# Patient Record
Sex: FEMALE | Race: WHITE | NOT HISPANIC OR LATINO | Employment: OTHER | URBAN - METROPOLITAN AREA
[De-identification: names, ages, dates, MRNs, and addresses within clinical notes are randomized per-mention and may not be internally consistent; named-entity substitution may affect disease eponyms.]

---

## 2023-09-20 PROBLEM — K42.9 UMBILICAL HERNIA WITHOUT OBSTRUCTION AND WITHOUT GANGRENE: Status: ACTIVE | Noted: 2023-09-20

## 2023-09-20 PROBLEM — I89.0 LYMPHEDEMA: Status: ACTIVE | Noted: 2023-09-20

## 2023-09-20 PROBLEM — M47.816 FACET DEGENERATION OF LUMBAR REGION: Status: ACTIVE | Noted: 2023-09-20

## 2023-09-20 PROBLEM — N28.9 RENAL IMPAIRMENT: Status: ACTIVE | Noted: 2023-09-20

## 2023-09-20 PROBLEM — K44.9 HIATAL HERNIA: Status: ACTIVE | Noted: 2023-09-20

## 2023-09-20 PROBLEM — R60.0 LOWER LEG EDEMA: Status: ACTIVE | Noted: 2023-09-20

## 2023-09-20 PROBLEM — H91.90 HEARING LOSS: Status: ACTIVE | Noted: 2023-09-20

## 2023-09-20 PROBLEM — K43.9 EPIGASTRIC HERNIA: Status: ACTIVE | Noted: 2023-09-20

## 2023-09-20 PROBLEM — H92.02 OTALGIA, LEFT: Status: ACTIVE | Noted: 2023-09-20

## 2023-09-20 PROBLEM — M25.511 ARTHRALGIA OF SHOULDER REGION, RIGHT: Status: ACTIVE | Noted: 2023-09-20

## 2023-09-20 PROBLEM — Z86.711 HISTORY OF PULMONARY EMBOLUS (PE): Status: ACTIVE | Noted: 2023-09-20

## 2023-09-20 RX ORDER — FLUTICASONE PROPIONATE 50 MCG
1 SPRAY, SUSPENSION (ML) NASAL DAILY
COMMUNITY

## 2023-09-20 RX ORDER — ALBUTEROL SULFATE 90 UG/1
2 AEROSOL, METERED RESPIRATORY (INHALATION) EVERY 4 HOURS PRN
COMMUNITY

## 2023-09-20 RX ORDER — ACETAMINOPHEN 325 MG/1
3 TABLET ORAL EVERY 8 HOURS
COMMUNITY

## 2023-09-20 RX ORDER — IPRATROPIUM BROMIDE AND ALBUTEROL SULFATE 2.5; .5 MG/3ML; MG/3ML
3 SOLUTION RESPIRATORY (INHALATION) EVERY 4 HOURS PRN
COMMUNITY

## 2023-09-20 RX ORDER — FLUTICASONE PROPIONATE AND SALMETEROL 250; 50 UG/1; UG/1
1 POWDER RESPIRATORY (INHALATION) 2 TIMES DAILY
COMMUNITY
Start: 2023-08-31 | End: 2024-02-08 | Stop reason: WASHOUT

## 2023-09-20 RX ORDER — MONTELUKAST SODIUM 10 MG/1
1 TABLET ORAL DAILY
COMMUNITY

## 2023-09-20 RX ORDER — GABAPENTIN 600 MG/1
600 TABLET ORAL 3 TIMES DAILY
COMMUNITY

## 2023-09-20 RX ORDER — LANOLIN ALCOHOL/MO/W.PET/CERES
1 CREAM (GRAM) TOPICAL DAILY
COMMUNITY
End: 2024-02-08 | Stop reason: WASHOUT

## 2023-09-20 RX ORDER — MONTELUKAST SODIUM 4 MG/1
1 TABLET, CHEWABLE ORAL DAILY
COMMUNITY
End: 2024-02-08 | Stop reason: WASHOUT

## 2023-09-20 RX ORDER — LEVOTHYROXINE SODIUM 150 UG/1
1 TABLET ORAL DAILY
COMMUNITY

## 2023-09-20 RX ORDER — METOPROLOL SUCCINATE 50 MG/1
50 TABLET, EXTENDED RELEASE ORAL DAILY
COMMUNITY
Start: 2023-08-31 | End: 2024-02-08 | Stop reason: WASHOUT

## 2023-09-20 RX ORDER — TRAMADOL HYDROCHLORIDE 50 MG/1
50 TABLET ORAL EVERY 6 HOURS PRN
COMMUNITY
Start: 2023-08-31 | End: 2024-03-03 | Stop reason: HOSPADM

## 2023-09-20 RX ORDER — CHOLECALCIFEROL (VITAMIN D3) 125 MCG
2000 TABLET ORAL DAILY
COMMUNITY

## 2023-09-20 RX ORDER — LORATADINE 10 MG/1
10 TABLET ORAL DAILY
COMMUNITY

## 2023-09-20 RX ORDER — TORSEMIDE 10 MG/1
TABLET ORAL
Status: ON HOLD | COMMUNITY
End: 2024-03-01 | Stop reason: SDUPTHER

## 2023-09-20 RX ORDER — CYANOCOBALAMIN (VITAMIN B-12) 500 MCG
125 TABLET ORAL
COMMUNITY
End: 2024-03-04 | Stop reason: ENTERED-IN-ERROR

## 2023-09-20 RX ORDER — OMEPRAZOLE 40 MG/1
40 CAPSULE, DELAYED RELEASE ORAL EVERY MORNING
COMMUNITY
End: 2024-03-08 | Stop reason: HOSPADM

## 2023-10-26 ENCOUNTER — APPOINTMENT (OUTPATIENT)
Dept: ORTHOPEDIC SURGERY | Facility: CLINIC | Age: 83
End: 2023-10-26
Payer: MEDICARE

## 2023-11-15 ENCOUNTER — APPOINTMENT (OUTPATIENT)
Dept: RADIOLOGY | Facility: HOSPITAL | Age: 83
DRG: 163 | End: 2023-11-15
Payer: MEDICARE

## 2023-11-15 ENCOUNTER — APPOINTMENT (OUTPATIENT)
Dept: CARDIOLOGY | Facility: HOSPITAL | Age: 83
DRG: 163 | End: 2023-11-15
Payer: MEDICARE

## 2023-11-15 ENCOUNTER — APPOINTMENT (OUTPATIENT)
Dept: VASCULAR MEDICINE | Facility: HOSPITAL | Age: 83
DRG: 163 | End: 2023-11-15
Payer: MEDICARE

## 2023-11-15 ENCOUNTER — HOSPITAL ENCOUNTER (INPATIENT)
Facility: HOSPITAL | Age: 83
LOS: 13 days | Discharge: NURSING, REHAB OR LONG-TERM CARE | DRG: 163 | End: 2023-11-28
Attending: STUDENT IN AN ORGANIZED HEALTH CARE EDUCATION/TRAINING PROGRAM | Admitting: INTERNAL MEDICINE
Payer: MEDICARE

## 2023-11-15 DIAGNOSIS — N28.9 RENAL IMPAIRMENT: ICD-10-CM

## 2023-11-15 DIAGNOSIS — I26.09 OTHER PULMONARY EMBOLISM WITH ACUTE COR PULMONALE (MULTI): ICD-10-CM

## 2023-11-15 DIAGNOSIS — R60.1 GENERALIZED EDEMA: ICD-10-CM

## 2023-11-15 DIAGNOSIS — Z86.711 HISTORY OF PULMONARY EMBOLISM: ICD-10-CM

## 2023-11-15 DIAGNOSIS — I26.92 ACUTE SADDLE PULMONARY EMBOLISM, UNSPECIFIED WHETHER ACUTE COR PULMONALE PRESENT (MULTI): Primary | ICD-10-CM

## 2023-11-15 DIAGNOSIS — I26.99 PULMONARY EMBOLISM (MULTI): ICD-10-CM

## 2023-11-15 DIAGNOSIS — I63.9 ACUTE STROKE DUE TO ISCHEMIA (MULTI): ICD-10-CM

## 2023-11-15 DIAGNOSIS — N17.9 ACUTE KIDNEY INJURY (CMS-HCC): ICD-10-CM

## 2023-11-15 DIAGNOSIS — R06.09 OTHER FORMS OF DYSPNEA: ICD-10-CM

## 2023-11-15 LAB
ALBUMIN SERPL BCP-MCNC: 3.6 G/DL (ref 3.4–5)
ALP SERPL-CCNC: 90 U/L (ref 33–136)
ALT SERPL W P-5'-P-CCNC: 8 U/L (ref 7–45)
ANION GAP BLDA CALCULATED.4IONS-SCNC: 8 MMO/L (ref 10–25)
ANION GAP BLDA CALCULATED.4IONS-SCNC: 9 MMO/L (ref 10–25)
ANION GAP BLDV CALCULATED.4IONS-SCNC: 9 MMOL/L (ref 10–25)
ANION GAP SERPL CALC-SCNC: 17 MMOL/L (ref 10–20)
ANION GAP SERPL CALC-SCNC: 19 MMOL/L (ref 10–20)
APPARATUS: ABNORMAL
APTT PPP: 33 SECONDS (ref 27–38)
AST SERPL W P-5'-P-CCNC: 16 U/L (ref 9–39)
BASE EXCESS BLDA CALC-SCNC: -0.7 MMOL/L (ref -2–3)
BASE EXCESS BLDA CALC-SCNC: -1.8 MMOL/L (ref -2–3)
BASE EXCESS BLDMV CALC-SCNC: -1.6 MMOL/L (ref -2–3)
BASE EXCESS BLDMV CALC-SCNC: 0.9 MMOL/L (ref -2–3)
BASE EXCESS BLDMV CALC-SCNC: 2.9 MMOL/L (ref -2–3)
BASE EXCESS BLDV CALC-SCNC: 0.8 MMOL/L (ref -2–3)
BASOPHILS # BLD AUTO: 0.05 X10*3/UL (ref 0–0.1)
BASOPHILS NFR BLD AUTO: 0.5 %
BILIRUB SERPL-MCNC: 0.8 MG/DL (ref 0–1.2)
BNP SERPL-MCNC: 1683 PG/ML (ref 0–99)
BODY TEMPERATURE: 37 DEGREES CELSIUS
BUN SERPL-MCNC: 29 MG/DL (ref 6–23)
BUN SERPL-MCNC: 35 MG/DL (ref 6–23)
CA-I BLDA-SCNC: 1.19 MMOL/L (ref 1.1–1.33)
CA-I BLDA-SCNC: 1.23 MMOL/L (ref 1.1–1.33)
CA-I BLDV-SCNC: 1.19 MMOL/L (ref 1.1–1.33)
CALCIUM SERPL-MCNC: 8.5 MG/DL (ref 8.6–10.3)
CALCIUM SERPL-MCNC: 9.5 MG/DL (ref 8.6–10.3)
CARDIAC TROPONIN I PNL SERPL HS: 1116 NG/L (ref 0–13)
CARDIAC TROPONIN I PNL SERPL HS: 1272 NG/L (ref 0–13)
CHLORIDE BLDA-SCNC: 105 MMOL/L (ref 98–107)
CHLORIDE BLDA-SCNC: 107 MMOL/L (ref 98–107)
CHLORIDE BLDV-SCNC: 103 MMOL/L (ref 98–107)
CHLORIDE SERPL-SCNC: 101 MMOL/L (ref 98–107)
CHLORIDE SERPL-SCNC: 103 MMOL/L (ref 98–107)
CHOLEST SERPL-MCNC: 112 MG/DL (ref 0–199)
CHOLESTEROL/HDL RATIO: 3.6
CO2 SERPL-SCNC: 23 MMOL/L (ref 21–32)
CO2 SERPL-SCNC: 23 MMOL/L (ref 21–32)
CREAT SERPL-MCNC: 2.21 MG/DL (ref 0.5–1.05)
CREAT SERPL-MCNC: 2.58 MG/DL (ref 0.5–1.05)
EOSINOPHIL # BLD AUTO: 0.15 X10*3/UL (ref 0–0.4)
EOSINOPHIL NFR BLD AUTO: 1.5 %
ERYTHROCYTE [DISTWIDTH] IN BLOOD BY AUTOMATED COUNT: 15.8 % (ref 11.5–14.5)
ERYTHROCYTE [DISTWIDTH] IN BLOOD BY AUTOMATED COUNT: 15.9 % (ref 11.5–14.5)
EST. AVERAGE GLUCOSE BLD GHB EST-MCNC: 128 MG/DL
FLOW: 15 LPM
FLOW: 60 LPM
FLOW: 60 LPM
FLUAV RNA RESP QL NAA+PROBE: NOT DETECTED
FLUBV RNA RESP QL NAA+PROBE: NOT DETECTED
GFR SERPL CREATININE-BSD FRML MDRD: 18 ML/MIN/1.73M*2
GFR SERPL CREATININE-BSD FRML MDRD: 22 ML/MIN/1.73M*2
GLUCOSE BLD MANUAL STRIP-MCNC: 125 MG/DL (ref 74–99)
GLUCOSE BLD MANUAL STRIP-MCNC: 134 MG/DL (ref 74–99)
GLUCOSE BLDA-MCNC: 149 MG/DL (ref 74–99)
GLUCOSE BLDA-MCNC: 150 MG/DL (ref 74–99)
GLUCOSE BLDV-MCNC: 125 MG/DL (ref 74–99)
GLUCOSE SERPL-MCNC: 119 MG/DL (ref 74–99)
GLUCOSE SERPL-MCNC: 133 MG/DL (ref 74–99)
HBA1C MFR BLD: 6.1 %
HCO3 BLDA-SCNC: 23.7 MMOL/L (ref 22–26)
HCO3 BLDA-SCNC: 24.2 MMOL/L (ref 22–26)
HCO3 BLDMV-SCNC: 25.6 MMOL/L (ref 22–26)
HCO3 BLDMV-SCNC: 27.5 MMOL/L (ref 22–26)
HCO3 BLDMV-SCNC: 29.8 MMOL/L (ref 22–26)
HCO3 BLDV-SCNC: 29.1 MMOL/L (ref 22–26)
HCT VFR BLD AUTO: 39.2 % (ref 36–46)
HCT VFR BLD AUTO: 48.7 % (ref 36–46)
HCT VFR BLD EST: 40 % (ref 36–46)
HCT VFR BLD EST: 41 % (ref 36–46)
HCT VFR BLD EST: 45 % (ref 36–46)
HDLC SERPL-MCNC: 30.7 MG/DL
HGB BLD-MCNC: 12.3 G/DL (ref 12–16)
HGB BLD-MCNC: 15.1 G/DL (ref 12–16)
HGB BLDA-MCNC: 13.3 G/DL (ref 12–16)
HGB BLDA-MCNC: 15 G/DL (ref 12–16)
HGB BLDV-MCNC: 13.7 G/DL (ref 12–16)
HOLD SPECIMEN: NORMAL
HOLD SPECIMEN: NORMAL
IMM GRANULOCYTES # BLD AUTO: 0.07 X10*3/UL (ref 0–0.5)
IMM GRANULOCYTES NFR BLD AUTO: 0.7 % (ref 0–0.9)
INHALED O2 CONCENTRATION: 100 %
INHALED O2 CONCENTRATION: 80 %
INR PPP: 1.3 (ref 0.9–1.1)
LACTATE BLDA-SCNC: 2.3 MMOL/L (ref 0.4–2)
LACTATE BLDA-SCNC: 2.7 MMOL/L (ref 0.4–2)
LACTATE BLDV-SCNC: 3.4 MMOL/L (ref 0.4–2)
LDLC SERPL CALC-MCNC: 53 MG/DL
LYMPHOCYTES # BLD AUTO: 2.56 X10*3/UL (ref 0.8–3)
LYMPHOCYTES NFR BLD AUTO: 24.9 %
MAGNESIUM SERPL-MCNC: 2.2 MG/DL (ref 1.6–2.4)
MCH RBC QN AUTO: 30.7 PG (ref 26–34)
MCH RBC QN AUTO: 31 PG (ref 26–34)
MCHC RBC AUTO-ENTMCNC: 31 G/DL (ref 32–36)
MCHC RBC AUTO-ENTMCNC: 31.4 G/DL (ref 32–36)
MCV RBC AUTO: 99 FL (ref 80–100)
MCV RBC AUTO: 99 FL (ref 80–100)
MONOCYTES # BLD AUTO: 0.96 X10*3/UL (ref 0.05–0.8)
MONOCYTES NFR BLD AUTO: 9.3 %
NEUTROPHILS # BLD AUTO: 6.5 X10*3/UL (ref 1.6–5.5)
NEUTROPHILS NFR BLD AUTO: 63.1 %
NON HDL CHOLESTEROL: 81 MG/DL (ref 0–149)
NRBC BLD-RTO: 0.4 /100 WBCS (ref 0–0)
NRBC BLD-RTO: 0.5 /100 WBCS (ref 0–0)
OXYHGB MFR BLDA: 79.2 % (ref 94–98)
OXYHGB MFR BLDA: 97.6 % (ref 94–98)
OXYHGB MFR BLDMV: 41.6 % (ref 45–75)
OXYHGB MFR BLDMV: 43.2 % (ref 45–75)
OXYHGB MFR BLDMV: 51.1 % (ref 45–75)
OXYHGB MFR BLDV: 33.5 % (ref 45–75)
PCO2 BLDA: 40 MM HG (ref 38–42)
PCO2 BLDA: 42 MM HG (ref 38–42)
PCO2 BLDMV: 51 MM HG (ref 41–51)
PCO2 BLDMV: 52 MM HG (ref 41–51)
PCO2 BLDMV: 54 MM HG (ref 41–51)
PCO2 BLDV: 62 MM HG (ref 41–51)
PH BLDA: 7.36 PH (ref 7.38–7.42)
PH BLDA: 7.39 PH (ref 7.38–7.42)
PH BLDMV: 7.3 PH (ref 7.33–7.43)
PH BLDMV: 7.34 PH (ref 7.33–7.43)
PH BLDMV: 7.35 PH (ref 7.33–7.43)
PH BLDV: 7.28 PH (ref 7.33–7.43)
PLATELET # BLD AUTO: 157 X10*3/UL (ref 150–450)
PLATELET # BLD AUTO: 175 X10*3/UL (ref 150–450)
PO2 BLDA: 111 MM HG (ref 85–95)
PO2 BLDA: 53 MM HG (ref 85–95)
PO2 BLDMV: 30 MM HG (ref 35–45)
PO2 BLDMV: 32 MM HG (ref 35–45)
PO2 BLDMV: 37 MM HG (ref 35–45)
PO2 BLDV: 30 MM HG (ref 35–45)
POCT GLUCOSE: 134 MG/DL (ref 74–99)
POTASSIUM BLDA-SCNC: 4.6 MMOL/L (ref 3.5–5.3)
POTASSIUM BLDA-SCNC: 5.1 MMOL/L (ref 3.5–5.3)
POTASSIUM BLDV-SCNC: 4.7 MMOL/L (ref 3.5–5.3)
POTASSIUM SERPL-SCNC: 4.5 MMOL/L (ref 3.5–5.3)
POTASSIUM SERPL-SCNC: 5.1 MMOL/L (ref 3.5–5.3)
PROT SERPL-MCNC: 7.5 G/DL (ref 6.4–8.2)
PROTHROMBIN TIME: 14.6 SECONDS (ref 9.8–12.8)
RBC # BLD AUTO: 3.97 X10*6/UL (ref 4–5.2)
RBC # BLD AUTO: 4.92 X10*6/UL (ref 4–5.2)
SAO2 % BLDA: 100 % (ref 94–100)
SAO2 % BLDA: 82 % (ref 94–100)
SAO2 % BLDMV: 42 % (ref 45–75)
SAO2 % BLDMV: 44 % (ref 45–75)
SAO2 % BLDMV: 52 % (ref 45–75)
SAO2 % BLDV: 34 % (ref 45–75)
SARS-COV-2 RNA RESP QL NAA+PROBE: NOT DETECTED
SODIUM BLDA-SCNC: 134 MMOL/L (ref 136–145)
SODIUM BLDA-SCNC: 134 MMOL/L (ref 136–145)
SODIUM BLDV-SCNC: 136 MMOL/L (ref 136–145)
SODIUM SERPL-SCNC: 138 MMOL/L (ref 136–145)
SODIUM SERPL-SCNC: 138 MMOL/L (ref 136–145)
TRIGL SERPL-MCNC: 142 MG/DL (ref 0–149)
UFH PPP CHRO-ACNC: 0.1 IU/ML
UFH PPP CHRO-ACNC: 0.7 IU/ML
UFH PPP CHRO-ACNC: 1.3 IU/ML
VLDL: 28 MG/DL (ref 0–40)
WBC # BLD AUTO: 10.3 X10*3/UL (ref 4.4–11.3)
WBC # BLD AUTO: 12.8 X10*3/UL (ref 4.4–11.3)

## 2023-11-15 PROCEDURE — 71045 X-RAY EXAM CHEST 1 VIEW: CPT | Mod: FR

## 2023-11-15 PROCEDURE — C1769 GUIDE WIRE: HCPCS | Performed by: INTERNAL MEDICINE

## 2023-11-15 PROCEDURE — 2500000002 HC RX 250 W HCPCS SELF ADMINISTERED DRUGS (ALT 637 FOR MEDICARE OP, ALT 636 FOR OP/ED): Performed by: STUDENT IN AN ORGANIZED HEALTH CARE EDUCATION/TRAINING PROGRAM

## 2023-11-15 PROCEDURE — 2550000001 HC RX 255 CONTRASTS: Performed by: INTERNAL MEDICINE

## 2023-11-15 PROCEDURE — 71275 CT ANGIOGRAPHY CHEST: CPT | Mod: FR

## 2023-11-15 PROCEDURE — 82947 ASSAY GLUCOSE BLOOD QUANT: CPT

## 2023-11-15 PROCEDURE — 36415 COLL VENOUS BLD VENIPUNCTURE: CPT | Performed by: NURSE PRACTITIONER

## 2023-11-15 PROCEDURE — 96365 THER/PROPH/DIAG IV INF INIT: CPT | Mod: 59

## 2023-11-15 PROCEDURE — 2720000007 HC OR 272 NO HCPCS: Performed by: INTERNAL MEDICINE

## 2023-11-15 PROCEDURE — 85027 COMPLETE CBC AUTOMATED: CPT | Performed by: STUDENT IN AN ORGANIZED HEALTH CARE EDUCATION/TRAINING PROGRAM

## 2023-11-15 PROCEDURE — 99223 1ST HOSP IP/OBS HIGH 75: CPT | Performed by: PSYCHIATRY & NEUROLOGY

## 2023-11-15 PROCEDURE — 70450 CT HEAD/BRAIN W/O DYE: CPT | Performed by: SURGERY

## 2023-11-15 PROCEDURE — 85520 HEPARIN ASSAY: CPT | Performed by: NURSE PRACTITIONER

## 2023-11-15 PROCEDURE — 99153 MOD SED SAME PHYS/QHP EA: CPT | Performed by: INTERNAL MEDICINE

## 2023-11-15 PROCEDURE — 87636 SARSCOV2 & INF A&B AMP PRB: CPT | Performed by: STUDENT IN AN ORGANIZED HEALTH CARE EDUCATION/TRAINING PROGRAM

## 2023-11-15 PROCEDURE — 37185 PRIM ART M-THRMBC SBSQ VSL: CPT | Performed by: INTERNAL MEDICINE

## 2023-11-15 PROCEDURE — 75743 ARTERY X-RAYS LUNGS: CPT | Performed by: INTERNAL MEDICINE

## 2023-11-15 PROCEDURE — 96360 HYDRATION IV INFUSION INIT: CPT | Mod: 59

## 2023-11-15 PROCEDURE — 82805 BLOOD GASES W/O2 SATURATION: CPT

## 2023-11-15 PROCEDURE — 70496 CT ANGIOGRAPHY HEAD: CPT

## 2023-11-15 PROCEDURE — 84295 ASSAY OF SERUM SODIUM: CPT | Performed by: STUDENT IN AN ORGANIZED HEALTH CARE EDUCATION/TRAINING PROGRAM

## 2023-11-15 PROCEDURE — 2500000005 HC RX 250 GENERAL PHARMACY W/O HCPCS: Performed by: INTERNAL MEDICINE

## 2023-11-15 PROCEDURE — 93970 EXTREMITY STUDY: CPT

## 2023-11-15 PROCEDURE — 82805 BLOOD GASES W/O2 SATURATION: CPT | Performed by: STUDENT IN AN ORGANIZED HEALTH CARE EDUCATION/TRAINING PROGRAM

## 2023-11-15 PROCEDURE — C9113 INJ PANTOPRAZOLE SODIUM, VIA: HCPCS | Performed by: STUDENT IN AN ORGANIZED HEALTH CARE EDUCATION/TRAINING PROGRAM

## 2023-11-15 PROCEDURE — 2500000004 HC RX 250 GENERAL PHARMACY W/ HCPCS (ALT 636 FOR OP/ED): Performed by: NURSE PRACTITIONER

## 2023-11-15 PROCEDURE — 71045 X-RAY EXAM CHEST 1 VIEW: CPT | Mod: FOREIGN READ | Performed by: RADIOLOGY

## 2023-11-15 PROCEDURE — 99152 MOD SED SAME PHYS/QHP 5/>YRS: CPT | Performed by: INTERNAL MEDICINE

## 2023-11-15 PROCEDURE — 71275 CT ANGIOGRAPHY CHEST: CPT

## 2023-11-15 PROCEDURE — 93970 EXTREMITY STUDY: CPT | Performed by: INTERNAL MEDICINE

## 2023-11-15 PROCEDURE — 82435 ASSAY OF BLOOD CHLORIDE: CPT | Performed by: STUDENT IN AN ORGANIZED HEALTH CARE EDUCATION/TRAINING PROGRAM

## 2023-11-15 PROCEDURE — 85610 PROTHROMBIN TIME: CPT | Performed by: STUDENT IN AN ORGANIZED HEALTH CARE EDUCATION/TRAINING PROGRAM

## 2023-11-15 PROCEDURE — 93306 TTE W/DOPPLER COMPLETE: CPT

## 2023-11-15 PROCEDURE — 2020000001 HC ICU ROOM DAILY

## 2023-11-15 PROCEDURE — 99223 1ST HOSP IP/OBS HIGH 75: CPT | Performed by: INTERNAL MEDICINE

## 2023-11-15 PROCEDURE — 82947 ASSAY GLUCOSE BLOOD QUANT: CPT | Performed by: STUDENT IN AN ORGANIZED HEALTH CARE EDUCATION/TRAINING PROGRAM

## 2023-11-15 PROCEDURE — B31T1ZZ FLUOROSCOPY OF LEFT PULMONARY ARTERY USING LOW OSMOLAR CONTRAST: ICD-10-PCS | Performed by: INTERNAL MEDICINE

## 2023-11-15 PROCEDURE — 84484 ASSAY OF TROPONIN QUANT: CPT | Performed by: STUDENT IN AN ORGANIZED HEALTH CARE EDUCATION/TRAINING PROGRAM

## 2023-11-15 PROCEDURE — 2500000004 HC RX 250 GENERAL PHARMACY W/ HCPCS (ALT 636 FOR OP/ED): Performed by: STUDENT IN AN ORGANIZED HEALTH CARE EDUCATION/TRAINING PROGRAM

## 2023-11-15 PROCEDURE — 93306 TTE W/DOPPLER COMPLETE: CPT | Performed by: STUDENT IN AN ORGANIZED HEALTH CARE EDUCATION/TRAINING PROGRAM

## 2023-11-15 PROCEDURE — 36600 WITHDRAWAL OF ARTERIAL BLOOD: CPT

## 2023-11-15 PROCEDURE — 96374 THER/PROPH/DIAG INJ IV PUSH: CPT | Mod: 59

## 2023-11-15 PROCEDURE — 36415 COLL VENOUS BLD VENIPUNCTURE: CPT | Performed by: STUDENT IN AN ORGANIZED HEALTH CARE EDUCATION/TRAINING PROGRAM

## 2023-11-15 PROCEDURE — 70450 CT HEAD/BRAIN W/O DYE: CPT

## 2023-11-15 PROCEDURE — 83036 HEMOGLOBIN GLYCOSYLATED A1C: CPT | Performed by: STUDENT IN AN ORGANIZED HEALTH CARE EDUCATION/TRAINING PROGRAM

## 2023-11-15 PROCEDURE — 02CR3ZZ EXTIRPATION OF MATTER FROM LEFT PULMONARY ARTERY, PERCUTANEOUS APPROACH: ICD-10-PCS | Performed by: INTERNAL MEDICINE

## 2023-11-15 PROCEDURE — 83880 ASSAY OF NATRIURETIC PEPTIDE: CPT | Performed by: STUDENT IN AN ORGANIZED HEALTH CARE EDUCATION/TRAINING PROGRAM

## 2023-11-15 PROCEDURE — 70496 CT ANGIOGRAPHY HEAD: CPT | Performed by: SURGERY

## 2023-11-15 PROCEDURE — 82330 ASSAY OF CALCIUM: CPT | Performed by: STUDENT IN AN ORGANIZED HEALTH CARE EDUCATION/TRAINING PROGRAM

## 2023-11-15 PROCEDURE — 36014 PLACE CATHETER IN ARTERY: CPT | Performed by: INTERNAL MEDICINE

## 2023-11-15 PROCEDURE — 94640 AIRWAY INHALATION TREATMENT: CPT

## 2023-11-15 PROCEDURE — 36015 PLACE CATHETER IN ARTERY: CPT | Performed by: INTERNAL MEDICINE

## 2023-11-15 PROCEDURE — 37184 PRIM ART M-THRMBC 1ST VSL: CPT | Performed by: INTERNAL MEDICINE

## 2023-11-15 PROCEDURE — 2550000001 HC RX 255 CONTRASTS: Performed by: STUDENT IN AN ORGANIZED HEALTH CARE EDUCATION/TRAINING PROGRAM

## 2023-11-15 PROCEDURE — 99291 CRITICAL CARE FIRST HOUR: CPT | Performed by: INTERNAL MEDICINE

## 2023-11-15 PROCEDURE — 80061 LIPID PANEL: CPT | Performed by: STUDENT IN AN ORGANIZED HEALTH CARE EDUCATION/TRAINING PROGRAM

## 2023-11-15 PROCEDURE — 71275 CT ANGIOGRAPHY CHEST: CPT | Mod: FOREIGN READ | Performed by: RADIOLOGY

## 2023-11-15 PROCEDURE — 2500000004 HC RX 250 GENERAL PHARMACY W/ HCPCS (ALT 636 FOR OP/ED): Performed by: INTERNAL MEDICINE

## 2023-11-15 PROCEDURE — 76937 US GUIDE VASCULAR ACCESS: CPT

## 2023-11-15 PROCEDURE — 96375 TX/PRO/DX INJ NEW DRUG ADDON: CPT | Mod: 59

## 2023-11-15 PROCEDURE — 83605 ASSAY OF LACTIC ACID: CPT

## 2023-11-15 PROCEDURE — 94660 CPAP INITIATION&MGMT: CPT

## 2023-11-15 PROCEDURE — 85730 THROMBOPLASTIN TIME PARTIAL: CPT | Performed by: STUDENT IN AN ORGANIZED HEALTH CARE EDUCATION/TRAINING PROGRAM

## 2023-11-15 PROCEDURE — 85025 COMPLETE CBC W/AUTO DIFF WBC: CPT | Performed by: STUDENT IN AN ORGANIZED HEALTH CARE EDUCATION/TRAINING PROGRAM

## 2023-11-15 PROCEDURE — 85520 HEPARIN ASSAY: CPT | Performed by: STUDENT IN AN ORGANIZED HEALTH CARE EDUCATION/TRAINING PROGRAM

## 2023-11-15 PROCEDURE — 99291 CRITICAL CARE FIRST HOUR: CPT | Performed by: STUDENT IN AN ORGANIZED HEALTH CARE EDUCATION/TRAINING PROGRAM

## 2023-11-15 PROCEDURE — 2700000047 HC OR 270 NO HCPCS: Performed by: INTERNAL MEDICINE

## 2023-11-15 PROCEDURE — 70498 CT ANGIOGRAPHY NECK: CPT | Performed by: SURGERY

## 2023-11-15 PROCEDURE — 80048 BASIC METABOLIC PNL TOTAL CA: CPT | Mod: CCI | Performed by: STUDENT IN AN ORGANIZED HEALTH CARE EDUCATION/TRAINING PROGRAM

## 2023-11-15 PROCEDURE — 83735 ASSAY OF MAGNESIUM: CPT | Performed by: STUDENT IN AN ORGANIZED HEALTH CARE EDUCATION/TRAINING PROGRAM

## 2023-11-15 PROCEDURE — 85018 HEMOGLOBIN: CPT | Performed by: STUDENT IN AN ORGANIZED HEALTH CARE EDUCATION/TRAINING PROGRAM

## 2023-11-15 PROCEDURE — 02CQ3ZZ EXTIRPATION OF MATTER FROM RIGHT PULMONARY ARTERY, PERCUTANEOUS APPROACH: ICD-10-PCS | Performed by: INTERNAL MEDICINE

## 2023-11-15 PROCEDURE — 85520 HEPARIN ASSAY: CPT | Performed by: INTERNAL MEDICINE

## 2023-11-15 PROCEDURE — 70450 CT HEAD/BRAIN W/O DYE: CPT | Performed by: STUDENT IN AN ORGANIZED HEALTH CARE EDUCATION/TRAINING PROGRAM

## 2023-11-15 PROCEDURE — B31S1ZZ FLUOROSCOPY OF RIGHT PULMONARY ARTERY USING LOW OSMOLAR CONTRAST: ICD-10-PCS | Performed by: INTERNAL MEDICINE

## 2023-11-15 PROCEDURE — 70498 CT ANGIOGRAPHY NECK: CPT

## 2023-11-15 RX ORDER — LIDOCAINE HYDROCHLORIDE 20 MG/ML
INJECTION, SOLUTION INFILTRATION; PERINEURAL AS NEEDED
Status: DISCONTINUED | OUTPATIENT
Start: 2023-11-15 | End: 2023-11-15 | Stop reason: HOSPADM

## 2023-11-15 RX ORDER — HYDRALAZINE HYDROCHLORIDE 20 MG/ML
10 INJECTION INTRAMUSCULAR; INTRAVENOUS
Status: DISCONTINUED | OUTPATIENT
Start: 2023-11-15 | End: 2023-11-17

## 2023-11-15 RX ORDER — HEPARIN SODIUM 5000 [USP'U]/ML
2000-4000 INJECTION, SOLUTION INTRAVENOUS; SUBCUTANEOUS EVERY 4 HOURS PRN
Status: DISCONTINUED | OUTPATIENT
Start: 2023-11-15 | End: 2023-11-15

## 2023-11-15 RX ORDER — FLUTICASONE PROPIONATE 50 MCG
2 SPRAY, SUSPENSION (ML) NASAL DAILY
Status: DISCONTINUED | OUTPATIENT
Start: 2023-11-15 | End: 2023-11-28 | Stop reason: HOSPADM

## 2023-11-15 RX ORDER — SODIUM CHLORIDE, SODIUM LACTATE, POTASSIUM CHLORIDE, CALCIUM CHLORIDE 600; 310; 30; 20 MG/100ML; MG/100ML; MG/100ML; MG/100ML
75 INJECTION, SOLUTION INTRAVENOUS CONTINUOUS
Status: DISCONTINUED | OUTPATIENT
Start: 2023-11-15 | End: 2023-11-18

## 2023-11-15 RX ORDER — HEPARIN SODIUM 5000 [USP'U]/ML
3000-6000 INJECTION, SOLUTION INTRAVENOUS; SUBCUTANEOUS EVERY 4 HOURS PRN
Status: DISCONTINUED | OUTPATIENT
Start: 2023-11-15 | End: 2023-11-16

## 2023-11-15 RX ORDER — NAPROXEN SODIUM 220 MG/1
324 TABLET, FILM COATED ORAL ONCE
Status: DISCONTINUED | OUTPATIENT
Start: 2023-11-15 | End: 2023-11-16

## 2023-11-15 RX ORDER — POLYETHYLENE GLYCOL 3350 17 G/17G
17 POWDER, FOR SOLUTION ORAL DAILY
Status: DISCONTINUED | OUTPATIENT
Start: 2023-11-15 | End: 2023-11-28 | Stop reason: HOSPADM

## 2023-11-15 RX ORDER — LABETALOL HYDROCHLORIDE 5 MG/ML
10 INJECTION, SOLUTION INTRAVENOUS EVERY 10 MIN PRN
Status: DISCONTINUED | OUTPATIENT
Start: 2023-11-15 | End: 2023-11-17

## 2023-11-15 RX ORDER — HEPARIN SODIUM 10000 [USP'U]/100ML
0-4000 INJECTION, SOLUTION INTRAVENOUS CONTINUOUS
Status: DISCONTINUED | OUTPATIENT
Start: 2023-11-15 | End: 2023-11-15

## 2023-11-15 RX ORDER — FENTANYL CITRATE 50 UG/ML
INJECTION, SOLUTION INTRAMUSCULAR; INTRAVENOUS AS NEEDED
Status: DISCONTINUED | OUTPATIENT
Start: 2023-11-15 | End: 2023-11-15 | Stop reason: HOSPADM

## 2023-11-15 RX ORDER — ASPIRIN 81 MG/1
81 TABLET ORAL DAILY
Status: DISCONTINUED | OUTPATIENT
Start: 2023-11-16 | End: 2023-11-28 | Stop reason: HOSPADM

## 2023-11-15 RX ORDER — ALBUTEROL SULFATE 0.83 MG/ML
2.5 SOLUTION RESPIRATORY (INHALATION) ONCE
Status: COMPLETED | OUTPATIENT
Start: 2023-11-15 | End: 2023-11-15

## 2023-11-15 RX ORDER — HYDRALAZINE HYDROCHLORIDE 25 MG/1
25 TABLET, FILM COATED ORAL EVERY 6 HOURS PRN
Status: DISCONTINUED | OUTPATIENT
Start: 2023-11-17 | End: 2023-11-17

## 2023-11-15 RX ORDER — MAGNESIUM SULFATE HEPTAHYDRATE 40 MG/ML
2 INJECTION, SOLUTION INTRAVENOUS ONCE
Status: COMPLETED | OUTPATIENT
Start: 2023-11-15 | End: 2023-11-15

## 2023-11-15 RX ORDER — HEPARIN SODIUM 1000 [USP'U]/ML
INJECTION, SOLUTION INTRAVENOUS; SUBCUTANEOUS AS NEEDED
Status: DISCONTINUED | OUTPATIENT
Start: 2023-11-15 | End: 2023-11-15 | Stop reason: HOSPADM

## 2023-11-15 RX ORDER — IPRATROPIUM BROMIDE AND ALBUTEROL SULFATE 2.5; .5 MG/3ML; MG/3ML
3 SOLUTION RESPIRATORY (INHALATION) ONCE
Status: COMPLETED | OUTPATIENT
Start: 2023-11-15 | End: 2023-11-15

## 2023-11-15 RX ORDER — SODIUM CHLORIDE 9 MG/ML
INJECTION, SOLUTION INTRAVENOUS CONTINUOUS PRN
Status: COMPLETED | OUTPATIENT
Start: 2023-11-15 | End: 2023-11-15

## 2023-11-15 RX ORDER — HEPARIN SODIUM 5000 [USP'U]/ML
80 INJECTION, SOLUTION INTRAVENOUS; SUBCUTANEOUS ONCE
Status: COMPLETED | OUTPATIENT
Start: 2023-11-15 | End: 2023-11-15

## 2023-11-15 RX ORDER — PANTOPRAZOLE SODIUM 40 MG/10ML
40 INJECTION, POWDER, LYOPHILIZED, FOR SOLUTION INTRAVENOUS DAILY
Status: DISCONTINUED | OUTPATIENT
Start: 2023-11-15 | End: 2023-11-17

## 2023-11-15 RX ORDER — PANTOPRAZOLE SODIUM 40 MG/1
40 TABLET, DELAYED RELEASE ORAL
Status: DISCONTINUED | OUTPATIENT
Start: 2023-11-16 | End: 2023-11-15

## 2023-11-15 RX ORDER — ATORVASTATIN CALCIUM 40 MG/1
40 TABLET, FILM COATED ORAL NIGHTLY
Status: DISCONTINUED | OUTPATIENT
Start: 2023-11-15 | End: 2023-11-28 | Stop reason: HOSPADM

## 2023-11-15 RX ORDER — HEPARIN SODIUM 10000 [USP'U]/100ML
0-4500 INJECTION, SOLUTION INTRAVENOUS CONTINUOUS
Status: DISCONTINUED | OUTPATIENT
Start: 2023-11-15 | End: 2023-11-28 | Stop reason: HOSPADM

## 2023-11-15 RX ORDER — LEVOTHYROXINE SODIUM 150 UG/1
150 TABLET ORAL DAILY
Status: DISCONTINUED | OUTPATIENT
Start: 2023-11-15 | End: 2023-11-16

## 2023-11-15 RX ORDER — IODIXANOL 270 MG/ML
INJECTION, SOLUTION INTRAVASCULAR AS NEEDED
Status: DISCONTINUED | OUTPATIENT
Start: 2023-11-15 | End: 2023-11-15 | Stop reason: HOSPADM

## 2023-11-15 RX ADMIN — IOHEXOL 75 ML: 350 INJECTION, SOLUTION INTRAVENOUS at 06:27

## 2023-11-15 RX ADMIN — ALBUTEROL SULFATE 2.5 MG: 2.5 SOLUTION RESPIRATORY (INHALATION) at 03:30

## 2023-11-15 RX ADMIN — PANTOPRAZOLE SODIUM 40 MG: 40 INJECTION, POWDER, FOR SOLUTION INTRAVENOUS at 15:59

## 2023-11-15 RX ADMIN — IPRATROPIUM BROMIDE AND ALBUTEROL SULFATE 3 ML: .5; 3 SOLUTION RESPIRATORY (INHALATION) at 03:30

## 2023-11-15 RX ADMIN — MAGNESIUM SULFATE HEPTAHYDRATE 2 G: 40 INJECTION, SOLUTION INTRAVENOUS at 05:01

## 2023-11-15 RX ADMIN — FLUTICASONE PROPIONATE 2 SPRAY: 50 SPRAY, METERED NASAL at 20:08

## 2023-11-15 RX ADMIN — SODIUM CHLORIDE 500 ML: 9 INJECTION, SOLUTION INTRAVENOUS at 07:55

## 2023-11-15 RX ADMIN — IOHEXOL 100 ML: 350 INJECTION, SOLUTION INTRAVENOUS at 07:37

## 2023-11-15 RX ADMIN — HEPARIN SODIUM 2000 UNITS/HR: 10000 INJECTION, SOLUTION INTRAVENOUS at 13:07

## 2023-11-15 RX ADMIN — HEPARIN SODIUM 1000 UNITS/HR: 10000 INJECTION, SOLUTION INTRAVENOUS at 05:08

## 2023-11-15 RX ADMIN — IOHEXOL 75 ML: 350 INJECTION, SOLUTION INTRAVENOUS at 04:50

## 2023-11-15 RX ADMIN — HEPARIN SODIUM 1998 UNITS/HR: 10000 INJECTION, SOLUTION INTRAVENOUS at 05:23

## 2023-11-15 RX ADMIN — PERFLUTREN 3 ML OF DILUTION: 6.52 INJECTION, SUSPENSION INTRAVENOUS at 13:49

## 2023-11-15 RX ADMIN — METHYLPREDNISOLONE SODIUM SUCCINATE 125 MG: 125 INJECTION INTRAMUSCULAR; INTRAVENOUS at 05:00

## 2023-11-15 RX ADMIN — SODIUM CHLORIDE, POTASSIUM CHLORIDE, SODIUM LACTATE AND CALCIUM CHLORIDE 75 ML/HR: 600; 310; 30; 20 INJECTION, SOLUTION INTRAVENOUS at 15:55

## 2023-11-15 RX ADMIN — HEPARIN SODIUM 9000 UNITS: 5000 INJECTION INTRAVENOUS; SUBCUTANEOUS at 05:28

## 2023-11-15 SDOH — SOCIAL STABILITY: SOCIAL INSECURITY: DO YOU FEEL UNSAFE GOING BACK TO THE PLACE WHERE YOU ARE LIVING?: NO

## 2023-11-15 SDOH — SOCIAL STABILITY: SOCIAL INSECURITY: HAVE YOU HAD THOUGHTS OF HARMING ANYONE ELSE?: NO

## 2023-11-15 SDOH — SOCIAL STABILITY: SOCIAL INSECURITY: ARE YOU OR HAVE YOU BEEN THREATENED OR ABUSED PHYSICALLY, EMOTIONALLY, OR SEXUALLY BY ANYONE?: NO

## 2023-11-15 SDOH — SOCIAL STABILITY: SOCIAL INSECURITY: WERE YOU ABLE TO COMPLETE ALL THE BEHAVIORAL HEALTH SCREENINGS?: YES

## 2023-11-15 SDOH — SOCIAL STABILITY: SOCIAL INSECURITY: ARE THERE ANY APPARENT SIGNS OF INJURIES/BEHAVIORS THAT COULD BE RELATED TO ABUSE/NEGLECT?: NO

## 2023-11-15 SDOH — SOCIAL STABILITY: SOCIAL INSECURITY: DOES ANYONE TRY TO KEEP YOU FROM HAVING/CONTACTING OTHER FRIENDS OR DOING THINGS OUTSIDE YOUR HOME?: NO

## 2023-11-15 SDOH — SOCIAL STABILITY: SOCIAL INSECURITY: DO YOU FEEL ANYONE HAS EXPLOITED OR TAKEN ADVANTAGE OF YOU FINANCIALLY OR OF YOUR PERSONAL PROPERTY?: NO

## 2023-11-15 SDOH — SOCIAL STABILITY: SOCIAL INSECURITY: ABUSE: ADULT

## 2023-11-15 SDOH — SOCIAL STABILITY: SOCIAL INSECURITY: HAS ANYONE EVER THREATENED TO HURT YOUR FAMILY OR YOUR PETS?: NO

## 2023-11-15 ASSESSMENT — PAIN SCALES - GENERAL
PAINLEVEL_OUTOF10: 0 - NO PAIN

## 2023-11-15 ASSESSMENT — COLUMBIA-SUICIDE SEVERITY RATING SCALE - C-SSRS
6. HAVE YOU EVER DONE ANYTHING, STARTED TO DO ANYTHING, OR PREPARED TO DO ANYTHING TO END YOUR LIFE?: NO
1. IN THE PAST MONTH, HAVE YOU WISHED YOU WERE DEAD OR WISHED YOU COULD GO TO SLEEP AND NOT WAKE UP?: NO
2. HAVE YOU ACTUALLY HAD ANY THOUGHTS OF KILLING YOURSELF?: NO

## 2023-11-15 ASSESSMENT — COGNITIVE AND FUNCTIONAL STATUS - GENERAL
PERSONAL GROOMING: TOTAL
EATING MEALS: TOTAL
DAILY ACTIVITIY SCORE: 6
MOBILITY SCORE: 6
STANDING UP FROM CHAIR USING ARMS: TOTAL
HELP NEEDED FOR BATHING: TOTAL
MOVING TO AND FROM BED TO CHAIR: TOTAL
TURNING FROM BACK TO SIDE WHILE IN FLAT BAD: TOTAL
MOVING FROM LYING ON BACK TO SITTING ON SIDE OF FLAT BED WITH BEDRAILS: TOTAL
WALKING IN HOSPITAL ROOM: TOTAL
DRESSING REGULAR LOWER BODY CLOTHING: TOTAL
PATIENT BASELINE BEDBOUND: NO
CLIMB 3 TO 5 STEPS WITH RAILING: TOTAL
TOILETING: TOTAL
DRESSING REGULAR UPPER BODY CLOTHING: TOTAL

## 2023-11-15 ASSESSMENT — LIFESTYLE VARIABLES
SUBSTANCE_ABUSE_PAST_12_MONTHS: NO
HOW MANY STANDARD DRINKS CONTAINING ALCOHOL DO YOU HAVE ON A TYPICAL DAY: PATIENT DOES NOT DRINK
SKIP TO QUESTIONS 9-10: 1
HOW OFTEN DO YOU HAVE A DRINK CONTAINING ALCOHOL: NEVER
AUDIT-C TOTAL SCORE: 0
REASON UNABLE TO ASSESS: YES
HOW OFTEN DO YOU HAVE 6 OR MORE DRINKS ON ONE OCCASION: NEVER
HAVE YOU EVER FELT YOU SHOULD CUT DOWN ON YOUR DRINKING: NO
HAVE PEOPLE ANNOYED YOU BY CRITICIZING YOUR DRINKING: NO
EVER HAD A DRINK FIRST THING IN THE MORNING TO STEADY YOUR NERVES TO GET RID OF A HANGOVER: NO
EVER FELT BAD OR GUILTY ABOUT YOUR DRINKING: NO
PRESCIPTION_ABUSE_PAST_12_MONTHS: NO
AUDIT-C TOTAL SCORE: 0

## 2023-11-15 ASSESSMENT — PATIENT HEALTH QUESTIONNAIRE - PHQ9
SUM OF ALL RESPONSES TO PHQ9 QUESTIONS 1 & 2: 0
1. LITTLE INTEREST OR PLEASURE IN DOING THINGS: NOT AT ALL
2. FEELING DOWN, DEPRESSED OR HOPELESS: NOT AT ALL

## 2023-11-15 ASSESSMENT — ACTIVITIES OF DAILY LIVING (ADL)
WALKS IN HOME: UNABLE TO ASSESS
ADEQUATE_TO_COMPLETE_ADL: YES
JUDGMENT_ADEQUATE_SAFELY_COMPLETE_DAILY_ACTIVITIES: YES
DRESSING YOURSELF: NEEDS ASSISTANCE
FEEDING YOURSELF: NEEDS ASSISTANCE
HEARING - RIGHT EAR: FUNCTIONAL
PATIENT'S MEMORY ADEQUATE TO SAFELY COMPLETE DAILY ACTIVITIES?: YES
BATHING: DEPENDENT
HEARING - LEFT EAR: FUNCTIONAL
TOILETING: DEPENDENT
GROOMING: NEEDS ASSISTANCE

## 2023-11-15 ASSESSMENT — PAIN - FUNCTIONAL ASSESSMENT
PAIN_FUNCTIONAL_ASSESSMENT: 0-10

## 2023-11-15 NOTE — PROGRESS NOTES
Emergency Medicine Transition of Care Note.    I received Lillian Mendoza in signout from Dr. Can.  Please see the previous ED provider note for all HPI, PE and MDM up to the time of signout at 0700. This is in addition to the primary record.    In brief Lillian Mendoza is an 83 y.o. female presenting for   Chief Complaint   Patient presents with    Stroke     Pt found by granddaughter sitting on toilet slumped over. Pt with facial droop and aphasia. Last known well 0200.     At the time of signout we were awaiting: CT PE and further discussion with PERT team.    Reassessed patient she is now on high flow nasal cannula 70 L.  Maintaining O2 sat 90%.  Patient with facial droop, dysarthria, right arm weakness.  Lungs clear to auscultation my exam.  Distal pulses intact.  Updated patient and family with my review of CT.  PERT team repaged as images are available for CT PE.  ED Course as of 11/15/23 1320   Wed Nov 15, 2023   0304 83-year-old female history of pulmonary embolism not on anticoagulation COPD presents emergency department for strokelike symptoms.  Initially on vital signs patient was stable except for hypoxia she has diffuse wheezing.  Her NIH stroke scale was 9.  She had a left-sided facial droop with dysarthria and aphasia as well as weakness in lower extremities which is around her baseline.  Delta NIH was 5.  Differentials considered include intracranial hemorrhage ischemic stroke.  Differential considered however unlikely as Akshat's paralysis as there is no seizure activity reported.  For patient's wheezing differential includes COVID-19 influenza and COPD exacerbation CTA of the head CT noncontrast chest x-ray CBC CMP EKG troponin have been ordered patient will be routinely reevaluate. [ZS]   0400 Upon reevaluation patient's dysarthria persists she also has some mild numbness in the right upper extremity which was not appreciated on initial examination.  There is no drift in upper extremity.   Initially radiology stated no hemorrhage was appreciated however there is a cortical ischemic infarct in the left temporal parietal cortex that is appreciated.  The case was discussed with Dr. Mcgovern.  Prior to knowing the cortical findings patient was considered a tenecteplase candidate however with the cortical findings and the numbness that started at 7 AM.  Patient currently is not a tenecteplase candidate.  There is also delay in obtaining CTAs due to patient being hypoxic and also having mild improvement in wheezing after breathing treatments.  Patient's blood gas showed that her pH is 7.39 CO2 is 40 however she is having decreased oxygenation at 53%.  She will be placed on air Vo at this time   [ZS]   0404 Patient has no signs of leukocytosis H&H of 15.1/48.7.  Creatinine of 2.58 which is acute kidney injury.  Glucose was 134. [ZS]   0404 EKG interpreted by me poor baseline however atrial fibrillation ventricular 108  QTc 41 no other acute ischemic changes [ZS]   0427 Repeat EKG shows sinus tachycardia ventricular 100 MN interval 206 QRS 97 QTc 467 no ST elevations appreciated there is some premature atrial complexes.  Patient's troponin was 1272.  Case was discussed with cardiology Dr. Almonte who recommended heparin and aspirin.  I also discussed this with Dr. Mcgovern she stated that she would recommend doing heparin infusion family was notified they are okay with plan.   [ZS]   0434 Aspirin heparin were ordered [ZS]   0519 Troponin is downtrending less likely to be ACS however patient does have bilateral pulmonary embolisms on CT imaging. [ZS]   0529 I reevaluated patient's neuro exam.  Facial droop is now resolved.  She continues to have some mild dysarthria but no aphasia. [ZS]   0603 Case was discussed with PERT team they recommended that patient requires CTA of the chest is not enough information can be delineated from the CTA of the head and neck.  Patient does have acute kidney injury that  was discussed with PERT team.  The benefits outweigh the risk at this time. [ZS]   0637 Patient's IV line blew on CTA of the chest.  I placed a ultrasound-guided line in the left upper extremity. [ZS]   0656 Handoff to Dr. Dumont [ZS]   0757 Updated PERT team with my evaluation the CT findings with my concern for extensive bilateral pulmonary emboli and large clot burden.  Updated with vital sign findings including high flow settings. Dr. Frank ([facility]) recommending echocardiogram and will call back with further information. [JA]   0821 Discussed radiology and they indicate there is moderate to severe right heart strain in the presence of multiple pulmonary emboli. [JA]   0822 Received call back from PERT team and they indicate Dr. Alvarado is at our facility today and will evaluate the patient for potential need of intervention at this facility.  [JA]   0853 Discussed with Dr. Alvarado who plans for intervention today.    [JA]   0855 Discussed with Dr. Moya (ICU) and he accepts the patient to his service. [JA]   0912 Updated patient and family with plan. Patient and family agree at this time.  [JA]      ED Course User Index  [JA] Enzo Dumont DO  [ZS] Bessie Can MD         Diagnoses as of 11/15/23 1320   Acute kidney injury (CMS/HCC)   Acute stroke due to ischemia (CMS/HCC)   Acute saddle pulmonary embolism, unspecified whether acute cor pulmonale present (CMS/HCC)       Medical Decision Making    Patient presenting for evaluation of possible acute stroke.  Ischemic changes seen on CT thus patient not a candidate for TNK.  Upon further review patient was found to be out of window for TNK.  CT angio did show incidental finding of pulmonary embolism.  Dedicated pulmonary embolism study ordered.  Patient found to have significant bilateral PEs with moderate to severe right heart strain.  Discussed with PERT team.  Patient planning for intervention at this facility.  Patient maintaining O2 sat of 90% on maximum high flow nasal  cannula.  Patient does not require IV pressors at this time.  Cardiology taking patient to Cath Lab for thrombectomy.  ICU consulted and accepted patient.      Final diagnoses:   [N17.9] Acute kidney injury (CMS/[facility])   [I63.9] Acute stroke due to ischemia (CMS/[facility])   [I26.92] Acute saddle pulmonary embolism, unspecified whether acute cor pulmonale present (CMS/[facility])           Procedure  Critical Care    Performed by: Enzo Dumont DO  Authorized by: Enzo Dumont DO    Critical care provider statement:     Critical care time (minutes):  35    Critical care time was exclusive of:  Separately billable procedures and treating other patients    Critical care was necessary to treat or prevent imminent or life-threatening deterioration of the following conditions:  Respiratory failure    Critical care was time spent personally by me on the following activities:  Ordering and performing treatments and interventions, ordering and review of laboratory studies, ordering and review of radiographic studies, pulse oximetry, re-evaluation of patient's condition, obtaining history from patient or surrogate, examination of patient and development of treatment plan with patient or surrogate    Care discussed with: admitting provider        Enzo Dumont DO

## 2023-11-15 NOTE — SIGNIFICANT EVENT
Stroke team called at 1729 for new left facial droop.  Patient had been admitted for acute hypoxemic respiratory failure secondary to acute bilateral PE for which she underwent thrombectomy this morning.  She was also found to have acute to subacute ischemic CVA involving inferior posterior left temporal lobe.  Heparin drip was stopped.  BP at time of stroke team was 98/65.  She was taken to CT scanner on NRB.    Physical Exam:   Constitutional: awake, alert, no acute distress  ENMT: mucous membranes moist, EOMI, conjunctivae clear  Head/Neck: normocephalic, atraumatic; supple, trachea midline  Respiratory/Thorax: diminished breath sounds but otherwise clear  Cardiovascular: RRR, no murmur appreciated  Gastrointestinal: soft, nondistended, non-tender, bowel sounds appreciated  Extremities: palpable peripheral pulses, BLE with nonpitting edema  Neurological: AO x3, NIHSS 4 (mild left facial droop, right leg motor drift)  Psychological: appropriate mood and behavior  Skin: warm and dry     NIH Stroke Scale: 4    Assessment/Plan:  Acute hypoxemic respiratory failure secondary to acute bilateral PE  Acute/subacute ischemic CVA, likely cardioembolic    - Discussed with radiology regarding CT findings, which were negative for ICH. Will restart Heparin ggt.  - Case discussed with interventional cardiology, neurology. Avoid hypotension, goal SBP 140s given her CVA. Repeat BPs upon her returning from CT improved to 120-130s/60s. Can use Levophed or Mekhi if needed overnight for additional support.  - Vascular surgery consulted for evaluation for IVC filter

## 2023-11-15 NOTE — H&P
History and Physical   Pre Surgical Review (< 30 days)      History & Physical Reviewed    I have reviewed the History and Physical dated:  11/15/2023   History and Physical reviewed and relevant findings noted. Patient examined to review pertinent physical  findings.: No significant changes   Home Medications Reviewed: no changes noted   Allergies Reviewed: no changes noted      Home Medications  Current Outpatient Medications   Medication Instructions    acetaminophen (Tylenol) 325 mg tablet 3 tablets, oral, Every 8 hours    albuterol 90 mcg/actuation inhaler 2 puffs, inhalation, Every 4 hours PRN    cholecalciferol (Vitamin D-3) 125 MCG (5000 UT) capsule Vitamin D CAPS   Refills: 0       Active    ergocalciferol (Vitamin D-2) 50 MCG (2000 UT) capsule capsule 1 cap(s) orally once a day    fluticasone (Flonase) 50 mcg/actuation nasal spray 1 spray, Does not apply, Daily    fluticasone propion-salmeteroL (Advair Diskus) 250-50 mcg/dose diskus inhaler 1 puff, inhalation, 2 times daily    gabapentin (Neurontin) 600 mg tablet 1 tab(s) orally 3 times a day    ipratropium-albuteroL (Duo-Neb) 0.5-2.5 mg/3 mL nebulizer solution 3 mL, inhalation, Every 4 hours PRN    levothyroxine (Synthroid) 150 mcg tablet 1 tablet, oral, Daily    loratadine (Claritin) 10 mg tablet Allergy Relief 10 MG Oral Tablet   Quantity: 90  Refills: 0        Start : 8-Oct-2019   Active    metoprolol succinate XL (TOPROL-XL) 50 mg, oral, Daily    montelukast (Singulair) 10 mg tablet 1 tablet, oral, Nightly    montelukast (Singulair) 4 mg chewable tablet 1 tablet, oral, Daily    omeprazole (PRILOSEC) 40 mg, oral, Daily RT    thiamine 100 mg tablet 1 tablet, oral, Daily    torsemide (Demadex) 10 mg tablet 5 tab(s) orally once a day    traMADol (ULTRAM) 50 mg, oral, Every 6 hours PRN        Allergies  Allergies   Allergen Reactions    Morphine Rash       Physical Exam  Physical Exam  Constitutional:       General: She is not in acute distress.      Appearance: She is obese. She is ill-appearing.   HENT:      Head: Normocephalic and atraumatic.      Nose: Nose normal.      Mouth/Throat:      Mouth: Mucous membranes are moist.      Pharynx: Oropharynx is clear.   Eyes:      Extraocular Movements: Extraocular movements intact.      Conjunctiva/sclera: Conjunctivae normal.      Pupils: Pupils are equal, round, and reactive to light.   Cardiovascular:      Rate and Rhythm: Normal rate and regular rhythm.      Pulses: Normal pulses.      Heart sounds: Normal heart sounds, S1 normal and S2 normal. No murmur heard.     No systolic murmur is present.      No friction rub. No gallop.   Pulmonary:      Effort: Pulmonary effort is normal. Tachypnea present. No bradypnea.      Breath sounds: No stridor. Examination of the right-lower field reveals decreased breath sounds. Examination of the left-lower field reveals decreased breath sounds. Decreased breath sounds present.   Abdominal:      General: Abdomen is flat. There is no distension.      Palpations: Abdomen is soft.   Musculoskeletal:         General: Normal range of motion.      Cervical back: Normal range of motion and neck supple.      Right lower leg: Edema present.      Left lower leg: Edema present.   Skin:     General: Skin is dry.      Capillary Refill: Capillary refill takes less than 2 seconds.      Findings: No lesion.      Nails: There is no clubbing.   Neurological:      Cranial Nerves: Cranial nerves 2-12 are intact.      Sensory: Sensation is intact.      Motor: Weakness (R sided) present.   Psychiatric:         Attention and Perception: Attention and perception normal.         Speech: Speech normal.         Cognition and Memory: Cognition and memory normal.          Airway/Sedation Assessment     Assessment by anesthesia See anesthesia airway/sedation assessment     ·  Mouth Opening OK yes      Neck Flexibility OK yes      Loose Teeth No   ·  Oropharyngeal Classification Grade II   ·  ASA PS  Classification ASA III - Patient with severe systemic disease       Sedation Plan Moderate     Risks, benefits, and alternatives discussed with patient.     ERAS (Enhanced Recovery After Surgery):  ·  ERAS Patient: No      Consent:   COVID-19 Consent:  ·  COVID-19 Risk Consent Surgeon has reviewed key risks related to the risk of soniya COVID-19 and if they contract COVID-19 what the risks are.     Porfirio Basilio, APRN-CNP

## 2023-11-15 NOTE — CONSULTS
"Inpatient consult to Cardiology  Consult performed by: Maldonado Alvarado MD  Consult ordered by: ZOEY Montoya-CNP  Reason for consult: intermediate-high risk PE        History Of Present Illness:    Lillian Mendoza is a 83 y.o. female presenting with intermediate-high risk PE.     Last Recorded Vitals:  Vitals:    11/15/23 1050 11/15/23 1300 11/15/23 1338 11/15/23 1400   BP:  114/85  123/75   BP Location:       Patient Position:       Pulse:  92 86 85   Resp:  20 16 14   SpO2: 92% 90% 93% 94%   Weight:   111 kg (244 lb 11.4 oz)    Height:   1.67 m (5' 5.75\")        Last Labs:  CBC - 11/15/2023:  3:25 AM  10.3 15.1 175    48.7      CMP - 11/15/2023:  3:25 AM  9.5 7.5 16 --- 0.8   3.5 3.6 8 90      PTT - 11/15/2023:  4:18 AM  1.3   14.6 33     Troponin I, High Sensitivity   Date/Time Value Ref Range Status   11/15/2023 04:18 AM 1,116 (HH) 0 - 13 ng/L Final     Comment:     Previous result verified on 11/15/2023 0404 on specimen/case [id number] called with component [facility] for procedure Troponin I, High Sensitivity with value 1,272 ng/L.   11/15/2023 03:25 AM 1,272 (HH) 0 - 13 ng/L Final     BNP   Date/Time Value Ref Range Status   11/15/2023 03:25 AM 1,683 (H) 0 - 99 pg/mL Final     Hemoglobin A1C   Date/Time Value Ref Range Status   11/15/2023 03:25 AM 6.1 (H) see below % Final     LDL Calculated   Date/Time Value Ref Range Status   11/15/2023 04:12 AM 53 <=99 mg/dL Final     Comment:                                 Near   Borderline      AGE      Desirable  Optimal    High     High     Very High     0-19 Y     0 - 109     ---    110-129   >/= 130     ----    20-24 Y     0 - 119     ---    120-159   >/= 160     ----      >24 Y     0 -  99   100-129  130-159   160-189     >/=190       VLDL   Date/Time Value Ref Range Status   11/15/2023 04:12 AM 28 0 - 40 mg/dL Final   12/13/2019 03:04 PM 21 0 - 40 mg/dL Final   11/14/2018 03:15 PM 32 0 - 40 mg/dL Final      Last I/O:  No intake/output data recorded.    Past " Cardiology Tests (Last 3 Years):  CTA Chest 11/15/23:  Bilateral PE R>LPA. RV/LV 2.      Past Medical History:  She has a past medical history of Personal history of diseases of the blood and blood-forming organs and certain disorders involving the immune mechanism (11/23/2020), Personal history of other diseases of the circulatory system (11/23/2020), Personal history of other diseases of the digestive system (11/23/2020), Personal history of other diseases of the musculoskeletal system and connective tissue, Personal history of other diseases of the nervous system and sense organs, Personal history of other diseases of the respiratory system (11/23/2020), Personal history of other diseases of the respiratory system (11/23/2020), Personal history of other diseases of the respiratory system (11/23/2020), Personal history of other diseases of urinary system (11/23/2020), Personal history of other endocrine, nutritional and metabolic disease (11/23/2020), and Personal history of other specified conditions (11/23/2020).    Past Surgical History:  She has a past surgical history that includes Other surgical history (11/23/2020); Other surgical history (11/23/2020); Other surgical history (11/23/2020); Other surgical history (11/23/2020); Other surgical history (11/23/2020); and Other surgical history (11/23/2020).      Social History:  She has no history on file for tobacco use, alcohol use, and drug use.    Family History:  Family History   Problem Relation Name Age of Onset    Cancer Mother      Diabetes Paternal Grandmother      Diabetes Paternal Grandfather          Allergies:  Morphine    Inpatient Medications:  Scheduled medications   Medication Dose Route Frequency    aspirin  324 mg oral Once    [Held by provider] aspirin  81 mg oral Daily    [Held by provider] atorvastatin  40 mg oral Nightly    iohexol  75 mL intravenous Once in imaging    [Held by provider] levothyroxine  150 mcg oral Daily    pantoprazole   40 mg intravenous Daily    perflutren protein A microsphere  0.5 mL intravenous Once in imaging    [Held by provider] polyethylene glycol  17 g oral Daily    sulfur hexafluoride microsphr  2 mL intravenous Once in imaging     PRN medications   Medication    heparin    hydrALAZINE    Followed by    [START ON 11/17/2023] hydrALAZINE    labetaloL    oxygen    oxygen     Continuous Medications   Medication Dose Last Rate    heparin  0-4,500 Units/hr 2,000 Units/hr (11/15/23 1307)    lactated Ringer's  75 mL/hr       Outpatient Medications:  Current Outpatient Medications   Medication Instructions    acetaminophen (Tylenol) 325 mg tablet 3 tablets, oral, Every 8 hours    albuterol 90 mcg/actuation inhaler 2 puffs, inhalation, Every 4 hours PRN    cholecalciferol (Vitamin D-3) 125 MCG (5000 UT) capsule Vitamin D CAPS   Refills: 0       Active    ergocalciferol (Vitamin D-2) 50 MCG (2000 UT) capsule capsule 1 cap(s) orally once a day    fluticasone (Flonase) 50 mcg/actuation nasal spray 1 spray, Does not apply, Daily    fluticasone propion-salmeteroL (Advair Diskus) 250-50 mcg/dose diskus inhaler 1 puff, inhalation, 2 times daily    gabapentin (Neurontin) 600 mg tablet 1 tab(s) orally 3 times a day    ipratropium-albuteroL (Duo-Neb) 0.5-2.5 mg/3 mL nebulizer solution 3 mL, inhalation, Every 4 hours PRN    levothyroxine (Synthroid) 150 mcg tablet 1 tablet, oral, Daily    loratadine (Claritin) 10 mg tablet Allergy Relief 10 MG Oral Tablet   Quantity: 90  Refills: 0        Start : 8-Oct-2019   Active    metoprolol succinate XL (TOPROL-XL) 50 mg, oral, Daily    montelukast (Singulair) 10 mg tablet 1 tablet, oral, Nightly    montelukast (Singulair) 4 mg chewable tablet 1 tablet, oral, Daily    omeprazole (PRILOSEC) 40 mg, oral, Daily RT    thiamine 100 mg tablet 1 tablet, oral, Daily    torsemide (Demadex) 10 mg tablet 5 tab(s) orally once a day    traMADol (ULTRAM) 50 mg, oral, Every 6 hours PRN       Physical Exam:  General:  no acute distress  HEENT: EOMI, no scleral icterus.  Lungs: fair air movement.  Cardiovascular: tachycardic.  Abdomen: Soft, nontender, nondistended. Bowel sounds present.  Extremities: BLE edema.  Neurologic: dysarthria.       Assessment/Plan   83-year-old woman with remote PE (in setting of knee TKA) 15 years ago s/p 1 year of warfarin, COPD, obesity, lymphedema. Presented with stroke sxs with dysarthria and facial droop. Out of window for TPA.     Found to have bilateral PE on work up as well - c/f likely paradoxical embolism in setting of RV pressure/volume overload. +significant hypoxia with high O2 requirement.    Status post intervention in cath lab with extirpation of material (fresh acute clot from LPA, subacute clot from RPA) with some improvement in PA pressures and O2 intralab post procedure.    I discussed with Dr. Flores from neurology - ok for systemic heparin; plan was to avoid significant heparin bolus in the cath lab. [place] not appropriate candidate to transfer to [facility]. We did give 2000 units heparin due to prolonged dwell time of catheter in setting of complex RPA intervention.     Plan:  - fig-of-8 stitch to be removed this afternoon  - cont systemic heparin  - repeat imaging of head as per neurology  - DVT scan BLE, TTE with bubble eval for PFO  - plan d/w pt, family, primary team and neurology team      Peripheral IV 11/15/23 22 G Right;Ventral Hand (Active)   Site Assessment Clean;Dry;Intact 11/15/23 1400   Dressing Type Transparent 11/15/23 1400   Line Status Saline locked 11/15/23 1400   Dressing Status Clean;Dry 11/15/23 1400   Number of days: 0       Peripheral IV 11/15/23 20 G Left Antecubital (Active)   Site Assessment Clean;Dry;Intact 11/15/23 1400   Dressing Type Transparent 11/15/23 1400   Line Status Blood return noted;Infusing 11/15/23 1400   Tubing Change Tubing changed 11/15/23 1400   Dressing Status Clean;Dry 11/15/23 1400   $ Peripheral IV Charge Ultrasound guidance 11/15/23  1400   Number of days: 0       Code Status:  Full Code      Maldonado Alvarado MD

## 2023-11-15 NOTE — CONSULTS
Inpatient consult to Neurology  Consult performed by: Alda Flores DO  Consult ordered by: Porfirio Basilio, ZOEY-CNP          History Of Present Illness  Lillian Mendoza is a 83 y.o. female presenting with acute onset of left facial droop and slurred speech.  History obtained from daughter at bedside. She reports that on the day prior to admission the patient developed right hand numbness, which she has had intermittent in the past from a prior surgery so they didn't think it was concerning at the time, however the patient reports that her hand was also weak and she was dropping the paper.  She then seemed confused that night when her granddaughter came home from work aroudn 1:30. Then at 2 pm she woke up to have her daughter take her to the bathroom and she noticed the facial droop and slurred speech.  Facial droop has now resolved however family states that her speech is still not back to normal.  TNK was not given in the ED due to concern for subacute infarct on CT head and symptoms that started the day prior.  She was then found to have bilateral PE with significant hypoxia with high O2 requirement. She was placed on heparin drip and went for thrombectomy this morning.       Last known well: morning prior to admission   Had stroke symptoms resolved at time of presentation: No  Past Medical History  Past Medical History:   Diagnosis Date    Personal history of diseases of the blood and blood-forming organs and certain disorders involving the immune mechanism 11/23/2020    History of anemia    Personal history of other diseases of the circulatory system 11/23/2020    History of hypertension    Personal history of other diseases of the digestive system 11/23/2020    History of hiatal hernia    Personal history of other diseases of the musculoskeletal system and connective tissue     History of arthritis    Personal history of other diseases of the nervous system and sense organs     History of cataract     Personal history of other diseases of the respiratory system 11/23/2020    History of asthma    Personal history of other diseases of the respiratory system 11/23/2020    History of bronchitis    Personal history of other diseases of the respiratory system 11/23/2020    History of lung disease    Personal history of other diseases of urinary system 11/23/2020    History of kidney problems    Personal history of other endocrine, nutritional and metabolic disease 11/23/2020    History of thyroid disorder    Personal history of other specified conditions 11/23/2020    H/O shortness of breath     Surgical History  Past Surgical History:   Procedure Laterality Date    OTHER SURGICAL HISTORY  11/23/2020    Knee replacement    OTHER SURGICAL HISTORY  11/23/2020    Lumpectomy    OTHER SURGICAL HISTORY  11/23/2020    Cataract surgery    OTHER SURGICAL HISTORY  11/23/2020    Cholecystectomy    OTHER SURGICAL HISTORY  11/23/2020    Hernia repair    OTHER SURGICAL HISTORY  11/23/2020    Hysterectomy     Social History     Allergies  Morphine  Home Medications  Medications Prior to Admission   Medication Sig Dispense Refill Last Dose    acetaminophen (Tylenol) 325 mg tablet Take 3 tablets (975 mg) by mouth every 8 hours.       albuterol 90 mcg/actuation inhaler Inhale 2 puffs every 4 hours if needed.       cholecalciferol (Vitamin D-3) 125 MCG (5000 UT) capsule Vitamin D CAPS   Refills: 0       Active       ergocalciferol (Vitamin D-2) 50 MCG (2000 UT) capsule capsule 1 cap(s) orally once a day       fluticasone (Flonase) 50 mcg/actuation nasal spray 1 spray by Does not apply route once daily.       fluticasone propion-salmeteroL (Advair Diskus) 250-50 mcg/dose diskus inhaler Inhale 1 puff twice a day.       gabapentin (Neurontin) 600 mg tablet 1 tab(s) orally 3 times a day       ipratropium-albuteroL (Duo-Neb) 0.5-2.5 mg/3 mL nebulizer solution Inhale 3 mL every 4 hours if needed for wheezing or shortness of breath.        levothyroxine (Synthroid) 150 mcg tablet Take 1 tablet (150 mcg) by mouth once daily.       loratadine (Claritin) 10 mg tablet Allergy Relief 10 MG Oral Tablet   Quantity: 90  Refills: 0        Start : 8-Oct-2019   Active       metoprolol succinate XL (Toprol-XL) 50 mg 24 hr tablet Take 1 tablet (50 mg) by mouth once daily.       montelukast (Singulair) 10 mg tablet Take 1 tablet (10 mg) by mouth once daily at bedtime.       montelukast (Singulair) 4 mg chewable tablet Chew 1 tablet (4 mg) once daily.       omeprazole (PriLOSEC) 40 mg DR capsule Take 1 capsule (40 mg) by mouth once daily.       thiamine 100 mg tablet Take 1 tablet (100 mg) by mouth once daily.       torsemide (Demadex) 10 mg tablet 5 tab(s) orally once a day       traMADol (Ultram) 50 mg tablet Take 1 tablet (50 mg) by mouth every 6 hours if needed (pain).          Review of Systems  Neurological Exam  Physical Exam    On general examination, the patient is well appearing and well groomed. Heart is regular, rate and rhythm. Lungs are clear to ausculation bilaterally. There is no peripheral edema. Normal pedal pulses bilaterally. No carotid bruits.   On neurologic examination, patient wearing high flow oxgyen masked when evaluated and short of breath which limited exam.  The history is related in quite good detail with no obvious deficit of attention, memory or language. Fund of knowledge is adequate. Orientation is intact to person, place and time. Spontaneous speech is fluent with no paraphasic or aphasic errors. On cranial nerve exam, the pupils are equal, round and reactive to light. Extraocular movements are full, without nystagmus.  Visual fields are full to confrontation. There is no bulbofacial weakness or ptosis.  The tongue is midline with no wasting or fasciculations. The palate elevates symmetrically. Facial sensation is intact to light touch and pinprick bilaterally. Hearing is intact to finger rub bilaterally. Shoulder shrug is 5/5  "bilaterally.  Neck flexion and extension have full strength. Motor examination reveals normal tone and bulk in the upper and lower extremities bilaterally. There are no fasciculations. There is full strength in the proximal and distal muscles of the upper and lower extremities bilaterally.  On coordination testing, finger-nose-fingertesting are done well bilaterally. Sensory examination reveals normal light touch sensation in the distal upper and lower extremities bilaterally. Gait is deferred.    Last Recorded Vitals  Blood pressure 123/75, pulse 85, temperature 36.5 °C (97.7 °F), resp. rate 14, height 1.67 m (5' 5.75\"), weight 111 kg (244 lb 11.4 oz), SpO2 96 %.      Results for orders placed or performed during the hospital encounter of 11/15/23 (from the past 24 hour(s))   POCT glucose   Result Value Ref Range    POCT Glucose 134 (A) 74 - 99 mg/dL   CBC and Auto Differential   Result Value Ref Range    WBC 10.3 4.4 - 11.3 x10*3/uL    nRBC 0.4 (H) 0.0 - 0.0 /100 WBCs    RBC 4.92 4.00 - 5.20 x10*6/uL    Hemoglobin 15.1 12.0 - 16.0 g/dL    Hematocrit 48.7 (H) 36.0 - 46.0 %    MCV 99 80 - 100 fL    MCH 30.7 26.0 - 34.0 pg    MCHC 31.0 (L) 32.0 - 36.0 g/dL    RDW 15.9 (H) 11.5 - 14.5 %    Platelets 175 150 - 450 x10*3/uL    Neutrophils % 63.1 40.0 - 80.0 %    Immature Granulocytes %, Automated 0.7 0.0 - 0.9 %    Lymphocytes % 24.9 13.0 - 44.0 %    Monocytes % 9.3 2.0 - 10.0 %    Eosinophils % 1.5 0.0 - 6.0 %    Basophils % 0.5 0.0 - 2.0 %    Neutrophils Absolute 6.50 (H) 1.60 - 5.50 x10*3/uL    Immature Granulocytes Absolute, Automated 0.07 0.00 - 0.50 x10*3/uL    Lymphocytes Absolute 2.56 0.80 - 3.00 x10*3/uL    Monocytes Absolute 0.96 (H) 0.05 - 0.80 x10*3/uL    Eosinophils Absolute 0.15 0.00 - 0.40 x10*3/uL    Basophils Absolute 0.05 0.00 - 0.10 x10*3/uL   Comprehensive metabolic panel   Result Value Ref Range    Glucose 133 (H) 74 - 99 mg/dL    Sodium 138 136 - 145 mmol/L    Potassium 4.5 3.5 - 5.3 mmol/L    " Chloride 101 98 - 107 mmol/L    Bicarbonate 23 21 - 32 mmol/L    Anion Gap 19 10 - 20 mmol/L    Urea Nitrogen 35 (H) 6 - 23 mg/dL    Creatinine 2.58 (H) 0.50 - 1.05 mg/dL    eGFR 18 (L) >60 mL/min/1.73m*2    Calcium 9.5 8.6 - 10.3 mg/dL    Albumin 3.6 3.4 - 5.0 g/dL    Alkaline Phosphatase 90 33 - 136 U/L    Total Protein 7.5 6.4 - 8.2 g/dL    AST 16 9 - 39 U/L    Bilirubin, Total 0.8 0.0 - 1.2 mg/dL    ALT 8 7 - 45 U/L   Troponin I, High Sensitivity   Result Value Ref Range    Troponin I, High Sensitivity 1,272 (HH) 0 - 13 ng/L   Protime-INR   Result Value Ref Range    Protime 14.6 (H) 9.8 - 12.8 seconds    INR 1.3 (H) 0.9 - 1.1   POCT GLUCOSE   Result Value Ref Range    POCT Glucose 134 (H) 74 - 99 mg/dL   B-type natriuretic peptide   Result Value Ref Range    BNP 1,683 (H) 0 - 99 pg/mL   Hemoglobin A1C   Result Value Ref Range    Hemoglobin A1C 6.1 (H) see below %    Estimated Average Glucose 128 Not Established mg/dL   SARS-CoV-2 RT PCR   Result Value Ref Range    Coronavirus 2019, PCR Not Detected Not Detected   Influenza A, and B PCR   Result Value Ref Range    Flu A Result Not Detected Not Detected    Flu B Result Not Detected Not Detected   Blood Gas Arterial Full Panel   Result Value Ref Range    POCT pH, Arterial 7.39 7.38 - 7.42 pH    POCT pCO2, Arterial 40 38 - 42 mm Hg    POCT pO2, Arterial 53 (L) 85 - 95 mm Hg    POCT SO2, Arterial 82 (L) 94 - 100 %    POCT Oxy Hemoglobin, Arterial 79.2 (L) 94.0 - 98.0 %    POCT Hematocrit Calculated, Arterial 45.0 36.0 - 46.0 %    POCT Sodium, Arterial 134 (L) 136 - 145 mmol/L    POCT Potassium, Arterial 4.6 3.5 - 5.3 mmol/L    POCT Chloride, Arterial 105 98 - 107 mmol/L    POCT Ionized Calcium, Arterial 1.23 1.10 - 1.33 mmol/L    POCT Glucose, Arterial 150 (H) 74 - 99 mg/dL    POCT Lactate, Arterial 2.7 (H) 0.4 - 2.0 mmol/L    POCT Base Excess, Arterial -0.7 -2.0 - 3.0 mmol/L    POCT HCO3 Calculated, Arterial 24.2 22.0 - 26.0 mmol/L    POCT Hemoglobin, Arterial 15.0  12.0 - 16.0 g/dL    POCT Anion Gap, Arterial 9 (L) 10 - 25 mmo/L    Patient Temperature 37.0 degrees Celsius    FiO2 80 %    Apparatus NON REBREATHER     Flow 15.0 LPM   SST TOP   Result Value Ref Range    Extra Tube Hold for add-ons.    Gray Top   Result Value Ref Range    Extra Tube Hold for add-ons.    Lipid Panel   Result Value Ref Range    Cholesterol 112 0 - 199 mg/dL    HDL-Cholesterol 30.7 mg/dL    Cholesterol/HDL Ratio 3.6     LDL Calculated 53 <=99 mg/dL    VLDL 28 0 - 40 mg/dL    Triglycerides 142 0 - 149 mg/dL    Non HDL Cholesterol 81 0 - 149 mg/dL   APTT   Result Value Ref Range    aPTT 33 27 - 38 seconds   Troponin I, High Sensitivity   Result Value Ref Range    Troponin I, High Sensitivity 1,116 (HH) 0 - 13 ng/L   Heparin Assay, UFH   Result Value Ref Range    Heparin Unfractionated 0.7 See Comment Below for Therapeutic Ranges IU/mL   Blood Gas Venous Full Panel Unsolicited   Result Value Ref Range    POCT pH, Venous 7.28 (L) 7.33 - 7.43 pH    POCT pCO2, Venous 62 (H) 41 - 51 mm Hg    POCT pO2, Venous 30 (L) 35 - 45 mm Hg    POCT SO2, Venous 34 (L) 45 - 75 %    POCT Oxy Hemoglobin, Venous 33.5 (L) 45.0 - 75.0 %    POCT Hematocrit Calculated, Venous 41.0 36.0 - 46.0 %    POCT Sodium, Venous 136 136 - 145 mmol/L    POCT Potassium, Venous 4.7 3.5 - 5.3 mmol/L    POCT Chloride, Venous 103 98 - 107 mmol/L    POCT Ionized Calicum, Venous 1.19 1.10 - 1.33 mmol/L    POCT Glucose, Venous 125 (H) 74 - 99 mg/dL    POCT Lactate, Venous 3.4 (H) 0.4 - 2.0 mmol/L    POCT Base Excess, Venous 0.8 -2.0 - 3.0 mmol/L    POCT HCO3 Calculated, Venous 29.1 (H) 22.0 - 26.0 mmol/L    POCT Hemoglobin, Venous 13.7 12.0 - 16.0 g/dL    POCT Anion Gap, Venous 9.0 (L) 10.0 - 25.0 mmol/L    Patient Temperature 37.0 degrees Celsius    FiO2 100 %    Apparatus HIGH FLOW CANNULA     Flow 60.0 LPM   Blood Gas Mixed Venous Unsolicited   Result Value Ref Range    POCT pH, Mixed 7.35 7.33 - 7.43 pH    POCT pCO2, Mixed 54 (H) 41 - 51 mm Hg     POCT pO2, Mixed 37 35 - 45 mm Hg    POCT SO2, Mixed 52 45 - 75 %    POCT Oxy Hemoglobin, Mixed 51.1 45.0 - 75.0 %    POCT Base Excess, Mixed 2.9 -2.0 - 3.0 mmol/L    POCT HCO3 Calculated, Mixed 29.8 (H) 22.0 - 26.0 mmol/L    Patient Temperature 37.0 degrees Celsius    FiO2 100 %   Blood Gas Mixed Venous Unsolicited   Result Value Ref Range    POCT pH, Mixed 7.34 7.33 - 7.43 pH    POCT pCO2, Mixed 51 41 - 51 mm Hg    POCT pO2, Mixed 30 (L) 35 - 45 mm Hg    POCT SO2, Mixed 42 (L) 45 - 75 %    POCT Oxy Hemoglobin, Mixed 41.6 (L) 45.0 - 75.0 %    POCT Base Excess, Mixed 0.9 -2.0 - 3.0 mmol/L    POCT HCO3 Calculated, Mixed 27.5 (H) 22.0 - 26.0 mmol/L    Patient Temperature 37.0 degrees Celsius    FiO2 100 %    Apparatus NON REBREATHER    Blood Gas Mixed Venous Unsolicited   Result Value Ref Range    POCT pH, Mixed 7.30 (L) 7.33 - 7.43 pH    POCT pCO2, Mixed 52 (H) 41 - 51 mm Hg    POCT pO2, Mixed 32 (L) 35 - 45 mm Hg    POCT SO2, Mixed 44 (L) 45 - 75 %    POCT Oxy Hemoglobin, Mixed 43.2 (L) 45.0 - 75.0 %    POCT Base Excess, Mixed -1.6 -2.0 - 3.0 mmol/L    POCT HCO3 Calculated, Mixed 25.6 22.0 - 26.0 mmol/L    Patient Temperature 37.0 degrees Celsius    FiO2 100 %    Apparatus NON REBREATHER    Blood Gas Arterial Full Panel   Result Value Ref Range    POCT pH, Arterial 7.36 (L) 7.38 - 7.42 pH    POCT pCO2, Arterial 42 38 - 42 mm Hg    POCT pO2, Arterial 111 (H) 85 - 95 mm Hg    POCT SO2, Arterial 100 94 - 100 %    POCT Oxy Hemoglobin, Arterial 97.6 94.0 - 98.0 %    POCT Hematocrit Calculated, Arterial 40.0 36.0 - 46.0 %    POCT Sodium, Arterial 134 (L) 136 - 145 mmol/L    POCT Potassium, Arterial 5.1 3.5 - 5.3 mmol/L    POCT Chloride, Arterial 107 98 - 107 mmol/L    POCT Ionized Calcium, Arterial 1.19 1.10 - 1.33 mmol/L    POCT Glucose, Arterial 149 (H) 74 - 99 mg/dL    POCT Lactate, Arterial 2.3 (H) 0.4 - 2.0 mmol/L    POCT Base Excess, Arterial -1.8 -2.0 - 3.0 mmol/L    POCT HCO3 Calculated, Arterial 23.7 22.0 -  26.0 mmol/L    POCT Hemoglobin, Arterial 13.3 12.0 - 16.0 g/dL    POCT Anion Gap, Arterial 8 (L) 10 - 25 mmo/L    Patient Temperature 37.0 degrees Celsius    FiO2 100 %    Apparatus HIGH FLOW CANNULA     Flow 60.0 LPM   Transthoracic Echo (TTE) Complete   Result Value Ref Range    BSA 2.27 m2   Vascular US Lower Extremity Venous Duplex Bilateral   Result Value Ref Range    BSA 2.27 m2          NIH Stroke Scale  1A. Level of Consciousness: Alert, Keenly Responsive  1B. Ask Month and Age: Both Questions Right  1C. Blink Eyes & Squeeze Hands: Performs Both Tasks  2. Best Gaze: Normal  3. Visual: No Visual Loss  4. Facial Palsy: Normal Symmetrical Movements  5A. Motor - Left Arm: No Drift  5B. Motor - Right Arm: No Drift  6A. Motor - Left Leg: Some Effort Against Gravity  6B. Motor - Right Leg: Some Effort Against Gravity  7. Limb Ataxia: Absent  8. Sensory Loss: Normal  9. Best Language: No Aphasia  10. Dysarthria: Normal  11. Extinction and Inattention: No Abnormality  NIH Stroke Scale: 4   Current NIH 0 during my evaluation.           [place] Coma Scale  Best Eye Response: Spontaneous  Best Verbal Response: Incomprehensible sounds  Best Motor Response: Follows commands  [place] Coma Scale Score: 12                    Results from last 7 days   Lab Units 11/15/23  0325   HEMOGLOBIN A1C % 6.1*     BNP   Date/Time Value Ref Range Status   11/15/2023 03:25 AM 1,683 (H) 0 - 99 pg/mL Final        I have personally reviewed the following imaging results Invasive vascular procedure    Result Date: 11/15/2023   [facility], Cath Lab, [address] [place] [address] Cardiovascular Catheterization Report Patient Name:      DARLING WYMAN   Performing Physician:  Jay Alvarado MD Study Date:        11/15/2023           Verifying Physician:   Jay Alvarado MD MRN/PID:           [id number]             Cardiologist/Co-scrub: Accession#:        [id number]         Ordering Physician:    Jay ALVARADO Date of Birth/Age:  "1940 / 83 years Fellow: Gender:            F                    Fellow: Encounter#:        [id number]  Study:            Pulmonary Angiogram Additional Study: Peripheral Intervention  Indications: DARLING WYMAN is a 84 year old female who presents with intermediate-high risk PE, hx of PE 15 years ago (thought 2/2 knee TKA) s/p 1 year of warfarin; p/w stroke c/f paradoxical embolus in setting of PE. +significant O2 requirement.  Procedure Description: After infiltration of local anesthetic, the right femoral vein was identified with two dimensional ultrasound. Under direct ultrasound visualization, the right femoral vein was cannulated with a percutaneous technique. A 24F sheath was placed in the vein.  Pulmonary Embolism:  We predilated with a 22F [employer] dilator, and placed a 24F sheath into the vein. Post-procedure, the venous sheath was pulled and pressure was applied to the site via figure-of-8.  Prior to access, we imaged the bilateral common femoral veins to evaluate for thrombus. No thrombus was noted. We selectively engaged the pulmonary artery using a JR4 diagnostic catheter. Pre-thrombectomy RA and PA pressures were obtained.  Following this, we sequentially engaged the following PA branches with Inari [id number] for selective extirpation of material: Left main, Right main, Truncus anterior and RLL posterior basal. Successful thrombectomy was performed. Selective angiogram was performed during the procedure to help locate thrombus for extraction. Post-thrombectomy RA and PA pressures were obtained.  We did not administer therapeutic systemic heparin up front d/t stroke and concerns for potential hemorrhagic conversion. Heparinized saline was utilized to \"wash\" the FlowSaver filter to maintain patency. Initially thrombectomy of LPA highly successful. Difficulty with RPA thrombectomy, with angiogram showing ball-like embolus. TA aspiration performed with loosening of the RPA thrombus (subacute clot). " We did provide 2000 units of heparin at this time due to extended dwell time of catheter. Further thrombectomy performed of the main RPA and lower posterior branch with successful extirpation of remainder of subacute thrombus.  Hemo Personnel: +----------+---------+ Name      Duty      +----------+---------+ Maldonado AlvaradoROC MD 1 +----------+---------+  Hemodynamic Pressures:  +----+-----------+----------+-------------+--------------+-----+-------+-------+ Site Date Time Phase NameSystolic mmHgDiastolic mmHgMean A-Wave V-Wave                                                      mmHg  mmHg   mmHg   +----+-----------+----------+-------------+--------------+-----+-------+-------+   RA 11/15/2023   O2 REST                              25     28     25     10:53:43 AM                                                         +----+-----------+----------+-------------+--------------+-----+-------+-------+   PA 11/15/2023   O2 REST           73            27   52                   10:56:29 AM                                                         +----+-----------+----------+-------------+--------------+-----+-------+-------+   PA 11/15/2023   O2 REST           75            31   51                   11:38:07 AM                                                         +----+-----------+----------+-------------+--------------+-----+-------+-------+   RA 11/15/2023   O2 REST                              18     21     19     11:38:30 AM                                                         +----+-----------+----------+-------------+--------------+-----+-------+-------+   PA 11/15/2023   O2 REST           67            29   41                   12:06:02 PM                                                         +----+-----------+----------+-------------+--------------+-----+-------+-------+   RA 11/15/2023   O2  REST                              23     27     23     12:06:39 PM                                                         +----+-----------+----------+-------------+--------------+-----+-------+-------+  Oxygen Saturation %: +-----------+----------+------------+ Sample SiteO2 Sat (%)HB (g/100ml) +-----------+----------+------------+          AO        90        15.1 +-----------+----------+------------+          PA        52        15.1 +-----------+----------+------------+          AO        90        15.1 +-----------+----------+------------+          PA        52        15.1 +-----------+----------+------------+          PA        42             +-----------+----------+------------+          PA        42             +-----------+----------+------------+  Cardiac Outputs: +---------------+------------------+-------+ SINTIA CO (l/min)SINTIA CI (l/min/m2)SINTIA SV +---------------+------------------+-------+             3.7               1.7   41.2 +---------------+------------------+-------+  Vascular Resistance Calculated Values (Wood Units): +-----+----+----+ PhaseTPR TPRI +-----+----+----+ 0    14.030.5 +-----+----+----+  Complications: No in-lab complications observed.  Cardiac Cath Post Procedure Notes: Post Procedure Diagnosis: Bilateral pulmonary embolism R>L c/w intermediate-high                           risk PE s/p extirpation of material. Blood Loss:               Estimated blood loss during the procedure was 50 mls. Specimens Removed:        Number of specimen(s) removed: thrombus (acute LPA,                           subacute RPA). ____________________________________________________________________________________ CONCLUSIONS:  1. Indication: intermediate-high risk PE with suspected paradoxical thrombus and CVA, severe hypoxia.  2. Bilateral pulmonary angiogram and exptirpation of material from bilateral PAs as noted.  3. Heparin anticoagulation  and routine work up. ICD 10 Codes: Other pulmonary embolism with acute cor pulmonale-I26.09  CPT Codes: Selective catheter placement, left or right pulmonary artery uni-91324; Selective catheter placement, left or right pulmonary artery bilat-56105.50; Selective catheter placement, segmental or subsegmental pulmonary artery uni-21144; Angiography, pulmonary, bilateral S&I-55154; Extirpation of material, unilateral PA-57554; Extirpation of material, contralateral PA-36800.50; Extirpation of material, unilateral secondary vessel Subsegment 1-23404; Extirpation of material, unilateral secondary vessel Subsegment 2-21012; Moderate Sedation Services initial 15 minutes patient >5 years-45289; Moderate Sedation Services 1st additional 15 minutes patient >5 years-17165; Moderate Sedation Services 2nd additional 15 minutes patient >5 years-69654; Moderate Sedation Services 3rd additional 15 minutes patient >5 years-40350; Moderate Sedation Services 4th additional 15 minutes patient >5 years-32699; Moderate Sedation Services 5th additional 15 minutes patient >5 years-26929  [id number] Maldonado Alvarado MD Performing Physician Electronically signed by Jay Alvarado MD on 11/15/2023 at 3:56:11 PM  ** Final **     Vascular US Lower Extremity Venous Duplex Bilateral    Result Date: 11/15/2023  Preliminary Cardiology Report          [place] [address] Tel [phone] and Fax [phone]          Preliminary Vascular Lab Report  [facility]C US LOWER EXTREMITY VENOUS DUPLEX BILATERAL  Patient Name:      DARLING Colin Physician: [id number] Cookie WYMAN MD Study Date:        11/15/2023        Ordering           [id number] ALVARADO                                      Physician: MRN/PID:           [id number]          Technologist:      Allyn Escobar RVT Accession#:        [id number]      Technologist 2: Date of Birth/Age: 1940         Encounter#:         [id number] Gender:            F Admission Status:  Inpatient         Location           [facility]                                      Performed:  Diagnosis/ICD: Other pulmonary embolism without acute cor pulmonale-I26.99 Procedure/CPT: 82052 Peripheral venous duplex scan for DVT complete  **CRITICAL RESULT** Critical Result: DVT Bilateral FV, Bilateral POP V, and Right PTV Notification called to Dr Maldonado Alvarado on 11/15/2023 at 3:00:00 PM by NEIL Escobar RVT.  PRELIMINARY CONCLUSIONS: Right Lower Venous: There is acute occlusive deep vein thrombosis visualized in the mid femoral, distal femoral, popliteal and posterior tibial veins. There is acute non-occlusive deep vein thrombosis visualized in the proximal femoral vein. Left Lower Venous: There is acute occlusive deep vein thrombosis visualized in the mid femoral, distal femoral, popliteal and proximal femoral veins. Cannot rule out thrombus in non-visualized peroneal vein due to edema.  Imaging & Doppler Findings:  Right                 Compressible      Thrombus          Flow Distal External Iliac     Yes             None         Continuous CFV                       Yes             None         Continuous PFV                       Yes             None FV Proximal             Partial    Acute non-occlusive Continuous FV Mid                     No        Acute occlusive FV Distal                  No        Acute occlusive Popliteal                  No        Acute occlusive      None Peroneal                  Yes             None PTV                        No        Acute occlusive  Left                  Compress    Thrombus        Flow Distal External Iliac   Yes         None       Continuous CFV                     Yes         None       Continuous PFV                     Yes         None FV Proximal              No    Acute occlusive    None FV Mid                   No    Acute occlusive FV Distal                No    Acute occlusive Popliteal                 No    Acute occlusive    None PTV                     Yes         None VASCULAR PRELIMINARY REPORT completed by Allyn Escobar RVT on 11/15/2023 at 3:05:56 PM  ** Final **     CT angio chest for pulmonary embolism    Addendum Date: 11/15/2023    This finding was discussed with and acknowledged by Dr. Lazaro Amezquita on 11/15/2023 at 9:30 AM Signed by Stan Chamberlain MD    Result Date: 11/15/2023  STUDY: CT Angiogram of the Chest; 11/15/2023 7:38 AM. INDICATION: Known pulmonary embolism.  Evaluate for heart strain. COMPARISON: CXR 11/15/2023.  CT CAP 12/2/2022. ACCESSION NUMBER(S): [id number] ORDERING CLINICIAN: LAZARO AMEZQUITA TECHNIQUE:  CTA of the chest was performed with intravenous contrast. Images are reviewed and processed at a workstation according to the CT angiogram protocol with 3-D and/or MIP post processing imaging generated.  Omnipaque 350 100 mL was administered intravenously. Automated mA/kV exposure control was utilized and patient examination was performed in strict accordance with principles of ALARA. FINDINGS: Pulmonary arteries are adequately opacified.  There are extensive filling defects in the bilateral main pulmonary arteries extending into the bilateral lobar and segmental arteries.  These are greater on the right.  The thoracic aorta is normal in course and caliber without dissection or aneurysm.  There is no evidence for right heart strain. The heart is normal in size without pericardial effusion.  Thoracic lymph nodes are not enlarged. There is no pleural effusion, pleural thickening, or pneumothorax. The airways are patent. Lungs are clear without consolidation, interstitial disease, or suspicious nodules.  There is prominent atelectasis at the left base. Upper abdomen demonstrates no acute pathology. There are no acute fractures.  No suspicious bony lesions.    1. Extensive bilateral pulmonary emboli, greater on the right. There is no evidence for right heart strain. 2. Prominent  atelectasis at the left base. Signed by Stan Chamberlain MD    CT angio chest for pulmonary embolism    Result Date: 11/15/2023  STUDY: CT Angiogram of the Chest; 11/15/2023 6:32 AM INDICATION: Abdominal pain. COMPARISON: XR chest 11/15/2023, CT CAP 12/02/2022. ACCESSION NUMBER(S): [id number] ORDERING CLINICIAN: JASMEET MERCADO TECHNIQUE:  CTA of the chest was performed with intravenous contrast. Images are reviewed and processed at a workstation according to the CT angiogram protocol with 3-D and/or MIP post processing imaging generated.  Omnipaque 350 75 mL was administered intravenously. Technologist note states contrast extravasation/IV infiltration. Automated mA/kV exposure control was utilized and patient examination was performed in strict accordance with principles of ALARA. FINDINGS: Pulmonary arteries are adequately opacified and there are extensive bilateral pulmonary emboli involving all lobes with large amount of embolic material in the distal aspect of the right main pulmonary artery. There is moderate to severe embolic burden with moderate to severe right heart dilation.  The thoracic aorta is normal in course and caliber without dissection or aneurysm. Heart is enlarged.  Thoracic lymph nodes are not enlarged. There is no pleural effusion, pleural thickening, or pneumothorax. The airways are patent. Left basilar atelectasis. Lungs are otherwise clear. There is fatty infiltration of the liver. Nonobstructing right renal calculus. Moderate right renal cortical atrophy. There are no acute fractures.  No suspicious bony lesions.    1. Extensive bilateral pulmonary emboli involving all lobes with large amount of embolic material in the distal aspect of the right main pulmonary artery. There is moderate to severe embolic burden with moderate to severe right heart strain. 2. Hepatic steatosis. 3. Nonobstructing right renal calculus. 4. Moderate right renal cortical atrophy. Findings discussed with and  acknowledged by Dr. Enzo Dumont8:15 AM Signed by Akshat Nj MD    CT angio brain attack head w IV contrast and post procedure    Result Date: 11/15/2023  Interpreted By:  Arian Goldberg, STUDY: CT ANGIO BRAIN ATTACK HEAD W IV CONTRAST AND POST PROCEDURE; CT ANGIO BRAIN ATTACK NECK W IV CONTRAST AND POST PROCEDURE;  11/15/2023 4:49 am   INDICATION: Signs/Symptoms:Stroke Evaluation with VAN Positive.   COMPARISON: Correlation made to noncontrast head CT of 11/15/2023.   ACCESSION NUMBER(S): [id number]; [id number]   ORDERING CLINICIAN: JASMEET MERCADO   TECHNIQUE: 75 cc Omnipaque 350 were administered intravenously and axial images of the head were acquired.  Coronal, sagittal, and 3-D reconstructions were provided for review.   FINDINGS: The previously seen region of asymmetric cortical hypoattenuation and loss of gray-white matter differentiation is not definitely seen on the current examination, suggesting its presence on the prior examination may have related to artifact. Gray-white matter differentiation appears maintained. There is no evidence of acute intracranial hemorrhage. No abnormal intracranial enhancement is seen.   CTA HEAD FINDINGS:   Anterior circulation: The bilateral intracranial internal carotid arteries, bilateral carotid terminals, bilateral proximal anterior and middle cerebral arteries are normal.   Posterior circulation: Bilateral intracranial vertebral arteries, vertebrobasilar junction, basilar artery and proximal posterior cerebral arteries are normal.   CTA NECK FINDINGS:   Right carotid vessels: The common carotid artery is normal. The carotid bifurcation is normal. The internal carotid artery in the neck is normal. There is 0% stenosis  .   Left carotid vessels: The common carotid artery is normal. The carotid bifurcation is normal. The internal carotid artery in the neck is normal. There is 0% stenosis  .   Vertebral vessels: The origin of the right vertebral artery is not well seen  due to artifact. The visualized segments of the cervical vertebral arteries are normal in caliber.     CT angiography of the chest demonstrates acute bilateral pulmonary emboli. The visible aortic arch appears within normal limits. No significant stenosis in the innominate or subclavian arteries. Asymmetric posterior opacity in the superior segment of the left lower lobe partially imaged may relate to atelectasis, scarring, pneumonia, or developing pulmonary infarct.       The previously seen region of asymmetric cortical hypoattenuation and loss of gray-white matter differentiation is not definitely seen on the current examination, suggesting its presence on the prior examination may have related to artifact. Gray-white matter differentiation appears maintained. There is no evidence of acute intracranial hemorrhage. No abnormal intracranial enhancement is seen.   No evidence for significant stenosis of the cervical vessels.   No evidence for significant stenosis or large branch vessel cutoffs of the intracranial vessels.   Acute bilateral pulmonary emboli are incidentally seen on CT angiography of the chest. Asymmetric posterior opacity in the superior segment of the left lower lobe partially imaged may relate to atelectasis, scarring, pneumonia, or developing pulmonary infarct.   MACRO: Arian Goldberg discussed the significance and urgency of this critical finding by epic secure chat with message receipt verification with JSAMEET MERCADO on 11/15/2023 at 5:24 am.  (**-RCF-**) Findings:  Acute pulmonary embolism.   Signed by: Arian Goldberg 11/15/2023 5:25 AM Dictation workstation:   [id number]    CT angio brain attack neck w IV contrast and post procedure    Result Date: 11/15/2023  Interpreted By:  Arian Goldberg, STUDY: CT ANGIO BRAIN ATTACK HEAD W IV CONTRAST AND POST PROCEDURE; CT ANGIO BRAIN ATTACK NECK W IV CONTRAST AND POST PROCEDURE;  11/15/2023 4:49 am   INDICATION: Signs/Symptoms:Stroke Evaluation with VAN  Positive.   COMPARISON: Correlation made to noncontrast head CT of 11/15/2023.   ACCESSION NUMBER(S): [id number]; [id number]   ORDERING CLINICIAN: JASMEET MERCADO   TECHNIQUE: 75 cc Omnipaque 350 were administered intravenously and axial images of the head were acquired.  Coronal, sagittal, and 3-D reconstructions were provided for review.   FINDINGS: The previously seen region of asymmetric cortical hypoattenuation and loss of gray-white matter differentiation is not definitely seen on the current examination, suggesting its presence on the prior examination may have related to artifact. Gray-white matter differentiation appears maintained. There is no evidence of acute intracranial hemorrhage. No abnormal intracranial enhancement is seen.   CTA HEAD FINDINGS:   Anterior circulation: The bilateral intracranial internal carotid arteries, bilateral carotid terminals, bilateral proximal anterior and middle cerebral arteries are normal.   Posterior circulation: Bilateral intracranial vertebral arteries, vertebrobasilar junction, basilar artery and proximal posterior cerebral arteries are normal.   CTA NECK FINDINGS:   Right carotid vessels: The common carotid artery is normal. The carotid bifurcation is normal. The internal carotid artery in the neck is normal. There is 0% stenosis  .   Left carotid vessels: The common carotid artery is normal. The carotid bifurcation is normal. The internal carotid artery in the neck is normal. There is 0% stenosis  .   Vertebral vessels: The origin of the right vertebral artery is not well seen due to artifact. The visualized segments of the cervical vertebral arteries are normal in caliber.     CT angiography of the chest demonstrates acute bilateral pulmonary emboli. The visible aortic arch appears within normal limits. No significant stenosis in the innominate or subclavian arteries. Asymmetric posterior opacity in the superior segment of the left lower lobe partially imaged may  relate to atelectasis, scarring, pneumonia, or developing pulmonary infarct.       The previously seen region of asymmetric cortical hypoattenuation and loss of gray-white matter differentiation is not definitely seen on the current examination, suggesting its presence on the prior examination may have related to artifact. Gray-white matter differentiation appears maintained. There is no evidence of acute intracranial hemorrhage. No abnormal intracranial enhancement is seen.   No evidence for significant stenosis of the cervical vessels.   No evidence for significant stenosis or large branch vessel cutoffs of the intracranial vessels.   Acute bilateral pulmonary emboli are incidentally seen on CT angiography of the chest. Asymmetric posterior opacity in the superior segment of the left lower lobe partially imaged may relate to atelectasis, scarring, pneumonia, or developing pulmonary infarct.   MACRO: Arian Goldberg discussed the significance and urgency of this critical finding by epic secure chat with message receipt verification with JASMEET MERCADO on 11/15/2023 at 5:24 am.  (**-RCF-**) Findings:  Acute pulmonary embolism.   Signed by: Arian Goldberg 11/15/2023 5:25 AM Dictation workstation:   [id number]    XR chest 1 view    Result Date: 11/15/2023  STUDY: Chest Radiograph;  11/15/2023 3:28 AM INDICATION: Wheezing. COMPARISON: 12/01/2022 XR Chest ACCESSION NUMBER(S): [id number] ORDERING CLINICIAN: JASMEET MERCADO TECHNIQUE:  Frontal chest was obtained at 03:28 hours. FINDINGS: CARDIOMEDIASTINAL SILHOUETTE: Cardiomediastinal silhouette is enlarged and more pronounced as compared to previous exam. LUNGS: Lungs reveals opacification of left lung base.  There is a lobulated appearance of the left hilum. ABDOMEN: No remarkable upper abdominal findings.  BONES: No acute osseous changes.    Opacification at the left lung base difficult to determine etiology.  Cannot exclude underlying infiltrate.  Fullness of left hilum.   Findings could better assessed with CT chest. Signed by Morgan De León, DO    CT brain attack head wo IV contrast    Result Date: 11/15/2023  Interpreted By:  Arian Goldberg, STUDY: CT BRAIN ATTACK HEAD WO IV CONTRAST;  11/15/2023 3:16 am   INDICATION: Signs/Symptoms:Stroke Evaluation.   COMPARISON: Noncontrast head CT of 12/02/2022.   ACCESSION NUMBER(S): [id number]   ORDERING CLINICIAN: JASMEET MERCADO   TECHNIQUE: Noncontrast axial CT scan of head was performed. Angled reformats in brain and bone windows were generated. The images were reviewed in bone, brain, blood and soft tissue windows.   FINDINGS: CSF Spaces: The ventricles, sulci and basal cisterns are within normal limits. There is no extraaxial fluid collection.   Parenchyma: Moderate to advanced volume loss.  There is periventricular and subcortical white matter hypoattenuation, most in keeping with chronic microvascular ischemic change. Similar appearance of asymmetric focal hypodensity in the left globus pallidus/genu of internal capsule suggesting sequelae of chronic ischemia.  Redemonstrated small focal hypodensity compatible with chronic lacunar infarct in the left cerebellar hemisphere. There is a new focal region of asymmetric cortical hypoattenuation and loss of gray-white matter differentiation involving the inferior posterior left temporal lobe (series 203, images 21-30; series 205, images 66-71; series 206, images 61-67), suspicious for acute to subacute ischemia. The grey-white differentiation is otherwise intact.There is no mass effect or midline shift.  There is no intracranial hemorrhage.   Calvarium: The calvarium is unremarkable.   Paranasal sinuses and mastoids: Visualized paranasal sinuses and mastoids are clear.       There is a new focal region of asymmetric cortical hypoattenuation and loss of gray-white matter differentiation involving the inferior posterior left temporal lobe (series 203, images 21-30; series 205, images 66-71;  series 206, images 61-67), suspicious for small region of acute to subacute ischemia. No acute intracranial hemorrhage.     MACRO: Arian Goldberg discussed the significance and urgency of this critical finding by telephone with  JASMEET MERCADO on 11/15/2023 at 3:30 am. (**-RCF-**) Findings:  See findings.     Signed by: Arian Goldberg 11/15/2023 3:32 AM Dictation workstation:   [id number]          IV Thrombolysis IV Thrombolysis Checklist      IV Thrombolysis Given: No; Thrombolysis contraindication reason: Time from Last Known Well (or stroke onset) is >4.5 hours        Assessment/Plan   Principal Problem:    Acute saddle pulmonary embolism, unspecified whether acute cor pulmonale present (CMS/[facility])      Ms. Mendoza is a 83 year old woman whom presented with right hand numbness/weakness, left facial droop and slurred speech. Found to have acute saddle pulmonary embolism s/p thrombectomy.  Neurologic symptoms have greatly improved and seem resolved on my evaluation although family feels speech is not at baseline. Etiology of stroke concerning for embolic source.  Need echo with bubble study and DVT scan for further evaluation given presentation of PE, concern for paradoxical emboli.  Will need MRI brain to confirm size of stroke and if bilateral involvement given bilateral symptoms. Needs close monitoring of neurologic exam given heparin drip and risk for hemorrhagic conversion was discussed with patient and family at bedside. Would try to keep BP less than 160 to lower risk of hemorrhagic conversion however avoid hypotension.       Neurology will continue to follow.      Alda Flores, DO

## 2023-11-15 NOTE — ED PROVIDER NOTES
HPI   Chief Complaint   Patient presents with    Stroke     Pt found by granddaughter sitting on toilet slumped over. Pt with facial droop and aphasia. Last known well 0200.       Patient is a 83-year-old female past medical history of pulmonary embolism not on anticoagulation, COPD presenting to the emergency department for strokelike symptoms.  Patient is not providing much history however states that she has been having some difficulty with speech.  Initially patient's granddaughter who presented to the ER stated that her last known normal was 2 AM.  EMS states that patient was also hypoxic they placed on nonrebreather and presented to the ER.      After initial assessment a discussion occurred with family and they stated that patient had been having numbness of the right upper extremity starting at 7 AM yesterday.  They stated that she texted her granddaughter that it did appear to improve however it is unclear for completely resolved.  When asked patient she stated that the numbness persisted throughout the day.                          [place] Coma Scale Score: 11         NIH Stroke Scale: 9          Patient History   Past Medical History:   Diagnosis Date    Personal history of diseases of the blood and blood-forming organs and certain disorders involving the immune mechanism 11/23/2020    History of anemia    Personal history of other diseases of the circulatory system 11/23/2020    History of hypertension    Personal history of other diseases of the digestive system 11/23/2020    History of hiatal hernia    Personal history of other diseases of the musculoskeletal system and connective tissue     History of arthritis    Personal history of other diseases of the nervous system and sense organs     History of cataract    Personal history of other diseases of the respiratory system 11/23/2020    History of asthma    Personal history of other diseases of the respiratory system 11/23/2020    History of  bronchitis    Personal history of other diseases of the respiratory system 11/23/2020    History of lung disease    Personal history of other diseases of urinary system 11/23/2020    History of kidney problems    Personal history of other endocrine, nutritional and metabolic disease 11/23/2020    History of thyroid disorder    Personal history of other specified conditions 11/23/2020    H/O shortness of breath     Past Surgical History:   Procedure Laterality Date    OTHER SURGICAL HISTORY  11/23/2020    Knee replacement    OTHER SURGICAL HISTORY  11/23/2020    Lumpectomy    OTHER SURGICAL HISTORY  11/23/2020    Cataract surgery    OTHER SURGICAL HISTORY  11/23/2020    Cholecystectomy    OTHER SURGICAL HISTORY  11/23/2020    Hernia repair    OTHER SURGICAL HISTORY  11/23/2020    Hysterectomy     Family History   Problem Relation Name Age of Onset    Cancer Mother      Diabetes Paternal Grandmother      Diabetes Paternal Grandfather       Social History     Tobacco Use    Smoking status: Not on file    Smokeless tobacco: Not on file   Substance Use Topics    Alcohol use: Not on file    Drug use: Not on file       Physical Exam   ED Triage Vitals   Temp Heart Rate Resp BP   -- 11/15/23 0329 11/15/23 0329 11/15/23 0329    97 18 108/84      SpO2 Temp src Heart Rate Source Patient Position   11/15/23 0329 -- 11/15/23 0358 11/15/23 0358   (!) 81 %  Monitor Sitting      BP Location FiO2 (%)     11/15/23 0358 --     Left arm        Physical Exam  Vitals reviewed.   Constitutional:       Appearance: Normal appearance.   HENT:      Head: Normocephalic and atraumatic.      Nose: Nose normal.      Mouth/Throat:      Mouth: Mucous membranes are moist.   Eyes:      Extraocular Movements: Extraocular movements intact.      Conjunctiva/sclera: Conjunctivae normal.   Cardiovascular:      Rate and Rhythm: Normal rate. Rhythm irregular.      Pulses: Normal pulses.      Heart sounds: Normal heart sounds.   Pulmonary:      Effort:  Respiratory distress present.      Breath sounds: Wheezing present. No rhonchi or rales.   Abdominal:      General: Abdomen is flat. Bowel sounds are normal.      Palpations: Abdomen is soft.   Musculoskeletal:         General: Normal range of motion.   Skin:     General: Skin is warm and dry.      Capillary Refill: Capillary refill takes less than 2 seconds.   Neurological:      Mental Status: She is alert and oriented to person, place, and time. Mental status is at baseline.      GCS: GCS eye subscore is 4. GCS verbal subscore is 5. GCS motor subscore is 6.      Cranial Nerves: Cranial nerve deficit (Left lower facial droop) present.      Sensory: Sensation is intact.      Motor: Weakness (Bilateral lower extremities drift to bed (baseline)) present. No pronator drift.      Comments: NIH Stroke Scale: 9   VAN negative   Psychiatric:         Mood and Affect: Mood normal.         ED Course & MDM   ED Course as of 11/15/23 0656   Wed Nov 15, 2023   0304 83-year-old female history of pulmonary embolism not on anticoagulation COPD presents emergency department for strokelike symptoms.  Initially on vital signs patient was stable except for hypoxia she has diffuse wheezing.  Her NIH stroke scale was 9.  She had a left-sided facial droop with dysarthria and aphasia as well as weakness in lower extremities which is around her baseline.  Delta NIH was 5.  Differentials considered include intracranial hemorrhage ischemic stroke.  Differential considered however unlikely as Akshat's paralysis as there is no seizure activity reported.  For patient's wheezing differential includes COVID-19 influenza and COPD exacerbation CTA of the head CT noncontrast chest x-ray CBC CMP EKG troponin have been ordered patient will be routinely reevaluate. [ZS]   0400 Upon reevaluation patient's dysarthria persists she also has some mild numbness in the right upper extremity which was not appreciated on initial examination.  There is no drift in  upper extremity.  Initially radiology stated no hemorrhage was appreciated however there is a cortical ischemic infarct in the left temporal parietal cortex that is appreciated.  The case was discussed with Dr. Mcgovern.  Prior to knowing the cortical findings patient was considered a tenecteplase candidate however with the cortical findings and the numbness that started at 7 AM.  Patient currently is not a tenecteplase candidate.  There is also delay in obtaining CTAs due to patient being hypoxic and also having mild improvement in wheezing after breathing treatments.  Patient's blood gas showed that her pH is 7.39 CO2 is 40 however she is having decreased oxygenation at 53%.  She will be placed on air Vo at this time   [ZS]   0404 Patient has no signs of leukocytosis H&H of 15.1/48.7.  Creatinine of 2.58 which is acute kidney injury.  Glucose was 134. [ZS]   0404 EKG interpreted by me poor baseline however atrial fibrillation ventricular 108  QTc 41 no other acute ischemic changes [ZS]   0427 Repeat EKG shows sinus tachycardia ventricular 100 AR interval 206 QRS 97 QTc 467 no ST elevations appreciated there is some premature atrial complexes.  Patient's troponin was 1272.  Case was discussed with cardiology Dr. Almonte who recommended heparin and aspirin.  I also discussed this with Dr. Mcgovern she stated that she would recommend doing heparin infusion family was notified they are okay with plan.   [ZS]   0434 Aspirin heparin were ordered [ZS]   0519 Troponin is downtrending less likely to be ACS however patient does have bilateral pulmonary embolisms on CT imaging. [ZS]   0529 I reevaluated patient's neuro exam.  Facial droop is now resolved.  She continues to have some mild dysarthria but no aphasia. [ZS]   0603 Case was discussed with PERT team they recommended that patient requires CTA of the chest is not enough information can be delineated from the CTA of the head and neck.  Patient does have acute  kidney injury that was discussed with PERT team.  The benefits outweigh the risk at this time. [ZS]   0637 Patient's IV line blew on CTA of the chest.  I placed a ultrasound-guided line in the left upper extremity. [ZS]   0656 Handoff to Dr. Dumont [ZS]      ED Course User Index  [ZS] Bessie Can MD         Diagnoses as of 11/15/23 0656   Acute kidney injury (CMS/HCC)       Medical Decision Making      Procedure  Critical Care    Performed by: Bessie Can MD  Authorized by: Bessie Can MD    Critical care provider statement:     Critical care time (minutes):  60    Critical care time was exclusive of:  Separately billable procedures and treating other patients    Critical care was necessary to treat or prevent imminent or life-threatening deterioration of the following conditions:  Respiratory failure (stroke, PE)    Critical care was time spent personally by me on the following activities:  Development of treatment plan with patient or surrogate, discussions with consultants, evaluation of patient's response to treatment, examination of patient, interpretation of cardiac output measurements, obtaining history from patient or surrogate, ordering and performing treatments and interventions, ordering and review of laboratory studies, ordering and review of radiographic studies, blood draw for specimens, re-evaluation of patient's condition, pulse oximetry and review of old charts       Bessie Can MD  11/15/23 0657

## 2023-11-15 NOTE — POST-PROCEDURE NOTE
Physician Transition of Care Summary  Invasive Cardiovascular Lab    Procedure Date: 11/15/2023  Attending:    Krysten Alvarado - Primary  Resident/Fellow/Other Assistant: Surgeon(s) and Role:    Indications:   Pre-op Diagnosis     * Pulmonary embolism (CMS/HCC) [I26.99]    Post-procedure diagnosis:   Post-op Diagnosis     * Pulmonary embolism (CMS/HCC) [I26.99]    Procedure(s):     * Pulmonary Angiography - Bilateral      Procedure Findings:   Acute PE    Description of the Procedure:   Thrombectomy bilateral PAs    Complications:   None    Stents/Implants:       Anticoagulation/Antiplatelet Plan:   Heparin resumed    Estimated Blood Loss:   50 mL    Anesthesia: Moderate Sedation Anesthesia Staff: No anesthesia staff entered.    Any Specimen(s) Removed:   No specimens collected during this procedure.    Disposition:   ICU      Electronically signed by: Maldonado Alvarado MD, 11/15/2023 1:01 PM

## 2023-11-15 NOTE — CARE PLAN
"PULMONARY EMBOLISM RESPONSE TEAM (PERT) NOTE     This note represents a summary of a virtual evaluation by the pulmonary embolism response team requested by Dr. Dumont.  Suggestions and impression are summarized from the initial virtual encounter and discussion with the referring medical team. These suggestions supplement but should not be a substitute for bedside evaluation and management by the attending of record.     PERT Members on the Call:   Dr. Jaime, Dr. Palafox, Dr. Rodriguez      Brief Clinical Summary:  Lillian Mendoza is a 83 y.o. female with history of COPD presenting for stroke like symptoms and found to have PE. Initially in for code stroke that started over 24 hours ago. She was hypoxic requiring increasing amounts of oxygen and is now on high flow 100%.     Initially radiology stated no hemorrhage was appreciated however there is a cortical ischemic infarct in the left temporal parietal cortex that is appreciated     Moderate persistent asthma and chronic allergic rhinitis.   obstructive sleep apnea on nocturnal oxygen   Chronic diastolic CHF  Chronic lymphedema    Vital Signs  BP 99/67 (BP Location: Right arm, Patient Position: Sitting)   Pulse 90   Resp 22   Ht 1.67 m (5' 5.75\")   Wt 111 kg (244 lb 11.4 oz)   SpO2 91%   BMI 39.80 kg/m²      Laboratory  Recent Labs     11/15/23  0418 11/15/23  0325   TROPHS 1,116* 1,272*   BNP  --  1,683*           PESI Score:  201, consistent with JLPESIRISK: Class V, or Very High risk (10-24.5% 30-day mortality risk) PE.    CT Scan reviewed:  Clot burden almost complete occlusion of the right pulmonary artery with minimal distal filling and moderate burden on the left  RV/LV ratio enlarged RV by CT scan    TTE reviewed (if available): pending      Venous duplex (if available): pending    Contraindications to anticoagulation: started on heparin  Contraindications to thrombolytics: Stroke on head imaging    Initial Impressions:  ERS/ESC Pulmonary Embolism " Risk Category: JLPECLASS: High risk.    Initial Suggestions/Plans:  Continue treatment at [facility], will discuss intervention with Dr. Castro who at [place] today  Initial therapy suggested: heparin drip has been started on discussion with neurology.  Additional testing recommended: Echo  Consults suggested: Neurology, interventional cardiology      To re conference the PERT team please call the  Transfer Center at [phone].

## 2023-11-15 NOTE — H&P
Subjective   Admission History:  Lillian Mendoza is a 83 y.o. female who presents for Stroke (Pt found by granddaughter sitting on toilet slumped over. Pt with facial droop and aphasia. Last known well 0200.).    HPI  This is an 83-year-old female who initially presented from home to [facility] on 11/15/23 with confusion, left facial droop, and dysarthria at 2 AM this morning.  Per granddaughter, patient was in the bathroom at 2AM when she had called over her granddaughter for help.  It was at that time that her granddaughter noticed the facial droop and dysarthria.  Before this, patient had been complaining of right hand numbness 1 day prior.  However, family did not think much of this as patient has had intermittent right hand numbness from a prior surgery.    Upon arrival, patient is hemodynamically stable although hypoxic with SpO2 81% on NRB.  NIHSS 9.  ABG showed pH 7.39/PCO2 40/PO2 53 on NRB after which she was placed on Airvo.  Initial work-up notable for EMILI on CKD with creatinine 2.58.  This is compared to her baseline of ~1.3.  She has elevated BNP 1683 and troponin 1272.  EKG without acute ischemic changes.  CT brain attack head negative for ICH but did show acute to subacute ischemic CVA involving the inferior posterior left temporal lobe.  Case had been discussed with on-call cardiology and neurology due to concern for NSTEMI and CVA, respectively, from the ED after which patient was given ASA and started on heparin drip.  She was deemed not a TNK candidate given development of her right hand numbness beginning the day prior.  CTA head/neck negative for LVO but did incidentally show acute bilateral PE.  CTA chest was subsequently obtained which confirmed extensive bilateral PE involving all lobes with large embolus in the distal aspect of right main pulmonary artery.  Moderate to severe embolic burden with moderate to severe right heart strain noted.  PERT team was activated.  Patient was urgently taken  for thrombectomy and then admitted the ICU for further evaluation and management.    Past Medical History:   History of remote PE in setting of right TKR in ~2008, previously on Coumadin  Chronic HFpEF with EF 55 to 60% as of 9/2020  Hypertension  Hyperlipidemia  CKD III  Hypothyroidism  BLAYNE on O2 nightly  Moderate persistent asthma  Chronic allergic rhinitis  GERD  RLS  Chronic lymphedema  Osteoarthritis    Past Surgical History: Cataract extraction, cholecystectomy, hernia repair, hysterectomy, right knee replacement, right breast lumpectomy  Family History: HTN, DM, CAD, COPD, asthma  Allergies: see above  Social history: Denies tobacco, alcohol, or illicit drug use.    Current Medications: see above    Review of Systems:  12-point ROS was reviewed and is negative, unless otherwise noted in HPI    Objective   Vital Signs: Reviewed  Input/Output: Reviewed  Oxygen requirements: Reviewed  Ventilator Information: Reviewed     Physical Exam:   Constitutional: awake, alert  ENMT: mucous membranes moist, EOMI, conjunctivae clear  Head/Neck: normocephalic, atraumatic; supple, trachea midline  Respiratory/Thorax: diminished breath sounds bilaterally but otherwise clear, Airvo with NRB in place  Cardiovascular: tachycardic, regular rhythm, no murmur appreciated  Gastrointestinal: soft, nondistended, non-tender, bowel sounds appreciated  Extremities: palpable peripheral pulses, BLE with nonpitting edema  Neurological: AO x3, CN II-XII grossly intact, finger-to-nose intact, sensation grossly intact throughout; motor strength RUE 4/5, LUE 5/5, RLE 2/5, LLE 3/5; no facial droop, no dysarthria or aphasia  Psychological: appropriate mood and behavior  Skin: warm and dry    Scheduled Medications:   aspirin, 324 mg, oral, Once  [START ON 11/16/2023] aspirin, 81 mg, oral, Daily  atorvastatin, 40 mg, oral, Nightly  iohexol, 75 mL, intravenous, Once in imaging  levothyroxine, 150 mcg, oral, Daily  [START ON 11/16/2023] pantoprazole,  40 mg, oral, Daily before breakfast  perflutren lipid microspheres, 0.5-10 mL of dilution, intravenous, Once in imaging  perflutren protein A microsphere, 0.5 mL, intravenous, Once in imaging  polyethylene glycol, 17 g, oral, Daily  sulfur hexafluoride microsphr, 2 mL, intravenous, Once in imaging    Continuous Medications:   heparin, 0-4,500 Units/hr, Last Rate: Stopped (11/15/23 1031)    PRN Medications:   PRN medications: heparin, hydrALAZINE **FOLLOWED BY** [START ON 11/17/2023] hydrALAZINE, labetaloL, oxygen, oxygen    Assessment/Plan   This is an 83-year-old female, with history of remote PE in setting of right TKR, chronic HFpEF with EF 55 to 60% as of 9/2020, HTN, HLD, CKD 3, hypothyroidism, BLAYNE on O2 nightly, moderate persistent asthma, chronic allergic rhinitis, and chronic lymphedema, who initially presented from home to [facility] on 11/15/23 with confusion, left facial droop, and dysarthria at 2 AM that morning.  Patient was out of TNK window as she had had right hand numbness the day prior.  She was found to have acute to subacute ischemic CVA involving inferior posterior left temporal lobe.  CTA head/neck incidentally found acute bilateral PE.  Subsequent CTA chest confirmed extensive bilateral PE involving all lobes with large embolus in the distal aspect of right main pulmonary artery.  Moderate to severe embolic burden with moderate to severe right heart strain noted.  PERT team was activated.  Patient was urgently taken for thrombectomy and then admitted the ICU for further evaluation and management.    Neurological:  Acute/subacute ischemic CVA, left temporal lobe  Restless legs syndrome  Stroke team called on evening of admission due to redemonstration of left facial droop. Repeat CT head (-) ICH.  - Neurochecks q4h  - Defer MRI brain until 11/16 pending respiratory status  - Avoid excess caths & drains, sedating meds  - Monitor for ICU delirium    Cardiovascular:  Acute submassive bilateral PE s/p  thrombectomy on 11/15/23  Acute occlusive proximal BLE DVT   Chronic HFpEF  Hypertension  Hyperlipidemia  TTE in 9/2020 shows EF 55 to 60%  - Continue Heparin ggt  - Hemodynamically stable, not requiring pressors. Judicious use of IVF given RV overload. Maintain MAP>65.  - TTE with bubble study pending  - Interventional cardiology following.  - Vascular surgery consulted for evaluation for IVC filter in setting of proximal BLE DVTs.    Respiratory:  Acute hypoxemic respiratory failure secondary to acute submassive bilateral PE  History of remote PE in setting of right TKA in ~2008, previously on Coumadin  BLAYNE on O2 QHS  Moderate persistent asthma  Chronic allergic rhinitis  Not on continuous O2 at baseline.  Repeat ABG 7.36/42/111 on max Airvo.  - Wean FiO2 as tolerated with goal SpO2>92%.  - Aspiration precautions    Gastrointestinal:  Hepatic steatosis  GERD  - Diet: NPO  - PPX: Protonix    Endocrine:  Hypothyroidism  No hx DM.  - Accuchecks q6h.  - Hold home Levothyroxine while NPO for now.    Renal:  EMILI on CKD III  Lactic acidosis  Right nephrolithiasis, nonobstructing  Baseline SCr ~1.2-1.3  - IVF: LR @ 75cc/hr  - Trend RFP, lactate. Avoid nephrotoxins. Monitor I&Os.  - Monitor & replete electrolytes PRN  - Nephrology consulted    Hematological:  - Trend CBC. Monitor for s/s bleeding while on Heparin ggt.    Infection Disease:  No acute issues.    Skin/MSK:  Chronic lymphedema  Osteoarthritis  No acute issues.    Vent/O2: Airvo 60/100 + NRB  Lines/Devices: PIVs  Drips: Heparin  ATBx: -  Diet: NPO given high risk of aspiration while on Airvo 60L/min.  IVF: LR @ 75cc/hr  PPX: Heparin ggt, Protonix  Code: FULL - Discussed with patient who was accompanied by granddaughter, Cassandra. Patient requesting more time to decide.  Dispo: ICU    This is a preliminary note written by the resident. Please wait for attending addendum for finalization of note and recommendations.    Anatoliy Kumar, DO  Internal Medicine PGY3

## 2023-11-16 ENCOUNTER — APPOINTMENT (OUTPATIENT)
Dept: RADIOLOGY | Facility: HOSPITAL | Age: 83
DRG: 163 | End: 2023-11-16
Payer: MEDICARE

## 2023-11-16 ENCOUNTER — APPOINTMENT (OUTPATIENT)
Dept: CARDIOLOGY | Facility: HOSPITAL | Age: 83
DRG: 163 | End: 2023-11-16
Payer: MEDICARE

## 2023-11-16 LAB
ALBUMIN SERPL BCP-MCNC: 2.8 G/DL (ref 3.4–5)
ALP SERPL-CCNC: 66 U/L (ref 33–136)
ALT SERPL W P-5'-P-CCNC: 8 U/L (ref 7–45)
ANION GAP SERPL CALC-SCNC: 15 MMOL/L (ref 10–20)
AST SERPL W P-5'-P-CCNC: 19 U/L (ref 9–39)
BILIRUB SERPL-MCNC: 0.8 MG/DL (ref 0–1.2)
BUN SERPL-MCNC: 29 MG/DL (ref 6–23)
CALCIUM SERPL-MCNC: 8.2 MG/DL (ref 8.6–10.3)
CHLORIDE SERPL-SCNC: 105 MMOL/L (ref 98–107)
CO2 SERPL-SCNC: 21 MMOL/L (ref 21–32)
CREAT SERPL-MCNC: 2.12 MG/DL (ref 0.5–1.05)
ERYTHROCYTE [DISTWIDTH] IN BLOOD BY AUTOMATED COUNT: 15.6 % (ref 11.5–14.5)
ERYTHROCYTE [DISTWIDTH] IN BLOOD BY AUTOMATED COUNT: 15.9 % (ref 11.5–14.5)
GFR SERPL CREATININE-BSD FRML MDRD: 23 ML/MIN/1.73M*2
GLUCOSE BLD MANUAL STRIP-MCNC: 107 MG/DL (ref 74–99)
GLUCOSE BLD MANUAL STRIP-MCNC: 88 MG/DL (ref 74–99)
GLUCOSE BLD MANUAL STRIP-MCNC: 99 MG/DL (ref 74–99)
GLUCOSE SERPL-MCNC: 95 MG/DL (ref 74–99)
HCT VFR BLD AUTO: 32.8 % (ref 36–46)
HCT VFR BLD AUTO: 39.1 % (ref 36–46)
HGB BLD-MCNC: 10.2 G/DL (ref 12–16)
HGB BLD-MCNC: 11.3 G/DL (ref 12–16)
LACTATE SERPL-SCNC: 1.1 MMOL/L (ref 0.4–2)
LEFT VENTRICLE INTERNAL DIMENSION DIASTOLE: 3.47 (ref 3.5–6)
LEFT VENTRICULAR OUTFLOW TRACT DIAMETER: 2.7
MAGNESIUM SERPL-MCNC: 2.12 MG/DL (ref 1.6–2.4)
MCH RBC QN AUTO: 31.3 PG (ref 26–34)
MCH RBC QN AUTO: 31.3 PG (ref 26–34)
MCHC RBC AUTO-ENTMCNC: 28.9 G/DL (ref 32–36)
MCHC RBC AUTO-ENTMCNC: 31.1 G/DL (ref 32–36)
MCV RBC AUTO: 101 FL (ref 80–100)
MCV RBC AUTO: 108 FL (ref 80–100)
NRBC BLD-RTO: 0.3 /100 WBCS (ref 0–0)
NRBC BLD-RTO: 0.6 /100 WBCS (ref 0–0)
PHOSPHATE SERPL-MCNC: 3.9 MG/DL (ref 2.5–4.9)
PLATELET # BLD AUTO: 147 X10*3/UL (ref 150–450)
PLATELET # BLD AUTO: 148 X10*3/UL (ref 150–450)
POTASSIUM SERPL-SCNC: 5.3 MMOL/L (ref 3.5–5.3)
PROT SERPL-MCNC: 5.7 G/DL (ref 6.4–8.2)
RBC # BLD AUTO: 3.26 X10*6/UL (ref 4–5.2)
RBC # BLD AUTO: 3.61 X10*6/UL (ref 4–5.2)
RIGHT VENTRICLE PEAK SYSTOLIC PRESSURE: 43.2
SODIUM SERPL-SCNC: 136 MMOL/L (ref 136–145)
UFH PPP CHRO-ACNC: 0.1 IU/ML
UFH PPP CHRO-ACNC: 1.1 IU/ML
UFH PPP CHRO-ACNC: 1.2 IU/ML
UFH PPP CHRO-ACNC: 1.4 IU/ML
UFH PPP CHRO-ACNC: <0.1 IU/ML
WBC # BLD AUTO: 8.6 X10*3/UL (ref 4.4–11.3)
WBC # BLD AUTO: 9.4 X10*3/UL (ref 4.4–11.3)

## 2023-11-16 PROCEDURE — 85027 COMPLETE CBC AUTOMATED: CPT | Performed by: STUDENT IN AN ORGANIZED HEALTH CARE EDUCATION/TRAINING PROGRAM

## 2023-11-16 PROCEDURE — 2500000004 HC RX 250 GENERAL PHARMACY W/ HCPCS (ALT 636 FOR OP/ED): Performed by: STUDENT IN AN ORGANIZED HEALTH CARE EDUCATION/TRAINING PROGRAM

## 2023-11-16 PROCEDURE — 2500000004 HC RX 250 GENERAL PHARMACY W/ HCPCS (ALT 636 FOR OP/ED): Performed by: NURSE PRACTITIONER

## 2023-11-16 PROCEDURE — 82947 ASSAY GLUCOSE BLOOD QUANT: CPT

## 2023-11-16 PROCEDURE — 84100 ASSAY OF PHOSPHORUS: CPT | Performed by: STUDENT IN AN ORGANIZED HEALTH CARE EDUCATION/TRAINING PROGRAM

## 2023-11-16 PROCEDURE — 85520 HEPARIN ASSAY: CPT | Performed by: INTERNAL MEDICINE

## 2023-11-16 PROCEDURE — 2020000001 HC ICU ROOM DAILY

## 2023-11-16 PROCEDURE — 36415 COLL VENOUS BLD VENIPUNCTURE: CPT | Performed by: INTERNAL MEDICINE

## 2023-11-16 PROCEDURE — 99223 1ST HOSP IP/OBS HIGH 75: CPT | Performed by: NURSE PRACTITIONER

## 2023-11-16 PROCEDURE — 85520 HEPARIN ASSAY: CPT | Performed by: STUDENT IN AN ORGANIZED HEALTH CARE EDUCATION/TRAINING PROGRAM

## 2023-11-16 PROCEDURE — 99291 CRITICAL CARE FIRST HOUR: CPT | Performed by: INTERNAL MEDICINE

## 2023-11-16 PROCEDURE — 80053 COMPREHEN METABOLIC PANEL: CPT | Performed by: STUDENT IN AN ORGANIZED HEALTH CARE EDUCATION/TRAINING PROGRAM

## 2023-11-16 PROCEDURE — 76937 US GUIDE VASCULAR ACCESS: CPT

## 2023-11-16 PROCEDURE — 85027 COMPLETE CBC AUTOMATED: CPT | Performed by: NURSE PRACTITIONER

## 2023-11-16 PROCEDURE — 70551 MRI BRAIN STEM W/O DYE: CPT

## 2023-11-16 PROCEDURE — 83605 ASSAY OF LACTIC ACID: CPT | Performed by: STUDENT IN AN ORGANIZED HEALTH CARE EDUCATION/TRAINING PROGRAM

## 2023-11-16 PROCEDURE — 83735 ASSAY OF MAGNESIUM: CPT | Performed by: STUDENT IN AN ORGANIZED HEALTH CARE EDUCATION/TRAINING PROGRAM

## 2023-11-16 PROCEDURE — 99232 SBSQ HOSP IP/OBS MODERATE 35: CPT | Performed by: NURSE PRACTITIONER

## 2023-11-16 PROCEDURE — 93308 TTE F-UP OR LMTD: CPT | Performed by: STUDENT IN AN ORGANIZED HEALTH CARE EDUCATION/TRAINING PROGRAM

## 2023-11-16 PROCEDURE — 70551 MRI BRAIN STEM W/O DYE: CPT | Performed by: RADIOLOGY

## 2023-11-16 PROCEDURE — 92610 EVALUATE SWALLOWING FUNCTION: CPT | Mod: GN

## 2023-11-16 PROCEDURE — C9113 INJ PANTOPRAZOLE SODIUM, VIA: HCPCS | Performed by: STUDENT IN AN ORGANIZED HEALTH CARE EDUCATION/TRAINING PROGRAM

## 2023-11-16 PROCEDURE — 36415 COLL VENOUS BLD VENIPUNCTURE: CPT | Performed by: STUDENT IN AN ORGANIZED HEALTH CARE EDUCATION/TRAINING PROGRAM

## 2023-11-16 PROCEDURE — 99291 CRITICAL CARE FIRST HOUR: CPT | Performed by: NURSE PRACTITIONER

## 2023-11-16 PROCEDURE — 94660 CPAP INITIATION&MGMT: CPT

## 2023-11-16 PROCEDURE — 93308 TTE F-UP OR LMTD: CPT

## 2023-11-16 RX ORDER — HEPARIN SODIUM 1000 [USP'U]/ML
3000-6000 INJECTION, SOLUTION INTRAVENOUS; SUBCUTANEOUS EVERY 4 HOURS PRN
Status: DISCONTINUED | OUTPATIENT
Start: 2023-11-16 | End: 2023-11-28 | Stop reason: HOSPADM

## 2023-11-16 RX ORDER — LEVOTHYROXINE SODIUM 150 UG/1
150 TABLET ORAL
Status: DISCONTINUED | OUTPATIENT
Start: 2023-11-17 | End: 2023-11-28 | Stop reason: HOSPADM

## 2023-11-16 RX ADMIN — HEPARIN SODIUM 2300 UNITS/HR: 10000 INJECTION, SOLUTION INTRAVENOUS at 16:03

## 2023-11-16 RX ADMIN — FLUTICASONE PROPIONATE 2 SPRAY: 50 SPRAY, METERED NASAL at 09:16

## 2023-11-16 RX ADMIN — HEPARIN SODIUM 3000 UNITS: 1000 INJECTION INTRAVENOUS; SUBCUTANEOUS at 09:11

## 2023-11-16 RX ADMIN — PANTOPRAZOLE SODIUM 40 MG: 40 INJECTION, POWDER, FOR SOLUTION INTRAVENOUS at 09:10

## 2023-11-16 RX ADMIN — SODIUM CHLORIDE, POTASSIUM CHLORIDE, SODIUM LACTATE AND CALCIUM CHLORIDE 75 ML/HR: 600; 310; 30; 20 INJECTION, SOLUTION INTRAVENOUS at 16:02

## 2023-11-16 RX ADMIN — HEPARIN SODIUM 2300 UNITS/HR: 10000 INJECTION, SOLUTION INTRAVENOUS at 06:09

## 2023-11-16 ASSESSMENT — COGNITIVE AND FUNCTIONAL STATUS - GENERAL
MOBILITY SCORE: 7
PERSONAL GROOMING: A LOT
MOVING FROM LYING ON BACK TO SITTING ON SIDE OF FLAT BED WITH BEDRAILS: A LOT
DRESSING REGULAR UPPER BODY CLOTHING: TOTAL
HELP NEEDED FOR BATHING: TOTAL
DRESSING REGULAR LOWER BODY CLOTHING: TOTAL
WALKING IN HOSPITAL ROOM: TOTAL
STANDING UP FROM CHAIR USING ARMS: TOTAL
CLIMB 3 TO 5 STEPS WITH RAILING: TOTAL
EATING MEALS: A LOT
TURNING FROM BACK TO SIDE WHILE IN FLAT BAD: TOTAL
MOVING TO AND FROM BED TO CHAIR: TOTAL
DAILY ACTIVITIY SCORE: 8
TOILETING: TOTAL

## 2023-11-16 ASSESSMENT — PAIN - FUNCTIONAL ASSESSMENT
PAIN_FUNCTIONAL_ASSESSMENT: 0-10

## 2023-11-16 ASSESSMENT — PAIN SCALES - GENERAL
PAINLEVEL_OUTOF10: 0 - NO PAIN

## 2023-11-16 ASSESSMENT — ACTIVITIES OF DAILY LIVING (ADL): LACK_OF_TRANSPORTATION: NO

## 2023-11-16 NOTE — CONSULTS
Consults  Vascular Surgery  Reason For Consult  Possible IVC filter    History Of Present Illness  Lillian Mendoza is a 83 y.o. female presenting with left fascial droop and slurred speech.  History obtained from daughter at bedside. She reports that on the day prior to admission the patient developed right hand numbness, which she has had intermittent in the past from a prior surgery so they didn't think it was concerning at the time, however the patient reports that her hand was also weak and she was dropping the paper.  She then seemed confused that night when her granddaughter came home from work aroudn 1:30. Then at 2 pm she woke up to have her daughter take her to the bathroom and she noticed the facial droop and slurred speech.  Facial droop has now resolved however family states that her speech is still not back to normal.  TNK was not given in the ED due to concern for subacute infarct on CT head and symptoms that started the day prior.  She was then found to have bilateral PE with significant hypoxia with high O2 requirement. She was placed on heparin drip and went for thrombectomy this morning.  Information obtained from chart review.  Family was present during evaluation patient's current neurologic symptom is left facial droop and slurred speech no other neurological issues currently on heparin infusion and high flow nasal oxygen.        Last known well: morning prior to admission   Had stroke symptoms resolved at time of presentation: No     Past Medical History  She has a past medical history of Personal history of diseases of the blood and blood-forming organs and certain disorders involving the immune mechanism (11/23/2020), Personal history of other diseases of the circulatory system (11/23/2020), Personal history of other diseases of the digestive system (11/23/2020), Personal history of other diseases of the musculoskeletal system and connective tissue, Personal history of other diseases of  the nervous system and sense organs, Personal history of other diseases of the respiratory system (11/23/2020), Personal history of other diseases of the respiratory system (11/23/2020), Personal history of other diseases of the respiratory system (11/23/2020), Personal history of other diseases of urinary system (11/23/2020), Personal history of other endocrine, nutritional and metabolic disease (11/23/2020), and Personal history of other specified conditions (11/23/2020).  Patient is very sedentary and morbidly obese.    Surgical History  She has a past surgical history that includes Other surgical history (11/23/2020); Other surgical history (11/23/2020); Other surgical history (11/23/2020); Other surgical history (11/23/2020); Other surgical history (11/23/2020); Other surgical history (11/23/2020); and Invasive Vascular Procedure (N/A, 11/15/2023).     Social History  She has no history on file for tobacco use, alcohol use, and drug use.    Family History  Family History   Problem Relation Name Age of Onset    Cancer Mother      Diabetes Paternal Grandmother      Diabetes Paternal Grandfather          Allergies  Morphine    Review of Systems  A 10 point ROS was performed with the patient denying any complaint at this time aside from those listed in the HPI above.  Physical Exam  Constitutional: BMI 47.2, awake/alert/oriented x3, in no distress,  Eyes: Clear sclera  ENMT: mucous membranes are moist, no apparent injury, no lesions seen,   Head/neck: Neck supple, trachea  is midline, no apparent injury, no JVD, no bruits, no mass, no stridor  Respiratory/thorax: Breath sounds clear and equal diminished throughout bilaterally, thorax symmetric, currently on high flow oxygen  Cardiac/Vascular: Regular, rate and rhythm, no murmurs, 2+ radial pulses, palpable DP and PTs  Gastrointestinal: Nondistended soft nontender no rebound tenderness or guarding no masses palpable no organomegaly, positive bowel sounds, no  "bruits.  Musculoskeletal: Moves all extremities, limited range of motion , no joint swelling, normal strength  Extremities: No cyanosis, no contusions or wounds, no clubbing  Neurological: Alert and oriented x3, left facial droop with slurred speech no other neurological issues  Lymphatic: No significant lymphadenopathy  Skin: Warm and dry, no lesions, no rashes  Psychological: Appropriate mood and behavior last Recorded Vitals  Blood pressure 109/70, pulse 94, temperature 36.6 °C (97.9 °F), temperature source Tympanic, resp. rate (!) 34, height 1.67 m (5' 5.75\"), weight 132 kg (290 lb 2 oz), SpO2 96 %.       Results for orders placed or performed during the hospital encounter of 11/15/23 (from the past 24 hour(s))   Transthoracic Echo (TTE) Complete   Result Value Ref Range    LVOT diam 2.70     LVIDd 3.47     RVSP 43.2    CBC   Result Value Ref Range    WBC 12.8 (H) 4.4 - 11.3 x10*3/uL    nRBC 0.5 (H) 0.0 - 0.0 /100 WBCs    RBC 3.97 (L) 4.00 - 5.20 x10*6/uL    Hemoglobin 12.3 12.0 - 16.0 g/dL    Hematocrit 39.2 36.0 - 46.0 %    MCV 99 80 - 100 fL    MCH 31.0 26.0 - 34.0 pg    MCHC 31.4 (L) 32.0 - 36.0 g/dL    RDW 15.8 (H) 11.5 - 14.5 %    Platelets 157 150 - 450 x10*3/uL   Magnesium   Result Value Ref Range    Magnesium 2.20 1.60 - 2.40 mg/dL   Basic Metabolic Panel   Result Value Ref Range    Glucose 119 (H) 74 - 99 mg/dL    Sodium 138 136 - 145 mmol/L    Potassium 5.1 3.5 - 5.3 mmol/L    Chloride 103 98 - 107 mmol/L    Bicarbonate 23 21 - 32 mmol/L    Anion Gap 17 10 - 20 mmol/L    Urea Nitrogen 29 (H) 6 - 23 mg/dL    Creatinine 2.21 (H) 0.50 - 1.05 mg/dL    eGFR 22 (L) >60 mL/min/1.73m*2    Calcium 8.5 (L) 8.6 - 10.3 mg/dL   Heparin Assay, UFH   Result Value Ref Range    Heparin Unfractionated 1.3 (HH) See Comment Below for Therapeutic Ranges IU/mL   POCT GLUCOSE   Result Value Ref Range    POCT Glucose 125 (H) 74 - 99 mg/dL   Heparin Assay   Result Value Ref Range    Heparin Unfractionated 0.1 See Comment " Below for Therapeutic Ranges IU/mL   POCT GLUCOSE   Result Value Ref Range    POCT Glucose 99 74 - 99 mg/dL   Heparin Assay   Result Value Ref Range    Heparin Unfractionated <0.1 See Comment Below for Therapeutic Ranges IU/mL   Comprehensive Metabolic Panel   Result Value Ref Range    Glucose 95 74 - 99 mg/dL    Sodium 136 136 - 145 mmol/L    Potassium 5.3 3.5 - 5.3 mmol/L    Chloride 105 98 - 107 mmol/L    Bicarbonate 21 21 - 32 mmol/L    Anion Gap 15 10 - 20 mmol/L    Urea Nitrogen 29 (H) 6 - 23 mg/dL    Creatinine 2.12 (H) 0.50 - 1.05 mg/dL    eGFR 23 (L) >60 mL/min/1.73m*2    Calcium 8.2 (L) 8.6 - 10.3 mg/dL    Albumin 2.8 (L) 3.4 - 5.0 g/dL    Alkaline Phosphatase 66 33 - 136 U/L    Total Protein 5.7 (L) 6.4 - 8.2 g/dL    AST 19 9 - 39 U/L    Bilirubin, Total 0.8 0.0 - 1.2 mg/dL    ALT 8 7 - 45 U/L   CBC   Result Value Ref Range    WBC 8.6 4.4 - 11.3 x10*3/uL    nRBC 0.6 (H) 0.0 - 0.0 /100 WBCs    RBC 3.61 (L) 4.00 - 5.20 x10*6/uL    Hemoglobin 11.3 (L) 12.0 - 16.0 g/dL    Hematocrit 39.1 36.0 - 46.0 %     (H) 80 - 100 fL    MCH 31.3 26.0 - 34.0 pg    MCHC 28.9 (L) 32.0 - 36.0 g/dL    RDW 15.9 (H) 11.5 - 14.5 %    Platelets 147 (L) 150 - 450 x10*3/uL   Magnesium   Result Value Ref Range    Magnesium 2.12 1.60 - 2.40 mg/dL   Phosphorus   Result Value Ref Range    Phosphorus 3.9 2.5 - 4.9 mg/dL   Lactate   Result Value Ref Range    Lactate 1.1 0.4 - 2.0 mmol/L   POCT GLUCOSE   Result Value Ref Range    POCT Glucose 88 74 - 99 mg/dL   Heparin Assay   Result Value Ref Range    Heparin Unfractionated 0.1 See Comment Below for Therapeutic Ranges IU/mL   POCT GLUCOSE   Result Value Ref Range    POCT Glucose 107 (H) 74 - 99 mg/dL       Vascular US Lower Extremity Venous Duplex Bilateral    Result Date: 11/16/2023           [facility] [address] [place] [address] Tel [phone] and Fax [phone]  Vascular Lab Report VASC US LOWER EXTREMITY VENOUS DUPLEX BILATERAL  Patient Name:      DARLING Colin Physician: [id number] Cookie Pace MD Study Date:       11/15/2023          Ordering           [id number] TRANG ALVARADO                                       Physician: MRN/PID:          [id number]            Technologist:      Allyn Escobar RVT Accession#:       [id number]        Technologist 2: Date of           1940 / 83      Encounter#:        [id number] Birth/Age:        years Gender:           F Admission Status: Inpatient           Location           [facility]                                       Performed:  Diagnosis/ICD: Other pulmonary embolism without acute cor pulmonale-I26.99 CPT Codes:     30816 Peripheral venous duplex scan for DVT complete  **CRITICAL RESULT** Critical Result: DVT Bilateral FV, Bilateral POP V, and Right PTV Notification called to Dr Trang Alvarado on 11/15/2023 at 3:00:00 PM by NEIL Escobar RVT.  CONCLUSIONS: Right Lower Venous: There is acute occlusive deep vein thrombosis visualized in the mid femoral, distal femoral, popliteal and posterior tibial veins. There is acute non-occlusive deep vein thrombosis visualized in the proximal femoral vein. Left Lower Venous: There is acute occlusive deep vein thrombosis visualized in the mid femoral, distal femoral, popliteal and proximal femoral veins. Cannot rule out thrombus in non-visualized peroneal vein due to edema.  Imaging & Doppler Findings:  Right                 Compressible      Thrombus          Flow Distal External Iliac     Yes             None         Continuous CFV                       Yes             None         Continuous PFV                       Yes             None FV Proximal             Partial    Acute non-occlusive Continuous FV Mid                     No        Acute occlusive FV Distal                  No        Acute occlusive Popliteal                  No        Acute occlusive      None Peroneal                  Yes              None PTV                        No        Acute occlusive  Left                  Compress    Thrombus        Flow Distal External Iliac   Yes         None       Continuous CFV                     Yes         None       Continuous PFV                     Yes         None FV Proximal              No    Acute occlusive    None FV Mid                   No    Acute occlusive FV Distal                No    Acute occlusive Popliteal                No    Acute occlusive    None PTV                     Yes         None  [id number] Cookie Pace MD Electronically signed by [id number] Cookie Pace MD on 11/16/2023 at 12:36:58 PM  ** Final **     Transthoracic Echo (TTE) Complete    Result Date: 11/16/2023    [facility], [address] [place] [address]Hic [phone] and                                 Fax [phone] TRANSTHORACIC ECHOCARDIOGRAM REPORT  Patient Name:      DARLING SORIA GARO   Reading Physician:    [id number] Govind Olvera MD Study Date:        11/15/2023           Ordering Provider:    [id number] ANTOINETTE ENCINAS MRN/PID:           [id number]             Fellow: Accession#:        [id number]         Nurse: Date of Birth/Age: 1940 / 83 years Sonographer:          OSMAN Aguirre RDCS Gender:            F                    Additional Staff: Height:            165.10 cm            Admit Date:           11/15/2023 Weight:            110.68 kg            Admission Status:     Inpatient -                                                               Routine BSA:               2.15 m2              Encounter#:           [id number]                                         Department Location:  [address]                                                               floor/ICU Blood Pressure: 105 /73 mmHg Study Type:    TRANSTHORACIC ECHO (TTE) COMPLETE Diagnosis/ICD: Other forms of  dyspnea-R06.09 Indication:    Dyspnea CPT Code:      Echo Complete w Full Doppler-12960 Patient History: Pertinent History: Dyspnea. Study Detail: The following Echo studies were performed: 2D, M-Mode, Doppler and               color flow. Technically challenging study due to poor acoustic               windows, patient lying in supine position, body habitus and Post               procedure and bi-pap. Definity used as a contrast agent for               endocardial border definition. Total contrast used for this               procedure was 3 mL via IV push.  PHYSICIAN INTERPRETATION: Left Ventricle: The left ventricular systolic function is normal, with an estimated ejection fraction of 50-55%. There are no regional wall motion abnormalities. The left ventricular cavity size is normal. Left ventricular diastolic filling was not assessed. Left Atrium: The left atrium is normal in size. Right Ventricle: The right ventricle is severely enlarged. There is moderately reduced right ventricular systolic function. Right Atrium: The right atrium is mildly dilated. Aortic Valve: The aortic valve is probably trileaflet. There is trivial aortic valve regurgitation. Mitral Valve: The mitral valve is normal in structure. There is trace mitral valve regurgitation. Tricuspid Valve: The tricuspid valve is structurally normal. There is mild tricuspid regurgitation. The Doppler estimated RVSP is moderately elevated at 43.2 mmHg. Pulmonic Valve: The pulmonic valve is not well visualized. The pulmonic valve regurgitation was not well visualized. Pericardium: There is no pericardial effusion noted. Aorta: The aortic root is abnormal. There is mild dilatation of the ascending aorta. There is mild dilatation of the aortic root. Systemic Veins: The inferior vena cava appears to be of normal size.  CONCLUSIONS:  1. Left ventricular systolic function is normal with a 50-55% estimated ejection fraction.  2. Severely enlarged right ventricle.   3. There is moderately reduced right ventricular systolic function.  4. Moderately elevated right ventricular systolic pressure. QUANTITATIVE DATA SUMMARY: 2D MEASUREMENTS:                          Normal Ranges: Ao Root d:     3.70 cm   (2.0-3.7cm) LAs:           3.10 cm   (2.7-4.0cm) IVSd:          1.02 cm   (0.6-1.1cm) LVPWd:         0.84 cm   (0.6-1.1cm) LVIDd:         3.47 cm   (3.9-5.9cm) LVIDs:         2.14 cm LV Mass Index: 42.8 g/m2 LV % FS        38.3 % M-MODE MEASUREMENTS:                  Normal Ranges: Ao Root: 4.00 cm (2.0-3.7cm) LAs:     3.50 cm (2.7-4.0cm) AORTA MEASUREMENTS:                    Normal Ranges: Asc Ao, d: 3.80 cm (2.1-3.4cm) AORTIC VALVE:                        Normal Ranges: LVOT Diameter: 2.70 cm (1.8-2.4cm)  RIGHT VENTRICLE: RV Basal 5.13 cm TRICUSPID VALVE/RVSP:                             Normal Ranges: Peak TR Velocity: 3.17 m/s RV Syst Pressure: 43.2 mmHg (< 30mmHg) IVC Diam:         1.50 cm  [id number] Govind Olvera MD Electronically signed on 11/16/2023 at 9:42:05 AM  ** Final **     CT brain attack head wo IV contrast    Result Date: 11/15/2023  Interpreted By:  Reece Teran, STUDY: CT BRAIN ATTACK HEAD WO IV CONTRAST;  11/15/2023 5:54 pm   INDICATION: Signs/Symptoms:left facial droop.   COMPARISON: Noncontrast CT head dated 11/15/2023.   ACCESSION NUMBER(S): [id number]   ORDERING CLINICIAN: TERRY MCKINNEY   TECHNIQUE: Noncontrast axial CT scan of head was performed. Angled reformats in brain and bone windows were generated. The images were reviewed in bone, brain, blood and soft tissue windows.   FINDINGS: Exam is somewhat degraded by presence of the intravenous contrast within the vascular structures, likely from interventional procedure from earlier in the day due to poor renal clearance.   Within limits of the study, no hyperdense intracranial hemorrhage is evident. There is no mass effect or midline shift. Area of diminished attenuation described on previous  same day noncontrast CT of the head dated 11/15/2023 is not well visualized on current study.   Gray-white differentiation is intact, without evidence of CT apparent transcortical infarct. Subtle attenuation changes present in the periventricular and subcortical white matter of bilateral cerebral hemispheres are nonspecific, but favored to represent sequela of microvascular disease.   There is prominence of the extra-axial spaces overlying the cerebral hemispheres bilaterally without evidence of abnormal fluid collection. Basal cisterns are patent. No ventricular dilatation is present.   Scalp soft tissues do not demonstrate any acute abnormalities. Calvarium is unremarkable in appearance without evidence of depressed skull fracture. Mastoid air cells and middle ear cavities are well aerated without evidence of fluid fluid levels.   There is opacification of the intracranial carotid arteries and basilar artery bilaterally.       1.  Exam is somewhat degraded by a retained contrast from previous interventional procedure likely due to poor renal clearance. 2. Within limitations of the study, no hyperdense intracranial hemorrhage or CT apparent transcortical infarct is identified. Area of diminished attenuation in the posteroinferior left temporal lobe described on previous same day noncontrast CT of the head is not definitely identified on current examination. MRI of the brain can be considered for more definite characterization. 3. Subtle attenuation changes present in the periventricular and subcortical white matter bilateral cerebral hemispheres are nonspecific, but favored to represent changes of microvascular disease.   MACRO: Reece Teran discussed the significance and urgency of this critical finding by telephone with  TERRY MCKINNEY on 11/15/2023 at 6:03 pm.  (**-RCF-**) Findings:  See findings.   Signed by: Reece Teran 11/15/2023 6:05 PM Dictation workstation:   [id number]    Invasive vascular  procedure    Result Date: 11/15/2023   [facility], Cath Lab, [address] [facility], [place], [place] [address] Cardiovascular Catheterization Report Patient Name:      DARLING WYMAN   Performing Physician:  Jay Alvarado MD Study Date:        11/15/2023           Verifying Physician:   Jay Alvarado MD MRN/PID:           [id number]             Cardiologist/Co-scrub: Accession#:        [id number]         Ordering Physician:    Jay ALVARADO Date of Birth/Age: 1940 / 83 years Fellow: Gender:            F                    Fellow: Encounter#:        [id number]  Study:            Pulmonary Angiogram Additional Study: Peripheral Intervention  Indications: DARLING WYMAN is a 84 year old female who presents with intermediate-high risk PE, hx of PE 15 years ago (thought 2/2 knee TKA) s/p 1 year of warfarin; p/w stroke c/f paradoxical embolus in setting of PE. +significant O2 requirement.  Procedure Description: After infiltration of local anesthetic, the right femoral vein was identified with two dimensional ultrasound. Under direct ultrasound visualization, the right femoral vein was cannulated with a percutaneous technique. A 24F sheath was placed in the vein.  Pulmonary Embolism:  We predilated with a 22F Mac dilator, and placed a 24F sheath into the vein. Post-procedure, the venous sheath was pulled and pressure was applied to the site via figure-of-8.  Prior to access, we imaged the bilateral common femoral veins to evaluate for thrombus. No thrombus was noted. We selectively engaged the pulmonary artery using a JR4 diagnostic catheter. Pre-thrombectomy RA and PA pressures were obtained.  Following this, we sequentially engaged the following PA branches with Inari [id number] for selective extirpation of material: Left main, Right main, Truncus anterior and RLL posterior basal. Successful thrombectomy was performed. Selective angiogram was performed during the procedure to help locate thrombus for extraction.  "Post-thrombectomy RA and PA pressures were obtained.  We did not administer therapeutic systemic heparin up front d/t stroke and concerns for potential hemorrhagic conversion. Heparinized saline was utilized to \"wash\" the FlowSaver filter to maintain patency. Initially thrombectomy of LPA highly successful. Difficulty with RPA thrombectomy, with angiogram showing ball-like embolus. TA aspiration performed with loosening of the RPA thrombus (subacute clot). We did provide 2000 units of heparin at this time due to extended dwell time of catheter. Further thrombectomy performed of the main RPA and lower posterior branch with successful extirpation of remainder of subacute thrombus.  Hemo Personnel: +----------+---------+ Name      Duty      +----------+---------+ Maldonado Alvarado MDPJAIRON MD 1 +----------+---------+  Hemodynamic Pressures:  +----+-----------+----------+-------------+--------------+-----+-------+-------+ Site Date Time Phase NameSystolic mmHgDiastolic mmHgMean A-Wave V-Wave                                                      mmHg  mmHg   mmHg   +----+-----------+----------+-------------+--------------+-----+-------+-------+   RA 11/15/2023   O2 REST                              25     28     25     10:53:43 AM                                                         +----+-----------+----------+-------------+--------------+-----+-------+-------+   PA 11/15/2023   O2 REST           73            27   52                   10:56:29 AM                                                         +----+-----------+----------+-------------+--------------+-----+-------+-------+   PA 11/15/2023   O2 REST           75            31   51                   11:38:07 AM                                                         +----+-----------+----------+-------------+--------------+-----+-------+-------+   RA 11/15/2023   O2 REST                      "         18     21     19     11:38:30 AM                                                         +----+-----------+----------+-------------+--------------+-----+-------+-------+   PA 11/15/2023   O2 REST           67            29   41                   12:06:02 PM                                                         +----+-----------+----------+-------------+--------------+-----+-------+-------+   RA 11/15/2023   O2 REST                              23     27     23     12:06:39 PM                                                         +----+-----------+----------+-------------+--------------+-----+-------+-------+  Oxygen Saturation %: +-----------+----------+------------+ Sample SiteO2 Sat (%)HB (g/100ml) +-----------+----------+------------+          AO        90        15.1 +-----------+----------+------------+          PA        52        15.1 +-----------+----------+------------+          AO        90        15.1 +-----------+----------+------------+          PA        52        15.1 +-----------+----------+------------+          PA        42             +-----------+----------+------------+          PA        42             +-----------+----------+------------+  Cardiac Outputs: +---------------+------------------+-------+ SINTIA CO (l/min)SINTIA CI (l/min/m2)SINTIA SV +---------------+------------------+-------+             3.7               1.7   41.2 +---------------+------------------+-------+  Vascular Resistance Calculated Values (Wood Units): +-----+----+----+ PhaseTPR TPRI +-----+----+----+ 0    14.030.5 +-----+----+----+  Complications: No in-lab complications observed.  Cardiac Cath Post Procedure Notes: Post Procedure Diagnosis: Bilateral pulmonary embolism R>L c/w intermediate-high                           risk PE s/p extirpation of material. Blood Loss:               Estimated blood loss during the  procedure was 50 mls. Specimens Removed:        Number of specimen(s) removed: thrombus (acute LPA,                           subacute RPA). ____________________________________________________________________________________ CONCLUSIONS:  1. Indication: intermediate-high risk PE with suspected paradoxical thrombus and CVA, severe hypoxia.  2. Bilateral pulmonary angiogram and exptirpation of material from bilateral PAs as noted.  3. Heparin anticoagulation and routine work up. ICD 10 Codes: Other pulmonary embolism with acute cor pulmonale-I26.09  CPT Codes: Selective catheter placement, left or right pulmonary artery uni-66703; Selective catheter placement, left or right pulmonary artery bilat-88632.50; Selective catheter placement, segmental or subsegmental pulmonary artery uni-77470; Angiography, pulmonary, bilateral S&I-01729; Extirpation of material, unilateral PA-55740; Extirpation of material, contralateral PA-13265.50; Extirpation of material, unilateral secondary vessel Subsegment 1-64645; Extirpation of material, unilateral secondary vessel Subsegment 2-63198; Moderate Sedation Services initial 15 minutes patient >5 years-[id number]; Moderate Sedation Services 1st additional 15 minutes patient >5 years-[id number]; Moderate Sedation Services 2nd additional 15 minutes patient >5 years-[id number]; Moderate Sedation Services 3rd additional 15 minutes patient >5 years-45598; Moderate Sedation Services 4th additional 15 minutes patient >5 years-81413; Moderate Sedation Services 5th additional 15 minutes patient >5 years-[id number]  [id number] Maldonado Alvarado MD Performing Physician Electronically signed by [id number] Alvarado MD on 11/15/2023 at 3:56:11 PM  ** Final **     CT angio chest for pulmonary embolism    Addendum Date: 11/15/2023    This finding was discussed with and acknowledged by Dr. Enzo Dumont on 11/15/2023 at 9:30 AM Signed by Stan Chamberlain MD    Result Date: 11/15/2023  STUDY: CT Angiogram of the Chest; 11/15/2023 7:38 AM.  INDICATION: Known pulmonary embolism.  Evaluate for heart strain. COMPARISON: CXR 11/15/2023.  CT CAP 12/2/2022. ACCESSION NUMBER(S): [id number] ORDERING CLINICIAN: LAZARO AMEZQUITA TECHNIQUE:  CTA of the chest was performed with intravenous contrast. Images are reviewed and processed at a workstation according to the CT angiogram protocol with 3-D and/or MIP post processing imaging generated.  Omnipaque 350 100 mL was administered intravenously. Automated mA/kV exposure control was utilized and patient examination was performed in strict accordance with principles of ALARA. FINDINGS: Pulmonary arteries are adequately opacified.  There are extensive filling defects in the bilateral main pulmonary arteries extending into the bilateral lobar and segmental arteries.  These are greater on the right.  The thoracic aorta is normal in course and caliber without dissection or aneurysm.  There is no evidence for right heart strain. The heart is normal in size without pericardial effusion.  Thoracic lymph nodes are not enlarged. There is no pleural effusion, pleural thickening, or pneumothorax. The airways are patent. Lungs are clear without consolidation, interstitial disease, or suspicious nodules.  There is prominent atelectasis at the left base. Upper abdomen demonstrates no acute pathology. There are no acute fractures.  No suspicious bony lesions.    1. Extensive bilateral pulmonary emboli, greater on the right. There is no evidence for right heart strain. 2. Prominent atelectasis at the left base. Signed by Stan Chamberlain MD    CT angio chest for pulmonary embolism    Result Date: 11/15/2023  STUDY: CT Angiogram of the Chest; 11/15/2023 6:32 AM INDICATION: Abdominal pain. COMPARISON: XR chest 11/15/2023, CT CAP 12/02/2022. ACCESSION NUMBER(S): [id number] ORDERING CLINICIAN: JASMEET MERCADO TECHNIQUE:  CTA of the chest was performed with intravenous contrast. Images are reviewed and processed at a workstation according  to the CT angiogram protocol with 3-D and/or MIP post processing imaging generated.  Omnipaque 350 75 mL was administered intravenously. Technologist note states contrast extravasation/IV infiltration. Automated mA/kV exposure control was utilized and patient examination was performed in strict accordance with principles of ALARA. FINDINGS: Pulmonary arteries are adequately opacified and there are extensive bilateral pulmonary emboli involving all lobes with large amount of embolic material in the distal aspect of the right main pulmonary artery. There is moderate to severe embolic burden with moderate to severe right heart dilation.  The thoracic aorta is normal in course and caliber without dissection or aneurysm. Heart is enlarged.  Thoracic lymph nodes are not enlarged. There is no pleural effusion, pleural thickening, or pneumothorax. The airways are patent. Left basilar atelectasis. Lungs are otherwise clear. There is fatty infiltration of the liver. Nonobstructing right renal calculus. Moderate right renal cortical atrophy. There are no acute fractures.  No suspicious bony lesions.    1. Extensive bilateral pulmonary emboli involving all lobes with large amount of embolic material in the distal aspect of the right main pulmonary artery. There is moderate to severe embolic burden with moderate to severe right heart strain. 2. Hepatic steatosis. 3. Nonobstructing right renal calculus. 4. Moderate right renal cortical atrophy. Findings discussed with and acknowledged by Dr. Enzo Dumont8:15 AM Signed by Akshat Nj MD    CT angio brain attack head w IV contrast and post procedure    Result Date: 11/15/2023  Interpreted By:  Arian Goldberg, STUDY: CT ANGIO BRAIN ATTACK HEAD W IV CONTRAST AND POST PROCEDURE; CT ANGIO BRAIN ATTACK NECK W IV CONTRAST AND POST PROCEDURE;  11/15/2023 4:49 am   INDICATION: Signs/Symptoms:Stroke Evaluation with VAN Positive.   COMPARISON: Correlation made to noncontrast head CT of  11/15/2023.   ACCESSION NUMBER(S): [id number]; [id number]   ORDERING CLINICIAN: JASMEET MERCADO   TECHNIQUE: 75 cc Omnipaque 350 were administered intravenously and axial images of the head were acquired.  Coronal, sagittal, and 3-D reconstructions were provided for review.   FINDINGS: The previously seen region of asymmetric cortical hypoattenuation and loss of gray-white matter differentiation is not definitely seen on the current examination, suggesting its presence on the prior examination may have related to artifact. Gray-white matter differentiation appears maintained. There is no evidence of acute intracranial hemorrhage. No abnormal intracranial enhancement is seen.   CTA HEAD FINDINGS:   Anterior circulation: The bilateral intracranial internal carotid arteries, bilateral carotid terminals, bilateral proximal anterior and middle cerebral arteries are normal.   Posterior circulation: Bilateral intracranial vertebral arteries, vertebrobasilar junction, basilar artery and proximal posterior cerebral arteries are normal.   CTA NECK FINDINGS:   Right carotid vessels: The common carotid artery is normal. The carotid bifurcation is normal. The internal carotid artery in the neck is normal. There is 0% stenosis  .   Left carotid vessels: The common carotid artery is normal. The carotid bifurcation is normal. The internal carotid artery in the neck is normal. There is 0% stenosis  .   Vertebral vessels: The origin of the right vertebral artery is not well seen due to artifact. The visualized segments of the cervical vertebral arteries are normal in caliber.     CT angiography of the chest demonstrates acute bilateral pulmonary emboli. The visible aortic arch appears within normal limits. No significant stenosis in the innominate or subclavian arteries. Asymmetric posterior opacity in the superior segment of the left lower lobe partially imaged may relate to atelectasis, scarring, pneumonia, or developing pulmonary  infarct.       The previously seen region of asymmetric cortical hypoattenuation and loss of gray-white matter differentiation is not definitely seen on the current examination, suggesting its presence on the prior examination may have related to artifact. Gray-white matter differentiation appears maintained. There is no evidence of acute intracranial hemorrhage. No abnormal intracranial enhancement is seen.   No evidence for significant stenosis of the cervical vessels.   No evidence for significant stenosis or large branch vessel cutoffs of the intracranial vessels.   Acute bilateral pulmonary emboli are incidentally seen on CT angiography of the chest. Asymmetric posterior opacity in the superior segment of the left lower lobe partially imaged may relate to atelectasis, scarring, pneumonia, or developing pulmonary infarct.   MACRO: Arian Goldberg discussed the significance and urgency of this critical finding by epic secure chat with message receipt verification with JASMEET MERCADO on 11/15/2023 at 5:24 am.  (**-RCF-**) Findings:  Acute pulmonary embolism.   Signed by: Arian Goldberg 11/15/2023 5:25 AM Dictation workstation:   [id number]    CT angio brain attack neck w IV contrast and post procedure    Result Date: 11/15/2023  Interpreted By:  Arian Goldberg, STUDY: CT ANGIO BRAIN ATTACK HEAD W IV CONTRAST AND POST PROCEDURE; CT ANGIO BRAIN ATTACK NECK W IV CONTRAST AND POST PROCEDURE;  11/15/2023 4:49 am   INDICATION: Signs/Symptoms:Stroke Evaluation with VAN Positive.   COMPARISON: Correlation made to noncontrast head CT of 11/15/2023.   ACCESSION NUMBER(S): [id number]; [id number]   ORDERING CLINICIAN: JASMEET MERCADO   TECHNIQUE: 75 cc Omnipaque 350 were administered intravenously and axial images of the head were acquired.  Coronal, sagittal, and 3-D reconstructions were provided for review.   FINDINGS: The previously seen region of asymmetric cortical hypoattenuation and loss of gray-white matter differentiation is  not definitely seen on the current examination, suggesting its presence on the prior examination may have related to artifact. Gray-white matter differentiation appears maintained. There is no evidence of acute intracranial hemorrhage. No abnormal intracranial enhancement is seen.   CTA HEAD FINDINGS:   Anterior circulation: The bilateral intracranial internal carotid arteries, bilateral carotid terminals, bilateral proximal anterior and middle cerebral arteries are normal.   Posterior circulation: Bilateral intracranial vertebral arteries, vertebrobasilar junction, basilar artery and proximal posterior cerebral arteries are normal.   CTA NECK FINDINGS:   Right carotid vessels: The common carotid artery is normal. The carotid bifurcation is normal. The internal carotid artery in the neck is normal. There is 0% stenosis  .   Left carotid vessels: The common carotid artery is normal. The carotid bifurcation is normal. The internal carotid artery in the neck is normal. There is 0% stenosis  .   Vertebral vessels: The origin of the right vertebral artery is not well seen due to artifact. The visualized segments of the cervical vertebral arteries are normal in caliber.     CT angiography of the chest demonstrates acute bilateral pulmonary emboli. The visible aortic arch appears within normal limits. No significant stenosis in the innominate or subclavian arteries. Asymmetric posterior opacity in the superior segment of the left lower lobe partially imaged may relate to atelectasis, scarring, pneumonia, or developing pulmonary infarct.       The previously seen region of asymmetric cortical hypoattenuation and loss of gray-white matter differentiation is not definitely seen on the current examination, suggesting its presence on the prior examination may have related to artifact. Gray-white matter differentiation appears maintained. There is no evidence of acute intracranial hemorrhage. No abnormal intracranial enhancement  is seen.   No evidence for significant stenosis of the cervical vessels.   No evidence for significant stenosis or large branch vessel cutoffs of the intracranial vessels.   Acute bilateral pulmonary emboli are incidentally seen on CT angiography of the chest. Asymmetric posterior opacity in the superior segment of the left lower lobe partially imaged may relate to atelectasis, scarring, pneumonia, or developing pulmonary infarct.   MACRO: Arian Goldberg discussed the significance and urgency of this critical finding by epic secure chat with message receipt verification with JASMEET MERCADO on 11/15/2023 at 5:24 am.  (**-RCF-**) Findings:  Acute pulmonary embolism.   Signed by: Arian Goldberg 11/15/2023 5:25 AM Dictation workstation:   [id number]    XR chest 1 view    Result Date: 11/15/2023  STUDY: Chest Radiograph;  11/15/2023 3:28 AM INDICATION: Wheezing. COMPARISON: 12/01/2022 XR Chest ACCESSION NUMBER(S): [id number] ORDERING CLINICIAN: JASMEET MERCADO TECHNIQUE:  Frontal chest was obtained at 03:28 hours. FINDINGS: CARDIOMEDIASTINAL SILHOUETTE: Cardiomediastinal silhouette is enlarged and more pronounced as compared to previous exam. LUNGS: Lungs reveals opacification of left lung base.  There is a lobulated appearance of the left hilum. ABDOMEN: No remarkable upper abdominal findings.  BONES: No acute osseous changes.    Opacification at the left lung base difficult to determine etiology.  Cannot exclude underlying infiltrate.  Fullness of left hilum.  Findings could better assessed with CT chest. Signed by Morgan De León DO    CT brain attack head wo IV contrast    Result Date: 11/15/2023  Interpreted By:  Arian Goldberg, STUDY: CT BRAIN ATTACK HEAD WO IV CONTRAST;  11/15/2023 3:16 am   INDICATION: Signs/Symptoms:Stroke Evaluation.   COMPARISON: Noncontrast head CT of 12/02/2022.   ACCESSION NUMBER(S): [id number]   ORDERING CLINICIAN: JASMEET MERCADO   TECHNIQUE: Noncontrast axial CT scan of head was performed. Angled  reformats in brain and bone windows were generated. The images were reviewed in bone, brain, blood and soft tissue windows.   FINDINGS: CSF Spaces: The ventricles, sulci and basal cisterns are within normal limits. There is no extraaxial fluid collection.   Parenchyma: Moderate to advanced volume loss.  There is periventricular and subcortical white matter hypoattenuation, most in keeping with chronic microvascular ischemic change. Similar appearance of asymmetric focal hypodensity in the left globus pallidus/genu of internal capsule suggesting sequelae of chronic ischemia.  Redemonstrated small focal hypodensity compatible with chronic lacunar infarct in the left cerebellar hemisphere. There is a new focal region of asymmetric cortical hypoattenuation and loss of gray-white matter differentiation involving the inferior posterior left temporal lobe (series 203, images 21-30; series 205, images 66-71; series 206, images 61-67), suspicious for acute to subacute ischemia. The grey-white differentiation is otherwise intact.There is no mass effect or midline shift.  There is no intracranial hemorrhage.   Calvarium: The calvarium is unremarkable.   Paranasal sinuses and mastoids: Visualized paranasal sinuses and mastoids are clear.       There is a new focal region of asymmetric cortical hypoattenuation and loss of gray-white matter differentiation involving the inferior posterior left temporal lobe (series 203, images 21-30; series 205, images 66-71; series 206, images 61-67), suspicious for small region of acute to subacute ischemia. No acute intracranial hemorrhage.     MACRO: Arian Goldberg discussed the significance and urgency of this critical finding by telephone with  JASMEET MERCADO on 11/15/2023 at 3:30 am. (**-RCF-**) Findings:  See findings.     Signed by: Arian Goldberg 11/15/2023 3:32 AM Dictation workstation:   [id number]     Assessment/Plan  1. Acute saddle pulmonary embolism, unspecified whether acute cor  pulmonale present (CMS/HCC)  Transthoracic Echo (TTE) Complete    Transthoracic Echo (TTE) Complete    Vascular US Lower Extremity Venous Duplex Bilateral    Vascular US Lower Extremity Venous Duplex Bilateral    Transthoracic Echo (TTE) Limited    Transthoracic Echo (TTE) Limited    CANCELED: Lower extremity venous duplex bilateral    CANCELED: Lower extremity venous duplex bilateral      2. Acute kidney injury (CMS/HCC)        3. Pulmonary embolism (CMS/HCC)  Invasive vascular procedure    Invasive vascular procedure      4. Acute stroke due to ischemia (CMS/HCC)  Transthoracic Echo (TTE) Complete    Transthoracic Echo (TTE) Complete    Transthoracic Echo (TTE) Limited    Transthoracic Echo (TTE) Limited      5. Generalized edema  Vascular US Lower Extremity Venous Duplex Bilateral    Vascular US Lower Extremity Venous Duplex Bilateral      6. History of pulmonary embolism  CANCELED: Lower extremity venous duplex bilateral    CANCELED: Lower extremity venous duplex bilateral      7. Other pulmonary embolism with acute cor pulmonale (CMS/HCC)  Invasive vascular procedure      8. Other forms of dyspnea  Transthoracic Echo (TTE) Complete           I examined patient at bedside and discussed case with Dr. Peres.  Patient is currently on heparin infusion.  Patient recent DVT/PE with thrombectomy of PE patient can continues to be in considerable amount of oxygen, has bilateral lower extremity DVTs and recent CVA.  Patient needs to be evaluated for possible paradoxical emboli.  She is very thrombogenic.  There is concern for hemorrhagic conversion of CVA but considering her other comorbidities and current condition patient will need some anticoagulation therapy.  MRI infarcts are small.  Current best course is anticoagulation therapy, continue elevation of lower extremities on pillows would like to hold for IVC filter. We will continue to follow.    Thank you very much for allowing Vascular Surgery to be involved in  the care of your patient sincerely Jayro KEENAN .  (This note was generated with voice recognition software and may contain errors including spelling, grammar, syntax and missed recognition of what was dictated, of which may not have been fully corrected)

## 2023-11-16 NOTE — PROGRESS NOTES
Physical Therapy                 Therapy Communication Note    Patient Name: Lillian Mendoza  MRN: [id number]  Today's Date: 11/16/2023     Discipline: Physical Therapy    Missed Visit Reason: Missed Visit Reason:  (pt with new B PE, new CVA. Pt awaiting possible IVC filter and not on AC for 24 hours at this time.)    Missed Time: Attempt

## 2023-11-16 NOTE — PROGRESS NOTES
Speech-Language Pathology    Inpatient Speech-Language Pathology Clinical Swallow Evaluation    Patient Name: Lillian Mendoza  MRN: [id number]  Today's Date: 11/16/2023   Time Calculation  Start Time: 1045  Stop Time: 1100  Time Calculation (min): 15 min         Current Problem:   1. Acute saddle pulmonary embolism, unspecified whether acute cor pulmonale present (CMS/HCC)  Transthoracic Echo (TTE) Complete    Transthoracic Echo (TTE) Complete    Vascular US Lower Extremity Venous Duplex Bilateral    Vascular US Lower Extremity Venous Duplex Bilateral    CANCELED: Lower extremity venous duplex bilateral    CANCELED: Lower extremity venous duplex bilateral      2. Acute kidney injury (CMS/HCC)        3. Pulmonary embolism (CMS/HCC)  Invasive vascular procedure    Invasive vascular procedure      4. Acute stroke due to ischemia (CMS/HCC)  Transthoracic Echo (TTE) Complete    Transthoracic Echo (TTE) Complete      5. Generalized edema  Vascular US Lower Extremity Venous Duplex Bilateral    Vascular US Lower Extremity Venous Duplex Bilateral      6. History of pulmonary embolism  CANCELED: Lower extremity venous duplex bilateral    CANCELED: Lower extremity venous duplex bilateral      7. Other pulmonary embolism with acute cor pulmonale (CMS/HCC)  Invasive vascular procedure      8. Other forms of dyspnea  Transthoracic Echo (TTE) Complete        Chief Complaint   Patient presents with    Stroke       Pt found by granddaughter sitting on toilet slumped over. Pt with facial droop and aphasia. Last known well 0200.      Below copied from [facility] on admission on 11/15/23:  Patient is a 83-year-old female past medical history of pulmonary embolism not on anticoagulation, COPD presenting to the emergency department for strokelike symptoms.  Patient is not providing much history however states that she has been having some difficulty with speech.  Initially patient's granddaughter who presented to the ER stated that her last  known normal was 2 AM.  EMS states that patient was also hypoxic they placed on nonrebreather and presented to the ER.       After initial assessment a discussion occurred with family and they stated that patient had been having numbness of the right upper extremity starting at 7 AM yesterday.  They stated that she texted her granddaughter that it did appear to improve however it is unclear for completely resolved.  When asked patient she stated that the numbness persisted throughout the day. Pt also found to have acute bilateral PE; confirmed on CTA chest.       On admission: GCSS: 11, NIH stroke scale: 9    HX: remote PE in setting of R TKR, chronicHFpEF with EF 55-60% as of 9/2020, HTN, HLD, CKD 3, hypothyroidism, BLAYNE on 02 nightly, moderate persistent asthma, chronic allergic rhinitis, chronic hymphedema      CT head 11/15: IMPRESSION:  1.  Exam is somewhat degraded by a retained contrast from previous  interventional procedure likely due to poor renal clearance.  2. Within limitations of the study, no hyperdense intracranial  hemorrhage or CT apparent transcortical infarct is identified. Area  of diminished attenuation in the posteroinferior left temporal lobe  described on previous same day noncontrast CT of the head is not  definitely identified on current examination. MRI of the brain can be  considered for more definite characterization.  3. Subtle attenuation changes present in the periventricular and  subcortical white matter bilateral cerebral hemispheres are  nonspecific, but favored to represent changes of microvascular  disease.    CXR 11/15 BONES:  No acute osseous changes.  IMPRESSION:  Opacification at the left lung base difficult to determine  etiology.  Cannot exclude underlying infiltrate.  Fullness of left  hilum.  Findings could better assessed with CT chest.  Signed by Morgan De León, DO    MRI ordered for today.     Recommendations:  Risk for Aspiration: Yes  Additional Recommendations:  NPO      Assessment:  Prognosis: Fair    Spoke with nurse Mariya prior to eval; nsg swallow screen had been deferred. Pt seen bedside with multiple family members in the room. Pt's HOB elevated by SLP. Pt on airvo currently 50 LPM Fi02: 50%. Pt with L facial asymmetry at rest. Reduced labial movement and retraction as well as lingual deviation (+ L CVA per CT scan results/MRI pending).  Pt exhibiting dysarthric speech in conversation with SLP.     Pt assessed after oral care, with mildly thick liquid via tsp x1 and honey thick liquid via tsp x1. Pt exhibiting a severe pharyngeal dysphagia at bedside. Pt given tsp of mildly thick liquid with delayed swallow response and immediate coughing and overt s/s aspiration. Pt given time to take some breaths (pulse ox @ 98%). Pt then given a tsp of honey thick liquid, again with delayed swallow response and immediate coughing and overt s/s aspiration. PO trials stopped at this point due to overt s/s aspiration on both tsp thickened liquid trials. Recommend pt to remain NPO with swallow re-assessment on 11/17/23.       Plan:  Inpatient/Swing Bed or Outpatient: Inpatient  SLP Plan: Skilled SLP  SLP Frequency: 2x per week    Goals:   Pt will tolerate PO trials with SLP only for potential to advance to PO diet.   Pt/family education.     Subjective   Current Problem:  Acute CVA      General Visit Information:  Patient Class: Inpatient  Living Environment: Home  Referred By: Porfirio Basilio CNP  Prior Level of Function: WFL  Date of Onset: 11/15/23  Date of Order: 11/15/23  [employer] Diet: regular/thin  Current Diet : NPO      Vital Signs: pt on airvo 50 LPM Fi02: 50%  WBC-WNL (8.6)         Objective     Overt s/s aspiration on tsp of mild and honey thick liquids. Recommend NPO with swallow re-assessment on 11/17.        Pain:  Pain Assessment: 0-10  Pain Score: 0 - No pain         Clinical Observations:  Patient Positioning: Upright in Bed        Inpatient:  Education  Documentation  Reviewed results and recommendations with patient, family members in room, doctor and nurse Adrián

## 2023-11-16 NOTE — PROGRESS NOTES
Lillian Mendoza is a 83 y.o. female on day 1 of admission presenting with Acute saddle pulmonary embolism, unspecified whether acute cor pulmonale present (CMS/HCC).    --Pt underwent thrombectomy on 11/15 - on heparin infusion  --This morning with 50% FiO2      Subjective   Sitting up in bed. Denies any acute complaints, including SOB or chest pain.    Objective   Physical Exam:   Constitutional: awake, alert, no acute distress  ENMT: mucous membranes moist, EOMI, conjunctivae clear  Head/Neck: normocephalic, atraumatic; supple, trachea midline  Respiratory/Thorax: diminished breath sounds but otherwise clear  Cardiovascular: RRR, no murmur appreciated  Gastrointestinal: soft, nondistended, non-tender, bowel sounds appreciated  Extremities: palpable peripheral pulses, BLE with nonpitting edema  Neurological: AO x3, CN II-XII grossly intact, mild dysarthria  Psychological: appropriate mood and behavior  Skin: warm and dry    Scheduled Medications:   aspirin, 81 mg, oral, Daily  atorvastatin, 40 mg, oral, Nightly  fluticasone, 2 spray, Each Nostril, Daily  iohexol, 75 mL, intravenous, Once in imaging  [START ON 11/17/2023] levothyroxine, 150 mcg, oral, Daily before breakfast  pantoprazole, 40 mg, intravenous, Daily  perflutren protein A microsphere, 0.5 mL, intravenous, Once in imaging  [Held by provider] polyethylene glycol, 17 g, oral, Daily  sulfur hexafluoride microsphr, 2 mL, intravenous, Once in imaging    Continuous Medications:   heparin, 0-4,500 Units/hr, Last Rate: 2,500 Units/hr (11/16/23 0910)  lactated Ringer's, 75 mL/hr, Last Rate: 75 mL/hr (11/15/23 1555)    PRN Medications:   PRN medications: heparin, hydrALAZINE **FOLLOWED BY** [START ON 11/17/2023] hydrALAZINE, labetaloL, oxygen    Daily progress:  11/16: Weaned to Airvo 50/50 as of this AM. MRI brain showed acute shower embolic CVA. TTE with bubble study negative. Discussed case with interventional cardiology, considering CAROLS vs cMRI to evaluate  for PFO. Failed SLP evaluation. Patient to remain strict NPO for now. PT/OT.    Assessment/Plan   This is an 83-year-old female, with history of remote PE in setting of right TKR, chronic HFpEF with EF 55 to 60% as of 9/2020, HTN, HLD, CKD 3, hypothyroidism, BLAYNE on O2 nightly, moderate persistent asthma, chronic allergic rhinitis, and chronic lymphedema, who initially presented from home to [facility] on 11/15/23 with confusion, left facial droop, and dysarthria at 2 AM that morning.  Patient was out of TNK window as she had had right hand numbness the day prior.  She was found to have acute to subacute ischemic CVA involving inferior posterior left temporal lobe.  CTA head/neck incidentally found acute bilateral PE.  Subsequent CTA chest confirmed extensive bilateral PE involving all lobes with large embolus in the distal aspect of right main pulmonary artery.  Moderate to severe embolic burden with moderate to severe right heart strain noted.  PERT team was activated.  Patient was urgently taken for thrombectomy and then admitted the ICU for further evaluation and management.     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  - Neurochecks q4h  - Neurology following. ASA and statin on hold while strict NPO pending repeat SLP eval.  - Avoid excess caths & drains, sedating meds  - Monitor for ICU delirium     Cardiovascular:  Acute submassive bilateral PE s/p thrombectomy on 11/15/23  Acute occlusive proximal BLE DVT   Chronic HFpEF  Hypertension --Antihypertensives on hold  Hyperlipidemia  TTE this admission shows EF 50-55% with severely enlarged RV, moderately reduced RVSF, moderately elevated RVSP. (-) bubble study.  - Continue Heparin ggt. Eventual plan will be to transition to Coumadin given morbid obesity.  - Hemodynamically stable, not requiring pressors. Judicious use of IVF given RV overload.  Maintain MAP>65.  - Interventional cardiology following. Considering CARLOS vs cMRI given high suspicion for PFO in setting of embolic CVA.  - Vascular surgery consulted, no plans for IVC filter at this time.     Respiratory:  Acute hypoxemic respiratory failure secondary to acute submassive bilateral PE  History of remote PE in setting of right TKA in ~2008, previously on Coumadin  BLAYNE on O2 QHS  Moderate persistent asthma  Chronic allergic rhinitis  Not on continuous O2 at baseline.  Weaned to Airvo 50/50.  - Wean FiO2 as tolerated with goal SpO2>92%.  - Aspiration precautions     Gastrointestinal:  Concern for dysphagia in setting of acute CVA  Hepatic steatosis  GERD  - Diet: NPO  - PPX: Protonix  - SLP consulted. Failed evaluation on 11/16. Strict NPO pending reeval.     Endocrine:  Hypothyroidism  No hx DM.  A1c 6.1 as of 11/15/23.  - Accuchecks q6h.  - Hold home Levothyroxine while strict NPO, pending SLP reeval.     Renal:  EMILI on CKD III  Lactic acidosis  Right nephrolithiasis, nonobstructing  Baseline SCr ~1.2-1.3  - IVF: LR @ 75cc/hr  - Trend RFP, lactate. Avoid nephrotoxins. Monitor I&Os.  - Monitor & replete electrolytes PRN  - Nephrology consulted     Hematological:  - Trend CBC. Monitor for s/s bleeding while on Heparin ggt.     Infection Disease:  No acute issues.     Skin/MSK:  Chronic lymphedema  Osteoarthritis  No acute issues.     Vent/O2: Airvo 50/50  Lines/Devices: PIVs, external urinary cath  Drips: Heparin  ATBx: -  Diet: strict NPO pending SLP reeval  IVF: LR @ 75cc/hr  PPX: Heparin ggt, Protonix  Code: FULL - Discussed with patient who was accompanied by granddaughter, Cassandra. Patient requesting more time to decide.  Dispo: ICU     This is a preliminary note written by the resident. Please wait for attending addendum for finalization of note and recommendations.     Anatoliy Kumar DO  Internal Medicine PGY3      CRITICAL CARE MEDICINE ATTENDING NOTE    I saw and evaluated the patient. I  personally obtained the key and critical portions of the history and physical examination. I reviewed the resident's documentation and discussed the patient with the resident. I agree with the resident's medical decision making as documented in the resident's note.    The patient has high probability of sudden, clinically significant deterioration, which requires urgent interventions. I managed/supervised life supporting interventions that required frequent physician assessment. I spent 40 critical care minutes of my full attention on this patient's management and direct patient care. Time I spent with family or surrogate(s) is included if the patient was incapable of providing information or participating in medical decision making. Time devoted to teaching and to any procedures I billed separately is not included.     Nena Sanchez MD  Pulmonary and Critical Care Medicine

## 2023-11-16 NOTE — CARE PLAN
The patient's goals for the shift include able to eat/drink    The clinical goals for the shift include O2 saturations above 92%    Over the shift, the patient did not make progress toward the following goals. Barriers to progression include . Recommendations to address these barriers include .

## 2023-11-16 NOTE — CONSULTS
NEPHROLOGY CONSULTATION NOTE    DATE OF CONSULTATION:  11/16/23     Referring Physician: Chris Moya MD    REASON FOR CONSULT: EMILI on CKD stage III    HISTORY OF PRESENT ILLNESS:   Lillian Mendoza is a 83 y.o. female presenting with left-sided facial droop as well as slurred speech.  She was noted to have a creatinine elevation of 2.58.  She was hypoxic.  She had a CT chest PE protocol which showed extensive bilateral pulmonary emboli right greater than left.  She underwent embolectomy.  She was relatively hypotensive and was admitted to the medical ICU.  She was initiated on Ringer lactate.  A nephrology consultation was requested because of the elevated creatinine.    Creatinine today is better at 2.12.  She has underlying CKD stage III with a baseline creatinine between 1.3-1.8.  She states that she has not been taking torsemide but was taking torsemide 50 mg daily for chronic peripheral edema.  She has been using lymphedema pump which has been helping.  She denies taking any nonsteroidal agent     She has had a previous episode of PE 15 years ago and was on anticoagulation but was stopped.  She sees Dr. Ordonez on a regular basis.    PAST MEDICAL HISTORY:   Pulmonary emboli  Chronic peripheral edema  CKD stage III  Asthma  Hiatal hernia  Cataract  Osteoarthritis      SURGICAL HISTORY:   Past Surgical History:   Procedure Laterality Date    Knee replacement    Lumpectomy    Cataract surgery    Cholecystectomy    Hernia repair    Hysterectomy   Right arm surgery with pins-and-needles       SOCIAL HISTORY:   She denies smoking or any significant alcohol consumption  she lives with her granddaughter.  She has 3 children.  She worked in a factory.  FAMILY HISTORY:  Family History   Problem Relation Name Age of Onset    Cancer Mother      Diabetes Paternal Grandmother      Diabetes Paternal Grandfather     No kidney disease in the family her father had only 1 kidney when he was  born    ALLERGIES:  Morphine    MEDICATIONS:     No current facility-administered medications on file prior to encounter.     Current Outpatient Medications on File Prior to Encounter   Medication Sig Dispense Refill    acetaminophen (Tylenol) 325 mg tablet Take 3 tablets (975 mg) by mouth every 8 hours.      albuterol 90 mcg/actuation inhaler Inhale 2 puffs every 4 hours if needed.      cholecalciferol (Vitamin D-3) 125 MCG (5000 UT) capsule Vitamin D CAPS   Refills: 0       Active      ergocalciferol (Vitamin D-2) 50 MCG (2000 UT) capsule capsule 1 cap(s) orally once a day      fluticasone (Flonase) 50 mcg/actuation nasal spray 1 spray by Does not apply route once daily.      fluticasone propion-salmeteroL (Advair Diskus) 250-50 mcg/dose diskus inhaler Inhale 1 puff twice a day.      gabapentin (Neurontin) 600 mg tablet 1 tab(s) orally 3 times a day      ipratropium-albuteroL (Duo-Neb) 0.5-2.5 mg/3 mL nebulizer solution Inhale 3 mL every 4 hours if needed for wheezing or shortness of breath.      levothyroxine (Synthroid) 150 mcg tablet Take 1 tablet (150 mcg) by mouth once daily.      loratadine (Claritin) 10 mg tablet Allergy Relief 10 MG Oral Tablet   Quantity: 90  Refills: 0        Start : 8-Oct-2019   Active      metoprolol succinate XL (Toprol-XL) 50 mg 24 hr tablet Take 1 tablet (50 mg) by mouth once daily.      montelukast (Singulair) 10 mg tablet Take 1 tablet (10 mg) by mouth once daily at bedtime.      montelukast (Singulair) 4 mg chewable tablet Chew 1 tablet (4 mg) once daily.      omeprazole (PriLOSEC) 40 mg DR capsule Take 1 capsule (40 mg) by mouth once daily.      thiamine 100 mg tablet Take 1 tablet (100 mg) by mouth once daily.      torsemide (Demadex) 10 mg tablet 5 tab(s) orally once a day      traMADol (Ultram) 50 mg tablet Take 1 tablet (50 mg) by mouth every 6 hours if needed (pain).          Scheduled medications  aspirin, 324 mg, oral, Once  [Held by provider] aspirin, 81 mg, oral,  Daily  [Held by provider] atorvastatin, 40 mg, oral, Nightly  fluticasone, 2 spray, Each Nostril, Daily  iohexol, 75 mL, intravenous, Once in imaging  [Held by provider] levothyroxine, 150 mcg, oral, Daily  pantoprazole, 40 mg, intravenous, Daily  perflutren protein A microsphere, 0.5 mL, intravenous, Once in imaging  [Held by provider] polyethylene glycol, 17 g, oral, Daily  sulfur hexafluoride microsphr, 2 mL, intravenous, Once in imaging      Continuous medications  heparin, 0-4,500 Units/hr, Last Rate: 2,300 Units/hr (11/16/23 0609)  lactated Ringer's, 75 mL/hr, Last Rate: 75 mL/hr (11/15/23 1555)      PRN medications  PRN medications: heparin, hydrALAZINE **FOLLOWED BY** [START ON 11/17/2023] hydrALAZINE, labetaloL, oxygen     REVIEW OF SYSTEMS:    No headache, no blurry vision, no fever or chills,  no chest pain, some shortness of breath, no nausea, no vomiting, no diarrhea, no constipation , left-sided focal weakness, no burning urination, chronic leg swelling. Rest of the 10 point ROS is negative     PHYSICAL EXAM:    Visit Vitals  /72   Pulse 92   Temp 36 °C (96.8 °F) (Temporal)   Resp 24        GENERAL: Awake and Alert, obese, in no distress, Cooperative  EYES: EOMI,  no Pallor noted  HEENT: oral mucosa moist, high flow nasal oxygen  NECK: supple  CHEST: no tachypnea, no crackles, no wheeze  CVS: S1, S2 heard, Regular Rate and Rhythm, no murmurs, no rubs  ABDOMEN: soft, non tender, bowel sounds present, no distention  MSK: No joint effusion, no tenderness, left knee surgical scar  NEURO: Left-sided focal deficit, moving all extremities, no tremor  EXT: Chronic bilateral lower extremity  edema  PSYCH: normal affect         I&O 24HR    Intake/Output Summary (Last 24 hours) at 11/16/2023 0742  Last data filed at 11/16/2023 0700  Gross per 24 hour   Intake 1428.33 ml   Output 760 ml   Net 668.33 ml       RESULTS:         Lab Results   Component Value Date    WBC 8.6 11/16/2023    HGB 11.3 (L) 11/16/2023     HCT 39.1 11/16/2023     (H) 11/16/2023     (L) 11/16/2023      Lab Results   Component Value Date    GLUCOSE 95 11/16/2023    CALCIUM 8.2 (L) 11/16/2023     11/16/2023    K 5.3 11/16/2023    CO2 21 11/16/2023     11/16/2023    BUN 29 (H) 11/16/2023    CREATININE 2.12 (H) 11/16/2023         === 11/15/23 ===    XR CHEST 1 VIEW    - Impression -  Opacification at the left lung base difficult to determine  etiology.  Cannot exclude underlying infiltrate.  Fullness of left  hilum.  Findings could better assessed with CT chest.  Signed by Morgan De León, DO   CT chest PE protocol: Extensive pulmonary emboli right greater than left    ASSESSMENT/ PLAN:     This is a 83 y.o. year old female who presents with stroke    1.  Acute kidney injury: Multifactorial etiology in the setting of hypoxia and hypotension due to acute pulmonary emboli in addition to contrast exposure.  Patient presented with a creatinine of 2.58 which is above her baseline.  She is getting Ringer lactate.  Creatinine today at 2.12.  Ideally after contrast would recommend normal saline however since renal function is improving we will continue the current IV fluids today.    2.  CKD stage III: Patient has a creatinine fluctuating between 1.3-1.8.  She had been on diuretics which recently she stopped.  Previous imaging of the kidneys showed bilateral cyst as well as cortical thinning    3.  Chronic peripheral edema: She had been taking torsemide which she recently had stopped.  She uses lymphedema pump.    Thank you very much for allowing me to participate in the care of this patient.     This note was created using dragon dictation and may contain unintended errors    TELMA ALEJANDRO MD    11/16/23

## 2023-11-16 NOTE — PROGRESS NOTES
Lillian Mendoza is a 83 y.o. female on day 1 of admission presenting with Acute saddle pulmonary embolism, unspecified whether acute cor pulmonale present (CMS/HCC).      Subjective   83-year-old saw her today it appears clinically that she had a stroke however the CAT scan yesterday of the head did not indicate that.  She had an MRI done today the results of that are pending.  Her ultrasound of the lower extremities does show bilateral DVTs extensive I think probably from her sedentary condition.  Also an echo was done transthoracic however a bubble study was not done so we do not know if she has a PFO or not going to repeat that with a bubble study to see.  She is also to have an IVC filter for my understanding there is no contraindication to anticoagulation but she will need both.  She is on Airvo rate at this time and I will have to review her imaging studies as well.       Objective     Last Recorded Vitals  /70   Pulse 94   Temp 36.6 °C (97.9 °F) (Tympanic)   Resp (!) 34   Wt 132 kg (290 lb 2 oz)   SpO2 96%   Intake/Output last 3 Shifts:    Intake/Output Summary (Last 24 hours) at 11/16/2023 1435  Last data filed at 11/16/2023 0700  Gross per 24 hour   Intake 1428.33 ml   Output 750 ml   Net 678.33 ml       Admission Weight  Weight: 111 kg (244 lb 11.4 oz) (11/15/23 0327)    Daily Weight  11/16/23 : 132 kg (290 lb 2 oz)    Image Results  Vascular US Lower Extremity Venous Duplex Bilateral             [place] [id number]  Tel [phone] and Fax [phone]       Vascular Lab Report  VASC US LOWER EXTREMITY VENOUS DUPLEX BILATERAL       Patient Name:     LILLIAN MENDOZA  Reading Physician: [id number] Cookie Pace MD  Study Date:       11/15/2023          Ordering           [id number] TRANG                                         Physician:  MRN/PID:          [id number]            Technologist:      Allyn  Shawn WYNNE  Accession#:       [id number]        Technologist 2:  Date of           1940 / 83      Encounter#:        [id number]  Birth/Age:        years  Gender:           F  Admission Status: Inpatient           Location           [facility]                                        Performed:       Diagnosis/ICD: Other pulmonary embolism without acute cor pulmonale-I26.99  CPT Codes:     28983 Peripheral venous duplex scan for DVT complete       **CRITICAL RESULT**  Critical Result: DVT Bilateral FV, Bilateral POP V, and Right PTV  Notification called to Dr Maldonado Alvarado on 11/15/2023 at 3:00:00 PM by NEIL Escobar RVT.     CONCLUSIONS:  Right Lower Venous: There is acute occlusive deep vein thrombosis visualized in the mid femoral, distal femoral, popliteal and posterior tibial veins. There is acute non-occlusive deep vein thrombosis visualized in the proximal femoral vein.  Left Lower Venous: There is acute occlusive deep vein thrombosis visualized in the mid femoral, distal femoral, popliteal and proximal femoral veins. Cannot rule out thrombus in non-visualized peroneal vein due to edema.     Imaging & Doppler Findings:     Right                 Compressible      Thrombus          Flow  Distal External Iliac     Yes             None         Continuous  CFV                       Yes             None         Continuous  PFV                       Yes             None  FV Proximal             Partial    Acute non-occlusive Continuous  FV Mid                     No        Acute occlusive  FV Distal                  No        Acute occlusive  Popliteal                  No        Acute occlusive      None  Peroneal                  Yes             None  PTV                        No        Acute occlusive       Left                  Compress    Thrombus        Flow  Distal External Iliac   Yes         None       Continuous  CFV                     Yes         None       Continuous  PFV                     Yes          None  FV Proximal              No    Acute occlusive    None  FV Mid                   No    Acute occlusive  FV Distal                No    Acute occlusive  Popliteal                No    Acute occlusive    None  PTV                     Yes         None       [id number] Cookie Pace MD  Electronically signed by [id number] Cookie Pace MD on 11/16/2023 at 12:36:58 PM       ** Final **  Invasive vascular procedure     [facility], Cath Lab, [address], [place] [address]    Cardiovascular Catheterization Report    Patient Name:      DARLING WYMAN   Performing Physician:  Jay Alvarado MD  Study Date:        11/15/2023           Verifying Physician:   Jay Alvarado MD  MRN/PID:           [id number]             Cardiologist/Co-scrub:  Accession#:        [id number]         Ordering Physician:    Jay ALVARADO  Date of Birth/Age: 1940 / 83 years Fellow:  Gender:            F                    Fellow:  Encounter#:        [id number]       Study:            Pulmonary Angiogram  Additional Study: Peripheral Intervention       Indications:  DARLING WYMAN is a 84 year old female who presents with intermediate-high risk PE, hx of PE 15 years ago (thought 2/2 knee TKA) s/p 1 year of warfarin; p/w stroke c/f paradoxical embolus in setting of PE. +significant O2 requirement.     Procedure Description:  After infiltration of local anesthetic, the right femoral vein was identified with two dimensional ultrasound. Under direct ultrasound visualization, the right femoral vein was cannulated with a percutaneous technique. A 24F sheath was placed in the vein.     Pulmonary Embolism:     We predilated with a 22F Mac dilator, and placed a 24F sheath into the vein. Post-procedure, the venous sheath was pulled and pressure was applied to the site via figure-of-8.     Prior to access, we imaged the bilateral common femoral veins to evaluate for thrombus. No thrombus was noted. We selectively engaged the  "pulmonary artery using a JR4 diagnostic catheter. Pre-thrombectomy RA and PA pressures were obtained.     Following this, we sequentially engaged the following PA branches with Inari [id number] for selective extirpation of material: Left main, Right main, Truncus anterior and RLL posterior basal. Successful thrombectomy was performed. Selective angiogram was performed during the procedure to help locate thrombus for extraction. Post-thrombectomy RA and PA pressures were obtained.     We did not administer therapeutic systemic heparin up front d/t stroke and concerns for potential hemorrhagic conversion. Heparinized saline was utilized to \"wash\" the FlowSaver filter to maintain patency.    Initially thrombectomy of LPA highly successful. Difficulty with RPA thrombectomy, with angiogram showing ball-like embolus. TA aspiration performed with loosening of the RPA thrombus (subacute clot). We did provide 2000 units of heparin at this time due to extended dwell time of catheter. Further thrombectomy performed of the main RPA and lower posterior branch with successful extirpation of remainder of subacute thrombus.     Hemo Personnel:  +----------+---------+  Name      Duty       +----------+---------+  Maldonado Alvarado MDPROC MD 1  +----------+---------+       Hemodynamic Pressures:     +----+-----------+----------+-------------+--------------+-----+-------+-------+  Site Date Time Phase NameSystolic mmHgDiastolic mmHgMean A-Wave V-Wave                                                       mmHg  mmHg   mmHg    +----+-----------+----------+-------------+--------------+-----+-------+-------+    RA 11/15/2023   O2 REST                              25     28     25      10:53:43 AM                                                          +----+-----------+----------+-------------+--------------+-----+-------+-------+    PA 11/15/2023   O2 REST           73            27   52             "        10:56:29 AM                                                          +----+-----------+----------+-------------+--------------+-----+-------+-------+    PA 11/15/2023   O2 REST           75            31   51                    11:38:07 AM                                                          +----+-----------+----------+-------------+--------------+-----+-------+-------+    RA 11/15/2023   O2 REST                              18     21     19      11:38:30 AM                                                          +----+-----------+----------+-------------+--------------+-----+-------+-------+    PA 11/15/2023   O2 REST           67            29   41                    12:06:02 PM                                                          +----+-----------+----------+-------------+--------------+-----+-------+-------+    RA 11/15/2023   O2 REST                              23     27     23      12:06:39 PM                                                          +----+-----------+----------+-------------+--------------+-----+-------+-------+         Oxygen Saturation %:  +-----------+----------+------------+  Sample SiteO2 Sat (%)HB (g/100ml)  +-----------+----------+------------+           AO        90        15.1  +-----------+----------+------------+           PA        52        15.1  +-----------+----------+------------+           AO        90        15.1  +-----------+----------+------------+           PA        52        15.1  +-----------+----------+------------+           PA        42              +-----------+----------+------------+           PA        42              +-----------+----------+------------+       Cardiac Outputs:  +---------------+------------------+-------+  SINTIA CO (l/min)SINTIA CI (l/min/m2)SINTIA SV  +---------------+------------------+-------+              3.7                1.7   41.2  +---------------+------------------+-------+       Vascular Resistance Calculated Values (Wood Units):  +-----+----+----+  PhaseTPR TPRI  +-----+----+----+  0    14.030.5  +-----+----+----+       Complications:  No in-lab complications observed.     Cardiac Cath Post Procedure Notes:  Post Procedure Diagnosis: Bilateral pulmonary embolism R>L c/w intermediate-high                            risk PE s/p extirpation of material.  Blood Loss:               Estimated blood loss during the procedure was 50 mls.  Specimens Removed:        Number of specimen(s) removed: thrombus (acute LPA,                            subacute RPA).    ____________________________________________________________________________________  CONCLUSIONS:   1. Indication: intermediate-high risk PE with suspected paradoxical thrombus and CVA, severe hypoxia.   2. Bilateral pulmonary angiogram and exptirpation of material from bilateral PAs as noted.   3. Heparin anticoagulation and routine work up.    ICD 10 Codes:  Other pulmonary embolism with acute cor pulmonale-I26.09     CPT Codes:  Selective catheter placement, left or right pulmonary artery uni-56467; Selective catheter placement, left or right pulmonary artery bilat-52227.50; Selective catheter placement, segmental or subsegmental pulmonary artery uni-14396; Angiography, pulmonary, bilateral S&I-33128; Extirpation of material, unilateral PA-04180; Extirpation of material, contralateral PA-65241.50; Extirpation of material, unilateral secondary vessel Subsegment 1-21337; Extirpation of material, unilateral secondary vessel Subsegment 2-75432; Moderate Sedation Services initial 15 minutes patient >5 years-51267; Moderate Sedation Services 1st additional 15 minutes patient >5 years-65664; Moderate Sedation Services 2nd additional 15 minutes patient >5 years-99153; Moderate Sedation Services 3rd additional 15 minutes patient >5 years-99153; Moderate Sedation  Services 4th additional 15 minutes patient >5 years-88538; Moderate Sedation Services 5th additional 15 minutes patient >5 years-68227     [id number] Maldonado Alvarado MD  Performing Physician  Electronically signed by Jay Alvarado MD on 11/15/2023 at 3:56:11 PM         ** Final **  Transthoracic Echo (TTE) Complete      [facility], [address] [place] [id number] [phone] and                                  Fax [phone]    TRANSTHORACIC ECHOCARDIOGRAM REPORT       Patient Name:      DARLING SORIA GARO   Reading Physician:    [id number] Govind Olvera MD  Study Date:        11/15/2023           Ordering Provider:    [id number] ANTOINETTE ENCINAS  MRN/PID:           [id number]             Fellow:  Accession#:        [id number]         Nurse:  Date of Birth/Age: 1940 / 83 years Sonographer:          OSMAN Aguirre RDCS  Gender:            F                    Additional Staff:  Height:            165.10 cm            Admit Date:           11/15/2023  Weight:            110.68 kg            Admission Status:     Inpatient -                                                                Routine  BSA:               2.15 m2              Encounter#:           [id number]                                          Department Location:  [address]                                                                floor/ICU  Blood Pressure: 105 /73 mmHg    Study Type:    TRANSTHORACIC ECHO (TTE) COMPLETE  Diagnosis/ICD: Other forms of dyspnea-R06.09  Indication:    Dyspnea  CPT Code:      Echo Complete w Full Doppler-41018    Patient History:  Pertinent History: Dyspnea.    Study Detail: The following Echo studies were performed: 2D, M-Mode, Doppler and                color flow. Technically challenging study due to poor acoustic                windows, patient lying in supine position, body habitus  and Post                procedure and bi-pap. Definity used as a contrast agent for                endocardial border definition. Total contrast used for this                procedure was 3 mL via IV push.       PHYSICIAN INTERPRETATION:  Left Ventricle: The left ventricular systolic function is normal, with an estimated ejection fraction of 50-55%. There are no regional wall motion abnormalities. The left ventricular cavity size is normal. Left ventricular diastolic filling was not assessed.  Left Atrium: The left atrium is normal in size.  Right Ventricle: The right ventricle is severely enlarged. There is moderately reduced right ventricular systolic function.  Right Atrium: The right atrium is mildly dilated.  Aortic Valve: The aortic valve is probably trileaflet. There is trivial aortic valve regurgitation.  Mitral Valve: The mitral valve is normal in structure. There is trace mitral valve regurgitation.  Tricuspid Valve: The tricuspid valve is structurally normal. There is mild tricuspid regurgitation. The Doppler estimated RVSP is moderately elevated at 43.2 mmHg.  Pulmonic Valve: The pulmonic valve is not well visualized. The pulmonic valve regurgitation was not well visualized.  Pericardium: There is no pericardial effusion noted.  Aorta: The aortic root is abnormal. There is mild dilatation of the ascending aorta. There is mild dilatation of the aortic root.  Systemic Veins: The inferior vena cava appears to be of normal size.       CONCLUSIONS:   1. Left ventricular systolic function is normal with a 50-55% estimated ejection fraction.   2. Severely enlarged right ventricle.   3. There is moderately reduced right ventricular systolic function.   4. Moderately elevated right ventricular systolic pressure.    QUANTITATIVE DATA SUMMARY:  2D MEASUREMENTS:                           Normal Ranges:  Ao Root d:     3.70 cm   (2.0-3.7cm)  LAs:           3.10 cm   (2.7-4.0cm)  IVSd:          1.02 cm    (0.6-1.1cm)  LVPWd:         0.84 cm   (0.6-1.1cm)  LVIDd:         3.47 cm   (3.9-5.9cm)  LVIDs:         2.14 cm  LV Mass Index: 42.8 g/m2  LV % FS        38.3 %    M-MODE MEASUREMENTS:                   Normal Ranges:  Ao Root: 4.00 cm (2.0-3.7cm)  LAs:     3.50 cm (2.7-4.0cm)    AORTA MEASUREMENTS:                     Normal Ranges:  Asc Ao, d: 3.80 cm (2.1-3.4cm)    AORTIC VALVE:                         Normal Ranges:  LVOT Diameter: 2.70 cm (1.8-2.4cm)       RIGHT VENTRICLE:  RV Basal 5.13 cm    TRICUSPID VALVE/RVSP:                              Normal Ranges:  Peak TR Velocity: 3.17 m/s  RV Syst Pressure: 43.2 mmHg (< 30mmHg)  IVC Diam:         1.50 cm       [id number] Govind Olvera MD  Electronically signed on 11/16/2023 at 9:42:05 AM       ** Final **  ROS she is awake but she is dysarthric weakness no fever    Physical Exam  Awake and alert and oriented x3 she does have a facial droop she is dysarthric weakness but her lungs are clear anteriorly and her heart has a regular rate and rhythm and her abdomen is soft is not distended or firm  Relevant Results               Assessment/Plan   This patient currently has cardiac telemetry ordered; if you would like to modify or discontinue the telemetry order, click here to go to the orders activity to modify/discontinue the order.    For now she has multiple problems from the clinical stroke I will have to await and see the MRI come back at this time ultrasound of the lower extremities shows bilateral DVTs she is to have an IVC filter I would like to discuss that with vascular not sure no contraindication to anticoagulation why we should not just anticoagulate why she needs both.  Airvo is on right now I Kalpana look and see what her saturations are and take a look at her chest x-ray maintain her oxygen saturations like to look at her H&H and her counts, echo was completed but no bubble study completed, repeat with bubble study also MRI to be read continue with close  ICU monitoring, Her speech eval she failed, probably to retry that and continue with her present meds, neurologically intact, critical care time 35 minutes          Principal Problem:    Acute saddle pulmonary embolism, unspecified whether acute cor pulmonale present (CMS/[facility])                  Sage Alba, DO

## 2023-11-16 NOTE — PROGRESS NOTES
Subjective   Subjective Data and Overnight Events:  Slurred speech persistent.  Thought to have concerns for hemorrhagic transformation last night, repeat imaging overnight and today showed no evidence of hemorrhagic transformation.  Patient is maintained on heparin.    Objective   Vital Signs:  Patient Vitals for the past 24 hrs:   BP Temp Temp src Pulse Resp SpO2 Weight   11/16/23 1800 120/65 -- -- 89 26 97 % --   11/16/23 1700 120/64 -- -- 90 25 95 % --   11/16/23 1616 -- -- -- -- 14 95 % --   11/16/23 1600 104/63 -- -- 82 17 93 % --   11/16/23 1500 116/59 -- -- 89 25 95 % --   11/16/23 1400 -- -- -- 96 -- 95 % --   11/16/23 1300 137/77 -- -- 90 22 93 % --   11/16/23 1216 -- -- -- -- -- 96 % --   11/16/23 1200 100/64 36.6 °C (97.9 °F) Tympanic 88 18 94 % --   11/16/23 1100 109/70 -- -- 94 (!) 34 96 % --   11/16/23 1045 110/74 -- -- -- -- -- --   11/16/23 1000 90/52 -- -- 91 20 91 % --   11/16/23 0900 137/75 -- -- 94 21 93 % --   11/16/23 0800 111/76 -- -- 92 (!) 31 94 % --   11/16/23 0751 -- -- -- -- -- 96 % --   11/16/23 0700 148/72 -- -- 92 24 92 % --   11/16/23 0600 148/67 -- -- 87 17 92 % 132 kg (290 lb 2 oz)   11/16/23 0500 119/58 -- -- 89 19 94 % --   11/16/23 0400 139/63 36 °C (96.8 °F) Temporal 84 15 93 % --   11/16/23 0300 125/56 -- -- 84 17 92 % --   11/16/23 0210 -- -- -- -- -- 95 % --   11/16/23 0200 150/71 -- -- 86 16 96 % --   11/16/23 0100 142/64 -- -- 83 17 95 % --   11/16/23 0000 138/73 36.2 °C (97.2 °F) Temporal 83 17 95 % --   11/15/23 2301 -- -- -- -- -- 96 % --   11/15/23 2300 120/57 -- -- 84 16 94 % --   11/15/23 2200 119/76 -- -- 85 18 95 % --   11/15/23 2100 143/66 -- -- 82 16 93 % --   11/15/23 2000 127/71 36 °C (96.8 °F) -- 85 20 95 % --   11/15/23 1915 -- -- -- 84 20 97 % --   11/15/23 1900 131/66 -- -- 81 14 -- --   11/15/23 1845 127/68 -- -- 87 26 90 % --   11/15/23 1830 142/88 -- -- 88 25 94 % --       Physical Exam:  General: no acute distress  HEENT: EOMI, no scleral  icterus.  Lungs: Clear to auscultation bilaterally without wheezing, rales, or rhonchi.  Cardiovascular: Regular rhythm and rate. Normal S1 and S2. No murmurs, rubs, or gallops are appreciated. JVP normal.  Extremities: 1+ edema BLE.  Neurologic: Alert and oriented x3.    Current Medications:  Scheduled medications   Medication Dose Route Frequency    [Held by provider] aspirin  81 mg oral Daily    [Held by provider] atorvastatin  40 mg oral Nightly    fluticasone  2 spray Each Nostril Daily    iohexol  75 mL intravenous Once in imaging    [Held by provider] levothyroxine  150 mcg oral Daily before breakfast    pantoprazole  40 mg intravenous Daily    perflutren protein A microsphere  0.5 mL intravenous Once in imaging    [Held by provider] polyethylene glycol  17 g oral Daily    sulfur hexafluoride microsphr  2 mL intravenous Once in imaging     Continuous Medications   Medication Dose Last Rate    heparin  0-4,500 Units/hr 2,300 Units/hr (11/16/23 1603)    lactated Ringer's  75 mL/hr 75 mL/hr (11/16/23 1602)     PRN medications   Medication    heparin    hydrALAZINE    Followed by    [START ON 11/17/2023] hydrALAZINE    labetaloL    oxygen       Pertinent Recent Cardiovascular Studies (personally reviewed):  Vascular studies:  Venous duplex 11/15/2023: Bilateral fem/pop DVT. PTV RLE DVT. EIV and CFV spared.    Cardiac studies:  Echocardiogram 11/16/2023:  RV is mildly enlarged, function is mildly reduced.  There is no evidence of PFO (subcostal view only)      Laboratory values:  CMP:  Recent Labs     11/16/23  0203 11/15/23  1716 11/15/23  0325 12/12/22  0854 12/11/22  0812 12/08/22  0658 12/07/22  0846 12/06/22  0728 12/05/22  0637 12/03/22  1802    138 138 140 142 140 143 141   < > 140   K 5.3 5.1 4.5 4.9 4.7 4.0 4.1 4.2   < > 4.7    103 101 107 108* 107 111* 106   < > 105   CO2 21 23 23 23 26 25 25 27   < > 23   ANIONGAP 15 17 19 15 13 12 11 12   < > 17   BUN 29* 29* 35* 22 24* 22 26* 30*   < > 21  "  CREATININE 2.12* 2.21* 2.58* 1.27* 1.37* 1.27* 1.47* 1.36*   < > 1.50*   EGFR 23* 22* 18*  --   --   --   --   --   --   --    MG 2.12 2.20  --  2.24 1.81 1.97 1.57* 1.71  --  1.59*    < > = values in this interval not displayed.     Recent Labs     11/16/23  0203 11/15/23  0325 12/12/22  0854 12/11/22  0812 12/08/22  0658   ALBUMIN 2.8* 3.6 3.0* 3.0* 2.9*   ALKPHOS 66 90  --   --   --    ALT 8 8  --   --   --    AST 19 16  --   --   --    BILITOT 0.8 0.8  --   --   --      CBC:  Recent Labs     11/16/23  0203 11/15/23  1716 11/15/23  0325 12/12/22  0854 12/11/22  0812 12/08/22  0658 12/07/22  0846 12/03/22  1802   WBC 8.6 12.8* 10.3 4.2* 6.9 6.6 6.8 8.5   HGB 11.3* 12.3 15.1 11.6* 11.0* 11.1* 11.3* 13.2   HCT 39.1 39.2 48.7* 40.8 35.5* 35.5* 35.3* 42.4   * 157 175 130* 315 265 252 168   * 99 99 108* 98 98 97 100     COAG:   Recent Labs     11/16/23  1300 11/16/23  0755 11/16/23  0157 11/15/23  2102 11/15/23  1716 11/15/23  0905 11/15/23  0325 12/01/22  1710   INR  --   --   --   --   --   --  1.3* 1.0   HAUF 1.1* 0.1 <0.1 0.1 1.3* 0.7  --   --      ABO:   Recent Labs     12/02/22  0601   ABO O     HEME/ENDO:  Recent Labs     11/15/23  0325 08/24/21  1459 08/17/20  0000 12/13/19  1504 11/14/18  1515   TSH  --  0.14* 0.09* 0.44 5.34*   HGBA1C 6.1*  --   --   --   --       CARDIAC:   Recent Labs     11/15/23  0418 11/15/23  0325   TROPHS 1,116* 1,272*   BNP  --  1,683*     Recent Labs     11/15/23  0412 12/13/19  1504 11/14/18  1515   CHOL 112 182 141   LDLF  --  114* 71   HDL 30.7 46.9 37.5*   TRIG 142 105 162*     MICRO: No results for input(s): \"ESR\", \"CRP\", \"PROCAL\" in the last 18529 hours.  No results found for the last 90 days.         I have personally reviewed most recent PCP, cardiology, vascular, and/or podiatry documentation.      Assessment/Plan   83 y.o. 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 Presented with stroke sxs with dysarthria and facial droop. Out of window for TPA.  Patient status post bilateral thrombectomy for PE.  We did not perform IVC filter at that time due to no evidence of CFV thrombus seen in the Cath Lab.    Interim history includes DVT scan that showed femoropopliteal DVT bilaterally, MRI that showed evidence of embolic stroke.  No evidence of hemorrhagic transformation on multiple imaging of the head.      Plan:  1.  From a pulmonary embolism standpoint, titrate O2 down as tolerated.  Likely, she will require Coumadin for long-term anticoagulation due to BMI of 47.    2.  From a stroke perspective, uncertain as to source of embolic phenomenon.  Differential diagnosis includes the following:   - Highest suspicion is paradoxical embolus from PFO; we may need to consider CARLOS versus CMR to confirm if PFO is present despite negative TTE.  - APLA; regardless, patient will be on warfarin due to her weight and may not change treatment plan even if positive.  - Undiagnosed AF - on tele - I did not see any AF during visit today; CARLOS or CMR may be helpful here to evaluate the KATIE as well.  - Aortic atheroembolic phenomenon - if all else is negative, we may need to consider Plavix and statin optimization    3.  Given she has been tolerating anticoagulation at least for 36 hours now without hemorrhagic transformation, I agree that overall risk of hemorrhagic transformation now is lower.  May be equivocal considering IVC filter placement.  I have discussed with Dr. Peres, we can hold off at this time.  Okay to ambulate and okay to place BP cuff on lower extremity if needed.    4.  Agree with PT/OT evaluation for poststroke care.    We will continue to follow-up, please do not hesitate to call if questions arise.  Discussed with family and primary team.    Disclaimer: This note was dictated by speech recognition. Minor errors in transcription may be  present.       SIGNATURE: Maldonado Alvarado MD PATIENT NAME: Lillian Mendoza   DATE/TIME: November 16, 2023 6:22 PM MRN: [id number]

## 2023-11-16 NOTE — NURSING NOTE
11/16/23 1200 Patient Navigator  I introduced myself to the patient, the pt's son- Serjio, & her granddaughters Kiki & Cassandra. The patient lives with Kiki and she is able to assist her along with the other family members. The patient was dozing throughout my visit and the family was able to assist with answers. The patient uses a rollator & its difficult for her to do exercise. I informed them of the recommendations of 30 minutes 3 times a week. Kiki helps prepare the food for her and other family members makes dinners. They state they try a variety of different foods. I reviewed the Stroke Folder, the handouts listed below, and answered their questions. I reviewed the following lab values and what they indicate: cholesterol, HDL, LDL, triglycerides, and A1C. I gave them my business card & instructed them to call me as needed. They appreciated my visit and the information I provided. I have updated the patient's RN of my visit.    Handouts:  How do I follow a healthy diet pattern? American Heart Association  The connection between diabetes & stroke- American Stroke Association  Lifestyle changes to prevent a stroke- American Stroke Association  What can I eat? American Diabetes Association    Rylie BANKS, RN  Patient Navigator  Stroke Educator  Diabetes Care &

## 2023-11-16 NOTE — PROGRESS NOTES
Care transitions assessment completed via bedside with patient and granddaughters. TCC introduced self and explained role. Demographics verified. Patient from home with granddaughter, Kiki.  Patient needing assistance from granddaughters for ADLs. Has shower chair, walker, commode and HB at home. Denies SW needs at this time.  Dispo pending hospital course, PT/OT latoya. Patient used [facility] in  the past and would prefer again if HHC needed. Patient's family to transport home at time of discharge. TCC to continue to follow for discharge planning needs.      PCP: Sage Alba  Last seen: recent    11/16/23 1612   Discharge Planning   Living Arrangements Family members   Support Systems Family members   Assistance Needed Needs assistance from grandaughters, has SC, walker, commode and HB at home   Type of Residence Private residence   Number of Stairs to Enter Residence 2  (ranch style)   Do you have animals or pets at home? No   Patient expects to be discharged to: Pending PT/OT   Financial Resource Strain   How hard is it for you to pay for the very basics like food, housing, medical care, and heating? Not hard   Housing Stability   In the last 12 months, was there a time when you were not able to pay the mortgage or rent on time? N   In the last 12 months, how many places have you lived? 1   In the last 12 months, was there a time when you did not have a steady place to sleep or slept in a shelter (including now)? N   Transportation Needs   In the past 12 months, has lack of transportation kept you from medical appointments or from getting medications? no   In the past 12 months, has lack of transportation kept you from meetings, work, or from getting things needed for daily living? No     RACHELLE HERNANDEZ, RN ED TCC

## 2023-11-16 NOTE — PROGRESS NOTES
Occupational Therapy                 Therapy Communication Note    Patient Name: Lillian Mendoza  MRN: [id number]  Today's Date: 11/16/2023     Discipline: Occupational Therapy    Missed Visit Reason: Missed Visit Reason: Patient placed on medical hold (possible IVC filter, discussed with RN)    Missed Time: Attempt    Comment:

## 2023-11-16 NOTE — PROGRESS NOTES
"Lillian Mendoza is a 83 y.o. female on day 1 of admission presenting with Acute saddle pulmonary embolism, unspecified whether acute cor pulmonale present (CMS/HCC).    Subjective   MRI of the brain without contrast currently in process to rule out suspected CVA.  Patient is status post thrombectomy for bilateral saddle PEs.       Objective     Last Recorded Vitals  Blood pressure 109/70, pulse 94, temperature 36.6 °C (97.9 °F), temperature source Tympanic, resp. rate (!) 34, height 1.67 m (5' 5.75\"), weight 132 kg (290 lb 2 oz), SpO2 96 %.    Physical exam/neurological exam  Patient seen and examined at this time; upon entering room she is resting quietly in bed. Appears fully developed and well nourished.  Obese.  Mental status: A&Ox 3. Memory testing, fund of knowledge and concentration within normal limits. Speech is fluent and negative for any paraphrasic errors.     Cranial Nerves:  Optic II/ Oculomotor III: Fundoscopic exam was technically difficult. PERRL +2. Visual fields are full. Convergence and accomodation noted without difficulty. Negative for deficits to visual acuity confrontation via static-finger wiggle test. Eyes appear aligned and free of exophthalmos and ptosis. Sclera are white bilaterally and lens are free from clouding.   Oculomotor III/ Trochlear IV/ Abducens VI: Extraocular movements are full, with no evidence of nystagmus. Negative for diplopia.   Trigeminal V: Facial sensation is intact to light touch. Corneal reflex responsive when threatened bilaterally.  Facial VII: intact; nose is midline, with no evidence of flattening to nasolabial folds noted and mouth is negative for evidence of droop. Patient is successfully able to follow commands to raise eyebrows, squeeze eyes shut, smile and show teeth, frown, and puff out cheeks.   Acoustic VIII: Hearing is intact bilaterally.  Glossopharngyeal IX/ Vagus X: Palate elevates symmetrically to phonation. Findings are negative for uvula " deviation or dysphagia.   Spinal accessory XI: Sternocleidomastoid/ upper trapezius is 5/5 to strength testing. No asymmetry noted to strength, bulk, or tone.   Hypoglossal XII: Tongue is midline and without deviations. Phonation is articulate and is negative for findings of dysarthria or aphasia.     Motor exam: negative for evidence of involuntary movements or fasiculations. BUE flexion of biceps and brachioradialis graded 5/5, in addition to extension of triceps at elbow and wrists. BUE  strength 5/5, along with finger abduction and thumb opposition. BLE hip flexion, extension, adduction, and abduction 4+/5. Knee extension & flexion 4+/5. Ankle dorsiflexion and plantarflexion 5/5. Normal bulk and normal tone.     Sensory exam: Sensation is intact to light touch throughout.    Reflexes: Reflexes are 0 and symmetric. Bilateral plantar responses are flexor.     Coordination: finger-to-nose testing is negative for signs of dysmetria. Pronator drift testing to BUE negative. Rapid alternating hand movements WNL.    Gait exam: Not tested as patient is a high fall risk    Relevant Results  NIH: 0        [place] Coma Scale  Best Eye Response: Spontaneous  Best Verbal Response: Oriented  Best Motor Response: Follows commands  [place] Coma Scale Score: 15        Scheduled medications  [Held by provider] aspirin, 81 mg, oral, Daily  [Held by provider] atorvastatin, 40 mg, oral, Nightly  fluticasone, 2 spray, Each Nostril, Daily  iohexol, 75 mL, intravenous, Once in imaging  [Held by provider] levothyroxine, 150 mcg, oral, Daily before breakfast  pantoprazole, 40 mg, intravenous, Daily  perflutren protein A microsphere, 0.5 mL, intravenous, Once in imaging  [Held by provider] polyethylene glycol, 17 g, oral, Daily  sulfur hexafluoride microsphr, 2 mL, intravenous, Once in imaging      Continuous medications  heparin, 0-4,500 Units/hr, Last Rate: 2,500 Units/hr (11/16/23 2510)  lactated Ringer's, 75 mL/hr, Last Rate: 75  mL/hr (11/15/23 1555)      PRN medications  PRN medications: heparin, hydrALAZINE **FOLLOWED BY** [START ON 11/17/2023] hydrALAZINE, labetaloL, oxygen  Vascular US Lower Extremity Venous Duplex Bilateral    Result Date: 11/16/2023           [facility] [address] [place], [place] [address] Tel [phone] and Fax [phone]  Vascular Lab Report VASC US LOWER EXTREMITY VENOUS DUPLEX BILATERAL  Patient Name:     DARLING YANG WYMAN  Reading Physician: [id number] Cookie Pace MD Study Date:       11/15/2023          Ordering           [id number] TRANG ALVARADO                                       Physician: MRN/PID:          [id number]            Technologist:      Allyn Escobar RVT Accession#:       [id number]        Technologist 2: Date of           1940 / 83      Encounter#:        [id number] Birth/Age:        years Gender:           F Admission Status: Inpatient           Location           [facility]                                       Performed:  Diagnosis/ICD: Other pulmonary embolism without acute cor pulmonale-I26.99 CPT Codes:     17146 Peripheral venous duplex scan for DVT complete  **CRITICAL RESULT** Critical Result: DVT Bilateral FV, Bilateral POP V, and Right PTV Notification called to Dr Trang Alvarado on 11/15/2023 at 3:00:00 PM by NEIL Escobar RVT.  CONCLUSIONS: Right Lower Venous: There is acute occlusive deep vein thrombosis visualized in the mid femoral, distal femoral, popliteal and posterior tibial veins. There is acute non-occlusive deep vein thrombosis visualized in the proximal femoral vein. Left Lower Venous: There is acute occlusive deep vein thrombosis visualized in the mid femoral, distal femoral, popliteal and proximal femoral veins. Cannot rule out thrombus in non-visualized peroneal vein due to edema.  Imaging & Doppler Findings:  Right                 Compressible      Thrombus          Flow Distal External Iliac     Yes              None         Continuous CFV                       Yes             None         Continuous PFV                       Yes             None FV Proximal             Partial    Acute non-occlusive Continuous FV Mid                     No        Acute occlusive FV Distal                  No        Acute occlusive Popliteal                  No        Acute occlusive      None Peroneal                  Yes             None PTV                        No        Acute occlusive  Left                  Compress    Thrombus        Flow Distal External Iliac   Yes         None       Continuous CFV                     Yes         None       Continuous PFV                     Yes         None FV Proximal              No    Acute occlusive    None FV Mid                   No    Acute occlusive FV Distal                No    Acute occlusive Popliteal                No    Acute occlusive    None PTV                     Yes         None  [id number] Cookie Pace MD Electronically signed by [id number] Cookie Pace MD on 11/16/2023 at 12:36:58 PM  ** Final **     Transthoracic Echo (TTE) Complete    Result Date: 11/16/2023    [facility], [address] [place] [id number] [phone] and                                 Fax [phone] TRANSTHORACIC ECHOCARDIOGRAM REPORT  Patient Name:      DARLING SORIA GARO   Reading Physician:    [id number] Govind Olvera MD Study Date:        11/15/2023           Ordering Provider:    [id number] ANTOINETTE ENCINAS MRN/PID:           [id number]             Fellow: Accession#:        [id number]         Nurse: Date of Birth/Age: 1940 / 83 years Sonographer:          OSMAN Aguirre RDCS Gender:            F                    Additional Staff: Height:            165.10 cm            Admit Date:           11/15/2023 Weight:            110.68 kg            Admission  Status:     Inpatient -                                                               Routine BSA:               2.15 m2              Encounter#:           [id number]                                         Department Location:  [address]                                                               floor/ICU Blood Pressure: 105 /73 mmHg Study Type:    TRANSTHORACIC ECHO (TTE) COMPLETE Diagnosis/ICD: Other forms of dyspnea-R06.09 Indication:    Dyspnea CPT Code:      Echo Complete w Full Doppler-31223 Patient History: Pertinent History: Dyspnea. Study Detail: The following Echo studies were performed: 2D, M-Mode, Doppler and               color flow. Technically challenging study due to poor acoustic               windows, patient lying in supine position, body habitus and Post               procedure and bi-pap. Definity used as a contrast agent for               endocardial border definition. Total contrast used for this               procedure was 3 mL via IV push.  PHYSICIAN INTERPRETATION: Left Ventricle: The left ventricular systolic function is normal, with an estimated ejection fraction of 50-55%. There are no regional wall motion abnormalities. The left ventricular cavity size is normal. Left ventricular diastolic filling was not assessed. Left Atrium: The left atrium is normal in size. Right Ventricle: The right ventricle is severely enlarged. There is moderately reduced right ventricular systolic function. Right Atrium: The right atrium is mildly dilated. Aortic Valve: The aortic valve is probably trileaflet. There is trivial aortic valve regurgitation. Mitral Valve: The mitral valve is normal in structure. There is trace mitral valve regurgitation. Tricuspid Valve: The tricuspid valve is structurally normal. There is mild tricuspid regurgitation. The Doppler estimated RVSP is moderately elevated at 43.2 mmHg. Pulmonic Valve: The pulmonic valve is not well visualized. The pulmonic valve regurgitation was  not well visualized. Pericardium: There is no pericardial effusion noted. Aorta: The aortic root is abnormal. There is mild dilatation of the ascending aorta. There is mild dilatation of the aortic root. Systemic Veins: The inferior vena cava appears to be of normal size.  CONCLUSIONS:  1. Left ventricular systolic function is normal with a 50-55% estimated ejection fraction.  2. Severely enlarged right ventricle.  3. There is moderately reduced right ventricular systolic function.  4. Moderately elevated right ventricular systolic pressure. QUANTITATIVE DATA SUMMARY: 2D MEASUREMENTS:                          Normal Ranges: Ao Root d:     3.70 cm   (2.0-3.7cm) LAs:           3.10 cm   (2.7-4.0cm) IVSd:          1.02 cm   (0.6-1.1cm) LVPWd:         0.84 cm   (0.6-1.1cm) LVIDd:         3.47 cm   (3.9-5.9cm) LVIDs:         2.14 cm LV Mass Index: 42.8 g/m2 LV % FS        38.3 % M-MODE MEASUREMENTS:                  Normal Ranges: Ao Root: 4.00 cm (2.0-3.7cm) LAs:     3.50 cm (2.7-4.0cm) AORTA MEASUREMENTS:                    Normal Ranges: Asc Ao, d: 3.80 cm (2.1-3.4cm) AORTIC VALVE:                        Normal Ranges: LVOT Diameter: 2.70 cm (1.8-2.4cm)  RIGHT VENTRICLE: RV Basal 5.13 cm TRICUSPID VALVE/RVSP:                             Normal Ranges: Peak TR Velocity: 3.17 m/s RV Syst Pressure: 43.2 mmHg (< 30mmHg) IVC Diam:         1.50 cm  [id number] Govind Olvera MD Electronically signed on 11/16/2023 at 9:42:05 AM  ** Final **     CT brain attack head wo IV contrast    Result Date: 11/15/2023  Interpreted By:  Reece Teran, STUDY: CT BRAIN ATTACK HEAD WO IV CONTRAST;  11/15/2023 5:54 pm   INDICATION: Signs/Symptoms:left facial droop.   COMPARISON: Noncontrast CT head dated 11/15/2023.   ACCESSION NUMBER(S): [id number]   ORDERING CLINICIAN: TERRY MCKINNEY   TECHNIQUE: Noncontrast axial CT scan of head was performed. Angled reformats in brain and bone windows were generated. The images were reviewed in  bone, brain, blood and soft tissue windows.   FINDINGS: Exam is somewhat degraded by presence of the intravenous contrast within the vascular structures, likely from interventional procedure from earlier in the day due to poor renal clearance.   Within limits of the study, no hyperdense intracranial hemorrhage is evident. There is no mass effect or midline shift. Area of diminished attenuation described on previous same day noncontrast CT of the head dated 11/15/2023 is not well visualized on current study.   Gray-white differentiation is intact, without evidence of CT apparent transcortical infarct. Subtle attenuation changes present in the periventricular and subcortical white matter of bilateral cerebral hemispheres are nonspecific, but favored to represent sequela of microvascular disease.   There is prominence of the extra-axial spaces overlying the cerebral hemispheres bilaterally without evidence of abnormal fluid collection. Basal cisterns are patent. No ventricular dilatation is present.   Scalp soft tissues do not demonstrate any acute abnormalities. Calvarium is unremarkable in appearance without evidence of depressed skull fracture. Mastoid air cells and middle ear cavities are well aerated without evidence of fluid fluid levels.   There is opacification of the intracranial carotid arteries and basilar artery bilaterally.       1.  Exam is somewhat degraded by a retained contrast from previous interventional procedure likely due to poor renal clearance. 2. Within limitations of the study, no hyperdense intracranial hemorrhage or CT apparent transcortical infarct is identified. Area of diminished attenuation in the posteroinferior left temporal lobe described on previous same day noncontrast CT of the head is not definitely identified on current examination. MRI of the brain can be considered for more definite characterization. 3. Subtle attenuation changes present in the periventricular and subcortical  white matter bilateral cerebral hemispheres are nonspecific, but favored to represent changes of microvascular disease.   MACRO: Reece Teran discussed the significance and urgency of this critical finding by telephone with  ADRIANJHONNY FAYE on 11/15/2023 at 6:03 pm.  (**-RCF-**) Findings:  See findings.   Signed by: Reece Teran 11/15/2023 6:05 PM Dictation workstation:   [id number]    Invasive vascular procedure    Result Date: 11/15/2023   [facility], Cath Lab, [facility] [place] [id number] Cardiovascular Catheterization Report Patient Name:      DARLING WYMAN   Performing Physician:  Jay Alvarado MD Study Date:        11/15/2023           Verifying Physician:   Jay Alvarado MD MRN/PID:           [id number]             Cardiologist/Co-scrub: Accession#:        [id number]         Ordering Physician:    Jay ALVARADO Date of Birth/Age: 1940 / 83 years Fellow: Gender:            F                    Fellow: Encounter#:        [id number]  Study:            Pulmonary Angiogram Additional Study: Peripheral Intervention  Indications: DARLING WYMAN is a 84 year old female who presents with intermediate-high risk PE, hx of PE 15 years ago (thought 2/2 knee TKA) s/p 1 year of warfarin; p/w stroke c/f paradoxical embolus in setting of PE. +significant O2 requirement.  Procedure Description: After infiltration of local anesthetic, the right femoral vein was identified with two dimensional ultrasound. Under direct ultrasound visualization, the right femoral vein was cannulated with a percutaneous technique. A 24F sheath was placed in the vein.  Pulmonary Embolism:  We predilated with a 22F Mac dilator, and placed a 24F sheath into the vein. Post-procedure, the venous sheath was pulled and pressure was applied to the site via figure-of-8.  Prior to access, we imaged the bilateral common femoral veins to evaluate for thrombus. No thrombus was noted. We selectively engaged the  "pulmonary artery using a JR4 diagnostic catheter. Pre-thrombectomy RA and PA pressures were obtained.  Following this, we sequentially engaged the following PA branches with Inari [id number] for selective extirpation of material: Left main, Right main, Truncus anterior and RLL posterior basal. Successful thrombectomy was performed. Selective angiogram was performed during the procedure to help locate thrombus for extraction. Post-thrombectomy RA and PA pressures were obtained.  We did not administer therapeutic systemic heparin up front d/t stroke and concerns for potential hemorrhagic conversion. Heparinized saline was utilized to \"wash\" the FlowSaver filter to maintain patency. Initially thrombectomy of LPA highly successful. Difficulty with RPA thrombectomy, with angiogram showing ball-like embolus. TA aspiration performed with loosening of the RPA thrombus (subacute clot). We did provide 2000 units of heparin at this time due to extended dwell time of catheter. Further thrombectomy performed of the main RPA and lower posterior branch with successful extirpation of remainder of subacute thrombus.  Hemo Personnel: +----------+---------+ Name      Duty      +----------+---------+ Maldonado Alvarado MDPROC MD 1 +----------+---------+  Hemodynamic Pressures:  +----+-----------+----------+-------------+--------------+-----+-------+-------+ Site Date Time Phase NameSystolic mmHgDiastolic mmHgMean A-Wave V-Wave                                                      mmHg  mmHg   mmHg   +----+-----------+----------+-------------+--------------+-----+-------+-------+   RA 11/15/2023   O2 REST                              25     28     25     10:53:43 AM                                                         +----+-----------+----------+-------------+--------------+-----+-------+-------+   PA 11/15/2023   O2 REST           73            27   52                   10:56:29 AM          "                                                +----+-----------+----------+-------------+--------------+-----+-------+-------+   PA 11/15/2023   O2 REST           75            31   51                   11:38:07 AM                                                         +----+-----------+----------+-------------+--------------+-----+-------+-------+   RA 11/15/2023   O2 REST                              18     21     19     11:38:30 AM                                                         +----+-----------+----------+-------------+--------------+-----+-------+-------+   PA 11/15/2023   O2 REST           67            29   41                   12:06:02 PM                                                         +----+-----------+----------+-------------+--------------+-----+-------+-------+   RA 11/15/2023   O2 REST                              23     27     23     12:06:39 PM                                                         +----+-----------+----------+-------------+--------------+-----+-------+-------+  Oxygen Saturation %: +-----------+----------+------------+ Sample SiteO2 Sat (%)HB (g/100ml) +-----------+----------+------------+          AO        90        15.1 +-----------+----------+------------+          PA        52        15.1 +-----------+----------+------------+          AO        90        15.1 +-----------+----------+------------+          PA        52        15.1 +-----------+----------+------------+          PA        42             +-----------+----------+------------+          PA        42             +-----------+----------+------------+  Cardiac Outputs: +---------------+------------------+-------+ SINTIA CO (l/min)SINTIA CI (l/min/m2)SINTIA SV +---------------+------------------+-------+             3.7               1.7   41.2 +---------------+------------------+-------+   Vascular Resistance Calculated Values (Wood Units): +-----+----+----+ PhaseTPR TPRI +-----+----+----+ 0    14.030.5 +-----+----+----+  Complications: No in-lab complications observed.  Cardiac Cath Post Procedure Notes: Post Procedure Diagnosis: Bilateral pulmonary embolism R>L c/w intermediate-high                           risk PE s/p extirpation of material. Blood Loss:               Estimated blood loss during the procedure was 50 mls. Specimens Removed:        Number of specimen(s) removed: thrombus (acute LPA,                           subacute RPA). ____________________________________________________________________________________ CONCLUSIONS:  1. Indication: intermediate-high risk PE with suspected paradoxical thrombus and CVA, severe hypoxia.  2. Bilateral pulmonary angiogram and exptirpation of material from bilateral PAs as noted.  3. Heparin anticoagulation and routine work up. ICD 10 Codes: Other pulmonary embolism with acute cor pulmonale-I26.09  CPT Codes: Selective catheter placement, left or right pulmonary artery uni-23175; Selective catheter placement, left or right pulmonary artery bilat-45839.50; Selective catheter placement, segmental or subsegmental pulmonary artery uni-11480; Angiography, pulmonary, bilateral S&I-97872; Extirpation of material, unilateral PA-47082; Extirpation of material, contralateral PA-61720.50; Extirpation of material, unilateral secondary vessel Subsegment 1-33933; Extirpation of material, unilateral secondary vessel Subsegment 2-91234; Moderate Sedation Services initial 15 minutes patient >5 years-68645; Moderate Sedation Services 1st additional 15 minutes patient >5 years-77851; Moderate Sedation Services 2nd additional 15 minutes patient >5 years-19018; Moderate Sedation Services 3rd additional 15 minutes patient >5 years-91425; Moderate Sedation Services 4th additional 15 minutes patient >5 years-61641; Moderate Sedation Services 5th additional 15 minutes  patient >5 years-04486  [id number] Maldonado Alvarado MD Performing Physician Electronically signed by [id number] Alvarado MD on 11/15/2023 at 3:56:11 PM  ** Final **     CT angio chest for pulmonary embolism    Addendum Date: 11/15/2023    This finding was discussed with and acknowledged by Dr. Enzo Amezquita on 11/15/2023 at 9:30 AM Signed by Stan Chamberlain MD    Result Date: 11/15/2023  STUDY: CT Angiogram of the Chest; 11/15/2023 7:38 AM. INDICATION: Known pulmonary embolism.  Evaluate for heart strain. COMPARISON: CXR 11/15/2023.  CT CAP 12/2/2022. ACCESSION NUMBER(S): [id number] ORDERING CLINICIAN: ENZO AMEZQUITA TECHNIQUE:  CTA of the chest was performed with intravenous contrast. Images are reviewed and processed at a workstation according to the CT angiogram protocol with 3-D and/or MIP post processing imaging generated.  Omnipaque 350 100 mL was administered intravenously. Automated mA/kV exposure control was utilized and patient examination was performed in strict accordance with principles of ALARA. FINDINGS: Pulmonary arteries are adequately opacified.  There are extensive filling defects in the bilateral main pulmonary arteries extending into the bilateral lobar and segmental arteries.  These are greater on the right.  The thoracic aorta is normal in course and caliber without dissection or aneurysm.  There is no evidence for right heart strain. The heart is normal in size without pericardial effusion.  Thoracic lymph nodes are not enlarged. There is no pleural effusion, pleural thickening, or pneumothorax. The airways are patent. Lungs are clear without consolidation, interstitial disease, or suspicious nodules.  There is prominent atelectasis at the left base. Upper abdomen demonstrates no acute pathology. There are no acute fractures.  No suspicious bony lesions.    1. Extensive bilateral pulmonary emboli, greater on the right. There is no evidence for right heart strain. 2. Prominent atelectasis at the left base. Signed  by Stan Chamberlain MD    CT angio chest for pulmonary embolism    Result Date: 11/15/2023  STUDY: CT Angiogram of the Chest; 11/15/2023 6:32 AM INDICATION: Abdominal pain. COMPARISON: XR chest 11/15/2023, CT CAP 12/02/2022. ACCESSION NUMBER(S): [id number] ORDERING CLINICIAN: JASMEET MERCADO TECHNIQUE:  CTA of the chest was performed with intravenous contrast. Images are reviewed and processed at a workstation according to the CT angiogram protocol with 3-D and/or MIP post processing imaging generated.  Omnipaque 350 75 mL was administered intravenously. Technologist note states contrast extravasation/IV infiltration. Automated mA/kV exposure control was utilized and patient examination was performed in strict accordance with principles of ALARA. FINDINGS: Pulmonary arteries are adequately opacified and there are extensive bilateral pulmonary emboli involving all lobes with large amount of embolic material in the distal aspect of the right main pulmonary artery. There is moderate to severe embolic burden with moderate to severe right heart dilation.  The thoracic aorta is normal in course and caliber without dissection or aneurysm. Heart is enlarged.  Thoracic lymph nodes are not enlarged. There is no pleural effusion, pleural thickening, or pneumothorax. The airways are patent. Left basilar atelectasis. Lungs are otherwise clear. There is fatty infiltration of the liver. Nonobstructing right renal calculus. Moderate right renal cortical atrophy. There are no acute fractures.  No suspicious bony lesions.    1. Extensive bilateral pulmonary emboli involving all lobes with large amount of embolic material in the distal aspect of the right main pulmonary artery. There is moderate to severe embolic burden with moderate to severe right heart strain. 2. Hepatic steatosis. 3. Nonobstructing right renal calculus. 4. Moderate right renal cortical atrophy. Findings discussed with and acknowledged by Dr. Enzo Dumont8:15 AM  Signed by Akshat Nj MD    CT angio brain attack head w IV contrast and post procedure    Result Date: 11/15/2023  Interpreted By:  Arian Goldberg, STUDY: CT ANGIO BRAIN ATTACK HEAD W IV CONTRAST AND POST PROCEDURE; CT ANGIO BRAIN ATTACK NECK W IV CONTRAST AND POST PROCEDURE;  11/15/2023 4:49 am   INDICATION: Signs/Symptoms:Stroke Evaluation with VAN Positive.   COMPARISON: Correlation made to noncontrast head CT of 11/15/2023.   ACCESSION NUMBER(S): [id number]; [id number]   ORDERING CLINICIAN: JASMEET MERCADO   TECHNIQUE: 75 cc Omnipaque 350 were administered intravenously and axial images of the head were acquired.  Coronal, sagittal, and 3-D reconstructions were provided for review.   FINDINGS: The previously seen region of asymmetric cortical hypoattenuation and loss of gray-white matter differentiation is not definitely seen on the current examination, suggesting its presence on the prior examination may have related to artifact. Gray-white matter differentiation appears maintained. There is no evidence of acute intracranial hemorrhage. No abnormal intracranial enhancement is seen.   CTA HEAD FINDINGS:   Anterior circulation: The bilateral intracranial internal carotid arteries, bilateral carotid terminals, bilateral proximal anterior and middle cerebral arteries are normal.   Posterior circulation: Bilateral intracranial vertebral arteries, vertebrobasilar junction, basilar artery and proximal posterior cerebral arteries are normal.   CTA NECK FINDINGS:   Right carotid vessels: The common carotid artery is normal. The carotid bifurcation is normal. The internal carotid artery in the neck is normal. There is 0% stenosis  .   Left carotid vessels: The common carotid artery is normal. The carotid bifurcation is normal. The internal carotid artery in the neck is normal. There is 0% stenosis  .   Vertebral vessels: The origin of the right vertebral artery is not well seen due to artifact. The visualized segments  of the cervical vertebral arteries are normal in caliber.     CT angiography of the chest demonstrates acute bilateral pulmonary emboli. The visible aortic arch appears within normal limits. No significant stenosis in the innominate or subclavian arteries. Asymmetric posterior opacity in the superior segment of the left lower lobe partially imaged may relate to atelectasis, scarring, pneumonia, or developing pulmonary infarct.       The previously seen region of asymmetric cortical hypoattenuation and loss of gray-white matter differentiation is not definitely seen on the current examination, suggesting its presence on the prior examination may have related to artifact. Gray-white matter differentiation appears maintained. There is no evidence of acute intracranial hemorrhage. No abnormal intracranial enhancement is seen.   No evidence for significant stenosis of the cervical vessels.   No evidence for significant stenosis or large branch vessel cutoffs of the intracranial vessels.   Acute bilateral pulmonary emboli are incidentally seen on CT angiography of the chest. Asymmetric posterior opacity in the superior segment of the left lower lobe partially imaged may relate to atelectasis, scarring, pneumonia, or developing pulmonary infarct.   MACRO: Arian Goldberg discussed the significance and urgency of this critical finding by epic secure chat with message receipt verification with JASMEET MERCADO on 11/15/2023 at 5:24 am.  (**-RCF-**) Findings:  Acute pulmonary embolism.   Signed by: Arian Goldberg 11/15/2023 5:25 AM Dictation workstation:   [id number]    CT angio brain attack neck w IV contrast and post procedure    Result Date: 11/15/2023  Interpreted By:  Arian Goldberg, STUDY: CT ANGIO BRAIN ATTACK HEAD W IV CONTRAST AND POST PROCEDURE; CT ANGIO BRAIN ATTACK NECK W IV CONTRAST AND POST PROCEDURE;  11/15/2023 4:49 am   INDICATION: Signs/Symptoms:Stroke Evaluation with VAN Positive.   COMPARISON: Correlation made to  noncontrast head CT of 11/15/2023.   ACCESSION NUMBER(S): [id number]; [id number]   ORDERING CLINICIAN: JASMEET MERCADO   TECHNIQUE: 75 cc Omnipaque 350 were administered intravenously and axial images of the head were acquired.  Coronal, sagittal, and 3-D reconstructions were provided for review.   FINDINGS: The previously seen region of asymmetric cortical hypoattenuation and loss of gray-white matter differentiation is not definitely seen on the current examination, suggesting its presence on the prior examination may have related to artifact. Gray-white matter differentiation appears maintained. There is no evidence of acute intracranial hemorrhage. No abnormal intracranial enhancement is seen.   CTA HEAD FINDINGS:   Anterior circulation: The bilateral intracranial internal carotid arteries, bilateral carotid terminals, bilateral proximal anterior and middle cerebral arteries are normal.   Posterior circulation: Bilateral intracranial vertebral arteries, vertebrobasilar junction, basilar artery and proximal posterior cerebral arteries are normal.   CTA NECK FINDINGS:   Right carotid vessels: The common carotid artery is normal. The carotid bifurcation is normal. The internal carotid artery in the neck is normal. There is 0% stenosis  .   Left carotid vessels: The common carotid artery is normal. The carotid bifurcation is normal. The internal carotid artery in the neck is normal. There is 0% stenosis  .   Vertebral vessels: The origin of the right vertebral artery is not well seen due to artifact. The visualized segments of the cervical vertebral arteries are normal in caliber.     CT angiography of the chest demonstrates acute bilateral pulmonary emboli. The visible aortic arch appears within normal limits. No significant stenosis in the innominate or subclavian arteries. Asymmetric posterior opacity in the superior segment of the left lower lobe partially imaged may relate to atelectasis, scarring, pneumonia, or  developing pulmonary infarct.       The previously seen region of asymmetric cortical hypoattenuation and loss of gray-white matter differentiation is not definitely seen on the current examination, suggesting its presence on the prior examination may have related to artifact. Gray-white matter differentiation appears maintained. There is no evidence of acute intracranial hemorrhage. No abnormal intracranial enhancement is seen.   No evidence for significant stenosis of the cervical vessels.   No evidence for significant stenosis or large branch vessel cutoffs of the intracranial vessels.   Acute bilateral pulmonary emboli are incidentally seen on CT angiography of the chest. Asymmetric posterior opacity in the superior segment of the left lower lobe partially imaged may relate to atelectasis, scarring, pneumonia, or developing pulmonary infarct.   MACRO: Arian Goldberg discussed the significance and urgency of this critical finding by epic secure chat with message receipt verification with JASMEET MERCADO on 11/15/2023 at 5:24 am.  (**-RCF-**) Findings:  Acute pulmonary embolism.   Signed by: Arian Goldberg 11/15/2023 5:25 AM Dictation workstation:   [id number]    XR chest 1 view    Result Date: 11/15/2023  STUDY: Chest Radiograph;  11/15/2023 3:28 AM INDICATION: Wheezing. COMPARISON: 12/01/2022 XR Chest ACCESSION NUMBER(S): [id number] ORDERING CLINICIAN: JASMEET MERCADO TECHNIQUE:  Frontal chest was obtained at 03:28 hours. FINDINGS: CARDIOMEDIASTINAL SILHOUETTE: Cardiomediastinal silhouette is enlarged and more pronounced as compared to previous exam. LUNGS: Lungs reveals opacification of left lung base.  There is a lobulated appearance of the left hilum. ABDOMEN: No remarkable upper abdominal findings.  BONES: No acute osseous changes.    Opacification at the left lung base difficult to determine etiology.  Cannot exclude underlying infiltrate.  Fullness of left hilum.  Findings could better assessed with CT chest. Signed  by Morgan De León DO    CT brain attack head wo IV contrast    Result Date: 11/15/2023  Interpreted By:  Arian Goldberg, STUDY: CT BRAIN ATTACK HEAD WO IV CONTRAST;  11/15/2023 3:16 am   INDICATION: Signs/Symptoms:Stroke Evaluation.   COMPARISON: Noncontrast head CT of 12/02/2022.   ACCESSION NUMBER(S): [id number]   ORDERING CLINICIAN: JASMEET MERCADO   TECHNIQUE: Noncontrast axial CT scan of head was performed. Angled reformats in brain and bone windows were generated. The images were reviewed in bone, brain, blood and soft tissue windows.   FINDINGS: CSF Spaces: The ventricles, sulci and basal cisterns are within normal limits. There is no extraaxial fluid collection.   Parenchyma: Moderate to advanced volume loss.  There is periventricular and subcortical white matter hypoattenuation, most in keeping with chronic microvascular ischemic change. Similar appearance of asymmetric focal hypodensity in the left globus pallidus/genu of internal capsule suggesting sequelae of chronic ischemia.  Redemonstrated small focal hypodensity compatible with chronic lacunar infarct in the left cerebellar hemisphere. There is a new focal region of asymmetric cortical hypoattenuation and loss of gray-white matter differentiation involving the inferior posterior left temporal lobe (series 203, images 21-30; series 205, images 66-71; series 206, images 61-67), suspicious for acute to subacute ischemia. The grey-white differentiation is otherwise intact.There is no mass effect or midline shift.  There is no intracranial hemorrhage.   Calvarium: The calvarium is unremarkable.   Paranasal sinuses and mastoids: Visualized paranasal sinuses and mastoids are clear.       There is a new focal region of asymmetric cortical hypoattenuation and loss of gray-white matter differentiation involving the inferior posterior left temporal lobe (series 203, images 21-30; series 205, images 66-71; series 206, images 61-67), suspicious for small region  of acute to subacute ischemia. No acute intracranial hemorrhage.     MACRO: Arian Goldberg discussed the significance and urgency of this critical finding by telephone with  JASMEET MERCADO on 11/15/2023 at 3:30 am. (**-RCF-**) Findings:  See findings.     Signed by: Arian Goldberg 11/15/2023 3:32 AM Dictation workstation:   [id number]   Results for orders placed or performed during the hospital encounter of 11/15/23 (from the past 24 hour(s))   Blood Gas Arterial Full Panel   Result Value Ref Range    POCT pH, Arterial 7.36 (L) 7.38 - 7.42 pH    POCT pCO2, Arterial 42 38 - 42 mm Hg    POCT pO2, Arterial 111 (H) 85 - 95 mm Hg    POCT SO2, Arterial 100 94 - 100 %    POCT Oxy Hemoglobin, Arterial 97.6 94.0 - 98.0 %    POCT Hematocrit Calculated, Arterial 40.0 36.0 - 46.0 %    POCT Sodium, Arterial 134 (L) 136 - 145 mmol/L    POCT Potassium, Arterial 5.1 3.5 - 5.3 mmol/L    POCT Chloride, Arterial 107 98 - 107 mmol/L    POCT Ionized Calcium, Arterial 1.19 1.10 - 1.33 mmol/L    POCT Glucose, Arterial 149 (H) 74 - 99 mg/dL    POCT Lactate, Arterial 2.3 (H) 0.4 - 2.0 mmol/L    POCT Base Excess, Arterial -1.8 -2.0 - 3.0 mmol/L    POCT HCO3 Calculated, Arterial 23.7 22.0 - 26.0 mmol/L    POCT Hemoglobin, Arterial 13.3 12.0 - 16.0 g/dL    POCT Anion Gap, Arterial 8 (L) 10 - 25 mmo/L    Patient Temperature 37.0 degrees Celsius    FiO2 100 %    Apparatus HIGH FLOW CANNULA     Flow 60.0 LPM   Transthoracic Echo (TTE) Complete   Result Value Ref Range    LVOT diam 2.70     LVIDd 3.47     RVSP 43.2    CBC   Result Value Ref Range    WBC 12.8 (H) 4.4 - 11.3 x10*3/uL    nRBC 0.5 (H) 0.0 - 0.0 /100 WBCs    RBC 3.97 (L) 4.00 - 5.20 x10*6/uL    Hemoglobin 12.3 12.0 - 16.0 g/dL    Hematocrit 39.2 36.0 - 46.0 %    MCV 99 80 - 100 fL    MCH 31.0 26.0 - 34.0 pg    MCHC 31.4 (L) 32.0 - 36.0 g/dL    RDW 15.8 (H) 11.5 - 14.5 %    Platelets 157 150 - 450 x10*3/uL   Magnesium   Result Value Ref Range    Magnesium 2.20 1.60 - 2.40 mg/dL   Basic  Metabolic Panel   Result Value Ref Range    Glucose 119 (H) 74 - 99 mg/dL    Sodium 138 136 - 145 mmol/L    Potassium 5.1 3.5 - 5.3 mmol/L    Chloride 103 98 - 107 mmol/L    Bicarbonate 23 21 - 32 mmol/L    Anion Gap 17 10 - 20 mmol/L    Urea Nitrogen 29 (H) 6 - 23 mg/dL    Creatinine 2.21 (H) 0.50 - 1.05 mg/dL    eGFR 22 (L) >60 mL/min/1.73m*2    Calcium 8.5 (L) 8.6 - 10.3 mg/dL   Heparin Assay, UFH   Result Value Ref Range    Heparin Unfractionated 1.3 (HH) See Comment Below for Therapeutic Ranges IU/mL   POCT GLUCOSE   Result Value Ref Range    POCT Glucose 125 (H) 74 - 99 mg/dL   Heparin Assay   Result Value Ref Range    Heparin Unfractionated 0.1 See Comment Below for Therapeutic Ranges IU/mL   POCT GLUCOSE   Result Value Ref Range    POCT Glucose 99 74 - 99 mg/dL   Heparin Assay   Result Value Ref Range    Heparin Unfractionated <0.1 See Comment Below for Therapeutic Ranges IU/mL   Comprehensive Metabolic Panel   Result Value Ref Range    Glucose 95 74 - 99 mg/dL    Sodium 136 136 - 145 mmol/L    Potassium 5.3 3.5 - 5.3 mmol/L    Chloride 105 98 - 107 mmol/L    Bicarbonate 21 21 - 32 mmol/L    Anion Gap 15 10 - 20 mmol/L    Urea Nitrogen 29 (H) 6 - 23 mg/dL    Creatinine 2.12 (H) 0.50 - 1.05 mg/dL    eGFR 23 (L) >60 mL/min/1.73m*2    Calcium 8.2 (L) 8.6 - 10.3 mg/dL    Albumin 2.8 (L) 3.4 - 5.0 g/dL    Alkaline Phosphatase 66 33 - 136 U/L    Total Protein 5.7 (L) 6.4 - 8.2 g/dL    AST 19 9 - 39 U/L    Bilirubin, Total 0.8 0.0 - 1.2 mg/dL    ALT 8 7 - 45 U/L   CBC   Result Value Ref Range    WBC 8.6 4.4 - 11.3 x10*3/uL    nRBC 0.6 (H) 0.0 - 0.0 /100 WBCs    RBC 3.61 (L) 4.00 - 5.20 x10*6/uL    Hemoglobin 11.3 (L) 12.0 - 16.0 g/dL    Hematocrit 39.1 36.0 - 46.0 %     (H) 80 - 100 fL    MCH 31.3 26.0 - 34.0 pg    MCHC 28.9 (L) 32.0 - 36.0 g/dL    RDW 15.9 (H) 11.5 - 14.5 %    Platelets 147 (L) 150 - 450 x10*3/uL   Magnesium   Result Value Ref Range    Magnesium 2.12 1.60 - 2.40 mg/dL   Phosphorus   Result  Value Ref Range    Phosphorus 3.9 2.5 - 4.9 mg/dL   Lactate   Result Value Ref Range    Lactate 1.1 0.4 - 2.0 mmol/L   POCT GLUCOSE   Result Value Ref Range    POCT Glucose 88 74 - 99 mg/dL   Heparin Assay   Result Value Ref Range    Heparin Unfractionated 0.1 See Comment Below for Therapeutic Ranges IU/mL   POCT GLUCOSE   Result Value Ref Range    POCT Glucose 107 (H) 74 - 99 mg/dL                               Assessment/Plan   This patient currently has cardiac telemetry ordered; if you would like to modify or discontinue the telemetry order, click here to go to the orders activity to modify/discontinue the order.  Principal Problem:    Acute saddle pulmonary embolism, unspecified whether acute cor pulmonale present (CMS/HCC)  -When deemed appropriate to stop heparin GTT, patient to resume ASA 81 mg p.o. daily for CVA prophylaxis if she is not transitioned to oral anticoagulation.  Defer management of AP/AC to ICU team.  Patient also to continue atorvastatin 40 mg p.o. daily as prescribed.  -MRI of the brain without contrast is currently in process; continue stroke protocol  -Echocardiogram pending completion  -Recommendations for needs during hospitalization and at discharge via PT, OT, and SLP  -Continue promotion of lifestyle modifications, such as: Strict BP and glycemic control, dietary habit changes, incorporation of daily exercise regimen, adherence to all prescription/OTC medication schedules, attendance to all follow-up appointments, cessation from smoking if applicable, abstinence from alcohol and illicit drug use if applicable  -Patient to follow-up with PCP in 1 to 2 weeks postdischarge  -Patient to follow-up with Dr. Alda Flores in 2 to 3 months postdischarge    Total critical care face-to-face time spent with patient was 30 minutes; more than 50% of my time was spent counseling the patient on: preparation to see patient via chart review of resulted laboratory values, radiographic imaging,  prescribed medications, and impairment of involved organ systems. Time also spent on medical examination, placing appropriate orders for testing/medications, communicating with other health care providers, counseling/educating the patient/family, and care coordination.    *This note was created using voice recognition transcription software. Despite proofreading, unintentional typographical errors may be present. Please contact the department of neurology with any questions or concerns.         Mirna Angelo, APRN-CNP

## 2023-11-17 DIAGNOSIS — Z86.711 HISTORY OF PULMONARY EMBOLUS (PE): ICD-10-CM

## 2023-11-17 DIAGNOSIS — R06.02 SHORTNESS OF BREATH: ICD-10-CM

## 2023-11-17 LAB
ALBUMIN SERPL BCP-MCNC: 2.5 G/DL (ref 3.4–5)
ALP SERPL-CCNC: 59 U/L (ref 33–136)
ALT SERPL W P-5'-P-CCNC: 8 U/L (ref 7–45)
ANION GAP SERPL CALC-SCNC: 12 MMOL/L (ref 10–20)
AST SERPL W P-5'-P-CCNC: 16 U/L (ref 9–39)
BILIRUB SERPL-MCNC: 0.8 MG/DL (ref 0–1.2)
BUN SERPL-MCNC: 25 MG/DL (ref 6–23)
CALCIUM SERPL-MCNC: 8.2 MG/DL (ref 8.6–10.3)
CHLORIDE SERPL-SCNC: 104 MMOL/L (ref 98–107)
CO2 SERPL-SCNC: 27 MMOL/L (ref 21–32)
CREAT SERPL-MCNC: 1.89 MG/DL (ref 0.5–1.05)
ERYTHROCYTE [DISTWIDTH] IN BLOOD BY AUTOMATED COUNT: 15.6 % (ref 11.5–14.5)
GFR SERPL CREATININE-BSD FRML MDRD: 26 ML/MIN/1.73M*2
GLUCOSE BLD MANUAL STRIP-MCNC: 107 MG/DL (ref 74–99)
GLUCOSE BLD MANUAL STRIP-MCNC: 75 MG/DL (ref 74–99)
GLUCOSE BLD MANUAL STRIP-MCNC: 88 MG/DL (ref 74–99)
GLUCOSE BLD MANUAL STRIP-MCNC: 98 MG/DL (ref 74–99)
GLUCOSE SERPL-MCNC: 87 MG/DL (ref 74–99)
HCT VFR BLD AUTO: 32 % (ref 36–46)
HGB BLD-MCNC: 10 G/DL (ref 12–16)
HOLD SPECIMEN: NORMAL
HOLD SPECIMEN: NORMAL
MAGNESIUM SERPL-MCNC: 1.97 MG/DL (ref 1.6–2.4)
MCH RBC QN AUTO: 31.3 PG (ref 26–34)
MCHC RBC AUTO-ENTMCNC: 31.3 G/DL (ref 32–36)
MCV RBC AUTO: 100 FL (ref 80–100)
NRBC BLD-RTO: 0.2 /100 WBCS (ref 0–0)
PHOSPHATE SERPL-MCNC: 3.5 MG/DL (ref 2.5–4.9)
PLATELET # BLD AUTO: 152 X10*3/UL (ref 150–450)
POTASSIUM SERPL-SCNC: 4.7 MMOL/L (ref 3.5–5.3)
PROT SERPL-MCNC: 5.3 G/DL (ref 6.4–8.2)
RBC # BLD AUTO: 3.2 X10*6/UL (ref 4–5.2)
SODIUM SERPL-SCNC: 138 MMOL/L (ref 136–145)
UFH PPP CHRO-ACNC: 0.4 IU/ML
UFH PPP CHRO-ACNC: 0.7 IU/ML
UFH PPP CHRO-ACNC: 0.8 IU/ML
UFH PPP CHRO-ACNC: 0.8 IU/ML
UFH PPP CHRO-ACNC: 0.9 IU/ML
WBC # BLD AUTO: 8.6 X10*3/UL (ref 4.4–11.3)

## 2023-11-17 PROCEDURE — 97165 OT EVAL LOW COMPLEX 30 MIN: CPT | Mod: GO

## 2023-11-17 PROCEDURE — 85027 COMPLETE CBC AUTOMATED: CPT | Performed by: STUDENT IN AN ORGANIZED HEALTH CARE EDUCATION/TRAINING PROGRAM

## 2023-11-17 PROCEDURE — 83735 ASSAY OF MAGNESIUM: CPT | Performed by: STUDENT IN AN ORGANIZED HEALTH CARE EDUCATION/TRAINING PROGRAM

## 2023-11-17 PROCEDURE — 1200000002 HC GENERAL ROOM WITH TELEMETRY DAILY

## 2023-11-17 PROCEDURE — 36415 COLL VENOUS BLD VENIPUNCTURE: CPT | Performed by: INTERNAL MEDICINE

## 2023-11-17 PROCEDURE — 99291 CRITICAL CARE FIRST HOUR: CPT | Performed by: NURSE PRACTITIONER

## 2023-11-17 PROCEDURE — 2500000004 HC RX 250 GENERAL PHARMACY W/ HCPCS (ALT 636 FOR OP/ED): Performed by: NURSE PRACTITIONER

## 2023-11-17 PROCEDURE — 85520 HEPARIN ASSAY: CPT | Performed by: INTERNAL MEDICINE

## 2023-11-17 PROCEDURE — 92526 ORAL FUNCTION THERAPY: CPT | Mod: GN

## 2023-11-17 PROCEDURE — 82947 ASSAY GLUCOSE BLOOD QUANT: CPT

## 2023-11-17 PROCEDURE — 2500000002 HC RX 250 W HCPCS SELF ADMINISTERED DRUGS (ALT 637 FOR MEDICARE OP, ALT 636 FOR OP/ED): Performed by: STUDENT IN AN ORGANIZED HEALTH CARE EDUCATION/TRAINING PROGRAM

## 2023-11-17 PROCEDURE — 99232 SBSQ HOSP IP/OBS MODERATE 35: CPT | Performed by: NURSE PRACTITIONER

## 2023-11-17 PROCEDURE — 85520 HEPARIN ASSAY: CPT | Performed by: STUDENT IN AN ORGANIZED HEALTH CARE EDUCATION/TRAINING PROGRAM

## 2023-11-17 PROCEDURE — 84100 ASSAY OF PHOSPHORUS: CPT | Performed by: STUDENT IN AN ORGANIZED HEALTH CARE EDUCATION/TRAINING PROGRAM

## 2023-11-17 PROCEDURE — C9113 INJ PANTOPRAZOLE SODIUM, VIA: HCPCS | Performed by: STUDENT IN AN ORGANIZED HEALTH CARE EDUCATION/TRAINING PROGRAM

## 2023-11-17 PROCEDURE — 80053 COMPREHEN METABOLIC PANEL: CPT | Performed by: STUDENT IN AN ORGANIZED HEALTH CARE EDUCATION/TRAINING PROGRAM

## 2023-11-17 PROCEDURE — 2500000004 HC RX 250 GENERAL PHARMACY W/ HCPCS (ALT 636 FOR OP/ED): Performed by: STUDENT IN AN ORGANIZED HEALTH CARE EDUCATION/TRAINING PROGRAM

## 2023-11-17 PROCEDURE — 2500000001 HC RX 250 WO HCPCS SELF ADMINISTERED DRUGS (ALT 637 FOR MEDICARE OP): Performed by: STUDENT IN AN ORGANIZED HEALTH CARE EDUCATION/TRAINING PROGRAM

## 2023-11-17 PROCEDURE — 94660 CPAP INITIATION&MGMT: CPT

## 2023-11-17 PROCEDURE — 2500000001 HC RX 250 WO HCPCS SELF ADMINISTERED DRUGS (ALT 637 FOR MEDICARE OP): Performed by: NURSE PRACTITIONER

## 2023-11-17 PROCEDURE — 94640 AIRWAY INHALATION TREATMENT: CPT

## 2023-11-17 PROCEDURE — 97163 PT EVAL HIGH COMPLEX 45 MIN: CPT | Mod: GP

## 2023-11-17 RX ORDER — WARFARIN SODIUM 5 MG/1
5 TABLET ORAL DAILY
Status: DISCONTINUED | OUTPATIENT
Start: 2023-11-17 | End: 2023-11-17

## 2023-11-17 RX ORDER — IPRATROPIUM BROMIDE AND ALBUTEROL SULFATE 2.5; .5 MG/3ML; MG/3ML
3 SOLUTION RESPIRATORY (INHALATION) EVERY 2 HOUR PRN
Status: DISCONTINUED | OUTPATIENT
Start: 2023-11-17 | End: 2023-11-18

## 2023-11-17 RX ORDER — GABAPENTIN 300 MG/1
300 CAPSULE ORAL 3 TIMES DAILY
Status: DISCONTINUED | OUTPATIENT
Start: 2023-11-17 | End: 2023-11-22

## 2023-11-17 RX ORDER — NYSTATIN 100000 [USP'U]/G
1 POWDER TOPICAL 2 TIMES DAILY
Status: DISCONTINUED | OUTPATIENT
Start: 2023-11-17 | End: 2023-11-28 | Stop reason: HOSPADM

## 2023-11-17 RX ORDER — IPRATROPIUM BROMIDE AND ALBUTEROL SULFATE 2.5; .5 MG/3ML; MG/3ML
3 SOLUTION RESPIRATORY (INHALATION)
Status: DISCONTINUED | OUTPATIENT
Start: 2023-11-17 | End: 2023-11-17

## 2023-11-17 RX ORDER — PANTOPRAZOLE SODIUM 40 MG/1
40 TABLET, DELAYED RELEASE ORAL
Status: DISCONTINUED | OUTPATIENT
Start: 2023-11-18 | End: 2023-11-28 | Stop reason: HOSPADM

## 2023-11-17 RX ORDER — IPRATROPIUM BROMIDE AND ALBUTEROL SULFATE 2.5; .5 MG/3ML; MG/3ML
3 SOLUTION RESPIRATORY (INHALATION)
Status: DISCONTINUED | OUTPATIENT
Start: 2023-11-18 | End: 2023-11-28 | Stop reason: HOSPADM

## 2023-11-17 RX ADMIN — HEPARIN SODIUM 1800 UNITS/HR: 10000 INJECTION, SOLUTION INTRAVENOUS at 06:57

## 2023-11-17 RX ADMIN — NYSTATIN 1 APPLICATION: 100000 POWDER TOPICAL at 14:44

## 2023-11-17 RX ADMIN — HEPARIN SODIUM 2000 UNITS/HR: 10000 INJECTION, SOLUTION INTRAVENOUS at 02:05

## 2023-11-17 RX ADMIN — PANTOPRAZOLE SODIUM 40 MG: 40 INJECTION, POWDER, FOR SOLUTION INTRAVENOUS at 09:09

## 2023-11-17 RX ADMIN — ATORVASTATIN CALCIUM 40 MG: 40 TABLET, FILM COATED ORAL at 16:32

## 2023-11-17 RX ADMIN — GABAPENTIN 300 MG: 300 CAPSULE ORAL at 20:41

## 2023-11-17 RX ADMIN — ASPIRIN 81 MG: 81 TABLET, COATED ORAL at 16:32

## 2023-11-17 RX ADMIN — GABAPENTIN 300 MG: 300 CAPSULE ORAL at 16:31

## 2023-11-17 RX ADMIN — NYSTATIN 1 APPLICATION: 100000 POWDER TOPICAL at 20:41

## 2023-11-17 RX ADMIN — FLUTICASONE PROPIONATE 2 SPRAY: 50 SPRAY, METERED NASAL at 20:41

## 2023-11-17 RX ADMIN — ATORVASTATIN CALCIUM 40 MG: 40 TABLET, FILM COATED ORAL at 20:41

## 2023-11-17 RX ADMIN — HEPARIN SODIUM 1800 UNITS/HR: 10000 INJECTION, SOLUTION INTRAVENOUS at 09:37

## 2023-11-17 RX ADMIN — IPRATROPIUM BROMIDE AND ALBUTEROL SULFATE 3 ML: .5; 3 SOLUTION RESPIRATORY (INHALATION) at 19:36

## 2023-11-17 RX ADMIN — LEVOTHYROXINE SODIUM 150 MCG: 150 TABLET ORAL at 16:31

## 2023-11-17 ASSESSMENT — COGNITIVE AND FUNCTIONAL STATUS - GENERAL
STANDING UP FROM CHAIR USING ARMS: A LOT
PERSONAL GROOMING: A LOT
CLIMB 3 TO 5 STEPS WITH RAILING: TOTAL
WALKING IN HOSPITAL ROOM: TOTAL
MOVING TO AND FROM BED TO CHAIR: A LOT
DRESSING REGULAR LOWER BODY CLOTHING: A LOT
DRESSING REGULAR LOWER BODY CLOTHING: TOTAL
MOVING FROM LYING ON BACK TO SITTING ON SIDE OF FLAT BED WITH BEDRAILS: A LOT
TURNING FROM BACK TO SIDE WHILE IN FLAT BAD: TOTAL
MOVING FROM LYING ON BACK TO SITTING ON SIDE OF FLAT BED WITH BEDRAILS: A LOT
DRESSING REGULAR LOWER BODY CLOTHING: TOTAL
PERSONAL GROOMING: A LITTLE
WALKING IN HOSPITAL ROOM: TOTAL
TURNING FROM BACK TO SIDE WHILE IN FLAT BAD: A LOT
CLIMB 3 TO 5 STEPS WITH RAILING: TOTAL
DAILY ACTIVITIY SCORE: 6
DAILY ACTIVITIY SCORE: 14
CLIMB 3 TO 5 STEPS WITH RAILING: TOTAL
WALKING IN HOSPITAL ROOM: A LOT
MOVING FROM LYING ON BACK TO SITTING ON SIDE OF FLAT BED WITH BEDRAILS: TOTAL
TOILETING: TOTAL
TOILETING: A LOT
DRESSING REGULAR UPPER BODY CLOTHING: A LOT
HELP NEEDED FOR BATHING: TOTAL
DAILY ACTIVITIY SCORE: 13
TOILETING: TOTAL
HELP NEEDED FOR BATHING: A LOT
MOVING TO AND FROM BED TO CHAIR: TOTAL
MOBILITY SCORE: 6
EATING MEALS: TOTAL
MOVING TO AND FROM BED TO CHAIR: A LOT
MOBILITY SCORE: 11
PERSONAL GROOMING: TOTAL
DRESSING REGULAR UPPER BODY CLOTHING: A LOT
STANDING UP FROM CHAIR USING ARMS: TOTAL
HELP NEEDED FOR BATHING: A LOT
STANDING UP FROM CHAIR USING ARMS: A LOT
TURNING FROM BACK TO SIDE WHILE IN FLAT BAD: A LOT
MOBILITY SCORE: 10
DRESSING REGULAR UPPER BODY CLOTHING: TOTAL

## 2023-11-17 ASSESSMENT — ACTIVITIES OF DAILY LIVING (ADL)
LACK_OF_TRANSPORTATION: NO
WALKS IN HOME: NEEDS ASSISTANCE
BATHING: NEEDS ASSISTANCE
PATIENT'S MEMORY ADEQUATE TO SAFELY COMPLETE DAILY ACTIVITIES?: YES
FEEDING YOURSELF: NEEDS ASSISTANCE
TOILETING: NEEDS ASSISTANCE
GROOMING: NEEDS ASSISTANCE
ADEQUATE_TO_COMPLETE_ADL: NO
HEARING - LEFT EAR: FUNCTIONAL
JUDGMENT_ADEQUATE_SAFELY_COMPLETE_DAILY_ACTIVITIES: YES
DRESSING YOURSELF: NEEDS ASSISTANCE
HEARING - RIGHT EAR: FUNCTIONAL
ASSISTIVE_DEVICE: WALKER

## 2023-11-17 ASSESSMENT — PAIN SCALES - GENERAL
PAINLEVEL_OUTOF10: 0 - NO PAIN

## 2023-11-17 ASSESSMENT — PAIN - FUNCTIONAL ASSESSMENT
PAIN_FUNCTIONAL_ASSESSMENT: 0-10
PAIN_FUNCTIONAL_ASSESSMENT: CPOT (CRITICAL CARE PAIN OBSERVATION TOOL)

## 2023-11-17 NOTE — PROGRESS NOTES
Lillian Mendoza is a 83 y.o. female on day 2 of admission presenting with Acute saddle pulmonary embolism, unspecified whether acute cor pulmonale present (CMS/HCC).    --Pt underwent thrombectomy on 11/15 - on heparin infusion  --Hypoxemia has improvedmuch, currently on 3L NC  --Echo did not demonstrate shunt, CARLOS vs cardiac MRI to evaluate source of brain embolism  --MRI result reviewed, multiple strokes    Subjective   Sitting up in bed. Weaned to 3L NC. Anxiously awaiting SLP reevaluation. Asking to restart her home Levothyroxine. Denies any acute complaints, including SOB or chest pain.    Objective   Physical Exam:   Constitutional: awake, alert, no acute distress  ENMT: mucous membranes moist, EOMI, conjunctivae clear  Head/Neck: normocephalic, atraumatic; supple, trachea midline  Respiratory/Thorax: diminished breath sounds but otherwise clear  Cardiovascular: RRR, no murmur appreciated  Gastrointestinal: soft, nondistended, non-tender, bowel sounds appreciated  Extremities: palpable peripheral pulses, BLE with nonpitting edema  Neurological: AO x3, CN II-XII grossly intact, mild dysarthria  Psychological: appropriate mood and behavior  Skin: warm and dry    Scheduled Medications:   [Held by provider] aspirin, 81 mg, oral, Daily  [Held by provider] atorvastatin, 40 mg, oral, Nightly  fluticasone, 2 spray, Each Nostril, Daily  iohexol, 75 mL, intravenous, Once in imaging  [Held by provider] levothyroxine, 150 mcg, oral, Daily before breakfast  nystatin, 1 Application, Topical, BID  pantoprazole, 40 mg, intravenous, Daily  perflutren protein A microsphere, 0.5 mL, intravenous, Once in imaging  [Held by provider] polyethylene glycol, 17 g, oral, Daily  sulfur hexafluoride microsphr, 2 mL, intravenous, Once in imaging    Continuous Medications:   heparin, 0-4,500 Units/hr, Last Rate: 1,600 Units/hr (11/17/23 1200)  lactated Ringer's, 75 mL/hr, Last Rate: 75 mL/hr (11/16/23 2052)    PRN Medications:   PRN  medications: heparin, [] hydrALAZINE **FOLLOWED BY** hydrALAZINE, oxygen    Daily progress:  : Weaned to 3L NC this AM. Discussed case with interventional cardiology, considering CARLOS vs cMRI to evaluate for PFO. Anticipate need for IRENE upon DC to evaluate for possible underlying afib. NPO pending SLP reeval. PT/OT. Otherwise stable for transfer to telemetry under care of general medicine who has been updated.    Assessment/Plan   This is an 83-year-old female, with history of remote PE in setting of right TKR, chronic HFpEF with EF 55 to 60% as of 2020, HTN, HLD, CKD 3, hypothyroidism, BLAYNE on O2 nightly, moderate persistent asthma, chronic allergic rhinitis, and chronic lymphedema, who initially presented from home to [facility] on 11/15/23 with confusion, left facial droop, and dysarthria at 2 AM that morning.  Patient was out of TNK window as she had had right hand numbness the day prior.  She was found to have acute to subacute ischemic CVA involving inferior posterior left temporal lobe.  CTA head/neck incidentally found acute bilateral PE.  Subsequent CTA chest confirmed extensive bilateral PE involving all lobes with large embolus in the distal aspect of right main pulmonary artery.  Moderate to severe embolic burden with moderate to severe right heart strain noted.  PERT team was activated.  Patient was urgently taken for thrombectomy and then admitted the ICU for further evaluation and management.     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  - Neurochecks q4h  - Neurology following. ASA and statin on hold while strict NPO pending repeat SLP eval.  - Avoid excess caths & drains, sedating meds  - Monitor for ICU delirium  - Appreciate cardiology recs regarding best imaging study to evaluate possible source of cerebral emboli <CARLOS vs cardiac MRI>     Cardiovascular:  Acute submassive  bilateral PE s/p thrombectomy on 11/15/23  Acute occlusive proximal BLE DVT   Chronic HFpEF  Hypertension --Antihypertensives on hold  Hyperlipidemia  TTE this admission shows EF 50-55% with severely enlarged RV, moderately reduced RVSF, moderately elevated RVSP. (-) bubble study.  - Continue Heparin ggt. Eventual plan will be to transition to Coumadin given morbid obesity.  - Hemodynamically stable, not requiring pressors. Judicious use of IVF given RV overload. Maintain MAP>65.  - Interventional cardiology following. Considering CARLOS vs cMRI given high suspicion for PFO in setting of embolic CVA with PE/DVT.  - Vascular surgery initially consulted for IVC evaluation; however, patient has remained stable on Heparin ggt for 48+ hrs. No plans for IVC filter at this time.  - Hold home Metoprolol succinate     Respiratory:  Acute hypoxemic respiratory failure secondary to acute submassive bilateral PE  History of remote PE in setting of right TKA in ~2008, previously on Coumadin  BLAYNE on O2 QHS  Moderate persistent asthma  Chronic allergic rhinitis  Not on continuous O2 at baseline.  Weaned to 3LNC  - Wean O2 as tolerated with goal SpO2>92%.  - Aspiration precautions     Gastrointestinal:  Concern for dysphagia in setting of acute CVA  Hepatic steatosis  GERD  - Diet: NPO pending SLP reeval  - PPX: Protonix  - SLP consulted. Failed evaluation on 11/16. Strict NPO pending reeval.     Endocrine:  Hypothyroidism  No hx DM.  A1c 6.1 as of 11/15/23.  - Accuchecks q6h.  - Hold home Levothyroxine while strict NPO, pending SLP reeval. Consider IV LT4 if she fails again.     Renal:  EMILI on CKD III  Lactic acidosis  Right nephrolithiasis, nonobstructing  Baseline SCr ~1.2-1.3  - IVF: LR @ 75cc/hr  - Trend RFP, lactate. Avoid nephrotoxins. Monitor I&Os.  - Monitor & replete electrolytes PRN  - Nephrology consulted     Hematological:  - Trend CBC. Monitor for s/s bleeding while on Heparin ggt.     Infection Disease:  No acute  issues.     Skin/MSK:  Chronic lymphedema  Osteoarthritis  No acute issues.     Vent/O2: 3L NC  Lines/Devices: PIVs, external urinary cath  Drips: Heparin  ATBx: -  Diet: strict NPO pending SLP reeval  IVF: LR @ 75cc/hr  PPX: Heparin ggt, Protonix  Code: FULL - Discussed with patient who was accompanied by granddaughter, Cassandra. Patient requesting more time to decide.  Dispo: Telemetry     This is a preliminary note written by the resident. Please wait for attending addendum for finalization of note and recommendations.     Anatoliy Kumar DO  Internal Medicine PGY3      CRITICAL CARE MEDICINE ATTENDING NOTE    I saw and evaluated the patient. I personally obtained the key and critical portions of the history and physical examination. I reviewed the resident's documentation and discussed the patient with the resident. I agree with the resident's medical decision making as documented in the resident's note.    The patient has high probability of sudden, clinically significant deterioration, which requires urgent interventions. I managed/supervised life supporting interventions that required frequent physician assessment. I spent 40 critical care minutes of my full attention on this patient's management and direct patient care. Time I spent with family or surrogate(s) is included if the patient was incapable of providing information or participating in medical decision making. Time devoted to teaching and to any procedures I billed separately is not included.     Nena Sanchez MD  Pulmonary and Critical Care Medicine

## 2023-11-17 NOTE — CARE PLAN
The patient's goals for the shift include able to eat/drink    The clinical goals for the shift include Maintain O2 saturation above 92% throughout shift

## 2023-11-17 NOTE — NURSING NOTE
11/17/23 7779 Patient Navigator  I saw the patient, her son Serjio & granddaughter Kiki, again this am. The patient is alert and awake and I answered her questions. I reviewed the BE FAST and they denied any other needs. They understand to contact me as needed and appreciated my follow up visit.  Rylie BANKS, RN  Diabetes Care &   Stroke Educator

## 2023-11-17 NOTE — PROGRESS NOTES
Physical Therapy    Physical Therapy Evaluation    Patient Name: Lillian Mendoza  MRN: [id number]  Today's Date: 11/17/2023   Time Calculation  Start Time: 1038  Stop Time: 1109  Time Calculation (min): 31 min    Assessment/Plan   PT Assessment  PT Assessment Results: Decreased strength, Decreased range of motion, Impaired balance, Decreased mobility  Rehab Prognosis: Good  End of Session Communication: Bedside nurse, Care Coordinator  End of Session Patient Position: Up in chair, Alarm off, caregiver present  IP OR SWING BED PT PLAN  Inpatient or Swing Bed: Inpatient  PT Plan  Treatment/Interventions: Bed mobility, Strengthening  PT Plan: Skilled PT  PT Frequency: 5 times per week  PT Discharge Recommendations: High intensity level of continued care  PT Recommended Transfer Status: Assist x2  PT - OK to Discharge: Yes      Subjective   General Visit Information:  General  Reason for Referral: stroke sx's/dysarthia,facial droop  Referred By: José  Past Medical History Relevant to Rehab: COPD,TKR,PFO,remote PE,lymphedema,obesity  Prior to Session Communication: Bedside nurse  Patient Position Received: Alarm off, caregiver present, Bed, 2 rail up  Home Living:  Home Living  Type of Home: House  Lives With: Grandchildren  Home Adaptive Equipment: Walker rolling or standard, Wheelchair-manual, Hospital bed (transport W/C long distances)  Home Layout: One level  Home Access: Stairs to enter without rails (2 steps)  Bathroom Shower/Tub: Tub/shower unit  Bathroom Equipment: Bedside commode  Prior Level of Function:  Prior Function Per Pt/Caregiver Report  Level of Reeves: Needs assistance with ADLs, Needs assistance with homemaking  Hand Dominance: Right  Precautions:  Precautions  Medical Precautions: Fall precautions  Vital Signs:  Vital Signs  Heart Rate: 87  SpO2: 94 %  BP: 121/57    Objective   Pain:  Pain Assessment  Pain Score: 0 - No pain  Cognition:  Cognition  Overall Cognitive Status: Within Functional  Limits    General Assessments:    Functional Assessments:  Bed Mobility  Bed Mobility: Yes (max x 2)    Transfers  Transfer: Yes (mod x 2)    Ambulation/Gait Training  Ambulation/Gait Training Performed: Yes (bed to chair/wh walker/mod assist x 2)  Extremity/Trunk Assessments:    Outcome Measures:  [facility] Basic Mobility  Turning from your back to your side while in a flat bed without using bedrails: A lot  Moving from lying on your back to sitting on the side of a flat bed without using bedrails: A lot  Moving to and from bed to chair (including a wheelchair): A lot  Standing up from a chair using your arms (e.g. wheelchair or bedside chair): A lot  To walk in hospital room: Total  Climbing 3-5 steps with railing: Total  Basic Mobility - Total Score: 10    Encounter Problems       Encounter Problems (Active)       PT Problem       bed mob       Start:  11/17/23    Expected End:  12/08/23       Min assist 1-2 bed mob         srerano       Start:  11/17/23    Expected End:  12/08/23       Min assist 1-2 serrano         gait       Start:  11/17/23    Expected End:  12/08/23       Amb 10 ft> w/ approp device/min assist 1-2         strengthening       Start:  11/17/23    Expected End:  12/08/23       10-30 reps LE'S            Pain - Adult              Education Documentation  Mobility Training, taught by Stan Fall, PT at 11/17/2023  1:24 PM.  Learner: Family, Patient  Readiness: Acceptance  Method: Explanation, Demonstration  Response: Verbalizes Understanding, Demonstrated Understanding    Body Mechanics, taught by Stan Fall, PT at 11/17/2023  1:24 PM.  Learner: Family, Patient  Readiness: Acceptance  Method: Explanation, Demonstration  Response: Verbalizes Understanding, Demonstrated Understanding    Precautions, taught by Stan Fall, PT at 11/17/2023  1:24 PM.  Learner: Family, Patient  Readiness: Acceptance  Method: Explanation, Demonstration  Response: Verbalizes Understanding, Demonstrated  Understanding    ADL Training, taught by Stan Fall, PT at 11/17/2023  1:24 PM.  Learner: Family, Patient  Readiness: Acceptance  Method: Explanation, Demonstration  Response: Verbalizes Understanding, Demonstrated Understanding    Education Comments  No comments found.

## 2023-11-17 NOTE — PROGRESS NOTES
Attempted bedside visit for  discharge planning. PT/OT [facility] 10/13, high intensity/moderate. Katie GRIFFIN reviewing. Will need FOC from granddaughter. Another service at bedside. Will re-attempt assessment as able.      1600: Spoke to Patient, granddaughters and RN at bedside. Discussed AR vs SNF. FOC  Katie GRIFFIN. Referral sent in [facility], awaiting if can accept.   RACHELLE HERNANDEZ, RN ED TCC

## 2023-11-17 NOTE — PROGRESS NOTES
NEPHROLOGY PROGRESS NOTE    REASON FOR CONSULT: EMILI on CKD stage III     SUBJECTIVE:  Patient has been started on a puréed diet.  She is worried that she has not done gotten her Synthroid.  She is in A-fib.  She denies chest pain.  Breathing is okay.  On IV fluids.    OBJECTIVE:    Visit Vitals  /57   Pulse 87   Temp 36.8 °C (98.2 °F) (Temporal)   Resp 21          Intake/Output Summary (Last 24 hours) at 2023 1516  Last data filed at 2023 0600  Gross per 24 hour   Intake 2036.02 ml   Output 800 ml   Net 1236.02 ml        General: Awake and Alert, In no distress, Cooperative  HEENT: Oral mucosa moist, EOMI  NECK: Supple  CHEST: No crackles, no wheeze, no tachypnea  CVS: S1,S2 heard, no rubs, irregularly irregular  ABD: Soft, Non Tender, BS present  EXT: Chronic bilateral lower extremity edema    MEDICATION:    Scheduled medications  [Held by provider] aspirin, 81 mg, oral, Daily  [Held by provider] atorvastatin, 40 mg, oral, Nightly  fluticasone, 2 spray, Each Nostril, Daily  iohexol, 75 mL, intravenous, Once in imaging  [Held by provider] levothyroxine, 150 mcg, oral, Daily before breakfast  nystatin, 1 Application, Topical, BID  pantoprazole, 40 mg, intravenous, Daily  perflutren protein A microsphere, 0.5 mL, intravenous, Once in imaging  [Held by provider] polyethylene glycol, 17 g, oral, Daily  sulfur hexafluoride microsphr, 2 mL, intravenous, Once in imaging      Continuous medications  heparin, 0-4,500 Units/hr, Last Rate: 1,600 Units/hr (23 1200)  lactated Ringer's, 75 mL/hr, Last Rate: 75 mL/hr (23)      PRN medications  PRN medications: heparin, [] hydrALAZINE **FOLLOWED BY** hydrALAZINE, oxygen     RESULTS:    Lab Results   Component Value Date    WBC 8.6 2023    HGB 10.0 (L) 2023    HCT 32.0 (L) 2023     2023     2023        Lab Results   Component Value Date    CREATININE 1.89 (H) 2023    BUN 25 (H) 2023      11/17/2023    K 4.7 11/17/2023     11/17/2023    CO2 27 11/17/2023        Radiology Imaging reviewed      ASSESSMENT/PLAN:     1.EMILI: Multifactorial etiology creatinine is trending down today the creatinine is at 1.89.  She is getting Ringer lactate.  She came in with a stroke and had PE and required contrast.  Blood pressure was on the lower side.  It has stabilized.  Once she is eating better we can discontinue the IV fluids.  Avoid diuretics for now.    2.  CKD stage III: Patient has a baseline creatinine between 1.3-1.8.  She has cortical thinning on her previous imaging.  She has been on chronic diuretics.    3.  Chronic peripheral edema: She had been on torsemide which has been discontinued.        Thank You very much for allowing me to participate in the care of this Patient    This document was created using dragon dictation and may contain unintended error    Peewee Benson MD   11/17/23

## 2023-11-17 NOTE — PROGRESS NOTES
Lillian Mendoza is a 83 y.o. female on day 2 of admission presenting with Acute saddle pulmonary embolism, unspecified whether acute cor pulmonale present (CMS/HCC).      Subjective   Seen today the MRI did in fact show shower emboli bilaterally she had multiple mini infarcts throughout although the the echo with bubble study was negative for PFO most likely this was the cause or is the the cause of the shower emboli it has to be there nevertheless she needs to be anticoagulated and because of her BMI she will need to be on Coumadin and then to follow her INRs she is down on 3 L nasal cannula at this time       Objective     Last Recorded Vitals  /57   Pulse 87   Temp 36.8 °C (98.2 °F) (Temporal)   Resp 21   Wt 134 kg (295 lb 13.7 oz)   SpO2 94% Comment: 3L O2 via NC; dropping to 84% after activity however recovering to 90% with pursed lip breathing; notified nursing staff  Intake/Output last 3 Shifts:    Intake/Output Summary (Last 24 hours) at 11/17/2023 1252  Last data filed at 11/17/2023 0600  Gross per 24 hour   Intake 2036.02 ml   Output 800 ml   Net 1236.02 ml       Admission Weight  Weight: 111 kg (244 lb 11.4 oz) (11/15/23 0327)    Daily Weight  11/17/23 : 134 kg (295 lb 13.7 oz)    Image Results  Transthoracic Echo (TTE) [address] [address] [phone] and                                  Fax [phone]    TRANSTHORACIC ECHOCARDIOGRAM REPORT       Patient Name:      LILLIAN MENDOZA   Reading Physician:    [id number] Govind Olvera MD  Study Date:        11/16/2023           Ordering Provider:    [id number] ANTOINETTE ENCINAS  MRN/PID:           [id number]             Fellow:  Accession#:        [id number]         Nurse:  Date of Birth/Age: 1940 / 83 years Sonographer:          OSMAN Aguirre RDCS  Gender:             F                    Additional Staff:  Height:            165.10 cm            Admit Date:           11/15/2023  Weight:            131.54 kg            Admission Status:     Inpatient -                                                                Routine  BSA:               2.32 m2              Encounter#:           [id number]                                          Department Location:  [address]                                                                floor/ICU  Blood Pressure: 109 /70 mmHg    Study Type:    TRANSTHORACIC ECHO (TTE) LIMITED  Diagnosis/ICD: Personal history of pulmonary embolism-Z86.711  Indication:    PE.  CPT Code:      Echo Limited-57752    Patient History:  Pertinent History: PE.    Study Detail: The following Echo studies were performed: 2D. Technically                challenging study due to body habitus. Agitated saline used as a                contrast agent for intraseptal flow evaluation.       PHYSICIAN INTERPRETATION:  Left Ventricle: The left ventricular systolic function is normal. The left ventricular cavity size was not assessed. Left ventricular diastolic filling was not assessed.  Left Atrium: The left atrium was not assessed.  Right Ventricle: The right ventricle was not assessed. Right ventricular systolic function not assessed.  Right Atrium: The right atrium was not assessed.  Aortic Valve: The aortic valve was not assessed. Aortic valve regurgitation was not assessed.  Mitral Valve: The mitral valve was not assessed. Mitral valve regurgitation was not assessed.  Tricuspid Valve: The tricuspid valve was not assessed. Tricuspid regurgitation was not assessed.  Pulmonic Valve: The pulmonic valve was not assessed. Pulmonic valve regurgitation was not assessed.  Pericardium: There is a small pericardial effusion. There is no evidence of cardiac tamponade.  Aorta: The aortic root was not assessed.       CONCLUSIONS:   1. Left ventricular systolic function is  normal.   2. There is no evidence of cardiac tamponade.   3. Negative agitated saline study. No PFO.    QUANTITATIVE DATA SUMMARY:     [id number] Govind Olvera MD  Electronically signed on 11/16/2023 at 2:52:09 PM       ** Final **  MR brain wo IV contrast  Narrative: Interpreted By:  Concepcion Hopper,   STUDY:  MR BRAIN WO IV CONTRAST;  11/16/2023 2:18 pm      INDICATION:  Signs/Symptoms:stroke symptoms.      COMPARISON:  CT head 11/15/2023.      ACCESSION NUMBER(S):  [id number]      ORDERING CLINICIAN:  TERRY MCKINNEY      TECHNIQUE:  Axial T2, FLAIR, DWI, gradient echo T2 and sagittal and coronal T1  weighted images of brain were acquired.      FINDINGS:  There are multiple small foci of cortical and subcortical diffusion  restriction within the bilateral cerebral hemispheres and left  cerebellum compatible with acute infarcts. There is associated T2 and  FLAIR signal abnormality without significant mass effect. No  susceptibility artifact to suggest hemorrhage.      There is prominence of the ventricles, cortical sulci and basal  cisterns compatible with age related involutional changes and  moderate volume loss. Prominent extra-axial CSF space near the vertex  likely represents asymmetric volume loss.      There is a background of mild nonspecific subcortical and  periventricular T2 and FLAIR hyperintense signal which is compatible  with microangiopathy. Major intracranial flow voids at the skull base  are unremarkable. Evidence of bilateral lens surgery. Paranasal  sinuses and mastoid air cells are predominantly clear.      Cerebellar tonsils are above the foramen magnum. Pituitary and sella  are not enlarged.      Impression: Small foci of cortical and subcortical diffusion restriction within  the bilateral cerebral hemispheres and left cerebellum compatible  with acute infarcts. Given multiple vascular territories consider a  central embolic source. There is no mass effect, midline shift or  evidence of  hemorrhage.      Volume loss and nonspecific white matter changes compatible with  microangiopathy.      MACRO:  None      Signed by: Concepcion Hopper 11/16/2023 2:37 PM  Dictation workstation:   [id number]  Vascular US Lower Extremity Venous Duplex Bilateral             [place] [address]  Tel [phone] and Fax [phone]       Vascular Lab Report  VASC US LOWER EXTREMITY VENOUS DUPLEX BILATERAL       Patient Name:     DARLING YANG WYMAN  Reading Physician: [id number] Cookie Pace MD  Study Date:       11/15/2023          Ordering           [id number] TRANG ALVARADO                                        Physician:  MRN/PID:          [id number]            Technologist:      Allyn Escobar RVT  Accession#:       [id number]        Technologist 2:  Date of           1940 / 83      Encounter#:        [id number]  Birth/Age:        years  Gender:           F  Admission Status: Inpatient           Location           [facility]                                        Performed:       Diagnosis/ICD: Other pulmonary embolism without acute cor pulmonale-I26.99  CPT Codes:     79374 Peripheral venous duplex scan for DVT complete       **CRITICAL RESULT**  Critical Result: DVT Bilateral FV, Bilateral POP V, and Right PTV  Notification called to Dr Trang Alvarado on 11/15/2023 at 3:00:00 PM by NEIL Escobar RVT.     CONCLUSIONS:  Right Lower Venous: There is acute occlusive deep vein thrombosis visualized in the mid femoral, distal femoral, popliteal and posterior tibial veins. There is acute non-occlusive deep vein thrombosis visualized in the proximal femoral vein.  Left Lower Venous: There is acute occlusive deep vein thrombosis visualized in the mid femoral, distal femoral, popliteal and proximal femoral veins. Cannot rule out thrombus in non-visualized peroneal vein due to edema.     Imaging & Doppler Findings:     Right                  Compressible      Thrombus          Flow  Distal External Iliac     Yes             None         Continuous  CFV                       Yes             None         Continuous  PFV                       Yes             None  FV Proximal             Partial    Acute non-occlusive Continuous  FV Mid                     No        Acute occlusive  FV Distal                  No        Acute occlusive  Popliteal                  No        Acute occlusive      None  Peroneal                  Yes             None  PTV                        No        Acute occlusive       Left                  Compress    Thrombus        Flow  Distal External Iliac   Yes         None       Continuous  CFV                     Yes         None       Continuous  PFV                     Yes         None  FV Proximal              No    Acute occlusive    None  FV Mid                   No    Acute occlusive  FV Distal                No    Acute occlusive  Popliteal                No    Acute occlusive    None  PTV                     Yes         None       [id number] Cookie Pace MD  Electronically signed by [id number] Cookie Pace MD on 11/16/2023 at 12:36:58 PM       ** Final **  Invasive vascular procedure     [facility], Cath Lab, [address] [place] [address]    Cardiovascular Catheterization Report    Patient Name:      DARLING WYMAN   Performing Physician:  Jay Alvarado MD  Study Date:        11/15/2023           Verifying Physician:   Jay Alvarado MD  MRN/PID:           [id number]             Cardiologist/Co-scrub:  Accession#:        [id number]         Ordering Physician:    Jay ALVARADO  Date of Birth/Age: 1940 / 83 years Fellow:  Gender:            F                    Fellow:  Encounter#:        [id number]       Study:            Pulmonary Angiogram  Additional Study: Peripheral Intervention       Indications:  DARLING WYMAN is a 84 year old female who presents with intermediate-high risk PE, hx of PE 15  "years ago (thought 2/2 knee TKA) s/p 1 year of warfarin; p/w stroke c/f paradoxical embolus in setting of PE. +significant O2 requirement.     Procedure Description:  After infiltration of local anesthetic, the right femoral vein was identified with two dimensional ultrasound. Under direct ultrasound visualization, the right femoral vein was cannulated with a percutaneous technique. A 24F sheath was placed in the vein.     Pulmonary Embolism:     We predilated with a 22F Mac dilator, and placed a 24F sheath into the vein. Post-procedure, the venous sheath was pulled and pressure was applied to the site via figure-of-8.     Prior to access, we imaged the bilateral common femoral veins to evaluate for thrombus. No thrombus was noted. We selectively engaged the pulmonary artery using a JR4 diagnostic catheter. Pre-thrombectomy RA and PA pressures were obtained.     Following this, we sequentially engaged the following PA branches with Inari [id number] for selective extirpation of material: Left main, Right main, Truncus anterior and RLL posterior basal. Successful thrombectomy was performed. Selective angiogram was performed during the procedure to help locate thrombus for extraction. Post-thrombectomy RA and PA pressures were obtained.     We did not administer therapeutic systemic heparin up front d/t stroke and concerns for potential hemorrhagic conversion. Heparinized saline was utilized to \"wash\" the FlowSaver filter to maintain patency.    Initially thrombectomy of LPA highly successful. Difficulty with RPA thrombectomy, with angiogram showing ball-like embolus. TA aspiration performed with loosening of the RPA thrombus (subacute clot). We did provide 2000 units of heparin at this time due to extended dwell time of catheter. Further thrombectomy performed of the main RPA and lower posterior branch with successful extirpation of remainder of subacute thrombus.     Hemo Personnel:  +----------+---------+  Name    "   Duty       +----------+---------+  Jenny Maldonado CUEVAS MD 1  +----------+---------+       Hemodynamic Pressures:     +----+-----------+----------+-------------+--------------+-----+-------+-------+  Site Date Time Phase NameSystolic mmHgDiastolic mmHgMean A-Wave V-Wave                                                       mmHg  mmHg   mmHg    +----+-----------+----------+-------------+--------------+-----+-------+-------+    RA 11/15/2023   O2 REST                              25     28     25      10:53:43 AM                                                          +----+-----------+----------+-------------+--------------+-----+-------+-------+    PA 11/15/2023   O2 REST           73            27   52                    10:56:29 AM                                                          +----+-----------+----------+-------------+--------------+-----+-------+-------+    PA 11/15/2023   O2 REST           75            31   51                    11:38:07 AM                                                          +----+-----------+----------+-------------+--------------+-----+-------+-------+    RA 11/15/2023   O2 REST                              18     21     19      11:38:30 AM                                                          +----+-----------+----------+-------------+--------------+-----+-------+-------+    PA 11/15/2023   O2 REST           67            29   41                    12:06:02 PM                                                          +----+-----------+----------+-------------+--------------+-----+-------+-------+    RA 11/15/2023   O2 REST                              23     27     23      12:06:39 PM                                                          +----+-----------+----------+-------------+--------------+-----+-------+-------+         Oxygen Saturation  %:  +-----------+----------+------------+  Sample SiteO2 Sat (%)HB (g/100ml)  +-----------+----------+------------+           AO        90        15.1  +-----------+----------+------------+           PA        52        15.1  +-----------+----------+------------+           AO        90        15.1  +-----------+----------+------------+           PA        52        15.1  +-----------+----------+------------+           PA        42              +-----------+----------+------------+           PA        42              +-----------+----------+------------+       Cardiac Outputs:  +---------------+------------------+-------+  SINTIA CO (l/min)SINTIA CI (l/min/m2)SINTIA SV  +---------------+------------------+-------+              3.7               1.7   41.2  +---------------+------------------+-------+       Vascular Resistance Calculated Values (Wood Units):  +-----+----+----+  PhaseTPR TPRI  +-----+----+----+  0    14.030.5  +-----+----+----+       Complications:  No in-lab complications observed.     Cardiac Cath Post Procedure Notes:  Post Procedure Diagnosis: Bilateral pulmonary embolism R>L c/w intermediate-high                            risk PE s/p extirpation of material.  Blood Loss:               Estimated blood loss during the procedure was 50 mls.  Specimens Removed:        Number of specimen(s) removed: thrombus (acute LPA,                            subacute RPA).    ____________________________________________________________________________________  CONCLUSIONS:   1. Indication: intermediate-high risk PE with suspected paradoxical thrombus and CVA, severe hypoxia.   2. Bilateral pulmonary angiogram and exptirpation of material from bilateral PAs as noted.   3. Heparin anticoagulation and routine work up.    ICD 10 Codes:  Other pulmonary embolism with acute cor pulmonale-I26.09     CPT Codes:  Selective catheter placement, left or right pulmonary artery uni-89119;  Selective catheter placement, left or right pulmonary artery bilat-15052.50; Selective catheter placement, segmental or subsegmental pulmonary artery uni-13407; Angiography, pulmonary, bilateral S&I-95292; Extirpation of material, unilateral PA-83032; Extirpation of material, contralateral PA-64940.50; Extirpation of material, unilateral secondary vessel Subsegment 1-77847; Extirpation of material, unilateral secondary vessel Subsegment 2-49094; Moderate Sedation Services initial 15 minutes patient >5 years-93689; Moderate Sedation Services 1st additional 15 minutes patient >5 years-90974; Moderate Sedation Services 2nd additional 15 minutes patient >5 years-14459; Moderate Sedation Services 3rd additional 15 minutes patient >5 years-49106; Moderate Sedation Services 4th additional 15 minutes patient >5 years-70306; Moderate Sedation Services 5th additional 15 minutes patient >5 years-79723     [id number] Maldonado Alvarado MD  Performing Physician  Electronically signed by [id number] Maldonado Alvarado MD on 11/15/2023 at 3:56:11 PM         ** Final **  Transthoracic Echo (TTE) [facility], [address] [place] [address] [phone] and                                  Fax [phone]    TRANSTHORACIC ECHOCARDIOGRAM REPORT       Patient Name:      DARLING Colin Physician:    [id number] Govind Olvera MD  Study Date:        11/15/2023           Ordering Provider:    [id number] ANTOINETTE ENCINAS  MRN/PID:           [id number]             Fellow:  Accession#:        [id number]         Nurse:  Date of Birth/Age: 1940 / 83 years Sonographer:          Yfn Castellanos RDCS, OSMAN  Gender:            F                    Additional Staff:  Height:            165.10 cm            Admit Date:           11/15/2023  Weight:            110.68 kg            Admission Status:     Inpatient -                                                                 Routine  BSA:               2.15 m2              Encounter#:           [id number]                                          Department Location:  [address]                                                                floor/ICU  Blood Pressure: 105 /73 mmHg    Study Type:    TRANSTHORACIC ECHO (TTE) COMPLETE  Diagnosis/ICD: Other forms of dyspnea-R06.09  Indication:    Dyspnea  CPT Code:      Echo Complete w Full Doppler-58812    Patient History:  Pertinent History: Dyspnea.    Study Detail: The following Echo studies were performed: 2D, M-Mode, Doppler and                color flow. Technically challenging study due to poor acoustic                windows, patient lying in supine position, body habitus and Post                procedure and bi-pap. Definity used as a contrast agent for                endocardial border definition. Total contrast used for this                procedure was 3 mL via IV push.       PHYSICIAN INTERPRETATION:  Left Ventricle: The left ventricular systolic function is normal, with an estimated ejection fraction of 50-55%. There are no regional wall motion abnormalities. The left ventricular cavity size is normal. Left ventricular diastolic filling was not assessed.  Left Atrium: The left atrium is normal in size.  Right Ventricle: The right ventricle is severely enlarged. There is moderately reduced right ventricular systolic function.  Right Atrium: The right atrium is mildly dilated.  Aortic Valve: The aortic valve is probably trileaflet. There is trivial aortic valve regurgitation.  Mitral Valve: The mitral valve is normal in structure. There is trace mitral valve regurgitation.  Tricuspid Valve: The tricuspid valve is structurally normal. There is mild tricuspid regurgitation. The Doppler estimated RVSP is moderately elevated at 43.2 mmHg.  Pulmonic Valve: The pulmonic valve is not well visualized. The pulmonic valve regurgitation  was not well visualized.  Pericardium: There is no pericardial effusion noted.  Aorta: The aortic root is abnormal. There is mild dilatation of the ascending aorta. There is mild dilatation of the aortic root.  Systemic Veins: The inferior vena cava appears to be of normal size.       CONCLUSIONS:   1. Left ventricular systolic function is normal with a 50-55% estimated ejection fraction.   2. Severely enlarged right ventricle.   3. There is moderately reduced right ventricular systolic function.   4. Moderately elevated right ventricular systolic pressure.    QUANTITATIVE DATA SUMMARY:  2D MEASUREMENTS:                           Normal Ranges:  Ao Root d:     3.70 cm   (2.0-3.7cm)  LAs:           3.10 cm   (2.7-4.0cm)  IVSd:          1.02 cm   (0.6-1.1cm)  LVPWd:         0.84 cm   (0.6-1.1cm)  LVIDd:         3.47 cm   (3.9-5.9cm)  LVIDs:         2.14 cm  LV Mass Index: 42.8 g/m2  LV % FS        38.3 %    M-MODE MEASUREMENTS:                   Normal Ranges:  Ao Root: 4.00 cm (2.0-3.7cm)  LAs:     3.50 cm (2.7-4.0cm)    AORTA MEASUREMENTS:                     Normal Ranges:  Asc Ao, d: 3.80 cm (2.1-3.4cm)    AORTIC VALVE:                         Normal Ranges:  LVOT Diameter: 2.70 cm (1.8-2.4cm)       RIGHT VENTRICLE:  RV Basal 5.13 cm    TRICUSPID VALVE/RVSP:                              Normal Ranges:  Peak TR Velocity: 3.17 m/s  RV Syst Pressure: 43.2 mmHg (< 30mmHg)  IVC Diam:         1.50 cm       [id number] Govind Olvera MD  Electronically signed on 11/16/2023 at 9:42:05 AM       ** Final **  ROS weakness hypoxia she still very bedridden    Physical Exam  Lungs are clear anteriorly heart has a regular rate and rhythm she is awake and alert and oriented she is very lucid her abdomen is soft and nontender her skin is warm and dry  Relevant Results               Assessment/Plan   This patient currently has cardiac telemetry ordered; if you would like to modify or discontinue the telemetry order, click here to go  to the orders activity to modify/discontinue the order.              Principal Problem:    Acute saddle pulmonary embolism, unspecified whether acute cor pulmonale present (CMS/HCC)    For now it appears that we have to use Coumadin to treat her PE also shower emboli are apparent from the MRI the echo did not show a PFO but most likely she does have 1 she is also morbidly obese and because of her morbid obesity she will not be able to use oral anticoagulants other than Coumadin so she will need to use Coumadin so for now I am going to review her renal panel she is cleared to move out of the intensive care unit up to the regular floor and she will need rehab afterwards planning on discharging her from the hospital probably on Monday or Tuesday once she is stable more stable and she is now she has so many medical issues going on and like to really connect keep an eye on her monitor her up on the stepdown for a while before discharging her to an ECF thank you              Sage Alba, DO

## 2023-11-17 NOTE — PROGRESS NOTES
Speech-Language Pathology    Inpatient  Speech-Language Pathology Treatment     Patient Name: Lillian Mendoza  MRN: [id number]  Today's Date: 11/17/2023  Time Calculation  Start Time: 1300  Stop Time: 1330  Time Calculation (min): 30 min         Current Problem:   1. Acute saddle pulmonary embolism, unspecified whether acute cor pulmonale present (CMS/HCC)  Transthoracic Echo (TTE) Complete    Transthoracic Echo (TTE) Complete    Vascular US Lower Extremity Venous Duplex Bilateral    Vascular US Lower Extremity Venous Duplex Bilateral    Transthoracic Echo (TTE) Limited    Transthoracic Echo (TTE) Limited    CANCELED: Lower extremity venous duplex bilateral    CANCELED: Lower extremity venous duplex bilateral      2. Acute kidney injury (CMS/HCC)        3. Pulmonary embolism (CMS/HCC)  Invasive vascular procedure    Invasive vascular procedure      4. Acute stroke due to ischemia (CMS/HCC)  Transthoracic Echo (TTE) Complete    Transthoracic Echo (TTE) Complete    Transthoracic Echo (TTE) Limited    Transthoracic Echo (TTE) Limited      5. Generalized edema  Vascular US Lower Extremity Venous Duplex Bilateral    Vascular US Lower Extremity Venous Duplex Bilateral      6. History of pulmonary embolism  CANCELED: Lower extremity venous duplex bilateral    CANCELED: Lower extremity venous duplex bilateral      7. Other pulmonary embolism with acute cor pulmonale (CMS/HCC)  Invasive vascular procedure      8. Other forms of dyspnea  Transthoracic Echo (TTE) Complete           Plan:  Inpatient/Swing Bed or Outpatient: Inpatient  Discussed POC: Patient, Caregiver/family  SLP frequency 2x week     Dysphagia Goals:   Pt will tolerate PO trials with SLP only for potential to advance to PO diet.   GOAL MET 11/17/23  Pt/family education. GOAL MET 11/17     New goals added:  Pt will demonstrate safe /adequate management of the prescribed diet without s/s of aspiration.   Pt will complete oropharyngeal exercises x10-20 reps to  given min to moderate cues to maximize pharyngeal /laryngeal /oral strength ./ROM/coordination to improve swallowing function.   Pt will trial advancing PO by SLP only to determine possible diet advancement verus need for a MBSS given more time for recovery.   Pt/family will verbalize demonstrate comprehension of dysphagia education and recommendations.         Subjective , Alert, Ox4 with Min to Moderate dysarthria   Current Problem:  Moderate Oral Dysphagia with a suspected Mild pharyngeal component 2/2 reduced swallowing timing, coordination, and min slow lingual oral motor movements with left mario labial weakness-reduced tone s/p new strokes.     Most Recent Visit:  SLP Received On: 11/18/23    General Visit Information:   Referred By: José  Past Medical History Relevant to Rehab: COPD,TKR,PFO,remote PE,lymphedema,obesity  Prior to Session Communication: Bedside nurse      Pain Assessment:   Pain Assessment: 0-10  Pain Score: 0 - No pain      Therapeutic Swallow:     Swallowing re-evaluation completed 2/2 pt improved with her respiratory skills and now on 3 Liters of O2 via NC with stable RR and SP02 above 90 during the exam.  Pt seated at the side of the bed in a chair. Speech Mildly to Moderately dysarthric.   Pt given thins and Mildly thick liquids with a suspected min reduced swallow onset delay resulting in delayed min throat clears x4 eliminated given cup sips of a slower moving bolus of Moderately thick liquids. Oral control and coordination Moderately reduced with min left pocketing noted during textured PO eliminated given self fed purees and Minced Moist nickie crackers in applesauce.     Stable respiratory skills with a noted dysphagia at the bedside given thins and regular solids.     Suggest an oral diet of MINCED MOIST solids and Moderately thick liquids via small sips/bites as the pt's continues to improve in strength and overall endurance and swallowing timing-coordination.   No delayed overt s/s  of aspiration noted given the modified self administered oral diet.          Inpatient:  Education Documentation    Repeat  CSE results and recs with swallowing strategies d/w the pt/family MD and RN Krysta with ongoing education needed but comprehension verbalized.

## 2023-11-17 NOTE — CARE PLAN
The patient's goals for the shift include able to eat/drink    The clinical goals for the shift include Maintain O2 saturation above 92% throughout shift    Over the shift, the patient did not make progress toward the following goals. Barriers to progression include SWALLOW EVAL. Recommendations to address these barriers include ABLE TO EAT WITHMODIFICATIONS.

## 2023-11-17 NOTE — PROGRESS NOTES
"Lillian Mendoza is a 83 y.o. female on day 2 of admission presenting with Acute saddle pulmonary embolism, unspecified whether acute cor pulmonale present (CMS/HCC).    Subjective   MRI of the brain is positive for acute bilateral CVAs.  Patient is still receiving heparin gtt. for treatment of PE, with plans noted to continue anticoagulation permanently in the outpatient setting.  SLP approved her for modified diet and physical therapy was able to sit her in a bedside chair today.  Left-sided deficits continue to wax and wane.       Objective     Last Recorded Vitals  Blood pressure 121/57, pulse 87, temperature 36.8 °C (98.2 °F), temperature source Temporal, resp. rate 21, height 1.67 m (5' 5.75\"), weight 134 kg (295 lb 13.7 oz), SpO2 94 %.    Physical exam/neurological exam  Patient seen and examined at this time; upon entering room she is resting quietly in bed with family x2 and RN at bedside. Appears fully developed and well nourished.   Mental status: A&Ox 3. Memory testing, fund of knowledge and concentration within normal limits. Speech is slurred but overall fluent and negative for any paraphrasic errors.     Cranial Nerves:  Optic II/ Oculomotor III: Fundoscopic exam was technically difficult. PERRL +2. Visual fields are full. Convergence and accomodation noted without difficulty. Negative for deficits to visual acuity confrontation via static-finger wiggle test. Eyes appear aligned and free of exophthalmos and ptosis. Sclera are white bilaterally and lens are free from clouding.   Oculomotor III/ Trochlear IV/ Abducens VI: Extraocular movements are full, with no evidence of nystagmus. Negative for diplopia.   Trigeminal V: Facial sensation is intact to light touch. Corneal reflex responsive when threatened bilaterally.  Facial VII: impaired; nose is midline, but there is evidence of flattening to left nasolabial fold noted and mouth is positive for evidence of left droop while at rest that does grossly " disappear when smiling. Patient is successfully able to follow commands to raise eyebrows, squeeze eyes shut, smile and show teeth, frown, and puff out cheeks.   Acoustic VIII: Hearing is intact bilaterally.  Glossopharngyeal IX/ Vagus X: Palate elevates symmetrically to phonation. Findings are negative for uvula deviation or dysphagia.   Spinal accessory XI: Sternocleidomastoid/ upper trapezius is 4-/5 to strength testing, with no asymmetry noted to strength, bulk, or tone.  BUE discomfort noted secondary to previous surgical intervention secondary to fracture.  Hypoglossal XII: Tongue is positive for left mouth deviation. Phonation is slurred.  Speech is negative for any evidence of aphasia.    Motor exam: negative for evidence of involuntary movements or fasiculations. BUE flexion of biceps and brachioradialis graded 4-/5, in addition to extension of triceps at elbow and wrists. BUE  strength 4-/5, along with finger abduction and thumb opposition. BLE hip flexion, extension, adduction, and abduction asymmetric, with RLE 1-/5 versus LLE 3-/5. Knee extension & flexion 3-/5. Ankle dorsiflexion and plantarflexion 3-/5. Normal bulk and normal tone.     Sensory exam: Sensation is intact to light touch throughout.    Reflexes: Reflexes are 0 and symmetric. Bilateral plantar responses are flexor.     Coordination: finger-to-nose testing is negative for signs of dysmetria. Pronator drift testing to BUE negative. Rapid alternating hand movements WNL.    Gait exam: Not tested as patient is a high fall risk  Relevant Results    NIH Stroke Scale: 6            [place] Coma Scale  Best Eye Response: Spontaneous  Best Verbal Response: Oriented  Best Motor Response: Follows commands  [place] Coma Scale Score: 15                                  Assessment/Plan   This patient currently has cardiac telemetry ordered; if you would like to modify or discontinue the telemetry order, click here to go to the orders activity to  modify/discontinue the order.  Principal Problem:    Acute saddle pulmonary embolism, unspecified whether acute cor pulmonale present (CMS/[facility])  Acute CVA   -Patient to continue heparin GTT for management of PE plus status post thrombectomy.  When deemed appropriate by ICU team, patient to transition to oral anticoagulation and resume this medication indefinitely for CVA/clot prophylaxis.  It is also recommended for patient to continue atorvastatin 40 mg p.o. daily for additional prophylaxis.  -Interventional cardiology consulting; despite TTE being negative for PFO there is still a question of patient having paradoxical embolus from PFO that would need to be further evaluated by [facility].  Final recommendations are pending.  -Recommendations for needs during hospitalization and at discharge via PT, OT, and SLP; patient has been approved for modified diet and p.o. medications have been resumed.  It is highly recommended for patient to be discharged to acute rehab upon discharge from this facility.  -Continue promotion of lifestyle modifications, such as: Strict BP and glycemic control, dietary habit changes, incorporation of daily exercise regimen, adherence to all prescription/OTC medication schedules, attendance to all follow-up appointments, cessation from smoking if applicable, abstinence from alcohol and illicit drug use if applicable  -Patient to follow-up with PCP in 1 to 2 weeks postdischarge  -Patient to follow-up with Dr. Alda Flores in 2 to 3 months postdischarge.    Total critical care face-to-face time spent with patient was 30 minutes; more than 50% of my time was spent counseling the patient on: preparation to see patient via chart review of resulted laboratory values, radiographic imaging, prescribed medications, and impairment of involved organ systems. Time also spent on medical examination, placing appropriate orders for testing/medications, communicating with other health care providers,  counseling/educating the patient/family, and care coordination.    *This note was created using voice recognition transcription software. Despite proofreading, unintentional typographical errors may be present. Please contact the department of neurology with any questions or concerns.  ZOEY Jose-CNP

## 2023-11-17 NOTE — PROGRESS NOTES
Occupational Therapy    Evaluation    Patient Name: Lillian Mendoza  MRN: [id number]  Today's Date: 11/17/2023  Time Calculation  Start Time: 1037  Stop Time: 1105  Time Calculation (min): 28 min        Assessment:  Prognosis: Fair  End of Session Communication: Bedside nurse  End of Session Patient Position: Up in chair, Alarm on    Plan:  Treatment Interventions: ADL retraining, Functional transfer training, UE strengthening/ROM, Patient/family training, Equipment evaluation/education, Endurance training  OT Frequency: 3 times per week  OT Discharge Recommendations: Moderate intensity level of continued care  OT - OK to Discharge: Yes (from acute OT services to next level of care when medically cleared)  Treatment Interventions: ADL retraining, Functional transfer training, UE strengthening/ROM, Patient/family training, Equipment evaluation/education, Endurance training    Subjective   Current Problem:  1. Acute saddle pulmonary embolism, unspecified whether acute cor pulmonale present (CMS/HCC)  Transthoracic Echo (TTE) Complete    Transthoracic Echo (TTE) Complete    Vascular US Lower Extremity Venous Duplex Bilateral    Vascular US Lower Extremity Venous Duplex Bilateral    Transthoracic Echo (TTE) Limited    Transthoracic Echo (TTE) Limited    CANCELED: Lower extremity venous duplex bilateral    CANCELED: Lower extremity venous duplex bilateral      2. Acute kidney injury (CMS/HCC)        3. Pulmonary embolism (CMS/HCC)  Invasive vascular procedure    Invasive vascular procedure      4. Acute stroke due to ischemia (CMS/HCC)  Transthoracic Echo (TTE) Complete    Transthoracic Echo (TTE) Complete    Transthoracic Echo (TTE) Limited    Transthoracic Echo (TTE) Limited      5. Generalized edema  Vascular US Lower Extremity Venous Duplex Bilateral    Vascular US Lower Extremity Venous Duplex Bilateral      6. History of pulmonary embolism  CANCELED: Lower extremity venous duplex bilateral    CANCELED: Lower  extremity venous duplex bilateral      7. Other pulmonary embolism with acute cor pulmonale (CMS/HCC)  Invasive vascular procedure      8. Other forms of dyspnea  Transthoracic Echo (TTE) Complete        General:  General  Reason for Referral: impaired ADLs; patient to ED with stroke like symptoms, dysarthria and facial droop  Referred By: REE Montoya; 11/16/2023  Past Medical History Relevant to Rehab: COPD, remote PE, obesity, lymphedema, PFO  Co-Treatment: PT  Co-Treatment Reason: to facilitate safety and activity tolerance  Prior to Session Communication: Bedside nurse  Patient Position Received: Bed, 2 rail up  General Comment: patient s/p (B) thrombectomy for PE; MRI with acute infarcts of (B) cerebral hemispheres and (L) cerebellum; acute (B) DVTs to LEs; patient with theraputic heparin assay draw noted this date  Precautions:  Precautions Comment: Fall Precautions  Vital Signs:  Heart Rate: 87  SpO2: 94 % (3L O2 via NC; dropping to 84% after activity however recovering to 90% with pursed lip breathing; notified nursing staff)  BP: 121/57  Pain:  Pain Assessment  Pain Assessment: 0-10  Pain Score: 0 - No pain    Objective   Cognition:  Overall Cognitive Status: Within Functional Limits           Home Living:  Home Living Comments: patient reporting that she lives with her granddaughter in a one story house with 2 JANEEN with HR in place; patient utilizes FWW, saul walker and transport chair as needed for mobility; requires assist with ADLs, and IADLs at [facility]; patient frequently has someone home with her except for overnight but remains in bed; patient with BSC and hospital bed; tub shower with shower chair and grab bars in place; patient declines recent falls, (-) driving    ADL:  ADL Comments: Toileting: total assist 2/2 purwick; UB Dressing: anticipate MOD A;  LB Dressing: total assist to don (B) socks; Grooming: anticiapte MIN A; Feeding: MOD I  Activity Tolerance:  Endurance: Decreased tolerance  for upright activites, Tolerates less than 10 min exercise with changes in vital signs  Bed Mobility/Transfers: Bed Mobility  Bed Mobility: Yes  Bed Mobility 1  Bed Mobility Comments 1: Supine > Sit: completing with MAX A X2    Transfers  Transfer: Yes (completing functional transfers with MOD A x2 with FWW assist from EOB, taking 2-3 steps from EOB to recliner with good hand placement and sequencing noted)      Sensation:  Sensation Comment: declines numbness/tingling  Strength:  Strength Comments: (B) UE grossly 4/5 observed through functinal activity    Extremities: RUE   RUE : Within Functional Limits and LUE   LUE: Within Functional Limits      Outcome Measures:[facility] Daily Activity  Putting on and taking off regular lower body clothing: Total  Bathing (including washing, rinsing, drying): A lot  Putting on and taking off regular upper body clothing: A lot  Toileting, which includes using toilet, bedpan or urinal: Total  Taking care of personal grooming such as brushing teeth: A little  Eating Meals: None  Daily Activity - Total Score: 13        Education Documentation  Body Mechanics, taught by Megan Avila OT at 11/17/2023 12:47 PM.  Learner: Patient  Readiness: Acceptance  Method: Explanation  Response: Verbalizes Understanding    Precautions, taught by Megan Avila OT at 11/17/2023 12:47 PM.  Learner: Patient  Readiness: Acceptance  Method: Explanation  Response: Verbalizes Understanding    ADL Training, taught by Megan Avila OT at 11/17/2023 12:47 PM.  Learner: Patient  Readiness: Acceptance  Method: Explanation  Response: Verbalizes Understanding    Education Comments  No comments found.        OP EDUCATION:  Education  Individual(s) Educated: Patient  Education Provided: Fall precautons, Ergonomics and postural realignment  Patient Response to Education: Patient/Caregiver Verbalized Understanding of Information  Education Comment: will require continued education    Goals:  Encounter  Problems       Encounter Problems (Active)       OT Goals       Patient will complete functional transfers at MIN A level with LRD to facilitate increased independence and safety with home going  (Progressing)       Start:  11/17/23    Expected End:  12/01/23            Patient will complete functional mobility for household distances at MIN A level with LRD to facilitate increased independence and safety with home going  (Progressing)       Start:  11/17/23    Expected End:  12/01/23            Patient will complete LB dressing at MOD A level to facilitate safety and independence for home going   (Progressing)       Start:  11/17/23    Expected End:  12/01/23            Patient will complete UB dressing at MOD I level to facilitate safety and independence for home going   (Progressing)       Start:  11/17/23    Expected End:  12/01/23            Patient will complete toileting at MOD A level to facilitate safety and independence for home going   (Progressing)       Start:  11/17/23    Expected End:  12/01/23

## 2023-11-17 NOTE — PROGRESS NOTES
"Lillian Mendoza is a 83 y.o. female on day 2 of admission presenting with Acute saddle pulmonary embolism, unspecified whether acute cor pulmonale present (CMS/HCC).    Subjective   Patient evaluated in her hospital room family present during evaluation.  Patient was in recliner encourage elevation of bilateral lower extremities to reduce edema and reduce venous pressure on DVTs, patient has 2-3+ pitting edema.         Objective     Vitals:    11/17/23 1200   BP:    Pulse:    Resp:    Temp: 36.8 °C (98.2 °F)   SpO2:       Physical Exam  Constitutional: BMI 47.2, awake/alert/oriented x3, in no distress,  Eyes: Clear sclera  ENMT: mucous membranes are moist, no apparent injury, no lesions seen,   Head/neck: Neck supple, trachea  is midline, no apparent injury, no JVD, no bruits, no mass, no stridor  Respiratory/thorax: Breath sounds clear and equal diminished throughout bilaterally, thorax symmetric, currently on high flow oxygen  Cardiac/Vascular: Regular, rate and rhythm, no murmurs, 2+ radial pulses, palpable DP and PTs  Gastrointestinal: Nondistended soft nontender no rebound tenderness or guarding no masses palpable no organomegaly, positive bowel sounds, no bruits.  Musculoskeletal: Moves all extremities, limited range of motion , no joint swelling, normal strength  Extremities: No cyanosis, no contusions or wounds, no clubbing  Neurological: Alert and oriented x3, left facial droop with slurred speech no other neurological issues  Lymphatic: No significant lymphadenopathy  Skin: Warm and dry, no lesions, no rashes  Blood pressure 121/57, pulse 87, temperature 36.8 °C (98.2 °F), temperature source Temporal, resp. rate 21, height 1.67 m (5' 5.75\"), weight 134 kg (295 lb 13.7 oz), SpO2 94 %.        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    Hearing loss 09/20/2023    Hiatal hernia 09/20/2023    History of pulmonary embolus (PE) 09/20/2023    Lower leg edema 09/20/2023    Lymphedema 09/20/2023    Otalgia, left 09/20/2023    Renal impairment 09/20/2023          Current Facility-Administered Medications:     aspirin EC tablet 81 mg, 81 mg, oral, Daily, ZOEY Montoya-CNP    atorvastatin (Lipitor) tablet 40 mg, 40 mg, oral, Nightly, ZOEY Montoya-CNP    fluticasone (Flonase) nasal spray 2 spray, 2 spray, Each Nostril, Daily, Anatoliy Kumar DO, 2 spray at 11/16/23 0916    gabapentin (Neurontin) capsule 300 mg, 300 mg, oral, TID, Anatoliy Kumar DO    heparin 1,000 unit/mL injection 3,000-6,000 Units, 3,000-6,000 Units, intravenous, q4h PRN, Porfirio Basilio APRN-CNP, 3,000 Units at 11/16/23 0911    heparin 25,000 Units in dextrose 5% 250 mL (100 Units/mL) infusion (premix), 0-4,500 Units/hr, intravenous, Continuous, Porfirio Basilio APRN-CNP, Last Rate: 16 mL/hr at 11/17/23 1200, 1,600 Units/hr at 11/17/23 1200    iohexol (OMNIPaque) 350 mg iodine/mL solution 75 mL, 75 mL, intravenous, Once in imaging, Porfirio Basilio APRN-CNP    lactated Ringer's infusion, 75 mL/hr, intravenous, Continuous, Anatoliy Kumar DO, Last Rate: 75 mL/hr at 11/16/23 2052, 75 mL/hr at 11/16/23 2052    levothyroxine (Synthroid, Levoxyl) tablet 150 mcg, 150 mcg, oral, Daily before breakfast, Anatoliy Kumar DO    nystatin (Mycostatin) 100,000 unit/gram powder 1 Application, 1 Application, Topical, BID, Anatoliy Kumar DO, 1 Application at 11/17/23 1444    oxygen (O2) therapy, , inhalation, Continuous PRN - O2/gases, Nena Smith MD, Rate Verify at  11/16/23 1616    [START ON 11/18/2023] pantoprazole (ProtoNix) EC tablet 40 mg, 40 mg, oral, Daily before breakfast, Anatoliy Kumar DO    polyethylene glycol (Glycolax, Miralax) packet 17 g, 17 g, oral, Daily, Porfirio R Praful, APRN-CNP     Lab Results   Component Value Date    WBC 8.6 11/17/2023    HGB 10.0 (L) 11/17/2023    HCT 32.0 (L) 11/17/2023     11/17/2023    CHOL 112 11/15/2023    TRIG 142 11/15/2023    HDL 30.7 11/15/2023    ALT 8 11/17/2023    AST 16 11/17/2023     11/17/2023    K 4.7 11/17/2023     11/17/2023    CREATININE 1.89 (H) 11/17/2023    BUN 25 (H) 11/17/2023    CO2 27 11/17/2023    TSH 0.14 (L) 08/24/2021    INR 1.3 (H) 11/15/2023    HGBA1C 6.1 (H) 11/15/2023       Transthoracic Echo (TTE) Limited    Result Date: 11/16/2023    [facility], [address] [place] [id number] [phone] and                                 Fax [phone] TRANSTHORACIC ECHOCARDIOGRAM REPORT  Patient Name:      DARLINGMAGUE Colin Physician:    [id number] Govind Olvera MD Study Date:        11/16/2023           Ordering Provider:    [id number] ANTOINETTE ENCINAS MRN/PID:           [id number]             Fellow: Accession#:        [id number]         Nurse: Date of Birth/Age: 1940 / 83 years Sonographer:          Yfn Castellanos RDCS, OSMAN Gender:            F                    Additional Staff: Height:            165.10 cm            Admit Date:           11/15/2023 Weight:            131.54 kg            Admission Status:     Inpatient -                                                               Routine BSA:               2.32 m2              Encounter#:           [id number]                                         Department Location:  [address]                                                               floor/ICU Blood Pressure: 109 /70 mmHg Study Type:     TRANSTHORACIC ECHO (TTE) LIMITED Diagnosis/ICD: Personal history of pulmonary embolism-Z86.711 Indication:    PE. CPT Code:      Echo Limited-63651 Patient History: Pertinent History: PE. Study Detail: The following Echo studies were performed: 2D. Technically               challenging study due to body habitus. Agitated saline used as a               contrast agent for intraseptal flow evaluation.  PHYSICIAN INTERPRETATION: Left Ventricle: The left ventricular systolic function is normal. The left ventricular cavity size was not assessed. Left ventricular diastolic filling was not assessed. Left Atrium: The left atrium was not assessed. Right Ventricle: The right ventricle was not assessed. Right ventricular systolic function not assessed. Right Atrium: The right atrium was not assessed. Aortic Valve: The aortic valve was not assessed. Aortic valve regurgitation was not assessed. Mitral Valve: The mitral valve was not assessed. Mitral valve regurgitation was not assessed. Tricuspid Valve: The tricuspid valve was not assessed. Tricuspid regurgitation was not assessed. Pulmonic Valve: The pulmonic valve was not assessed. Pulmonic valve regurgitation was not assessed. Pericardium: There is a small pericardial effusion. There is no evidence of cardiac tamponade. Aorta: The aortic root was not assessed.  CONCLUSIONS:  1. Left ventricular systolic function is normal.  2. There is no evidence of cardiac tamponade.  3. Negative agitated saline study. No PFO. QUANTITATIVE DATA SUMMARY:  [id number] Govind Olvera MD Electronically signed on 11/16/2023 at 2:52:09 PM  ** Final **     MR brain wo IV contrast    Result Date: 11/16/2023  Interpreted By:  Concepcion Hopper, STUDY: MR BRAIN WO IV CONTRAST;  11/16/2023 2:18 pm   INDICATION: Signs/Symptoms:stroke symptoms.   COMPARISON: CT head 11/15/2023.   ACCESSION NUMBER(S): [id number]   ORDERING CLINICIAN: TERRY MCKINNEY   TECHNIQUE: Axial T2, FLAIR, DWI, gradient echo T2 and  sagittal and coronal T1 weighted images of brain were acquired.   FINDINGS: There are multiple small foci of cortical and subcortical diffusion restriction within the bilateral cerebral hemispheres and left cerebellum compatible with acute infarcts. There is associated T2 and FLAIR signal abnormality without significant mass effect. No susceptibility artifact to suggest hemorrhage.   There is prominence of the ventricles, cortical sulci and basal cisterns compatible with age related involutional changes and moderate volume loss. Prominent extra-axial CSF space near the vertex likely represents asymmetric volume loss.   There is a background of mild nonspecific subcortical and periventricular T2 and FLAIR hyperintense signal which is compatible with microangiopathy. Major intracranial flow voids at the skull base are unremarkable. Evidence of bilateral lens surgery. Paranasal sinuses and mastoid air cells are predominantly clear.   Cerebellar tonsils are above the foramen magnum. Pituitary and sella are not enlarged.       Small foci of cortical and subcortical diffusion restriction within the bilateral cerebral hemispheres and left cerebellum compatible with acute infarcts. Given multiple vascular territories consider a central embolic source. There is no mass effect, midline shift or evidence of hemorrhage.   Volume loss and nonspecific white matter changes compatible with microangiopathy.   MACRO: None   Signed by: Concepcion Hopper 11/16/2023 2:37 PM Dictation workstation:   [id number]    Vascular US Lower Extremity Venous Duplex Bilateral    Result Date: 11/16/2023           [place] [address] Tel [phone] and Fax [phone]  Vascular Lab Report [facility] US LOWER EXTREMITY VENOUS DUPLEX BILATERAL  Patient Name:     DARLING YANG Colin Physician: [id number] Cookie Pace MD Study Date:       11/15/2023           Ordering           [id number] TRANG ALVARADO                                       Physician: MRN/PID:          [id number]            Technologist:      Allyn Escobar RVT Accession#:       [id number]        Technologist 2: Date of           1940 / 83      Encounter#:        [id number] Birth/Age:        years Gender:           F Admission Status: Inpatient           Location           [facility]                                       Performed:  Diagnosis/ICD: Other pulmonary embolism without acute cor pulmonale-I26.99 CPT Codes:     18459 Peripheral venous duplex scan for DVT complete  **CRITICAL RESULT** Critical Result: DVT Bilateral FV, Bilateral POP V, and Right PTV Notification called to Dr Trang Alvarado on 11/15/2023 at 3:00:00 PM by NEIL Escobar RVT.  CONCLUSIONS: Right Lower Venous: There is acute occlusive deep vein thrombosis visualized in the mid femoral, distal femoral, popliteal and posterior tibial veins. There is acute non-occlusive deep vein thrombosis visualized in the proximal femoral vein. Left Lower Venous: There is acute occlusive deep vein thrombosis visualized in the mid femoral, distal femoral, popliteal and proximal femoral veins. Cannot rule out thrombus in non-visualized peroneal vein due to edema.  Imaging & Doppler Findings:  Right                 Compressible      Thrombus          Flow Distal External Iliac     Yes             None         Continuous CFV                       Yes             None         Continuous PFV                       Yes             None FV Proximal             Partial    Acute non-occlusive Continuous FV Mid                     No        Acute occlusive FV Distal                  No        Acute occlusive Popliteal                  No        Acute occlusive      None Peroneal                  Yes             None PTV                        No        Acute occlusive  Left                  Compress    Thrombus        Flow Distal External Iliac   Yes         None        Continuous CFV                     Yes         None       Continuous PFV                     Yes         None FV Proximal              No    Acute occlusive    None FV Mid                   No    Acute occlusive FV Distal                No    Acute occlusive Popliteal                No    Acute occlusive    None PTV                     Yes         None  [id number] Cookie Pace MD Electronically signed by [id number] Cookie Pace MD on 11/16/2023 at 12:36:58 PM  ** Final **     Transthoracic Echo (TTE) Complete    Result Date: 11/16/2023    [facility], [address] [place] [address], [facility] [id number] [phone] and                                 Fax [phone] TRANSTHORACIC ECHOCARDIOGRAM REPORT  Patient Name:      DARLING SORIA GARO   Reading Physician:    [id number] Govind Olvera MD Study Date:        11/15/2023           Ordering Provider:    [id number] ANTOINETTE ENCINAS MRN/PID:           [id number]             Fellow: Accession#:        [id number]         Nurse: Date of Birth/Age: 1940 / 83 years Sonographer:          OSMAN Aguirre RDCS Gender:            F                    Additional Staff: Height:            165.10 cm            Admit Date:           11/15/2023 Weight:            110.68 kg            Admission Status:     Inpatient -                                                               Routine BSA:               2.15 m2              Encounter#:           [id number]                                         Department Location:  [address]                                                               floor/ICU Blood Pressure: 105 /73 mmHg Study Type:    TRANSTHORACIC ECHO (TTE) COMPLETE Diagnosis/ICD: Other forms of dyspnea-R06.09 Indication:    Dyspnea CPT Code:      Echo Complete w Full Doppler-39928 Patient History: Pertinent History: Dyspnea. Study Detail: The following Echo  studies were performed: 2D, M-Mode, Doppler and               color flow. Technically challenging study due to poor acoustic               windows, patient lying in supine position, body habitus and Post               procedure and bi-pap. Definity used as a contrast agent for               endocardial border definition. Total contrast used for this               procedure was 3 mL via IV push.  PHYSICIAN INTERPRETATION: Left Ventricle: The left ventricular systolic function is normal, with an estimated ejection fraction of 50-55%. There are no regional wall motion abnormalities. The left ventricular cavity size is normal. Left ventricular diastolic filling was not assessed. Left Atrium: The left atrium is normal in size. Right Ventricle: The right ventricle is severely enlarged. There is moderately reduced right ventricular systolic function. Right Atrium: The right atrium is mildly dilated. Aortic Valve: The aortic valve is probably trileaflet. There is trivial aortic valve regurgitation. Mitral Valve: The mitral valve is normal in structure. There is trace mitral valve regurgitation. Tricuspid Valve: The tricuspid valve is structurally normal. There is mild tricuspid regurgitation. The Doppler estimated RVSP is moderately elevated at 43.2 mmHg. Pulmonic Valve: The pulmonic valve is not well visualized. The pulmonic valve regurgitation was not well visualized. Pericardium: There is no pericardial effusion noted. Aorta: The aortic root is abnormal. There is mild dilatation of the ascending aorta. There is mild dilatation of the aortic root. Systemic Veins: The inferior vena cava appears to be of normal size.  CONCLUSIONS:  1. Left ventricular systolic function is normal with a 50-55% estimated ejection fraction.  2. Severely enlarged right ventricle.  3. There is moderately reduced right ventricular systolic function.  4. Moderately elevated right ventricular systolic pressure. QUANTITATIVE DATA SUMMARY: 2D  MEASUREMENTS:                          Normal Ranges: Ao Root d:     3.70 cm   (2.0-3.7cm) LAs:           3.10 cm   (2.7-4.0cm) IVSd:          1.02 cm   (0.6-1.1cm) LVPWd:         0.84 cm   (0.6-1.1cm) LVIDd:         3.47 cm   (3.9-5.9cm) LVIDs:         2.14 cm LV Mass Index: 42.8 g/m2 LV % FS        38.3 % M-MODE MEASUREMENTS:                  Normal Ranges: Ao Root: 4.00 cm (2.0-3.7cm) LAs:     3.50 cm (2.7-4.0cm) AORTA MEASUREMENTS:                    Normal Ranges: Asc Ao, d: 3.80 cm (2.1-3.4cm) AORTIC VALVE:                        Normal Ranges: LVOT Diameter: 2.70 cm (1.8-2.4cm)  RIGHT VENTRICLE: RV Basal 5.13 cm TRICUSPID VALVE/RVSP:                             Normal Ranges: Peak TR Velocity: 3.17 m/s RV Syst Pressure: 43.2 mmHg (< 30mmHg) IVC Diam:         1.50 cm  [id number] Govind Olvera MD Electronically signed on 11/16/2023 at 9:42:05 AM  ** Final **     CT brain attack head wo IV contrast    Result Date: 11/15/2023  Interpreted By:  Reece Teran, STUDY: CT BRAIN ATTACK HEAD WO IV CONTRAST;  11/15/2023 5:54 pm   INDICATION: Signs/Symptoms:left facial droop.   COMPARISON: Noncontrast CT head dated 11/15/2023.   ACCESSION NUMBER(S): [id number]   ORDERING CLINICIAN: TERRY MCKINNEY   TECHNIQUE: Noncontrast axial CT scan of head was performed. Angled reformats in brain and bone windows were generated. The images were reviewed in bone, brain, blood and soft tissue windows.   FINDINGS: Exam is somewhat degraded by presence of the intravenous contrast within the vascular structures, likely from interventional procedure from earlier in the day due to poor renal clearance.   Within limits of the study, no hyperdense intracranial hemorrhage is evident. There is no mass effect or midline shift. Area of diminished attenuation described on previous same day noncontrast CT of the head dated 11/15/2023 is not well visualized on current study.   Gray-white differentiation is intact, without evidence of CT  apparent transcortical infarct. Subtle attenuation changes present in the periventricular and subcortical white matter of bilateral cerebral hemispheres are nonspecific, but favored to represent sequela of microvascular disease.   There is prominence of the extra-axial spaces overlying the cerebral hemispheres bilaterally without evidence of abnormal fluid collection. Basal cisterns are patent. No ventricular dilatation is present.   Scalp soft tissues do not demonstrate any acute abnormalities. Calvarium is unremarkable in appearance without evidence of depressed skull fracture. Mastoid air cells and middle ear cavities are well aerated without evidence of fluid fluid levels.   There is opacification of the intracranial carotid arteries and basilar artery bilaterally.       1.  Exam is somewhat degraded by a retained contrast from previous interventional procedure likely due to poor renal clearance. 2. Within limitations of the study, no hyperdense intracranial hemorrhage or CT apparent transcortical infarct is identified. Area of diminished attenuation in the posteroinferior left temporal lobe described on previous same day noncontrast CT of the head is not definitely identified on current examination. MRI of the brain can be considered for more definite characterization. 3. Subtle attenuation changes present in the periventricular and subcortical white matter bilateral cerebral hemispheres are nonspecific, but favored to represent changes of microvascular disease.   MACRO: Reece Teran discussed the significance and urgency of this critical finding by telephone with  TERRY MCKINNEY on 11/15/2023 at 6:03 pm.  (**-RCF-**) Findings:  See findings.   Signed by: Reece Teran 11/15/2023 6:05 PM Dictation workstation:   [id number]    Invasive vascular procedure    Result Date: 11/15/2023   [facility], Cath Lab, [address] [place] [id number] Cardiovascular Catheterization Report Patient  Name:      DARLING WYMAN   Performing Physician:  Jay Alvarado MD Study Date:        11/15/2023           Verifying Physician:   Jay Alvarado MD MRN/PID:           [id number]             Cardiologist/Co-scrub: Accession#:        [id number]         Ordering Physician:    Jay ALVARADO Date of Birth/Age: 1940 / 83 years Fellow: Gender:            F                    Fellow: Encounter#:        [id number]  Study:            Pulmonary Angiogram Additional Study: Peripheral Intervention  Indications: DARLING WYMAN is a 84 year old female who presents with intermediate-high risk PE, hx of PE 15 years ago (thought 2/2 knee TKA) s/p 1 year of warfarin; p/w stroke c/f paradoxical embolus in setting of PE. +significant O2 requirement.  Procedure Description: After infiltration of local anesthetic, the right femoral vein was identified with two dimensional ultrasound. Under direct ultrasound visualization, the right femoral vein was cannulated with a percutaneous technique. A 24F sheath was placed in the vein.  Pulmonary Embolism:  We predilated with a 22F Mac dilator, and placed a 24F sheath into the vein. Post-procedure, the venous sheath was pulled and pressure was applied to the site via figure-of-8.  Prior to access, we imaged the bilateral common femoral veins to evaluate for thrombus. No thrombus was noted. We selectively engaged the pulmonary artery using a JR4 diagnostic catheter. Pre-thrombectomy RA and PA pressures were obtained.  Following this, we sequentially engaged the following PA branches with Inari [id number] for selective extirpation of material: Left main, Right main, Truncus anterior and RLL posterior basal. Successful thrombectomy was performed. Selective angiogram was performed during the procedure to help locate thrombus for extraction. Post-thrombectomy RA and PA pressures were obtained.  We did not administer therapeutic systemic heparin up front d/t stroke and concerns for potential  "hemorrhagic conversion. Heparinized saline was utilized to \"wash\" the FlowSaver filter to maintain patency. Initially thrombectomy of LPA highly successful. Difficulty with RPA thrombectomy, with angiogram showing ball-like embolus. TA aspiration performed with loosening of the RPA thrombus (subacute clot). We did provide 2000 units of heparin at this time due to extended dwell time of catheter. Further thrombectomy performed of the main RPA and lower posterior branch with successful extirpation of remainder of subacute thrombus.  Hemo Personnel: +----------+---------+ Name      Duty      +----------+---------+ Maldonado Alvarado MD 1 +----------+---------+  Hemodynamic Pressures:  +----+-----------+----------+-------------+--------------+-----+-------+-------+ Site Date Time Phase NameSystolic mmHgDiastolic mmHgMean A-Wave V-Wave                                                      mmHg  mmHg   mmHg   +----+-----------+----------+-------------+--------------+-----+-------+-------+   RA 11/15/2023   O2 REST                              25     28     25     10:53:43 AM                                                         +----+-----------+----------+-------------+--------------+-----+-------+-------+   PA 11/15/2023   O2 REST           73            27   52                   10:56:29 AM                                                         +----+-----------+----------+-------------+--------------+-----+-------+-------+   PA 11/15/2023   O2 REST           75            31   51                   11:38:07 AM                                                         +----+-----------+----------+-------------+--------------+-----+-------+-------+   RA 11/15/2023   O2 REST                              18     21     19     11:38:30 AM                                                         " +----+-----------+----------+-------------+--------------+-----+-------+-------+   PA 11/15/2023   O2 REST           67            29   41                   12:06:02 PM                                                         +----+-----------+----------+-------------+--------------+-----+-------+-------+   RA 11/15/2023   O2 REST                              23     27     23     12:06:39 PM                                                         +----+-----------+----------+-------------+--------------+-----+-------+-------+  Oxygen Saturation %: +-----------+----------+------------+ Sample SiteO2 Sat (%)HB (g/100ml) +-----------+----------+------------+          AO        90        15.1 +-----------+----------+------------+          PA        52        15.1 +-----------+----------+------------+          AO        90        15.1 +-----------+----------+------------+          PA        52        15.1 +-----------+----------+------------+          PA        42             +-----------+----------+------------+          PA        44             +-----------+----------+------------+  Cardiac Outputs: +---------------+------------------+-------+ SINTIA CO (l/min)SINTIA CI (l/min/m2)SINTIA SV +---------------+------------------+-------+             3.7               1.7   41.2 +---------------+------------------+-------+  Vascular Resistance Calculated Values (Wood Units): +-----+----+----+ PhaseTPR TPRI +-----+----+----+ 0    14.030.5 +-----+----+----+  Complications: No in-lab complications observed.  Cardiac Cath Post Procedure Notes: Post Procedure Diagnosis: Bilateral pulmonary embolism R>L c/w intermediate-high                           risk PE s/p extirpation of material. Blood Loss:               Estimated blood loss during the procedure was 50 mls. Specimens Removed:        Number of specimen(s) removed: thrombus (acute LPA,                            subacute RPA). ____________________________________________________________________________________ CONCLUSIONS:  1. Indication: intermediate-high risk PE with suspected paradoxical thrombus and CVA, severe hypoxia.  2. Bilateral pulmonary angiogram and exptirpation of material from bilateral PAs as noted.  3. Heparin anticoagulation and routine work up. ICD 10 Codes: Other pulmonary embolism with acute cor pulmonale-I26.09  CPT Codes: Selective catheter placement, left or right pulmonary artery uni-39051; Selective catheter placement, left or right pulmonary artery bilat-24154.50; Selective catheter placement, segmental or subsegmental pulmonary artery uni-89537; Angiography, pulmonary, bilateral S&I-73424; Extirpation of material, unilateral PA-99511; Extirpation of material, contralateral PA-57186.50; Extirpation of material, unilateral secondary vessel Subsegment 1-96724; Extirpation of material, unilateral secondary vessel Subsegment 2-25057; Moderate Sedation Services initial 15 minutes patient >5 years-67352; Moderate Sedation Services 1st additional 15 minutes patient >5 years-98010; Moderate Sedation Services 2nd additional 15 minutes patient >5 years-76789; Moderate Sedation Services 3rd additional 15 minutes patient >5 years-54933; Moderate Sedation Services 4th additional 15 minutes patient >5 years-77379; Moderate Sedation Services 5th additional 15 minutes patient >5 years-25583  [id number] Maldonado Alvarado MD Performing Physician Electronically signed by [id number] Maldonado Alvarado MD on 11/15/2023 at 3:56:11 PM  ** Final **     CT angio chest for pulmonary embolism    Addendum Date: 11/15/2023    This finding was discussed with and acknowledged by Dr. Enzo Dumont on 11/15/2023 at 9:30 AM Signed by Stan Chamberlain MD    Result Date: 11/15/2023  STUDY: CT Angiogram of the Chest; 11/15/2023 7:38 AM. INDICATION: Known pulmonary embolism.  Evaluate for heart strain. COMPARISON: CXR 11/15/2023.  CT CAP 12/2/2022. ACCESSION  NUMBER(S): [id number] ORDERING CLINICIAN: LAZARO AMEZQUITA TECHNIQUE:  CTA of the chest was performed with intravenous contrast. Images are reviewed and processed at a workstation according to the CT angiogram protocol with 3-D and/or MIP post processing imaging generated.  Omnipaque 350 100 mL was administered intravenously. Automated mA/kV exposure control was utilized and patient examination was performed in strict accordance with principles of ALARA. FINDINGS: Pulmonary arteries are adequately opacified.  There are extensive filling defects in the bilateral main pulmonary arteries extending into the bilateral lobar and segmental arteries.  These are greater on the right.  The thoracic aorta is normal in course and caliber without dissection or aneurysm.  There is no evidence for right heart strain. The heart is normal in size without pericardial effusion.  Thoracic lymph nodes are not enlarged. There is no pleural effusion, pleural thickening, or pneumothorax. The airways are patent. Lungs are clear without consolidation, interstitial disease, or suspicious nodules.  There is prominent atelectasis at the left base. Upper abdomen demonstrates no acute pathology. There are no acute fractures.  No suspicious bony lesions.    1. Extensive bilateral pulmonary emboli, greater on the right. There is no evidence for right heart strain. 2. Prominent atelectasis at the left base. Signed by Stan Chamberlain MD    CT angio chest for pulmonary embolism    Result Date: 11/15/2023  STUDY: CT Angiogram of the Chest; 11/15/2023 6:32 AM INDICATION: Abdominal pain. COMPARISON: XR chest 11/15/2023, CT CAP 12/02/2022. ACCESSION NUMBER(S): [id number] ORDERING CLINICIAN: JASMEET MERCADO TECHNIQUE:  CTA of the chest was performed with intravenous contrast. Images are reviewed and processed at a workstation according to the CT angiogram protocol with 3-D and/or MIP post processing imaging generated.  Omnipaque 350 75 mL was administered  intravenously. Technologist note states contrast extravasation/IV infiltration. Automated mA/kV exposure control was utilized and patient examination was performed in strict accordance with principles of ALARA. FINDINGS: Pulmonary arteries are adequately opacified and there are extensive bilateral pulmonary emboli involving all lobes with large amount of embolic material in the distal aspect of the right main pulmonary artery. There is moderate to severe embolic burden with moderate to severe right heart dilation.  The thoracic aorta is normal in course and caliber without dissection or aneurysm. Heart is enlarged.  Thoracic lymph nodes are not enlarged. There is no pleural effusion, pleural thickening, or pneumothorax. The airways are patent. Left basilar atelectasis. Lungs are otherwise clear. There is fatty infiltration of the liver. Nonobstructing right renal calculus. Moderate right renal cortical atrophy. There are no acute fractures.  No suspicious bony lesions.    1. Extensive bilateral pulmonary emboli involving all lobes with large amount of embolic material in the distal aspect of the right main pulmonary artery. There is moderate to severe embolic burden with moderate to severe right heart strain. 2. Hepatic steatosis. 3. Nonobstructing right renal calculus. 4. Moderate right renal cortical atrophy. Findings discussed with and acknowledged by Dr. Enzo Dumont8:15 AM Signed by Akshat Nj MD    CT angio brain attack head w IV contrast and post procedure    Result Date: 11/15/2023  Interpreted By:  Arian Goldberg, STUDY: CT ANGIO BRAIN ATTACK HEAD W IV CONTRAST AND POST PROCEDURE; CT ANGIO BRAIN ATTACK NECK W IV CONTRAST AND POST PROCEDURE;  11/15/2023 4:49 am   INDICATION: Signs/Symptoms:Stroke Evaluation with VAN Positive.   COMPARISON: Correlation made to noncontrast head CT of 11/15/2023.   ACCESSION NUMBER(S): [id number]; [id number]   ORDERING CLINICIAN: JASMEET MERCADO   TECHNIQUE: 75 cc Omnipaque  350 were administered intravenously and axial images of the head were acquired.  Coronal, sagittal, and 3-D reconstructions were provided for review.   FINDINGS: The previously seen region of asymmetric cortical hypoattenuation and loss of gray-white matter differentiation is not definitely seen on the current examination, suggesting its presence on the prior examination may have related to artifact. Gray-white matter differentiation appears maintained. There is no evidence of acute intracranial hemorrhage. No abnormal intracranial enhancement is seen.   CTA HEAD FINDINGS:   Anterior circulation: The bilateral intracranial internal carotid arteries, bilateral carotid terminals, bilateral proximal anterior and middle cerebral arteries are normal.   Posterior circulation: Bilateral intracranial vertebral arteries, vertebrobasilar junction, basilar artery and proximal posterior cerebral arteries are normal.   CTA NECK FINDINGS:   Right carotid vessels: The common carotid artery is normal. The carotid bifurcation is normal. The internal carotid artery in the neck is normal. There is 0% stenosis  .   Left carotid vessels: The common carotid artery is normal. The carotid bifurcation is normal. The internal carotid artery in the neck is normal. There is 0% stenosis  .   Vertebral vessels: The origin of the right vertebral artery is not well seen due to artifact. The visualized segments of the cervical vertebral arteries are normal in caliber.     CT angiography of the chest demonstrates acute bilateral pulmonary emboli. The visible aortic arch appears within normal limits. No significant stenosis in the innominate or subclavian arteries. Asymmetric posterior opacity in the superior segment of the left lower lobe partially imaged may relate to atelectasis, scarring, pneumonia, or developing pulmonary infarct.       The previously seen region of asymmetric cortical hypoattenuation and loss of gray-white matter  differentiation is not definitely seen on the current examination, suggesting its presence on the prior examination may have related to artifact. Gray-white matter differentiation appears maintained. There is no evidence of acute intracranial hemorrhage. No abnormal intracranial enhancement is seen.   No evidence for significant stenosis of the cervical vessels.   No evidence for significant stenosis or large branch vessel cutoffs of the intracranial vessels.   Acute bilateral pulmonary emboli are incidentally seen on CT angiography of the chest. Asymmetric posterior opacity in the superior segment of the left lower lobe partially imaged may relate to atelectasis, scarring, pneumonia, or developing pulmonary infarct.   MACRO: Arian Goldberg discussed the significance and urgency of this critical finding by epic secure chat with message receipt verification with JASMEET MERCADO on 11/15/2023 at 5:24 am.  (**-RCF-**) Findings:  Acute pulmonary embolism.   Signed by: Arian Goldberg 11/15/2023 5:25 AM Dictation workstation:   [id number]    CT angio brain attack neck w IV contrast and post procedure    Result Date: 11/15/2023  Interpreted By:  Arian Goldberg, STUDY: CT ANGIO BRAIN ATTACK HEAD W IV CONTRAST AND POST PROCEDURE; CT ANGIO BRAIN ATTACK NECK W IV CONTRAST AND POST PROCEDURE;  11/15/2023 4:49 am   INDICATION: Signs/Symptoms:Stroke Evaluation with VAN Positive.   COMPARISON: Correlation made to noncontrast head CT of 11/15/2023.   ACCESSION NUMBER(S): [id number]; [id number]   ORDERING CLINICIAN: JASMEET MERCADO   TECHNIQUE: 75 cc Omnipaque 350 were administered intravenously and axial images of the head were acquired.  Coronal, sagittal, and 3-D reconstructions were provided for review.   FINDINGS: The previously seen region of asymmetric cortical hypoattenuation and loss of gray-white matter differentiation is not definitely seen on the current examination, suggesting its presence on the prior examination may have  related to artifact. Gray-white matter differentiation appears maintained. There is no evidence of acute intracranial hemorrhage. No abnormal intracranial enhancement is seen.   CTA HEAD FINDINGS:   Anterior circulation: The bilateral intracranial internal carotid arteries, bilateral carotid terminals, bilateral proximal anterior and middle cerebral arteries are normal.   Posterior circulation: Bilateral intracranial vertebral arteries, vertebrobasilar junction, basilar artery and proximal posterior cerebral arteries are normal.   CTA NECK FINDINGS:   Right carotid vessels: The common carotid artery is normal. The carotid bifurcation is normal. The internal carotid artery in the neck is normal. There is 0% stenosis  .   Left carotid vessels: The common carotid artery is normal. The carotid bifurcation is normal. The internal carotid artery in the neck is normal. There is 0% stenosis  .   Vertebral vessels: The origin of the right vertebral artery is not well seen due to artifact. The visualized segments of the cervical vertebral arteries are normal in caliber.     CT angiography of the chest demonstrates acute bilateral pulmonary emboli. The visible aortic arch appears within normal limits. No significant stenosis in the innominate or subclavian arteries. Asymmetric posterior opacity in the superior segment of the left lower lobe partially imaged may relate to atelectasis, scarring, pneumonia, or developing pulmonary infarct.       The previously seen region of asymmetric cortical hypoattenuation and loss of gray-white matter differentiation is not definitely seen on the current examination, suggesting its presence on the prior examination may have related to artifact. Gray-white matter differentiation appears maintained. There is no evidence of acute intracranial hemorrhage. No abnormal intracranial enhancement is seen.   No evidence for significant stenosis of the cervical vessels.   No evidence for significant  stenosis or large branch vessel cutoffs of the intracranial vessels.   Acute bilateral pulmonary emboli are incidentally seen on CT angiography of the chest. Asymmetric posterior opacity in the superior segment of the left lower lobe partially imaged may relate to atelectasis, scarring, pneumonia, or developing pulmonary infarct.   MACRO: Arian Goldberg discussed the significance and urgency of this critical finding by epic secure chat with message receipt verification with JASMEET MERCADO on 11/15/2023 at 5:24 am.  (**-RCF-**) Findings:  Acute pulmonary embolism.   Signed by: Arian Goldberg 11/15/2023 5:25 AM Dictation workstation:   [id number]    XR chest 1 view    Result Date: 11/15/2023  STUDY: Chest Radiograph;  11/15/2023 3:28 AM INDICATION: Wheezing. COMPARISON: 12/01/2022 XR Chest ACCESSION NUMBER(S): [id number] ORDERING CLINICIAN: JASMEET MERCADO TECHNIQUE:  Frontal chest was obtained at 03:28 hours. FINDINGS: CARDIOMEDIASTINAL SILHOUETTE: Cardiomediastinal silhouette is enlarged and more pronounced as compared to previous exam. LUNGS: Lungs reveals opacification of left lung base.  There is a lobulated appearance of the left hilum. ABDOMEN: No remarkable upper abdominal findings.  BONES: No acute osseous changes.    Opacification at the left lung base difficult to determine etiology.  Cannot exclude underlying infiltrate.  Fullness of left hilum.  Findings could better assessed with CT chest. Signed by Morgan De León DO    CT brain attack head wo IV contrast    Result Date: 11/15/2023  Interpreted By:  Arian Goldberg, STUDY: CT BRAIN ATTACK HEAD WO IV CONTRAST;  11/15/2023 3:16 am   INDICATION: Signs/Symptoms:Stroke Evaluation.   COMPARISON: Noncontrast head CT of 12/02/2022.   ACCESSION NUMBER(S): [id number]   ORDERING CLINICIAN: JASMEET MERCADO   TECHNIQUE: Noncontrast axial CT scan of head was performed. Angled reformats in brain and bone windows were generated. The images were reviewed in bone, brain, blood and  soft tissue windows.   FINDINGS: CSF Spaces: The ventricles, sulci and basal cisterns are within normal limits. There is no extraaxial fluid collection.   Parenchyma: Moderate to advanced volume loss.  There is periventricular and subcortical white matter hypoattenuation, most in keeping with chronic microvascular ischemic change. Similar appearance of asymmetric focal hypodensity in the left globus pallidus/genu of internal capsule suggesting sequelae of chronic ischemia.  Redemonstrated small focal hypodensity compatible with chronic lacunar infarct in the left cerebellar hemisphere. There is a new focal region of asymmetric cortical hypoattenuation and loss of gray-white matter differentiation involving the inferior posterior left temporal lobe (series 203, images 21-30; series 205, images 66-71; series 206, images 61-67), suspicious for acute to subacute ischemia. The grey-white differentiation is otherwise intact.There is no mass effect or midline shift.  There is no intracranial hemorrhage.   Calvarium: The calvarium is unremarkable.   Paranasal sinuses and mastoids: Visualized paranasal sinuses and mastoids are clear.       There is a new focal region of asymmetric cortical hypoattenuation and loss of gray-white matter differentiation involving the inferior posterior left temporal lobe (series 203, images 21-30; series 205, images 66-71; series 206, images 61-67), suspicious for small region of acute to subacute ischemia. No acute intracranial hemorrhage.     MACRO: Arian Goldberg discussed the significance and urgency of this critical finding by telephone with  JASMEET MERCADO on 11/15/2023 at 3:30 am. (**-RCF-**) Findings:  See findings.     Signed by: Arian Goldberg 11/15/2023 3:32 AM Dictation workstation:   [id number]                Assessment/Plan   Principal Problem:    Acute saddle pulmonary embolism, unspecified whether acute cor pulmonale present (CMS/HCC)  Bilateral DVTs    Evaluated patient's in her  hospital room family present.  Patient has been weaned down to nasal oxygen.  Continue weaning of oxygen.  Continue anticoagulation therapy we will hold off IVC filter at this time.  Agree with cardiology due to patient's BMI of 48 (advised weight reduction) will need to be transitioned to warfarin.  Continue elevation of lower extremities to reduce vasocongestion due to DVTs.  Patient will need compression stockings in 3 to 4 weeks and to wear them daily.  We will follow-up patient's hospitalized.    Thank you very much for allowing Vascular Surgery to be involved in the care of your patient sincerely Jayro GREER-CNP .  (This note was generated with voice recognition software and may contain errors including spelling, grammar, syntax and missed recognition of what was dictated, of which may not have been fully corrected)      Jayro Desai, ZOEY-CNP

## 2023-11-18 LAB
ALBUMIN SERPL BCP-MCNC: 2.5 G/DL (ref 3.4–5)
ALP SERPL-CCNC: 55 U/L (ref 33–136)
ALT SERPL W P-5'-P-CCNC: 7 U/L (ref 7–45)
ANION GAP SERPL CALC-SCNC: 12 MMOL/L (ref 10–20)
AST SERPL W P-5'-P-CCNC: 12 U/L (ref 9–39)
BILIRUB SERPL-MCNC: 0.7 MG/DL (ref 0–1.2)
BUN SERPL-MCNC: 19 MG/DL (ref 6–23)
CALCIUM SERPL-MCNC: 8.6 MG/DL (ref 8.6–10.3)
CHLORIDE SERPL-SCNC: 108 MMOL/L (ref 98–107)
CO2 SERPL-SCNC: 25 MMOL/L (ref 21–32)
CREAT SERPL-MCNC: 1.61 MG/DL (ref 0.5–1.05)
ERYTHROCYTE [DISTWIDTH] IN BLOOD BY AUTOMATED COUNT: 15.3 % (ref 11.5–14.5)
ERYTHROCYTE [DISTWIDTH] IN BLOOD BY AUTOMATED COUNT: 15.4 % (ref 11.5–14.5)
GFR SERPL CREATININE-BSD FRML MDRD: 32 ML/MIN/1.73M*2
GLUCOSE BLD MANUAL STRIP-MCNC: 89 MG/DL (ref 74–99)
GLUCOSE BLD MANUAL STRIP-MCNC: 92 MG/DL (ref 74–99)
GLUCOSE SERPL-MCNC: 101 MG/DL (ref 74–99)
HCT VFR BLD AUTO: 29.1 % (ref 36–46)
HCT VFR BLD AUTO: 33.8 % (ref 36–46)
HGB BLD-MCNC: 10.1 G/DL (ref 12–16)
HGB BLD-MCNC: 9 G/DL (ref 12–16)
INR PPP: 1.2 (ref 0.9–1.1)
MAGNESIUM SERPL-MCNC: 2.02 MG/DL (ref 1.6–2.4)
MCH RBC QN AUTO: 30.6 PG (ref 26–34)
MCH RBC QN AUTO: 30.9 PG (ref 26–34)
MCHC RBC AUTO-ENTMCNC: 29.9 G/DL (ref 32–36)
MCHC RBC AUTO-ENTMCNC: 30.9 G/DL (ref 32–36)
MCV RBC AUTO: 100 FL (ref 80–100)
MCV RBC AUTO: 102 FL (ref 80–100)
NRBC BLD-RTO: 0 /100 WBCS (ref 0–0)
NRBC BLD-RTO: 0.3 /100 WBCS (ref 0–0)
PHOSPHATE SERPL-MCNC: 3.3 MG/DL (ref 2.5–4.9)
PLATELET # BLD AUTO: 179 X10*3/UL (ref 150–450)
PLATELET # BLD AUTO: 196 X10*3/UL (ref 150–450)
POTASSIUM SERPL-SCNC: 4.7 MMOL/L (ref 3.5–5.3)
PROT SERPL-MCNC: 4.9 G/DL (ref 6.4–8.2)
PROTHROMBIN TIME: 13.7 SECONDS (ref 9.8–12.8)
RBC # BLD AUTO: 2.91 X10*6/UL (ref 4–5.2)
RBC # BLD AUTO: 3.3 X10*6/UL (ref 4–5.2)
SODIUM SERPL-SCNC: 140 MMOL/L (ref 136–145)
UFH PPP CHRO-ACNC: 0.6 IU/ML
UFH PPP CHRO-ACNC: 0.7 IU/ML
WBC # BLD AUTO: 6.2 X10*3/UL (ref 4.4–11.3)
WBC # BLD AUTO: 6.8 X10*3/UL (ref 4.4–11.3)

## 2023-11-18 PROCEDURE — 2500000004 HC RX 250 GENERAL PHARMACY W/ HCPCS (ALT 636 FOR OP/ED): Performed by: NURSE PRACTITIONER

## 2023-11-18 PROCEDURE — 85027 COMPLETE CBC AUTOMATED: CPT | Performed by: STUDENT IN AN ORGANIZED HEALTH CARE EDUCATION/TRAINING PROGRAM

## 2023-11-18 PROCEDURE — 2500000004 HC RX 250 GENERAL PHARMACY W/ HCPCS (ALT 636 FOR OP/ED): Performed by: INTERNAL MEDICINE

## 2023-11-18 PROCEDURE — 36415 COLL VENOUS BLD VENIPUNCTURE: CPT | Performed by: STUDENT IN AN ORGANIZED HEALTH CARE EDUCATION/TRAINING PROGRAM

## 2023-11-18 PROCEDURE — 82947 ASSAY GLUCOSE BLOOD QUANT: CPT

## 2023-11-18 PROCEDURE — 85520 HEPARIN ASSAY: CPT | Performed by: INTERNAL MEDICINE

## 2023-11-18 PROCEDURE — 2500000001 HC RX 250 WO HCPCS SELF ADMINISTERED DRUGS (ALT 637 FOR MEDICARE OP): Performed by: NURSE PRACTITIONER

## 2023-11-18 PROCEDURE — 36415 COLL VENOUS BLD VENIPUNCTURE: CPT | Performed by: INTERNAL MEDICINE

## 2023-11-18 PROCEDURE — 2500000002 HC RX 250 W HCPCS SELF ADMINISTERED DRUGS (ALT 637 FOR MEDICARE OP, ALT 636 FOR OP/ED): Performed by: NURSE PRACTITIONER

## 2023-11-18 PROCEDURE — 94640 AIRWAY INHALATION TREATMENT: CPT

## 2023-11-18 PROCEDURE — 85610 PROTHROMBIN TIME: CPT | Performed by: INTERNAL MEDICINE

## 2023-11-18 PROCEDURE — 1200000002 HC GENERAL ROOM WITH TELEMETRY DAILY

## 2023-11-18 PROCEDURE — 99232 SBSQ HOSP IP/OBS MODERATE 35: CPT | Performed by: NURSE PRACTITIONER

## 2023-11-18 PROCEDURE — 99233 SBSQ HOSP IP/OBS HIGH 50: CPT | Performed by: STUDENT IN AN ORGANIZED HEALTH CARE EDUCATION/TRAINING PROGRAM

## 2023-11-18 PROCEDURE — 84100 ASSAY OF PHOSPHORUS: CPT | Performed by: STUDENT IN AN ORGANIZED HEALTH CARE EDUCATION/TRAINING PROGRAM

## 2023-11-18 PROCEDURE — 85027 COMPLETE CBC AUTOMATED: CPT | Performed by: NURSE PRACTITIONER

## 2023-11-18 PROCEDURE — 80053 COMPREHEN METABOLIC PANEL: CPT | Performed by: STUDENT IN AN ORGANIZED HEALTH CARE EDUCATION/TRAINING PROGRAM

## 2023-11-18 PROCEDURE — 83735 ASSAY OF MAGNESIUM: CPT | Performed by: STUDENT IN AN ORGANIZED HEALTH CARE EDUCATION/TRAINING PROGRAM

## 2023-11-18 RX ORDER — WARFARIN 2.5 MG/1
2.5 TABLET ORAL DAILY
Status: DISCONTINUED | OUTPATIENT
Start: 2023-11-18 | End: 2023-11-18

## 2023-11-18 RX ORDER — WARFARIN 2.5 MG/1
2.5 TABLET ORAL DAILY
Status: COMPLETED | OUTPATIENT
Start: 2023-11-19 | End: 2023-11-20

## 2023-11-18 RX ORDER — WARFARIN SODIUM 5 MG/1
5 TABLET ORAL ONCE
Status: COMPLETED | OUTPATIENT
Start: 2023-11-18 | End: 2023-11-18

## 2023-11-18 RX ADMIN — PANTOPRAZOLE SODIUM 40 MG: 40 TABLET, DELAYED RELEASE ORAL at 06:01

## 2023-11-18 RX ADMIN — LEVOTHYROXINE SODIUM 150 MCG: 150 TABLET ORAL at 06:01

## 2023-11-18 RX ADMIN — IPRATROPIUM BROMIDE AND ALBUTEROL SULFATE 3 ML: 2.5; .5 SOLUTION RESPIRATORY (INHALATION) at 07:17

## 2023-11-18 RX ADMIN — ATORVASTATIN CALCIUM 40 MG: 40 TABLET, FILM COATED ORAL at 21:00

## 2023-11-18 RX ADMIN — GABAPENTIN 300 MG: 300 CAPSULE ORAL at 08:39

## 2023-11-18 RX ADMIN — FLUTICASONE PROPIONATE 2 SPRAY: 50 SPRAY, METERED NASAL at 21:00

## 2023-11-18 RX ADMIN — NYSTATIN 1 APPLICATION: 100000 POWDER TOPICAL at 21:00

## 2023-11-18 RX ADMIN — HEPARIN SODIUM 1400 UNITS/HR: 10000 INJECTION, SOLUTION INTRAVENOUS at 01:47

## 2023-11-18 RX ADMIN — IPRATROPIUM BROMIDE AND ALBUTEROL SULFATE 3 ML: 2.5; .5 SOLUTION RESPIRATORY (INHALATION) at 14:30

## 2023-11-18 RX ADMIN — WARFARIN SODIUM 5 MG: 5 TABLET ORAL at 17:09

## 2023-11-18 RX ADMIN — HEPARIN SODIUM 1400 UNITS/HR: 10000 INJECTION, SOLUTION INTRAVENOUS at 17:09

## 2023-11-18 RX ADMIN — NYSTATIN 1 APPLICATION: 100000 POWDER TOPICAL at 09:00

## 2023-11-18 RX ADMIN — GABAPENTIN 300 MG: 300 CAPSULE ORAL at 21:00

## 2023-11-18 RX ADMIN — GABAPENTIN 300 MG: 300 CAPSULE ORAL at 15:20

## 2023-11-18 RX ADMIN — ASPIRIN 81 MG: 81 TABLET, COATED ORAL at 08:39

## 2023-11-18 ASSESSMENT — COGNITIVE AND FUNCTIONAL STATUS - GENERAL
DRESSING REGULAR UPPER BODY CLOTHING: A LOT
PERSONAL GROOMING: A LOT
DAILY ACTIVITIY SCORE: 14
TOILETING: A LOT
STANDING UP FROM CHAIR USING ARMS: A LOT
MOVING TO AND FROM BED TO CHAIR: A LOT
DAILY ACTIVITIY SCORE: 14
HELP NEEDED FOR BATHING: A LOT
TOILETING: A LOT
DRESSING REGULAR LOWER BODY CLOTHING: A LOT
WALKING IN HOSPITAL ROOM: A LOT
MOVING TO AND FROM BED TO CHAIR: A LOT
TURNING FROM BACK TO SIDE WHILE IN FLAT BAD: A LOT
STANDING UP FROM CHAIR USING ARMS: A LOT
DRESSING REGULAR UPPER BODY CLOTHING: A LOT
CLIMB 3 TO 5 STEPS WITH RAILING: TOTAL
WALKING IN HOSPITAL ROOM: A LOT
DRESSING REGULAR LOWER BODY CLOTHING: A LOT
MOBILITY SCORE: 11
MOVING FROM LYING ON BACK TO SITTING ON SIDE OF FLAT BED WITH BEDRAILS: A LOT
TURNING FROM BACK TO SIDE WHILE IN FLAT BAD: A LOT
PERSONAL GROOMING: A LOT
HELP NEEDED FOR BATHING: A LOT
MOVING FROM LYING ON BACK TO SITTING ON SIDE OF FLAT BED WITH BEDRAILS: A LOT
MOBILITY SCORE: 11
CLIMB 3 TO 5 STEPS WITH RAILING: TOTAL

## 2023-11-18 ASSESSMENT — PAIN SCALES - GENERAL
PAINLEVEL_OUTOF10: 0 - NO PAIN
PAINLEVEL_OUTOF10: 0 - NO PAIN

## 2023-11-18 ASSESSMENT — PAIN - FUNCTIONAL ASSESSMENT
PAIN_FUNCTIONAL_ASSESSMENT: 0-10
PAIN_FUNCTIONAL_ASSESSMENT: 0-10

## 2023-11-18 NOTE — CARE PLAN
Problem: General Stroke  Goal: Demonstrate improvement in neurological exam throughout the shift  Outcome: Progressing  Goal: Maintain BP within ordered limits throughout shift  Outcome: Progressing  Goal: Participate in treatment (ie., meds, therapy) throughout shift  Outcome: Progressing  Goal: No symptoms of aspiration throughout shift  Outcome: Progressing  Goal: No symptoms of hemorrhage throughout shift  Outcome: Progressing  Goal: Tolerate enteral feeding throughout shift  Outcome: Progressing  Goal: Decreased nausea/vomiting throughout shift  Outcome: Progressing  Goal: Controlled blood glucose throughout shift  Outcome: Progressing  Goal: Out of bed three times today  Outcome: Progressing     Problem: ICU Stroke  Goal: Maintain ICP within ordered limits throughout shift  Outcome: Progressing  Goal: Tolerate EVD clamping trial throughout shift  Outcome: Progressing  Goal: Tolerate ventilator weaning trial during shift  Outcome: Progressing  Goal: Maintain patent airway throughout shift  Outcome: Progressing  Goal: Achieve/maintain targeted sodium level throughout shift  Outcome: Progressing     Problem: Nutrition  Goal: Less than 5 days NPO/clear liquids  Outcome: Progressing  Goal: Oral intake greater than 50%  Outcome: Progressing  Goal: Oral intake greater 75%  Outcome: Progressing  Goal: Consume prescribed supplement  Outcome: Progressing  Goal: Adequate PO fluid intake  Outcome: Progressing  Goal: Nutrition support goals are met within 48 hrs  Outcome: Progressing  Goal: Nutrition support is meeting 75% of nutrient needs  Outcome: Progressing  Goal: Tube feed tolerance  Outcome: Progressing  Goal: BG  mg/dL  Outcome: Progressing  Goal: Lab values WNL  Outcome: Progressing  Goal: Electrolytes WNL  Outcome: Progressing  Goal: Promote healing  Outcome: Progressing  Goal: Maintain stable weight  Outcome: Progressing  Goal: Reduce weight from edema/fluid  Outcome: Progressing  Goal: Gradual weight  gain  Outcome: Progressing  Goal: Improve ostomy output  Outcome: Progressing     Problem: Fall/Injury  Goal: Not fall by end of shift  Outcome: Progressing  Goal: Be free from injury by end of the shift  Outcome: Progressing  Goal: Verbalize understanding of personal risk factors for fall in the hospital  Outcome: Progressing  Goal: Verbalize understanding of risk factor reduction measures to prevent injury from fall in the home  Outcome: Progressing  Goal: Use assistive devices by end of the shift  Outcome: Progressing  Goal: Pace activities to prevent fatigue by end of the shift  Outcome: Progressing     Problem: Pain - Adult  Goal: Verbalizes/displays adequate comfort level or baseline comfort level  Outcome: Progressing     Problem: Safety - Adult  Goal: Free from fall injury  Outcome: Progressing     Problem: Discharge Planning  Goal: Discharge to home or other facility with appropriate resources  Outcome: Progressing     Problem: Chronic Conditions and Co-morbidities  Goal: Patient's chronic conditions and co-morbidity symptoms are monitored and maintained or improved  Outcome: Progressing     Problem: Skin  Goal: Decreased wound size/increased tissue granulation at next dressing change  Outcome: Progressing  Goal: Participates in plan/prevention/treatment measures  Outcome: Progressing  Goal: Prevent/manage excess moisture  Outcome: Progressing  Goal: Prevent/minimize sheer/friction injuries  Outcome: Progressing  Goal: Promote/optimize nutrition  Outcome: Progressing  Goal: Promote skin healing  Outcome: Progressing   The patient's goals for the shift include able to eat/drink    The clinical goals for the shift include maintain O2 sats >92

## 2023-11-18 NOTE — PROGRESS NOTES
"Lillian Mendoza is a 83 y.o. female on day 3 of admission presenting with Acute saddle pulmonary embolism, unspecified whether acute cor pulmonale present (CMS/HCC).    Subjective   I examined patient in her hospital room family present.  Patient denies any chest pain or shortness of breath.  Patient on considerable amount of nasal cannula oxygen.         Objective     Vitals:    11/18/23 1218   BP: 119/70   Pulse: 86   Resp:    Temp: 37.4 °C (99.3 °F)   SpO2: 94%      Physical Exam  Constitutional: BMI 47.2, awake/alert/oriented x3, in no distress, patient has left-sided facial droop and dysarthria, slurred speech  Eyes: Clear sclera  ENMT: mucous membranes are moist, no apparent injury, no lesions seen,   Head/neck: Neck supple, trachea  is midline, no apparent injury, no JVD, no bruits, no mass, no stridor  Respiratory/thorax: Breath sounds clear and equal diminished throughout bilaterally, thorax symmetric, currently on high flow oxygen  Cardiac/Vascular: Regular, rate and rhythm, no murmurs, 2+ radial pulses, palpable DP and PTs  Gastrointestinal: Nondistended soft nontender no rebound tenderness or guarding no masses palpable no organomegaly, positive bowel sounds, no bruits.  Musculoskeletal: Moves all extremities, limited range of motion , no joint swelling, normal strength  Extremities: No cyanosis, no contusions or wounds, no clubbing  Neurological: Alert and oriented x3, left facial droop with slurred speech no other neurological issues  Lymphatic: No significant lymphadenopathy  Skin: Warm and dry, no lesions, no rashes   Blood pressure 119/70, pulse 86, temperature 37.4 °C (99.3 °F), resp. rate 16, height 1.67 m (5' 5.75\"), weight 134 kg (295 lb 13.7 oz), SpO2 94 %.        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    Hearing loss 09/20/2023    Hiatal hernia 09/20/2023    History of pulmonary embolus (PE) 09/20/2023    Lower leg edema 09/20/2023    Lymphedema 09/20/2023    Otalgia, left 09/20/2023    Renal impairment 09/20/2023          Current Facility-Administered Medications:     aspirin EC tablet 81 mg, 81 mg, oral, Daily, ZOEY Garza-CNP, 81 mg at 11/18/23 0839    atorvastatin (Lipitor) tablet 40 mg, 40 mg, oral, Nightly, ZOEY Garza-CNP, 40 mg at 11/17/23 2041    fluticasone (Flonase) nasal spray 2 spray, 2 spray, Each Nostril, Daily, ZOEY Garza-CNP, 2 spray at 11/17/23 2041    gabapentin (Neurontin) capsule 300 mg, 300 mg, oral, TID, ZOEY Garza-CNP, 300 mg at 11/18/23 0839    heparin 1,000 unit/mL injection 3,000-6,000 Units, 3,000-6,000 Units, intravenous, q4h PRN, ZOEY Garza-CNP, 3,000 Units at 11/16/23 0911    heparin 25,000 Units in dextrose 5% 250 mL (100 Units/mL) infusion (premix), 0-4,500 Units/hr, intravenous, Continuous, REE Montoya, Last Rate: 14 mL/hr at 11/18/23 0400, 1,400 Units/hr at 11/18/23 0400    ipratropium-albuteroL (Duo-Neb) 0.5-2.5 mg/3 mL nebulizer solution 3 mL, 3 mL, nebulization, TID, ZOEY Garza-CNP, 3 mL at 11/18/23 0717    lactated Ringer's infusion, 75 mL/hr, intravenous, Continuous, Anatoliy Kumar DO, Last Rate: 75 mL/hr at 11/16/23 2052, 75 mL/hr at 11/16/23 2052    levothyroxine (Synthroid, Levoxyl) tablet 150 mcg, 150 mcg, oral, Daily before breakfast, ZOEY Garza-CNP, 150 mcg at 11/18/23 0601    nystatin (Mycostatin) 100,000 unit/gram powder 1 Application,  1 Application, Topical, BID, Kanwal SORIA REE Wu, 1 Application at 11/18/23 0900    oxygen (O2) therapy, , inhalation, Continuous PRN - O2/gases, Kanwal SORIA REE Wu, Rate Verify at 11/16/23 1616    pantoprazole (ProtoNix) EC tablet 40 mg, 40 mg, oral, Daily before breakfast, Kanwal REE Schmitz, 40 mg at 11/18/23 0601    polyethylene glycol (Glycolax, Miralax) packet 17 g, 17 g, oral, Daily, Kanwal SORIA REE Wu     Lab Results   Component Value Date    WBC 6.2 11/18/2023    HGB 9.0 (L) 11/18/2023    HCT 29.1 (L) 11/18/2023     11/18/2023    CHOL 112 11/15/2023    TRIG 142 11/15/2023    HDL 30.7 11/15/2023    ALT 7 11/18/2023    AST 12 11/18/2023     11/18/2023    K 4.7 11/18/2023     (H) 11/18/2023    CREATININE 1.61 (H) 11/18/2023    BUN 19 11/18/2023    CO2 25 11/18/2023    TSH 0.14 (L) 08/24/2021    INR 1.3 (H) 11/15/2023    HGBA1C 6.1 (H) 11/15/2023       Transthoracic Echo (TTE) Limited    Result Date: 11/16/2023    [facility], [address] [place] [id number] [phone] and                                 Fax [phone] TRANSTHORACIC ECHOCARDIOGRAM REPORT  Patient Name:      DARLING SORIA GARO Colin Physician:    [id number] Govind Olvera MD Study Date:        11/16/2023           Ordering Provider:    [id number] ANTOINETTE ENCINAS MRN/PID:           [id number]             Fellow: Accession#:        [id number]         Nurse: Date of Birth/Age: 1940 / 83 years Sonographer:          Yfn Castellanos RDCS, OSMAN Gender:            F                    Additional Staff: Height:            165.10 cm            Admit Date:           11/15/2023 Weight:            131.54 kg            Admission Status:     Inpatient -                                                               Routine BSA:               2.32 m2               Encounter#:           [id number]                                         Department Location:  [address]                                                               floor/ICU Blood Pressure: 109 /70 mmHg Study Type:    TRANSTHORACIC ECHO (TTE) LIMITED Diagnosis/ICD: Personal history of pulmonary embolism-Z86.711 Indication:    PE. CPT Code:      Echo Limited-63008 Patient History: Pertinent History: PE. Study Detail: The following Echo studies were performed: 2D. Technically               challenging study due to body habitus. Agitated saline used as a               contrast agent for intraseptal flow evaluation.  PHYSICIAN INTERPRETATION: Left Ventricle: The left ventricular systolic function is normal. The left ventricular cavity size was not assessed. Left ventricular diastolic filling was not assessed. Left Atrium: The left atrium was not assessed. Right Ventricle: The right ventricle was not assessed. Right ventricular systolic function not assessed. Right Atrium: The right atrium was not assessed. Aortic Valve: The aortic valve was not assessed. Aortic valve regurgitation was not assessed. Mitral Valve: The mitral valve was not assessed. Mitral valve regurgitation was not assessed. Tricuspid Valve: The tricuspid valve was not assessed. Tricuspid regurgitation was not assessed. Pulmonic Valve: The pulmonic valve was not assessed. Pulmonic valve regurgitation was not assessed. Pericardium: There is a small pericardial effusion. There is no evidence of cardiac tamponade. Aorta: The aortic root was not assessed.  CONCLUSIONS:  1. Left ventricular systolic function is normal.  2. There is no evidence of cardiac tamponade.  3. Negative agitated saline study. No PFO. QUANTITATIVE DATA SUMMARY:  [id number] Govind Olvera MD Electronically signed on 11/16/2023 at 2:52:09 PM  ** Final **     MR brain wo IV contrast    Result Date: 11/16/2023  Interpreted By:  Concepcion Hopper, STUDY: MR BRAIN WO IV CONTRAST;  11/16/2023  2:18 pm   INDICATION: Signs/Symptoms:stroke symptoms.   COMPARISON: CT head 11/15/2023.   ACCESSION NUMBER(S): [id number]   ORDERING CLINICIAN: TERRY MCKINNEY   TECHNIQUE: Axial T2, FLAIR, DWI, gradient echo T2 and sagittal and coronal T1 weighted images of brain were acquired.   FINDINGS: There are multiple small foci of cortical and subcortical diffusion restriction within the bilateral cerebral hemispheres and left cerebellum compatible with acute infarcts. There is associated T2 and FLAIR signal abnormality without significant mass effect. No susceptibility artifact to suggest hemorrhage.   There is prominence of the ventricles, cortical sulci and basal cisterns compatible with age related involutional changes and moderate volume loss. Prominent extra-axial CSF space near the vertex likely represents asymmetric volume loss.   There is a background of mild nonspecific subcortical and periventricular T2 and FLAIR hyperintense signal which is compatible with microangiopathy. Major intracranial flow voids at the skull base are unremarkable. Evidence of bilateral lens surgery. Paranasal sinuses and mastoid air cells are predominantly clear.   Cerebellar tonsils are above the foramen magnum. Pituitary and sella are not enlarged.       Small foci of cortical and subcortical diffusion restriction within the bilateral cerebral hemispheres and left cerebellum compatible with acute infarcts. Given multiple vascular territories consider a central embolic source. There is no mass effect, midline shift or evidence of hemorrhage.   Volume loss and nonspecific white matter changes compatible with microangiopathy.   MACRO: None   Signed by: Concepcion Hopper 11/16/2023 2:37 PM Dictation workstation:   [id number]    Vascular US Lower Extremity Venous Duplex Bilateral    Result Date: 11/16/2023           [address] [address] Tel [phone] and Fax [phone]  Vascular Lab Report [facility] LOWER  EXTREMITY VENOUS DUPLEX BILATERAL  Patient Name:     DARLING WYMAN  Reading Physician: [id number] Cookie Pace MD Study Date:       11/15/2023          Ordering           [id number] TRANG ALVARADO                                       Physician: MRN/PID:          [id number]            Technologist:      Allyn Escobar RVT Accession#:       [id number]        Technologist 2: Date of           1940 / 83      Encounter#:        [id number] Birth/Age:        years Gender:           F Admission Status: Inpatient           Location           [facility]                                       Performed:  Diagnosis/ICD: Other pulmonary embolism without acute cor pulmonale-I26.99 CPT Codes:     13481 Peripheral venous duplex scan for DVT complete  **CRITICAL RESULT** Critical Result: DVT Bilateral FV, Bilateral POP V, and Right PTV Notification called to Dr Trang Alvarado on 11/15/2023 at 3:00:00 PM by NEIL Escobar RVT.  CONCLUSIONS: Right Lower Venous: There is acute occlusive deep vein thrombosis visualized in the mid femoral, distal femoral, popliteal and posterior tibial veins. There is acute non-occlusive deep vein thrombosis visualized in the proximal femoral vein. Left Lower Venous: There is acute occlusive deep vein thrombosis visualized in the mid femoral, distal femoral, popliteal and proximal femoral veins. Cannot rule out thrombus in non-visualized peroneal vein due to edema.  Imaging & Doppler Findings:  Right                 Compressible      Thrombus          Flow Distal External Iliac     Yes             None         Continuous CFV                       Yes             None         Continuous PFV                       Yes             None FV Proximal             Partial    Acute non-occlusive Continuous FV Mid                     No        Acute occlusive FV Distal                  No        Acute occlusive Popliteal                  No        Acute occlusive       None Peroneal                  Yes             None PTV                        No        Acute occlusive  Left                  Compress    Thrombus        Flow Distal External Iliac   Yes         None       Continuous CFV                     Yes         None       Continuous PFV                     Yes         None FV Proximal              No    Acute occlusive    None FV Mid                   No    Acute occlusive FV Distal                No    Acute occlusive Popliteal                No    Acute occlusive    None PTV                     Yes         None  [id number] Cookie Pace MD Electronically signed by [id number] Cookie Pace MD on 11/16/2023 at 12:36:58 PM  ** Final **     Transthoracic Echo (TTE) Complete    Result Date: 11/16/2023    [facility], [address] [place] [id number] [phone] and                                 Fax [phone] TRANSTHORACIC ECHOCARDIOGRAM REPORT  Patient Name:      DARLING WYMAN   Reading Physician:    [id number] Govind Olvera MD Study Date:        11/15/2023           Ordering Provider:    [id number] ANTOINETTE ENCINAS MRN/PID:           [id number]             Fellow: Accession#:        [id number]         Nurse: Date of Birth/Age: 1940 / 83 years Sonographer:          OSMAN Aguirre RDCS Gender:            F                    Additional Staff: Height:            165.10 cm            Admit Date:           11/15/2023 Weight:            110.68 kg            Admission Status:     Inpatient -                                                               Routine BSA:               2.15 m2              Encounter#:           [id number]                                         Department Location:  [address]                                                               floor/ICU Blood Pressure: 105 /73 mmHg Study Type:    TRANSTHORACIC ECHO  (TTE) COMPLETE Diagnosis/ICD: Other forms of dyspnea-R06.09 Indication:    Dyspnea CPT Code:      Echo Complete w Full Doppler-44826 Patient History: Pertinent History: Dyspnea. Study Detail: The following Echo studies were performed: 2D, M-Mode, Doppler and               color flow. Technically challenging study due to poor acoustic               windows, patient lying in supine position, body habitus and Post               procedure and bi-pap. Definity used as a contrast agent for               endocardial border definition. Total contrast used for this               procedure was 3 mL via IV push.  PHYSICIAN INTERPRETATION: Left Ventricle: The left ventricular systolic function is normal, with an estimated ejection fraction of 50-55%. There are no regional wall motion abnormalities. The left ventricular cavity size is normal. Left ventricular diastolic filling was not assessed. Left Atrium: The left atrium is normal in size. Right Ventricle: The right ventricle is severely enlarged. There is moderately reduced right ventricular systolic function. Right Atrium: The right atrium is mildly dilated. Aortic Valve: The aortic valve is probably trileaflet. There is trivial aortic valve regurgitation. Mitral Valve: The mitral valve is normal in structure. There is trace mitral valve regurgitation. Tricuspid Valve: The tricuspid valve is structurally normal. There is mild tricuspid regurgitation. The Doppler estimated RVSP is moderately elevated at 43.2 mmHg. Pulmonic Valve: The pulmonic valve is not well visualized. The pulmonic valve regurgitation was not well visualized. Pericardium: There is no pericardial effusion noted. Aorta: The aortic root is abnormal. There is mild dilatation of the ascending aorta. There is mild dilatation of the aortic root. Systemic Veins: The inferior vena cava appears to be of normal size.  CONCLUSIONS:  1. Left ventricular systolic function is normal with a 50-55% estimated ejection  fraction.  2. Severely enlarged right ventricle.  3. There is moderately reduced right ventricular systolic function.  4. Moderately elevated right ventricular systolic pressure. QUANTITATIVE DATA SUMMARY: 2D MEASUREMENTS:                          Normal Ranges: Ao Root d:     3.70 cm   (2.0-3.7cm) LAs:           3.10 cm   (2.7-4.0cm) IVSd:          1.02 cm   (0.6-1.1cm) LVPWd:         0.84 cm   (0.6-1.1cm) LVIDd:         3.47 cm   (3.9-5.9cm) LVIDs:         2.14 cm LV Mass Index: 42.8 g/m2 LV % FS        38.3 % M-MODE MEASUREMENTS:                  Normal Ranges: Ao Root: 4.00 cm (2.0-3.7cm) LAs:     3.50 cm (2.7-4.0cm) AORTA MEASUREMENTS:                    Normal Ranges: Asc Ao, d: 3.80 cm (2.1-3.4cm) AORTIC VALVE:                        Normal Ranges: LVOT Diameter: 2.70 cm (1.8-2.4cm)  RIGHT VENTRICLE: RV Basal 5.13 cm TRICUSPID VALVE/RVSP:                             Normal Ranges: Peak TR Velocity: 3.17 m/s RV Syst Pressure: 43.2 mmHg (< 30mmHg) IVC Diam:         1.50 cm  [id number] Govind Olvera MD Electronically signed on 11/16/2023 at 9:42:05 AM  ** Final **     CT brain attack head wo IV contrast    Result Date: 11/15/2023  Interpreted By:  Reece Teran, STUDY: CT BRAIN ATTACK HEAD WO IV CONTRAST;  11/15/2023 5:54 pm   INDICATION: Signs/Symptoms:left facial droop.   COMPARISON: Noncontrast CT head dated 11/15/2023.   ACCESSION NUMBER(S): [id number]   ORDERING CLINICIAN: TERRY MCKINNEY   TECHNIQUE: Noncontrast axial CT scan of head was performed. Angled reformats in brain and bone windows were generated. The images were reviewed in bone, brain, blood and soft tissue windows.   FINDINGS: Exam is somewhat degraded by presence of the intravenous contrast within the vascular structures, likely from interventional procedure from earlier in the day due to poor renal clearance.   Within limits of the study, no hyperdense intracranial hemorrhage is evident. There is no mass effect or midline shift. Area  of diminished attenuation described on previous same day noncontrast CT of the head dated 11/15/2023 is not well visualized on current study.   Gray-white differentiation is intact, without evidence of CT apparent transcortical infarct. Subtle attenuation changes present in the periventricular and subcortical white matter of bilateral cerebral hemispheres are nonspecific, but favored to represent sequela of microvascular disease.   There is prominence of the extra-axial spaces overlying the cerebral hemispheres bilaterally without evidence of abnormal fluid collection. Basal cisterns are patent. No ventricular dilatation is present.   Scalp soft tissues do not demonstrate any acute abnormalities. Calvarium is unremarkable in appearance without evidence of depressed skull fracture. Mastoid air cells and middle ear cavities are well aerated without evidence of fluid fluid levels.   There is opacification of the intracranial carotid arteries and basilar artery bilaterally.       1.  Exam is somewhat degraded by a retained contrast from previous interventional procedure likely due to poor renal clearance. 2. Within limitations of the study, no hyperdense intracranial hemorrhage or CT apparent transcortical infarct is identified. Area of diminished attenuation in the posteroinferior left temporal lobe described on previous same day noncontrast CT of the head is not definitely identified on current examination. MRI of the brain can be considered for more definite characterization. 3. Subtle attenuation changes present in the periventricular and subcortical white matter bilateral cerebral hemispheres are nonspecific, but favored to represent changes of microvascular disease.   MACRO: Reece Teran discussed the significance and urgency of this critical finding by telephone with  TERRY MCKINNEY on 11/15/2023 at 6:03 pm.  (**-RCF-**) Findings:  See findings.   Signed by: Reece Teran 11/15/2023 6:05 PM Dictation  workstation:   [id number]    Invasive vascular procedure    Result Date: 11/15/2023   [facility], Cath Lab, [address] [place] [id number] Cardiovascular Catheterization Report Patient Name:      DARLING WYMAN   Performing Physician:  Jay Alvarado MD Study Date:        11/15/2023           Verifying Physician:   Jay Alvarado MD MRN/PID:           [id number]             Cardiologist/Co-scrub: Accession#:        [id number]         Ordering Physician:    Jay ALVARADO Date of Birth/Age: 1940 / 83 years Fellow: Gender:            F                    Fellow: Encounter#:        [id number]  Study:            Pulmonary Angiogram Additional Study: Peripheral Intervention  Indications: DARLING WYMAN is a 84 year old female who presents with intermediate-high risk PE, hx of PE 15 years ago (thought 2/2 knee TKA) s/p 1 year of warfarin; p/w stroke c/f paradoxical embolus in setting of PE. +significant O2 requirement.  Procedure Description: After infiltration of local anesthetic, the right femoral vein was identified with two dimensional ultrasound. Under direct ultrasound visualization, the right femoral vein was cannulated with a percutaneous technique. A 24F sheath was placed in the vein.  Pulmonary Embolism:  We predilated with a 22F Mac dilator, and placed a 24F sheath into the vein. Post-procedure, the venous sheath was pulled and pressure was applied to the site via figure-of-8.  Prior to access, we imaged the bilateral common femoral veins to evaluate for thrombus. No thrombus was noted. We selectively engaged the pulmonary artery using a JR4 diagnostic catheter. Pre-thrombectomy RA and PA pressures were obtained.  Following this, we sequentially engaged the following PA branches with Inari [id number] for selective extirpation of material: Left main, Right main, Truncus anterior and RLL posterior basal. Successful thrombectomy was performed. Selective angiogram was performed during the  "procedure to help locate thrombus for extraction. Post-thrombectomy RA and PA pressures were obtained.  We did not administer therapeutic systemic heparin up front d/t stroke and concerns for potential hemorrhagic conversion. Heparinized saline was utilized to \"wash\" the FlowSaver filter to maintain patency. Initially thrombectomy of LPA highly successful. Difficulty with RPA thrombectomy, with angiogram showing ball-like embolus. TA aspiration performed with loosening of the RPA thrombus (subacute clot). We did provide 2000 units of heparin at this time due to extended dwell time of catheter. Further thrombectomy performed of the main RPA and lower posterior branch with successful extirpation of remainder of subacute thrombus.  Hemo Personnel: +----------+---------+ Name      Duty      +----------+---------+ Maldonado Alvarado MDPJAIRON MD 1 +----------+---------+  Hemodynamic Pressures:  +----+-----------+----------+-------------+--------------+-----+-------+-------+ Site Date Time Phase NameSystolic mmHgDiastolic mmHgMean A-Wave V-Wave                                                      mmHg  mmHg   mmHg   +----+-----------+----------+-------------+--------------+-----+-------+-------+   RA 11/15/2023   O2 REST                              25     28     25     10:53:43 AM                                                         +----+-----------+----------+-------------+--------------+-----+-------+-------+   PA 11/15/2023   O2 REST           73            27   52                   10:56:29 AM                                                         +----+-----------+----------+-------------+--------------+-----+-------+-------+   PA 11/15/2023   O2 REST           75            31   51                   11:38:07 AM                                                         +----+-----------+----------+-------------+--------------+-----+-------+-------+  "  RA 11/15/2023   O2 REST                              18     21     19     11:38:30 AM                                                         +----+-----------+----------+-------------+--------------+-----+-------+-------+   PA 11/15/2023   O2 REST           67            29   41                   12:06:02 PM                                                         +----+-----------+----------+-------------+--------------+-----+-------+-------+   RA 11/15/2023   O2 REST                              23     27     23     12:06:39 PM                                                         +----+-----------+----------+-------------+--------------+-----+-------+-------+  Oxygen Saturation %: +-----------+----------+------------+ Sample SiteO2 Sat (%)HB (g/100ml) +-----------+----------+------------+          AO        90        15.1 +-----------+----------+------------+          PA        52        15.1 +-----------+----------+------------+          AO        90        15.1 +-----------+----------+------------+          PA        52        15.1 +-----------+----------+------------+          PA        44             +-----------+----------+------------+          PA        44             +-----------+----------+------------+  Cardiac Outputs: +---------------+------------------+-------+ SINTIA CO (l/min)SINTIA CI (l/min/m2)SINTIA SV +---------------+------------------+-------+             3.7               1.7   41.2 +---------------+------------------+-------+  Vascular Resistance Calculated Values (Wood Units): +-----+----+----+ PhaseTPR TPRI +-----+----+----+ 0    14.030.5 +-----+----+----+  Complications: No in-lab complications observed.  Cardiac Cath Post Procedure Notes: Post Procedure Diagnosis: Bilateral pulmonary embolism R>L c/w intermediate-high                           risk PE s/p extirpation of material. Blood Loss:                Estimated blood loss during the procedure was 50 mls. Specimens Removed:        Number of specimen(s) removed: thrombus (acute LPA,                           subacute RPA). ____________________________________________________________________________________ CONCLUSIONS:  1. Indication: intermediate-high risk PE with suspected paradoxical thrombus and CVA, severe hypoxia.  2. Bilateral pulmonary angiogram and exptirpation of material from bilateral PAs as noted.  3. Heparin anticoagulation and routine work up. ICD 10 Codes: Other pulmonary embolism with acute cor pulmonale-I26.09  CPT Codes: Selective catheter placement, left or right pulmonary artery uni-95650; Selective catheter placement, left or right pulmonary artery bilat-22217.50; Selective catheter placement, segmental or subsegmental pulmonary artery uni-84341; Angiography, pulmonary, bilateral S&I-59481; Extirpation of material, unilateral PA-31621; Extirpation of material, contralateral PA-26560.50; Extirpation of material, unilateral secondary vessel Subsegment 1-75330; Extirpation of material, unilateral secondary vessel Subsegment 2-07583; Moderate Sedation Services initial 15 minutes patient >5 years-43550; Moderate Sedation Services 1st additional 15 minutes patient >5 years-21024; Moderate Sedation Services 2nd additional 15 minutes patient >5 years-53813; Moderate Sedation Services 3rd additional 15 minutes patient >5 years-65011; Moderate Sedation Services 4th additional 15 minutes patient >5 years-09833; Moderate Sedation Services 5th additional 15 minutes patient >5 years-68954  [id number] Maldonado Alvarado MD Performing Physician Electronically signed by [id number] Alvarado MD on 11/15/2023 at 3:56:11 PM  ** Final **     CT angio chest for pulmonary embolism    Addendum Date: 11/15/2023    This finding was discussed with and acknowledged by Dr. Enzo Dumont on 11/15/2023 at 9:30 AM Signed by Stan Chamberlain MD    Result Date: 11/15/2023  STUDY: CT Angiogram  of the Chest; 11/15/2023 7:38 AM. INDICATION: Known pulmonary embolism.  Evaluate for heart strain. COMPARISON: CXR 11/15/2023.  CT CAP 12/2/2022. ACCESSION NUMBER(S): [id number] ORDERING CLINICIAN: LAZARO AMEZQUITA TECHNIQUE:  CTA of the chest was performed with intravenous contrast. Images are reviewed and processed at a workstation according to the CT angiogram protocol with 3-D and/or MIP post processing imaging generated.  Omnipaque 350 100 mL was administered intravenously. Automated mA/kV exposure control was utilized and patient examination was performed in strict accordance with principles of ALARA. FINDINGS: Pulmonary arteries are adequately opacified.  There are extensive filling defects in the bilateral main pulmonary arteries extending into the bilateral lobar and segmental arteries.  These are greater on the right.  The thoracic aorta is normal in course and caliber without dissection or aneurysm.  There is no evidence for right heart strain. The heart is normal in size without pericardial effusion.  Thoracic lymph nodes are not enlarged. There is no pleural effusion, pleural thickening, or pneumothorax. The airways are patent. Lungs are clear without consolidation, interstitial disease, or suspicious nodules.  There is prominent atelectasis at the left base. Upper abdomen demonstrates no acute pathology. There are no acute fractures.  No suspicious bony lesions.    1. Extensive bilateral pulmonary emboli, greater on the right. There is no evidence for right heart strain. 2. Prominent atelectasis at the left base. Signed by Stan Chamberlain MD    CT angio chest for pulmonary embolism    Result Date: 11/15/2023  STUDY: CT Angiogram of the Chest; 11/15/2023 6:32 AM INDICATION: Abdominal pain. COMPARISON: XR chest 11/15/2023, CT CAP 12/02/2022. ACCESSION NUMBER(S): [id number] ORDERING CLINICIAN: JASMEET MERCADO TECHNIQUE:  CTA of the chest was performed with intravenous contrast. Images are reviewed and  processed at a workstation according to the CT angiogram protocol with 3-D and/or MIP post processing imaging generated.  Omnipaque 350 75 mL was administered intravenously. Technologist note states contrast extravasation/IV infiltration. Automated mA/kV exposure control was utilized and patient examination was performed in strict accordance with principles of ALARA. FINDINGS: Pulmonary arteries are adequately opacified and there are extensive bilateral pulmonary emboli involving all lobes with large amount of embolic material in the distal aspect of the right main pulmonary artery. There is moderate to severe embolic burden with moderate to severe right heart dilation.  The thoracic aorta is normal in course and caliber without dissection or aneurysm. Heart is enlarged.  Thoracic lymph nodes are not enlarged. There is no pleural effusion, pleural thickening, or pneumothorax. The airways are patent. Left basilar atelectasis. Lungs are otherwise clear. There is fatty infiltration of the liver. Nonobstructing right renal calculus. Moderate right renal cortical atrophy. There are no acute fractures.  No suspicious bony lesions.    1. Extensive bilateral pulmonary emboli involving all lobes with large amount of embolic material in the distal aspect of the right main pulmonary artery. There is moderate to severe embolic burden with moderate to severe right heart strain. 2. Hepatic steatosis. 3. Nonobstructing right renal calculus. 4. Moderate right renal cortical atrophy. Findings discussed with and acknowledged by Dr. Enzo Dumont8:15 AM Signed by Akshat Nj MD    CT angio brain attack head w IV contrast and post procedure    Result Date: 11/15/2023  Interpreted By:  Arian Goldberg, STUDY: CT ANGIO BRAIN ATTACK HEAD W IV CONTRAST AND POST PROCEDURE; CT ANGIO BRAIN ATTACK NECK W IV CONTRAST AND POST PROCEDURE;  11/15/2023 4:49 am   INDICATION: Signs/Symptoms:Stroke Evaluation with VAN Positive.   COMPARISON:  Correlation made to noncontrast head CT of 11/15/2023.   ACCESSION NUMBER(S): [id number]; [id number]   ORDERING CLINICIAN: JASMEET MERCADO   TECHNIQUE: 75 cc Omnipaque 350 were administered intravenously and axial images of the head were acquired.  Coronal, sagittal, and 3-D reconstructions were provided for review.   FINDINGS: The previously seen region of asymmetric cortical hypoattenuation and loss of gray-white matter differentiation is not definitely seen on the current examination, suggesting its presence on the prior examination may have related to artifact. Gray-white matter differentiation appears maintained. There is no evidence of acute intracranial hemorrhage. No abnormal intracranial enhancement is seen.   CTA HEAD FINDINGS:   Anterior circulation: The bilateral intracranial internal carotid arteries, bilateral carotid terminals, bilateral proximal anterior and middle cerebral arteries are normal.   Posterior circulation: Bilateral intracranial vertebral arteries, vertebrobasilar junction, basilar artery and proximal posterior cerebral arteries are normal.   CTA NECK FINDINGS:   Right carotid vessels: The common carotid artery is normal. The carotid bifurcation is normal. The internal carotid artery in the neck is normal. There is 0% stenosis  .   Left carotid vessels: The common carotid artery is normal. The carotid bifurcation is normal. The internal carotid artery in the neck is normal. There is 0% stenosis  .   Vertebral vessels: The origin of the right vertebral artery is not well seen due to artifact. The visualized segments of the cervical vertebral arteries are normal in caliber.     CT angiography of the chest demonstrates acute bilateral pulmonary emboli. The visible aortic arch appears within normal limits. No significant stenosis in the innominate or subclavian arteries. Asymmetric posterior opacity in the superior segment of the left lower lobe partially imaged may relate to atelectasis,  scarring, pneumonia, or developing pulmonary infarct.       The previously seen region of asymmetric cortical hypoattenuation and loss of gray-white matter differentiation is not definitely seen on the current examination, suggesting its presence on the prior examination may have related to artifact. Gray-white matter differentiation appears maintained. There is no evidence of acute intracranial hemorrhage. No abnormal intracranial enhancement is seen.   No evidence for significant stenosis of the cervical vessels.   No evidence for significant stenosis or large branch vessel cutoffs of the intracranial vessels.   Acute bilateral pulmonary emboli are incidentally seen on CT angiography of the chest. Asymmetric posterior opacity in the superior segment of the left lower lobe partially imaged may relate to atelectasis, scarring, pneumonia, or developing pulmonary infarct.   MACRO: Arian Goldberg discussed the significance and urgency of this critical finding by epic secure chat with message receipt verification with JASMEET MERCADO on 11/15/2023 at 5:24 am.  (**-RCF-**) Findings:  Acute pulmonary embolism.   Signed by: Arian Goldberg 11/15/2023 5:25 AM Dictation workstation:   [id number]    CT angio brain attack neck w IV contrast and post procedure    Result Date: 11/15/2023  Interpreted By:  Arian Goldberg, STUDY: CT ANGIO BRAIN ATTACK HEAD W IV CONTRAST AND POST PROCEDURE; CT ANGIO BRAIN ATTACK NECK W IV CONTRAST AND POST PROCEDURE;  11/15/2023 4:49 am   INDICATION: Signs/Symptoms:Stroke Evaluation with VAN Positive.   COMPARISON: Correlation made to noncontrast head CT of 11/15/2023.   ACCESSION NUMBER(S): [id number]; [id number]   ORDERING CLINICIAN: JASMEET MERCADO   TECHNIQUE: 75 cc Omnipaque 350 were administered intravenously and axial images of the head were acquired.  Coronal, sagittal, and 3-D reconstructions were provided for review.   FINDINGS: The previously seen region of asymmetric cortical hypoattenuation and  loss of gray-white matter differentiation is not definitely seen on the current examination, suggesting its presence on the prior examination may have related to artifact. Gray-white matter differentiation appears maintained. There is no evidence of acute intracranial hemorrhage. No abnormal intracranial enhancement is seen.   CTA HEAD FINDINGS:   Anterior circulation: The bilateral intracranial internal carotid arteries, bilateral carotid terminals, bilateral proximal anterior and middle cerebral arteries are normal.   Posterior circulation: Bilateral intracranial vertebral arteries, vertebrobasilar junction, basilar artery and proximal posterior cerebral arteries are normal.   CTA NECK FINDINGS:   Right carotid vessels: The common carotid artery is normal. The carotid bifurcation is normal. The internal carotid artery in the neck is normal. There is 0% stenosis  .   Left carotid vessels: The common carotid artery is normal. The carotid bifurcation is normal. The internal carotid artery in the neck is normal. There is 0% stenosis  .   Vertebral vessels: The origin of the right vertebral artery is not well seen due to artifact. The visualized segments of the cervical vertebral arteries are normal in caliber.     CT angiography of the chest demonstrates acute bilateral pulmonary emboli. The visible aortic arch appears within normal limits. No significant stenosis in the innominate or subclavian arteries. Asymmetric posterior opacity in the superior segment of the left lower lobe partially imaged may relate to atelectasis, scarring, pneumonia, or developing pulmonary infarct.       The previously seen region of asymmetric cortical hypoattenuation and loss of gray-white matter differentiation is not definitely seen on the current examination, suggesting its presence on the prior examination may have related to artifact. Gray-white matter differentiation appears maintained. There is no evidence of acute intracranial  hemorrhage. No abnormal intracranial enhancement is seen.   No evidence for significant stenosis of the cervical vessels.   No evidence for significant stenosis or large branch vessel cutoffs of the intracranial vessels.   Acute bilateral pulmonary emboli are incidentally seen on CT angiography of the chest. Asymmetric posterior opacity in the superior segment of the left lower lobe partially imaged may relate to atelectasis, scarring, pneumonia, or developing pulmonary infarct.   MACRO: Arian Goldberg discussed the significance and urgency of this critical finding by epic secure chat with message receipt verification with JASMEET MERCADO on 11/15/2023 at 5:24 am.  (**-RCF-**) Findings:  Acute pulmonary embolism.   Signed by: Arian Goldberg 11/15/2023 5:25 AM Dictation workstation:   [id number]    XR chest 1 view    Result Date: 11/15/2023  STUDY: Chest Radiograph;  11/15/2023 3:28 AM INDICATION: Wheezing. COMPARISON: 12/01/2022 XR Chest ACCESSION NUMBER(S): [id number] ORDERING CLINICIAN: JASMEET MERCADO TECHNIQUE:  Frontal chest was obtained at 03:28 hours. FINDINGS: CARDIOMEDIASTINAL SILHOUETTE: Cardiomediastinal silhouette is enlarged and more pronounced as compared to previous exam. LUNGS: Lungs reveals opacification of left lung base.  There is a lobulated appearance of the left hilum. ABDOMEN: No remarkable upper abdominal findings.  BONES: No acute osseous changes.    Opacification at the left lung base difficult to determine etiology.  Cannot exclude underlying infiltrate.  Fullness of left hilum.  Findings could better assessed with CT chest. Signed by Morgan De León DO    CT brain attack head wo IV contrast    Result Date: 11/15/2023  Interpreted By:  Arian Goldberg, STUDY: CT BRAIN ATTACK HEAD WO IV CONTRAST;  11/15/2023 3:16 am   INDICATION: Signs/Symptoms:Stroke Evaluation.   COMPARISON: Noncontrast head CT of 12/02/2022.   ACCESSION NUMBER(S): [id number]   ORDERING CLINICIAN: JASMEET MERCADO   TECHNIQUE:  Noncontrast axial CT scan of head was performed. Angled reformats in brain and bone windows were generated. The images were reviewed in bone, brain, blood and soft tissue windows.   FINDINGS: CSF Spaces: The ventricles, sulci and basal cisterns are within normal limits. There is no extraaxial fluid collection.   Parenchyma: Moderate to advanced volume loss.  There is periventricular and subcortical white matter hypoattenuation, most in keeping with chronic microvascular ischemic change. Similar appearance of asymmetric focal hypodensity in the left globus pallidus/genu of internal capsule suggesting sequelae of chronic ischemia.  Redemonstrated small focal hypodensity compatible with chronic lacunar infarct in the left cerebellar hemisphere. There is a new focal region of asymmetric cortical hypoattenuation and loss of gray-white matter differentiation involving the inferior posterior left temporal lobe (series 203, images 21-30; series 205, images 66-71; series 206, images 61-67), suspicious for acute to subacute ischemia. The grey-white differentiation is otherwise intact.There is no mass effect or midline shift.  There is no intracranial hemorrhage.   Calvarium: The calvarium is unremarkable.   Paranasal sinuses and mastoids: Visualized paranasal sinuses and mastoids are clear.       There is a new focal region of asymmetric cortical hypoattenuation and loss of gray-white matter differentiation involving the inferior posterior left temporal lobe (series 203, images 21-30; series 205, images 66-71; series 206, images 61-67), suspicious for small region of acute to subacute ischemia. No acute intracranial hemorrhage.     MACRO: Arian Goldberg discussed the significance and urgency of this critical finding by telephone with  JASMEET MERCADO on 11/15/2023 at 3:30 am. (**-RCF-**) Findings:  See findings.     Signed by: Arian Goldberg 11/15/2023 3:32 AM Dictation workstation:   [id number]                Assessment/Plan    Principal Problem:    Acute saddle pulmonary embolism, unspecified whether acute cor pulmonale present (CMS/HCC)  Bilateral DVTs    Continue anticoagulation therapy as ordered, monitor for increased shortness of breath or chest pain, elevate lower extremities avoid keeping them in a dependent position to reduce vasocongestion and pressure.  Avoid compression of the lower extremities by SCDs.  Patient's facial droop has improved as well as her speech.  Continue OT and PT and speech therapy.  Monitor lab work for signs of bleeding.  Transition to oral anticoagulation when possible.  We will continue to follow while patient is hospitalized.    Thank you very much for allowing Vascular Surgery to be involved in the care of your patient sincerely Jayro Desai APRN-CNP .  (This note was generated with voice recognition software and may contain errors including spelling, grammar, syntax and missed recognition of what was dictated, of which may not have been fully corrected) Jayro Desai, APRN-CNP

## 2023-11-18 NOTE — PROGRESS NOTES
Subjective   Subjective Data and Overnight Events:  Still on oxygen supplementation.  Has been transferred to telemetry unit.  Son and granddaughter at bedside.  Denies having chest pain.    Objective   Vital Signs:  Patient Vitals for the past 24 hrs:   BP Temp Temp src Pulse Resp SpO2   11/18/23 0805 130/73 36.1 °C (97 °F) Temporal 96 16 92 %   11/18/23 0717 -- -- -- -- -- 91 %   11/18/23 0403 118/66 36.1 °C (97 °F) -- 88 14 92 %   11/17/23 2331 -- -- -- -- -- 90 %   11/17/23 2247 119/65 36.7 °C (98.1 °F) Temporal 93 14 90 %   11/17/23 1936 -- -- -- -- -- 93 %   11/17/23 1600 128/80 36.3 °C (97.3 °F) Temporal -- -- --   11/17/23 1200 -- 36.8 °C (98.2 °F) Temporal -- -- --         Physical Exam: On oxygen supplementation  General: no acute distress  HEENT: EOMI, no scleral icterus.  Lungs: Clear to auscultation bilaterally without wheezing, rales, or rhonchi.  Cardiovascular: Regular rhythm and rate. Normal S1 and S2. No murmurs, rubs, or gallops are appreciated. JVP normal.  Extremities: 1+ edema BLE.  Neurologic: Alert and oriented x3.    Current Medications:  Scheduled medications   Medication Dose Route Frequency    aspirin  81 mg oral Daily    atorvastatin  40 mg oral Nightly    fluticasone  2 spray Each Nostril Daily    gabapentin  300 mg oral TID    ipratropium-albuteroL  3 mL nebulization TID    levothyroxine  150 mcg oral Daily before breakfast    nystatin  1 Application Topical BID    pantoprazole  40 mg oral Daily before breakfast    polyethylene glycol  17 g oral Daily     Continuous Medications   Medication Dose Last Rate    heparin  0-4,500 Units/hr 1,400 Units/hr (11/18/23 0400)    lactated Ringer's  75 mL/hr 75 mL/hr (11/16/23 2052)     PRN medications   Medication    heparin    oxygen       Pertinent Recent Cardiovascular Studies (personally reviewed):  Vascular studies:  Venous duplex 11/15/2023: Bilateral fem/pop DVT. PTV RLE DVT. EIV and CFV spared.    Cardiac studies:  Echocardiogram  11/16/2023:  RV is mildly enlarged, function is mildly reduced.  There is no evidence of PFO (subcostal view only)      Laboratory values:  CMP:  Recent Labs     11/18/23 0316 11/17/23 0135 11/16/23  0203 11/15/23  1716 11/15/23  0325 12/12/22  0854 12/11/22  0812 12/08/22  0658 12/07/22  0846    138 136 138 138 140 142 140 143   K 4.7 4.7 5.3 5.1 4.5 4.9 4.7 4.0 4.1   * 104 105 103 101 107 108* 107 111*   CO2 25 27 21 23 23 23 26 25 25   ANIONGAP 12 12 15 17 19 15 13 12 11   BUN 19 25* 29* 29* 35* 22 24* 22 26*   CREATININE 1.61* 1.89* 2.12* 2.21* 2.58* 1.27* 1.37* 1.27* 1.47*   EGFR 32* 26* 23* 22* 18*  --   --   --   --    MG 2.02 1.97 2.12 2.20  --  2.24 1.81 1.97 1.57*       Recent Labs     11/18/23 0316 11/17/23 0135 11/16/23  0203 11/15/23  0325 12/12/22  0854   ALBUMIN 2.5* 2.5* 2.8* 3.6 3.0*   ALKPHOS 55 59 66 90  --    ALT 7 8 8 8  --    AST 12 16 19 16  --    BILITOT 0.7 0.8 0.8 0.8  --        CBC:  Recent Labs     11/18/23 0316 11/17/23 0135 11/16/23  2223 11/16/23  0203 11/15/23  1716 11/15/23  0325 12/12/22  0854 12/11/22  0812   WBC 6.2 8.6 9.4 8.6 12.8* 10.3 4.2* 6.9   HGB 9.0* 10.0* 10.2* 11.3* 12.3 15.1 11.6* 11.0*   HCT 29.1* 32.0* 32.8* 39.1 39.2 48.7* 40.8 35.5*    152 148* 147* 157 175 130* 315    100 101* 108* 99 99 108* 98       COAG:   Recent Labs     11/18/23  0751 11/18/23  0316 11/17/23  2157 11/17/23  1558 11/17/23  1047 11/17/23  0541 11/17/23  0131 11/16/23  2223 11/15/23  0905 11/15/23  0325 12/01/22  1710   INR  --   --   --   --   --   --   --   --   --  1.3* 1.0   HAUF 0.7 0.6 0.8 0.7 0.9 0.8 0.4 1.2*   < >  --   --     < > = values in this interval not displayed.       ABO:   Recent Labs     12/02/22  0601   ABO O       HEME/ENDO:  Recent Labs     11/15/23  0325 08/24/21  1459 08/17/20  0000 12/13/19  1504 11/14/18  1515   TSH  --  0.14* 0.09* 0.44 5.34*   HGBA1C 6.1*  --   --   --   --         CARDIAC:   Recent Labs     11/15/23  0418  "11/15/23  0325   TROPHS 1,116* 1,272*   BNP  --  1,683*       Recent Labs     11/15/23  0412 12/13/19  1504 11/14/18  1515   CHOL 112 182 141   LDLF  --  114* 71   HDL 30.7 46.9 37.5*   TRIG 142 105 162*       MICRO: No results for input(s): \"ESR\", \"CRP\", \"PROCAL\" in the last 01289 hours.  No results found for the last 90 days.         I have personally reviewed most recent PCP, cardiology, vascular, and/or podiatry documentation.      Assessment/Plan   83 y.o. 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 Presented with stroke sxs with dysarthria and facial droop. Out of window for TPA.  Patient status post bilateral thrombectomy for PE.  We did not perform IVC filter at that time due to no evidence of CFV thrombus seen in the Cath Lab.    Interim history includes DVT scan that showed femoropopliteal DVT bilaterally, MRI that showed evidence of embolic stroke.  No evidence of hemorrhagic transformation on multiple imaging of the head.      Plan:  1.  From a pulmonary embolism standpoint, titrate O2 down as tolerated.  Likely, she will require Coumadin for long-term anticoagulation due to BMI of 47.    2.  From a stroke perspective, uncertain as to source of embolic phenomenon.  Differential diagnosis includes the following:   - Highest suspicion is paradoxical embolus from PFO; we may need to consider CARLOS versus CMR to confirm if PFO is present despite negative TTE.  - APLA; regardless, patient will be on warfarin due to her weight and may not change treatment plan even if positive.  - Undiagnosed AF - on tele - I did not see any AF during visit today; CARLOS or CMR may be helpful here to evaluate the KATIE as well.  - Aortic atheroembolic phenomenon - if all else is negative, we may need to consider Plavix and statin optimization    3.  Given she has been tolerating anticoagulation at " least for 36 hours now without hemorrhagic transformation, I agree that overall risk of hemorrhagic transformation now is lower.  May be equivocal considering IVC filter placement.  I have discussed with Dr. Peres, we can hold off at this time.  Okay to ambulate and okay to place BP cuff on lower extremity if needed.    4.  Agree with PT/OT evaluation for poststroke care.    We will continue to follow-up, please do not hesitate to call if questions arise.  Discussed with family and primary team.    Disclaimer: This note was dictated by speech recognition. Minor errors in transcription may be present.       11/18/2023  I discussed this case with Dr. Alvarado and I agree that CARLOS will be helpful to rule out PFO.  I discussed that with the patient and family in extent and they are in agreement to proceed with that as long as the patient is breathing status permits.  Tentatively will plan to get it done on Monday.  For now we will continue with current medications including heparin drip.  I will follow-up with the patient tomorrow.      SIGNATURE: Govind Olvera MD PhD PATIENT NAME: Lillian Mendoza   DATE/TIME: November 18, 2023 11:55 AM MRN: [id number]

## 2023-11-18 NOTE — PROGRESS NOTES
"Patient seen and examined.  She is out of the ICU.  She is currently on nasal cannula oxygen.  No fevers or chills.  Family is at the bedside.  I do not believe that she follows with a nephrologist as an outpatient.  She identifies Dr. Ordonez as her outpatient pulmonologist.    Scheduled medications  aspirin, 81 mg, oral, Daily  atorvastatin, 40 mg, oral, Nightly  fluticasone, 2 spray, Each Nostril, Daily  gabapentin, 300 mg, oral, TID  ipratropium-albuteroL, 3 mL, nebulization, TID  levothyroxine, 150 mcg, oral, Daily before breakfast  nystatin, 1 Application, Topical, BID  pantoprazole, 40 mg, oral, Daily before breakfast  polyethylene glycol, 17 g, oral, Daily  [START ON 11/19/2023] warfarin, 2.5 mg, oral, Daily  warfarin, 5 mg, oral, Once      Continuous medications  heparin, 0-4,500 Units/hr, Last Rate: 1,400 Units/hr (11/18/23 1539)      PRN medications  PRN medications: heparin, oxygen, sodium chloride    Blood pressure 122/74, pulse 86, temperature 37.2 °C (99 °F), resp. rate 18, height 1.67 m (5' 5.75\"), weight 134 kg (295 lb 13.7 oz), SpO2 95 %.    Intake/Output Summary (Last 24 hours) at 11/18/2023 1638  Last data filed at 11/18/2023 1539  Gross per 24 hour   Intake 2166.43 ml   Output 500 ml   Net 1666.43 ml     General: No apparent distress  Respiratory: Crackles at the bases  CVS: No rub  Abdomen: Soft  Extremities: Positive peripheral edema    Cr 1.6, K 4.7, HCO 25  Hb 9    Impression:  1.  Acute kidney injury on top of chronic kidney disease stage III.  In the setting of a significant pulmonary embolus without evidence of right heart strain.  Contrast was given on November 15.  No evidence of overt contrast nephropathy.  Creatinine improved from 2.2 down to 1.6.  2.  Chronic peripheral edema.  She tells me she was not using her torsemide at home for the past year  3.  Multifactorial anemia  4.  Pulmonary embolus  5.  Underlying chronic kidney disease stage III with a baseline creatinine in the mid 1 " range with variable creatinine levels.  She has renal atrophy on imaging    Plan:  1.  Stop IV fluids  2.  Probably will look to restart torsemide in the next 1 to 2 days  3.  Check a PTH and vitamin D  4.  Check a urinalysis and spot albumin to creatinine ratio

## 2023-11-18 NOTE — PROGRESS NOTES
Dr Alba asked pharmacy to start and follow warfarin therapy.  Treating PE.  Warfarin 5 mg x 1 day 1 ordered.  Warfain 2.5 mg daily x 2 days started day 2.  INR's ordered.   Also, patient infusing heparin. Suggesting stop heparin infusion when inr ~ 2.   Label comments on heparin and warfarin orders.

## 2023-11-18 NOTE — CARE PLAN
Problem: General Stroke  Goal: Demonstrate improvement in neurological exam throughout the shift  Outcome: Progressing  Goal: Maintain BP within ordered limits throughout shift  Outcome: Progressing  Goal: Participate in treatment (ie., meds, therapy) throughout shift  Outcome: Progressing  Goal: No symptoms of aspiration throughout shift  Outcome: Progressing  Goal: No symptoms of hemorrhage throughout shift  Outcome: Progressing  Goal: Tolerate enteral feeding throughout shift  Outcome: Progressing  Goal: Decreased nausea/vomiting throughout shift  Outcome: Progressing  Goal: Controlled blood glucose throughout shift  Outcome: Progressing  Goal: Out of bed three times today  Outcome: Progressing     Problem: ICU Stroke  Goal: Maintain ICP within ordered limits throughout shift  Outcome: Progressing  Goal: Tolerate EVD clamping trial throughout shift  Outcome: Progressing  Goal: Tolerate ventilator weaning trial during shift  Outcome: Progressing  Goal: Maintain patent airway throughout shift  Outcome: Progressing  Goal: Achieve/maintain targeted sodium level throughout shift  Outcome: Progressing     Problem: Nutrition  Goal: Less than 5 days NPO/clear liquids  Outcome: Progressing  Goal: Oral intake greater than 50%  Outcome: Progressing  Goal: Oral intake greater 75%  Outcome: Progressing  Goal: Consume prescribed supplement  Outcome: Progressing  Goal: Adequate PO fluid intake  Outcome: Progressing  Goal: Nutrition support goals are met within 48 hrs  Outcome: Progressing  Goal: Nutrition support is meeting 75% of nutrient needs  Outcome: Progressing  Goal: Tube feed tolerance  Outcome: Progressing  Goal: BG  mg/dL  Outcome: Progressing  Goal: Lab values WNL  Outcome: Progressing  Goal: Electrolytes WNL  Outcome: Progressing  Goal: Promote healing  Outcome: Progressing  Goal: Maintain stable weight  Outcome: Progressing  Goal: Reduce weight from edema/fluid  Outcome: Progressing  Goal: Gradual weight  gain  Outcome: Progressing  Goal: Improve ostomy output  Outcome: Progressing     Problem: Fall/Injury  Goal: Not fall by end of shift  Outcome: Progressing  Goal: Be free from injury by end of the shift  Outcome: Progressing  Goal: Verbalize understanding of personal risk factors for fall in the hospital  Outcome: Progressing  Goal: Verbalize understanding of risk factor reduction measures to prevent injury from fall in the home  Outcome: Progressing  Goal: Use assistive devices by end of the shift  Outcome: Progressing  Goal: Pace activities to prevent fatigue by end of the shift  Outcome: Progressing     Problem: Pain - Adult  Goal: Verbalizes/displays adequate comfort level or baseline comfort level  Outcome: Progressing     Problem: Safety - Adult  Goal: Free from fall injury  Outcome: Progressing     Problem: Discharge Planning  Goal: Discharge to home or other facility with appropriate resources  Outcome: Progressing     Problem: Chronic Conditions and Co-morbidities  Goal: Patient's chronic conditions and co-morbidity symptoms are monitored and maintained or improved  Outcome: Progressing     Problem: Skin  Goal: Decreased wound size/increased tissue granulation at next dressing change  Outcome: Progressing  Flowsheets (Taken 11/18/2023 1535)  Decreased wound size/increased tissue granulation at next dressing change: Promote sleep for wound healing  Goal: Participates in plan/prevention/treatment measures  Outcome: Progressing  Flowsheets (Taken 11/18/2023 1535)  Participates in plan/prevention/treatment measures:   Discuss with provider PT/OT consult   Elevate heels  Goal: Prevent/manage excess moisture  Outcome: Progressing  Flowsheets (Taken 11/18/2023 1538)  Prevent/manage excess moisture:   Cleanse incontinence/protect with barrier cream   Moisturize dry skin  Goal: Prevent/minimize sheer/friction injuries  Outcome: Progressing  Flowsheets (Taken 11/18/2023 1538)  Prevent/minimize sheer/friction  injuries:   Use pull sheet   HOB 30 degrees or less  Goal: Promote/optimize nutrition  Outcome: Progressing  Flowsheets (Taken 11/18/2023 1538)  Promote/optimize nutrition:   Consume > 50% meals/supplements   Monitor/record intake including meals  Goal: Promote skin healing  Outcome: Progressing  Flowsheets (Taken 11/18/2023 1538)  Promote skin healing:   Turn/reposition every 2 hours/use positioning/transfer devices   Assess skin/pad under line(s)/device(s)   The patient's goals for the shift include able to eat/drink    The clinical goals for the shift include decrease oxygen use    Over the shift, the patient did not make progress toward the following goals. Barriers to progression include motivation. Recommendations to address these barriers include reinforcement.

## 2023-11-18 NOTE — PROGRESS NOTES
Lillian Mendoza is a 83 y.o. female on day 3 of admission presenting with Acute saddle pulmonary embolism, unspecified whether acute cor pulmonale present (CMS/HCC).      Subjective   Seen today doing better hemodynamically stable on the stepdown floor no problems or issues noted renal function looks good I Kalpana start the INR see if she is receiving Coumadin yet and then will overlap that it will take 4 to 5 days probably Wednesday Thursday to acute rehab thank you       Objective     Last Recorded Vitals  /73   Pulse 96   Temp 36.1 °C (97 °F) (Temporal)   Resp 16   Wt 134 kg (295 lb 13.7 oz)   SpO2 92%   Intake/Output last 3 Shifts:    Intake/Output Summary (Last 24 hours) at 11/18/2023 1127  Last data filed at 11/18/2023 0600  Gross per 24 hour   Intake 972.9 ml   Output 800 ml   Net 172.9 ml       Admission Weight  Weight: 111 kg (244 lb 11.4 oz) (11/15/23 0327)    Daily Weight  11/17/23 : 134 kg (295 lb 13.7 oz)    Image Results  Transthoracic Echo (TTE) [facility], [address] [place] [id number] [phone] and                                  Fax [phone]    TRANSTHORACIC ECHOCARDIOGRAM REPORT       Patient Name:      LILLIAN MENDOZA   Reading Physician:    [id number] Govind Olvera MD  Study Date:        11/16/2023           Ordering Provider:    [id number] ANTOINETTE ENCINAS  MRN/PID:           [id number]             Fellow:  Accession#:        [id number]         Nurse:  Date of Birth/Age: 1940 / 83 years Sonographer:          Yfn Castellanos RDCS, OSMAN  Gender:            F                    Additional Staff:  Height:            165.10 cm            Admit Date:           11/15/2023  Weight:            131.54 kg            Admission Status:     Inpatient -                                                                Routine  BSA:                2.32 m2              Encounter#:           [id number]                                          Department Location:  [address]                                                                floor/ICU  Blood Pressure: 109 /70 mmHg    Study Type:    TRANSTHORACIC ECHO (TTE) LIMITED  Diagnosis/ICD: Personal history of pulmonary embolism-Z86.711  Indication:    PE.  CPT Code:      Echo Limited-14404    Patient History:  Pertinent History: PE.    Study Detail: The following Echo studies were performed: 2D. Technically                challenging study due to body habitus. Agitated saline used as a                contrast agent for intraseptal flow evaluation.       PHYSICIAN INTERPRETATION:  Left Ventricle: The left ventricular systolic function is normal. The left ventricular cavity size was not assessed. Left ventricular diastolic filling was not assessed.  Left Atrium: The left atrium was not assessed.  Right Ventricle: The right ventricle was not assessed. Right ventricular systolic function not assessed.  Right Atrium: The right atrium was not assessed.  Aortic Valve: The aortic valve was not assessed. Aortic valve regurgitation was not assessed.  Mitral Valve: The mitral valve was not assessed. Mitral valve regurgitation was not assessed.  Tricuspid Valve: The tricuspid valve was not assessed. Tricuspid regurgitation was not assessed.  Pulmonic Valve: The pulmonic valve was not assessed. Pulmonic valve regurgitation was not assessed.  Pericardium: There is a small pericardial effusion. There is no evidence of cardiac tamponade.  Aorta: The aortic root was not assessed.       CONCLUSIONS:   1. Left ventricular systolic function is normal.   2. There is no evidence of cardiac tamponade.   3. Negative agitated saline study. No PFO.    QUANTITATIVE DATA SUMMARY:     [id number] Govind Olvera MD  Electronically signed on 11/16/2023 at 2:52:09 PM       ** Final **  MR brain wo IV contrast  Narrative: Interpreted  By:  Concepcion Hopper,   STUDY:  MR BRAIN WO IV CONTRAST;  11/16/2023 2:18 pm      INDICATION:  Signs/Symptoms:stroke symptoms.      COMPARISON:  CT head 11/15/2023.      ACCESSION NUMBER(S):  [id number]      ORDERING CLINICIAN:  TERRY MCKINNEY      TECHNIQUE:  Axial T2, FLAIR, DWI, gradient echo T2 and sagittal and coronal T1  weighted images of brain were acquired.      FINDINGS:  There are multiple small foci of cortical and subcortical diffusion  restriction within the bilateral cerebral hemispheres and left  cerebellum compatible with acute infarcts. There is associated T2 and  FLAIR signal abnormality without significant mass effect. No  susceptibility artifact to suggest hemorrhage.      There is prominence of the ventricles, cortical sulci and basal  cisterns compatible with age related involutional changes and  moderate volume loss. Prominent extra-axial CSF space near the vertex  likely represents asymmetric volume loss.      There is a background of mild nonspecific subcortical and  periventricular T2 and FLAIR hyperintense signal which is compatible  with microangiopathy. Major intracranial flow voids at the skull base  are unremarkable. Evidence of bilateral lens surgery. Paranasal  sinuses and mastoid air cells are predominantly clear.      Cerebellar tonsils are above the foramen magnum. Pituitary and sella  are not enlarged.      Impression: Small foci of cortical and subcortical diffusion restriction within  the bilateral cerebral hemispheres and left cerebellum compatible  with acute infarcts. Given multiple vascular territories consider a  central embolic source. There is no mass effect, midline shift or  evidence of hemorrhage.      Volume loss and nonspecific white matter changes compatible with  microangiopathy.      MACRO:  None      Signed by: Concepcion Hopper 11/16/2023 2:37 PM  Dictation workstation:   [id number]  Vascular  Lower Extremity Venous Duplex Bilateral             [facility]  [place] [address]  Tel [phone] and Fax [phone]       Vascular Lab Report  VASC US LOWER EXTREMITY VENOUS DUPLEX BILATERAL       Patient Name:     DARLING WYMAN  Reading Physician: [id number] Cookie Pace MD  Study Date:       11/15/2023          Ordering           [id number] TRANG ALVARADO                                        Physician:  MRN/PID:          [id number]            Technologist:      Allyn Escobar RVT  Accession#:       [id number]        Technologist 2:  Date of           1940 / 83      Encounter#:        [id number]  Birth/Age:        years  Gender:           F  Admission Status: Inpatient           Location           [facility]                                        Performed:       Diagnosis/ICD: Other pulmonary embolism without acute cor pulmonale-I26.99  CPT Codes:     67111 Peripheral venous duplex scan for DVT complete       **CRITICAL RESULT**  Critical Result: DVT Bilateral FV, Bilateral POP V, and Right PTV  Notification called to Dr Trang Alvarado on 11/15/2023 at 3:00:00 PM by NEIL Escobar RVT.     CONCLUSIONS:  Right Lower Venous: There is acute occlusive deep vein thrombosis visualized in the mid femoral, distal femoral, popliteal and posterior tibial veins. There is acute non-occlusive deep vein thrombosis visualized in the proximal femoral vein.  Left Lower Venous: There is acute occlusive deep vein thrombosis visualized in the mid femoral, distal femoral, popliteal and proximal femoral veins. Cannot rule out thrombus in non-visualized peroneal vein due to edema.     Imaging & Doppler Findings:     Right                 Compressible      Thrombus          Flow  Distal External Iliac     Yes             None         Continuous  CFV                       Yes             None         Continuous  PFV                       Yes             None  FV Proximal             Partial    Acute non-occlusive  Continuous  FV Mid                     No        Acute occlusive  FV Distal                  No        Acute occlusive  Popliteal                  No        Acute occlusive      None  Peroneal                  Yes             None  PTV                        No        Acute occlusive       Left                  Compress    Thrombus        Flow  Distal External Iliac   Yes         None       Continuous  CFV                     Yes         None       Continuous  PFV                     Yes         None  FV Proximal              No    Acute occlusive    None  FV Mid                   No    Acute occlusive  FV Distal                No    Acute occlusive  Popliteal                No    Acute occlusive    None  PTV                     Yes         None       [id number] Cookie Pace MD  Electronically signed by [id number] Cookie Pace MD on 11/16/2023 at 12:36:58 PM       ** Final **  Invasive vascular procedure     [facility], Cath Lab, [facility] [place], [place] [address]    Cardiovascular Catheterization Report    Patient Name:      DARLING WYMAN   Performing Physician:  Jay Alvarado MD  Study Date:        11/15/2023           Verifying Physician:   Jay Alvarado MD  MRN/PID:           [id number]             Cardiologist/Co-scrub:  Accession#:        [id number]         Ordering Physician:    Jay ALVARADO  Date of Birth/Age: 1940 / 83 years Fellow:  Gender:            F                    Fellow:  Encounter#:        [id number]       Study:            Pulmonary Angiogram  Additional Study: Peripheral Intervention       Indications:  DARLING WYMAN is a 84 year old female who presents with intermediate-high risk PE, hx of PE 15 years ago (thought 2/2 knee TKA) s/p 1 year of warfarin; p/w stroke c/f paradoxical embolus in setting of PE. +significant O2 requirement.     Procedure Description:  After infiltration of local anesthetic, the right femoral vein was identified with two dimensional ultrasound.  "Under direct ultrasound visualization, the right femoral vein was cannulated with a percutaneous technique. A 24F sheath was placed in the vein.     Pulmonary Embolism:     We predilated with a 22F Mac dilator, and placed a 24F sheath into the vein. Post-procedure, the venous sheath was pulled and pressure was applied to the site via figure-of-8.     Prior to access, we imaged the bilateral common femoral veins to evaluate for thrombus. No thrombus was noted. We selectively engaged the pulmonary artery using a JR4 diagnostic catheter. Pre-thrombectomy RA and PA pressures were obtained.     Following this, we sequentially engaged the following PA branches with Inari [id number] for selective extirpation of material: Left main, Right main, Truncus anterior and RLL posterior basal. Successful thrombectomy was performed. Selective angiogram was performed during the procedure to help locate thrombus for extraction. Post-thrombectomy RA and PA pressures were obtained.     We did not administer therapeutic systemic heparin up front d/t stroke and concerns for potential hemorrhagic conversion. Heparinized saline was utilized to \"wash\" the FlowSaver filter to maintain patency.    Initially thrombectomy of LPA highly successful. Difficulty with RPA thrombectomy, with angiogram showing ball-like embolus. TA aspiration performed with loosening of the RPA thrombus (subacute clot). We did provide 2000 units of heparin at this time due to extended dwell time of catheter. Further thrombectomy performed of the main RPA and lower posterior branch with successful extirpation of remainder of subacute thrombus.     Hemo Personnel:  +----------+---------+  Name      Duty       +----------+---------+  Maldonado Alvarado MDPJAIRON MD 1  +----------+---------+       Hemodynamic Pressures:     +----+-----------+----------+-------------+--------------+-----+-------+-------+  Site Date Time Phase NameSystolic mmHgDiastolic mmHgMean A-Wave " V-Wave                                                       mmHg  mmHg   mmHg    +----+-----------+----------+-------------+--------------+-----+-------+-------+    RA 11/15/2023   O2 REST                              25     28     25      10:53:43 AM                                                          +----+-----------+----------+-------------+--------------+-----+-------+-------+    PA 11/15/2023   O2 REST           73            27   52                    10:56:29 AM                                                          +----+-----------+----------+-------------+--------------+-----+-------+-------+    PA 11/15/2023   O2 REST           75            31   51                    11:38:07 AM                                                          +----+-----------+----------+-------------+--------------+-----+-------+-------+    RA 11/15/2023   O2 REST                              18     21     19      11:38:30 AM                                                          +----+-----------+----------+-------------+--------------+-----+-------+-------+    PA 11/15/2023   O2 REST           67            29   41                    12:06:02 PM                                                          +----+-----------+----------+-------------+--------------+-----+-------+-------+    RA 11/15/2023   O2 REST                              23     27     23      12:06:39 PM                                                          +----+-----------+----------+-------------+--------------+-----+-------+-------+         Oxygen Saturation %:  +-----------+----------+------------+  Sample SiteO2 Sat (%)HB (g/100ml)  +-----------+----------+------------+           AO        90        15.1  +-----------+----------+------------+           PA        52         15.1  +-----------+----------+------------+           AO        90        15.1  +-----------+----------+------------+           PA        52        15.1  +-----------+----------+------------+           PA        42              +-----------+----------+------------+           PA        42              +-----------+----------+------------+       Cardiac Outputs:  +---------------+------------------+-------+  SINTIA CO (l/min)SINTIA CI (l/min/m2)SINTIA SV  +---------------+------------------+-------+              3.7               1.7   41.2  +---------------+------------------+-------+       Vascular Resistance Calculated Values (Wood Units):  +-----+----+----+  PhaseTPR TPRI  +-----+----+----+  0    14.030.5  +-----+----+----+       Complications:  No in-lab complications observed.     Cardiac Cath Post Procedure Notes:  Post Procedure Diagnosis: Bilateral pulmonary embolism R>L c/w intermediate-high                            risk PE s/p extirpation of material.  Blood Loss:               Estimated blood loss during the procedure was 50 mls.  Specimens Removed:        Number of specimen(s) removed: thrombus (acute LPA,                            subacute RPA).    ____________________________________________________________________________________  CONCLUSIONS:   1. Indication: intermediate-high risk PE with suspected paradoxical thrombus and CVA, severe hypoxia.   2. Bilateral pulmonary angiogram and exptirpation of material from bilateral PAs as noted.   3. Heparin anticoagulation and routine work up.    ICD 10 Codes:  Other pulmonary embolism with acute cor pulmonale-I26.09     CPT Codes:  Selective catheter placement, left or right pulmonary artery uni-11322; Selective catheter placement, left or right pulmonary artery bilat-65879.50; Selective catheter placement, segmental or subsegmental pulmonary artery uni-27964; Angiography, pulmonary, bilateral S&I-96920; Extirpation of material,  unilateral PA-89296; Extirpation of material, contralateral PA-75503.50; Extirpation of material, unilateral secondary vessel Subsegment 1-75623; Extirpation of material, unilateral secondary vessel Subsegment 2-62592; Moderate Sedation Services initial 15 minutes patient >5 years-35324; Moderate Sedation Services 1st additional 15 minutes patient >5 years-91379; Moderate Sedation Services 2nd additional 15 minutes patient >5 years-83963; Moderate Sedation Services 3rd additional 15 minutes patient >5 years-94285; Moderate Sedation Services 4th additional 15 minutes patient >5 years-60138; Moderate Sedation Services 5th additional 15 minutes patient >5 years-35824     [id number] Maldonado Alvarado MD  Performing Physician  Electronically signed by Jay Alvarado MD on 11/15/2023 at 3:56:11 PM         ** Final **  Transthoracic Echo (TTE) [facility], [address] [place] [address] [id number] [phone] and                                  Fax [phone]    TRANSTHORACIC ECHOCARDIOGRAM REPORT       Patient Name:      DARLING Colin Physician:    [id number] Govind Olvera MD  Study Date:        11/15/2023           Ordering Provider:    [id number] ANTOINETTE ENCINAS  MRN/PID:           [id number]             Fellow:  Accession#:        [id number]         Nurse:  Date of Birth/Age: 1940 / 83 years Sonographer:          Yfn Castellanos RDCS, OSMAN  Gender:            F                    Additional Staff:  Height:            165.10 cm            Admit Date:           11/15/2023  Weight:            110.68 kg            Admission Status:     Inpatient -                                                                Routine  BSA:               2.15 m2              Encounter#:           [id number]                                          Department Location:  [address]                                                                 floor/ICU  Blood Pressure: 105 /73 mmHg    Study Type:    TRANSTHORACIC ECHO (TTE) COMPLETE  Diagnosis/ICD: Other forms of dyspnea-R06.09  Indication:    Dyspnea  CPT Code:      Echo Complete w Full Doppler-58702    Patient History:  Pertinent History: Dyspnea.    Study Detail: The following Echo studies were performed: 2D, M-Mode, Doppler and                color flow. Technically challenging study due to poor acoustic                windows, patient lying in supine position, body habitus and Post                procedure and bi-pap. Definity used as a contrast agent for                endocardial border definition. Total contrast used for this                procedure was 3 mL via IV push.       PHYSICIAN INTERPRETATION:  Left Ventricle: The left ventricular systolic function is normal, with an estimated ejection fraction of 50-55%. There are no regional wall motion abnormalities. The left ventricular cavity size is normal. Left ventricular diastolic filling was not assessed.  Left Atrium: The left atrium is normal in size.  Right Ventricle: The right ventricle is severely enlarged. There is moderately reduced right ventricular systolic function.  Right Atrium: The right atrium is mildly dilated.  Aortic Valve: The aortic valve is probably trileaflet. There is trivial aortic valve regurgitation.  Mitral Valve: The mitral valve is normal in structure. There is trace mitral valve regurgitation.  Tricuspid Valve: The tricuspid valve is structurally normal. There is mild tricuspid regurgitation. The Doppler estimated RVSP is moderately elevated at 43.2 mmHg.  Pulmonic Valve: The pulmonic valve is not well visualized. The pulmonic valve regurgitation was not well visualized.  Pericardium: There is no pericardial effusion noted.  Aorta: The aortic root is abnormal. There is mild dilatation of the ascending aorta. There is mild dilatation of the aortic root.  Systemic Veins: The  inferior vena cava appears to be of normal size.       CONCLUSIONS:   1. Left ventricular systolic function is normal with a 50-55% estimated ejection fraction.   2. Severely enlarged right ventricle.   3. There is moderately reduced right ventricular systolic function.   4. Moderately elevated right ventricular systolic pressure.    QUANTITATIVE DATA SUMMARY:  2D MEASUREMENTS:                           Normal Ranges:  Ao Root d:     3.70 cm   (2.0-3.7cm)  LAs:           3.10 cm   (2.7-4.0cm)  IVSd:          1.02 cm   (0.6-1.1cm)  LVPWd:         0.84 cm   (0.6-1.1cm)  LVIDd:         3.47 cm   (3.9-5.9cm)  LVIDs:         2.14 cm  LV Mass Index: 42.8 g/m2  LV % FS        38.3 %    M-MODE MEASUREMENTS:                   Normal Ranges:  Ao Root: 4.00 cm (2.0-3.7cm)  LAs:     3.50 cm (2.7-4.0cm)    AORTA MEASUREMENTS:                     Normal Ranges:  Asc Ao, d: 3.80 cm (2.1-3.4cm)    AORTIC VALVE:                         Normal Ranges:  LVOT Diameter: 2.70 cm (1.8-2.4cm)       RIGHT VENTRICLE:  RV Basal 5.13 cm    TRICUSPID VALVE/RVSP:                              Normal Ranges:  Peak TR Velocity: 3.17 m/s  RV Syst Pressure: 43.2 mmHg (< 30mmHg)  IVC Diam:         1.50 cm       [id number] Govind Olvera MD  Electronically signed on 11/16/2023 at 9:42:05 AM       ** Final **    ROS no fever no nausea vomiting or diarrhea  Physical Exam  Lungs are clear heart is regular abdomen is soft.  She is alert and oriented x3 she still has dysarthria facial droop some weakness  Relevant Results               Assessment/Plan   This patient currently has cardiac telemetry ordered; if you would like to modify or discontinue the telemetry order, click here to go to the orders activity to modify/discontinue the order.      She really needs physical therapy acute rehab downstairs but I need to anticoagulated with Coumadin like to get that started now and then have pharmacy dose it monitor and titrate the INR up continue with heparin  for now and also maintain her oxygen saturations replace her potassium magnesium as needed follow her outcome neurologically thank you        Principal Problem:    Acute saddle pulmonary embolism, unspecified whether acute cor pulmonale present (CMS/HCC)                  Sage Alba DO

## 2023-11-19 LAB
ALBUMIN SERPL BCP-MCNC: 2.6 G/DL (ref 3.4–5)
ALP SERPL-CCNC: 59 U/L (ref 33–136)
ALT SERPL W P-5'-P-CCNC: 7 U/L (ref 7–45)
ANION GAP SERPL CALC-SCNC: 11 MMOL/L (ref 10–20)
APPEARANCE UR: CLEAR
AST SERPL W P-5'-P-CCNC: 11 U/L (ref 9–39)
BILIRUB SERPL-MCNC: 0.7 MG/DL (ref 0–1.2)
BILIRUB UR STRIP.AUTO-MCNC: NEGATIVE MG/DL
BUN SERPL-MCNC: 14 MG/DL (ref 6–23)
CALCIUM SERPL-MCNC: 8.7 MG/DL (ref 8.6–10.3)
CHLORIDE SERPL-SCNC: 107 MMOL/L (ref 98–107)
CO2 SERPL-SCNC: 29 MMOL/L (ref 21–32)
COLOR UR: YELLOW
CREAT SERPL-MCNC: 1.55 MG/DL (ref 0.5–1.05)
CREAT UR-MCNC: 77.7 MG/DL (ref 20–320)
CREAT UR-MCNC: 77.7 MG/DL (ref 20–320)
ERYTHROCYTE [DISTWIDTH] IN BLOOD BY AUTOMATED COUNT: 15.6 % (ref 11.5–14.5)
GFR SERPL CREATININE-BSD FRML MDRD: 34 ML/MIN/1.73M*2
GLUCOSE BLD MANUAL STRIP-MCNC: 110 MG/DL (ref 74–99)
GLUCOSE SERPL-MCNC: 106 MG/DL (ref 74–99)
GLUCOSE UR STRIP.AUTO-MCNC: NEGATIVE MG/DL
HCT VFR BLD AUTO: 31.3 % (ref 36–46)
HGB BLD-MCNC: 9.4 G/DL (ref 12–16)
INR PPP: 1.1 (ref 0.9–1.1)
KETONES UR STRIP.AUTO-MCNC: NEGATIVE MG/DL
LEUKOCYTE ESTERASE UR QL STRIP.AUTO: NEGATIVE
MAGNESIUM SERPL-MCNC: 1.95 MG/DL (ref 1.6–2.4)
MCH RBC QN AUTO: 30.4 PG (ref 26–34)
MCHC RBC AUTO-ENTMCNC: 30 G/DL (ref 32–36)
MCV RBC AUTO: 101 FL (ref 80–100)
MICROALBUMIN UR-MCNC: <7 MG/L
MICROALBUMIN/CREAT UR: NORMAL MG/G{CREAT}
NITRITE UR QL STRIP.AUTO: NEGATIVE
NRBC BLD-RTO: 0 /100 WBCS (ref 0–0)
PH UR STRIP.AUTO: 5 [PH]
PHOSPHATE SERPL-MCNC: 3.2 MG/DL (ref 2.5–4.9)
PLATELET # BLD AUTO: 199 X10*3/UL (ref 150–450)
POTASSIUM SERPL-SCNC: 4.5 MMOL/L (ref 3.5–5.3)
PROT SERPL-MCNC: 5.3 G/DL (ref 6.4–8.2)
PROT UR STRIP.AUTO-MCNC: NEGATIVE MG/DL
PROT UR-ACNC: 10 MG/DL (ref 5–24)
PROT/CREAT UR: 0.13 MG/MG CREAT (ref 0–0.17)
PROTHROMBIN TIME: 12 SECONDS (ref 9.8–12.8)
RBC # BLD AUTO: 3.09 X10*6/UL (ref 4–5.2)
RBC # UR STRIP.AUTO: NEGATIVE /UL
SODIUM SERPL-SCNC: 142 MMOL/L (ref 136–145)
SP GR UR STRIP.AUTO: 1.01
UFH PPP CHRO-ACNC: 0.5 IU/ML
UROBILINOGEN UR STRIP.AUTO-MCNC: 4 MG/DL
WBC # BLD AUTO: 6.2 X10*3/UL (ref 4.4–11.3)

## 2023-11-19 PROCEDURE — 85520 HEPARIN ASSAY: CPT | Performed by: INTERNAL MEDICINE

## 2023-11-19 PROCEDURE — 82947 ASSAY GLUCOSE BLOOD QUANT: CPT

## 2023-11-19 PROCEDURE — 85027 COMPLETE CBC AUTOMATED: CPT | Performed by: STUDENT IN AN ORGANIZED HEALTH CARE EDUCATION/TRAINING PROGRAM

## 2023-11-19 PROCEDURE — 85610 PROTHROMBIN TIME: CPT | Performed by: INTERNAL MEDICINE

## 2023-11-19 PROCEDURE — 82570 ASSAY OF URINE CREATININE: CPT | Performed by: INTERNAL MEDICINE

## 2023-11-19 PROCEDURE — 81003 URINALYSIS AUTO W/O SCOPE: CPT | Performed by: INTERNAL MEDICINE

## 2023-11-19 PROCEDURE — 99232 SBSQ HOSP IP/OBS MODERATE 35: CPT | Performed by: STUDENT IN AN ORGANIZED HEALTH CARE EDUCATION/TRAINING PROGRAM

## 2023-11-19 PROCEDURE — 36415 COLL VENOUS BLD VENIPUNCTURE: CPT | Performed by: STUDENT IN AN ORGANIZED HEALTH CARE EDUCATION/TRAINING PROGRAM

## 2023-11-19 PROCEDURE — 2500000004 HC RX 250 GENERAL PHARMACY W/ HCPCS (ALT 636 FOR OP/ED): Performed by: INTERNAL MEDICINE

## 2023-11-19 PROCEDURE — 2500000001 HC RX 250 WO HCPCS SELF ADMINISTERED DRUGS (ALT 637 FOR MEDICARE OP): Performed by: NURSE PRACTITIONER

## 2023-11-19 PROCEDURE — 84100 ASSAY OF PHOSPHORUS: CPT | Performed by: STUDENT IN AN ORGANIZED HEALTH CARE EDUCATION/TRAINING PROGRAM

## 2023-11-19 PROCEDURE — 83735 ASSAY OF MAGNESIUM: CPT | Performed by: STUDENT IN AN ORGANIZED HEALTH CARE EDUCATION/TRAINING PROGRAM

## 2023-11-19 PROCEDURE — 36415 COLL VENOUS BLD VENIPUNCTURE: CPT | Performed by: INTERNAL MEDICINE

## 2023-11-19 PROCEDURE — 84075 ASSAY ALKALINE PHOSPHATASE: CPT | Performed by: STUDENT IN AN ORGANIZED HEALTH CARE EDUCATION/TRAINING PROGRAM

## 2023-11-19 PROCEDURE — 2500000004 HC RX 250 GENERAL PHARMACY W/ HCPCS (ALT 636 FOR OP/ED): Performed by: NURSE PRACTITIONER

## 2023-11-19 PROCEDURE — 1200000002 HC GENERAL ROOM WITH TELEMETRY DAILY

## 2023-11-19 PROCEDURE — 94640 AIRWAY INHALATION TREATMENT: CPT

## 2023-11-19 PROCEDURE — 2500000002 HC RX 250 W HCPCS SELF ADMINISTERED DRUGS (ALT 637 FOR MEDICARE OP, ALT 636 FOR OP/ED): Performed by: NURSE PRACTITIONER

## 2023-11-19 RX ORDER — FUROSEMIDE 10 MG/ML
40 INJECTION INTRAMUSCULAR; INTRAVENOUS DAILY
Status: DISCONTINUED | OUTPATIENT
Start: 2023-11-19 | End: 2023-11-26

## 2023-11-19 RX ADMIN — NYSTATIN 1 APPLICATION: 100000 POWDER TOPICAL at 08:56

## 2023-11-19 RX ADMIN — PANTOPRAZOLE SODIUM 40 MG: 40 TABLET, DELAYED RELEASE ORAL at 06:41

## 2023-11-19 RX ADMIN — WARFARIN SODIUM 2.5 MG: 2.5 TABLET ORAL at 17:13

## 2023-11-19 RX ADMIN — ASPIRIN 81 MG: 81 TABLET, COATED ORAL at 08:56

## 2023-11-19 RX ADMIN — GABAPENTIN 300 MG: 300 CAPSULE ORAL at 20:54

## 2023-11-19 RX ADMIN — GABAPENTIN 300 MG: 300 CAPSULE ORAL at 08:56

## 2023-11-19 RX ADMIN — GABAPENTIN 300 MG: 300 CAPSULE ORAL at 14:10

## 2023-11-19 RX ADMIN — IPRATROPIUM BROMIDE AND ALBUTEROL SULFATE 3 ML: 2.5; .5 SOLUTION RESPIRATORY (INHALATION) at 19:46

## 2023-11-19 RX ADMIN — NYSTATIN 1 APPLICATION: 100000 POWDER TOPICAL at 20:57

## 2023-11-19 RX ADMIN — HEPARIN SODIUM 1400 UNITS/HR: 10000 INJECTION, SOLUTION INTRAVENOUS at 08:57

## 2023-11-19 RX ADMIN — LEVOTHYROXINE SODIUM 150 MCG: 150 TABLET ORAL at 06:41

## 2023-11-19 RX ADMIN — IPRATROPIUM BROMIDE AND ALBUTEROL SULFATE 3 ML: 2.5; .5 SOLUTION RESPIRATORY (INHALATION) at 12:00

## 2023-11-19 RX ADMIN — ATORVASTATIN CALCIUM 40 MG: 40 TABLET, FILM COATED ORAL at 20:54

## 2023-11-19 RX ADMIN — FUROSEMIDE 40 MG: 10 INJECTION, SOLUTION INTRAMUSCULAR; INTRAVENOUS at 17:13

## 2023-11-19 RX ADMIN — IPRATROPIUM BROMIDE AND ALBUTEROL SULFATE 3 ML: 2.5; .5 SOLUTION RESPIRATORY (INHALATION) at 07:18

## 2023-11-19 ASSESSMENT — COGNITIVE AND FUNCTIONAL STATUS - GENERAL
DRESSING REGULAR LOWER BODY CLOTHING: A LOT
MOVING FROM LYING ON BACK TO SITTING ON SIDE OF FLAT BED WITH BEDRAILS: A LOT
PERSONAL GROOMING: A LOT
WALKING IN HOSPITAL ROOM: A LOT
DRESSING REGULAR LOWER BODY CLOTHING: A LOT
HELP NEEDED FOR BATHING: A LOT
DAILY ACTIVITIY SCORE: 14
DRESSING REGULAR UPPER BODY CLOTHING: A LOT
MOBILITY SCORE: 11
MOVING FROM LYING ON BACK TO SITTING ON SIDE OF FLAT BED WITH BEDRAILS: A LOT
TOILETING: A LOT
TURNING FROM BACK TO SIDE WHILE IN FLAT BAD: A LOT
HELP NEEDED FOR BATHING: A LOT
MOVING TO AND FROM BED TO CHAIR: A LOT
CLIMB 3 TO 5 STEPS WITH RAILING: TOTAL
CLIMB 3 TO 5 STEPS WITH RAILING: TOTAL
MOBILITY SCORE: 11
STANDING UP FROM CHAIR USING ARMS: A LOT
DAILY ACTIVITIY SCORE: 14
MOVING TO AND FROM BED TO CHAIR: A LOT
WALKING IN HOSPITAL ROOM: A LOT
STANDING UP FROM CHAIR USING ARMS: A LOT
DRESSING REGULAR UPPER BODY CLOTHING: A LOT
TOILETING: A LOT
TURNING FROM BACK TO SIDE WHILE IN FLAT BAD: A LOT
PERSONAL GROOMING: A LOT

## 2023-11-19 ASSESSMENT — PAIN SCALES - GENERAL
PAINLEVEL_OUTOF10: 0 - NO PAIN

## 2023-11-19 ASSESSMENT — PAIN - FUNCTIONAL ASSESSMENT
PAIN_FUNCTIONAL_ASSESSMENT: 0-10

## 2023-11-19 NOTE — PROGRESS NOTES
Subjective   Subjective Data and Overnight Events:  Still on oxygen supplementation.   Son and granddaughter at bedside.  Denies having chest pain.  Shortness of breath is a slightly better.    Objective   Vital Signs:  Patient Vitals for the past 24 hrs:   BP Temp Temp src Pulse Resp SpO2 Weight   11/19/23 0800 120/73 36.3 °C (97.3 °F) Temporal 99 18 95 % --   11/19/23 0718 -- -- -- -- -- 92 % --   11/19/23 0435 123/66 36.8 °C (98.2 °F) Temporal 76 18 94 % --   11/19/23 0012 118/64 36.4 °C (97.5 °F) Temporal 82 20 96 % --   11/18/23 2021 120/63 36.2 °C (97.2 °F) Temporal 77 17 97 % 136 kg (300 lb 11.3 oz)   11/18/23 1603 122/74 37.2 °C (99 °F) Temporal 86 18 95 % --   11/18/23 1430 -- -- -- -- -- 95 % --   11/18/23 1218 119/70 37.4 °C (99.3 °F) Temporal 86 18 94 % --         Physical Exam: On oxygen supplementation  General: no acute distress  HEENT: EOMI, no scleral icterus.  Lungs: Clear to auscultation bilaterally without wheezing, rales, or rhonchi.  Cardiovascular: Regular rhythm and rate. Normal S1 and S2. No murmurs, rubs, or gallops are appreciated. JVP normal.  Extremities: 1+ edema BLE.  Neurologic: Alert and oriented x3.    Current Medications:  Scheduled medications   Medication Dose Route Frequency    aspirin  81 mg oral Daily    atorvastatin  40 mg oral Nightly    fluticasone  2 spray Each Nostril Daily    gabapentin  300 mg oral TID    ipratropium-albuteroL  3 mL nebulization TID    levothyroxine  150 mcg oral Daily before breakfast    nystatin  1 Application Topical BID    pantoprazole  40 mg oral Daily before breakfast    polyethylene glycol  17 g oral Daily    warfarin  2.5 mg oral Daily     Continuous Medications   Medication Dose Last Rate    heparin  0-4,500 Units/hr 1,400 Units/hr (11/19/23 0857)     PRN medications   Medication    heparin    oxygen    sodium chloride       Pertinent Recent Cardiovascular Studies (personally reviewed):  Vascular studies:  Venous duplex 11/15/2023: Bilateral  fem/pop DVT. PTV RLE DVT. EIV and CFV spared.    Cardiac studies:  Echocardiogram 11/16/2023:  RV is mildly enlarged, function is mildly reduced.  There is no evidence of PFO (subcostal view only)      Laboratory values:  CMP:  Recent Labs     11/19/23  0522 11/18/23  0316 11/17/23  0135 11/16/23  0203 11/15/23  1716 11/15/23  0325 12/12/22  0854 12/11/22  0812 12/08/22  0658    140 138 136 138 138 140 142 140   K 4.5 4.7 4.7 5.3 5.1 4.5 4.9 4.7 4.0    108* 104 105 103 101 107 108* 107   CO2 29 25 27 21 23 23 23 26 25   ANIONGAP 11 12 12 15 17 19 15 13 12   BUN 14 19 25* 29* 29* 35* 22 24* 22   CREATININE 1.55* 1.61* 1.89* 2.12* 2.21* 2.58* 1.27* 1.37* 1.27*   EGFR 34* 32* 26* 23* 22* 18*  --   --   --    MG 1.95 2.02 1.97 2.12 2.20  --  2.24 1.81 1.97       Recent Labs     11/19/23  0522 11/18/23 0316 11/17/23  0135 11/16/23  0203 11/15/23  0325   ALBUMIN 2.6* 2.5* 2.5* 2.8* 3.6   ALKPHOS 59 55 59 66 90   ALT 7 7 8 8 8   AST 11 12 16 19 16   BILITOT 0.7 0.7 0.8 0.8 0.8       CBC:  Recent Labs     11/19/23  0522 11/18/23  2333 11/18/23 0316 11/17/23  0135 11/16/23  2223 11/16/23  0203 11/15/23  1716 11/15/23  0325   WBC 6.2 6.8 6.2 8.6 9.4 8.6 12.8* 10.3   HGB 9.4* 10.1* 9.0* 10.0* 10.2* 11.3* 12.3 15.1   HCT 31.3* 33.8* 29.1* 32.0* 32.8* 39.1 39.2 48.7*    196 179 152 148* 147* 157 175   * 102* 100 100 101* 108* 99 99       COAG:   Recent Labs     11/19/23  0522 11/18/23  0751 11/18/23  0316 11/17/23  2157 11/17/23  1558 11/17/23  1047 11/17/23  0541 11/17/23  0131 11/15/23  0905 11/15/23  0325 12/01/22  1710   INR 1.1 1.2*  --   --   --   --   --   --   --  1.3* 1.0   HAUF 0.5 0.7 0.6 0.8 0.7 0.9 0.8 0.4   < >  --   --     < > = values in this interval not displayed.       ABO:   Recent Labs     12/02/22  0601   ABO O       HEME/ENDO:  Recent Labs     11/15/23  0325 08/24/21  1459 08/17/20  0000 12/13/19  1504 11/14/18  1515   TSH  --  0.14* 0.09* 0.44 5.34*   HGBA1C 6.1*  --   --   --   " --         CARDIAC:   Recent Labs     11/15/23  0418 11/15/23  0325   TROPHS 1,116* 1,272*   BNP  --  1,683*       Recent Labs     11/15/23  0412 12/13/19  1504 11/14/18  1515   CHOL 112 182 141   LDLF  --  114* 71   HDL 30.7 46.9 37.5*   TRIG 142 105 162*       MICRO: No results for input(s): \"ESR\", \"CRP\", \"PROCAL\" in the last 16656 hours.  No results found for the last 90 days.         I have personally reviewed most recent PCP, cardiology, vascular, and/or podiatry documentation.      Assessment/Plan   83 y.o. 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 Presented with stroke sxs with dysarthria and facial droop. Out of window for TPA.  Patient status post bilateral thrombectomy for PE.  We did not perform IVC filter at that time due to no evidence of CFV thrombus seen in the Cath Lab.    Interim history includes DVT scan that showed femoropopliteal DVT bilaterally, MRI that showed evidence of embolic stroke.  No evidence of hemorrhagic transformation on multiple imaging of the head.      Plan:  1.  From a pulmonary embolism standpoint, titrate O2 down as tolerated.  Likely, she will require Coumadin for long-term anticoagulation due to BMI of 47.    2.  From a stroke perspective, uncertain as to source of embolic phenomenon.  Differential diagnosis includes the following:   - Highest suspicion is paradoxical embolus from PFO; we may need to consider CARLOS versus CMR to confirm if PFO is present despite negative TTE.  - APLA; regardless, patient will be on warfarin due to her weight and may not change treatment plan even if positive.  - Undiagnosed AF - on tele - I did not see any AF during visit today; CARLOS or CMR may be helpful here to evaluate the KATIE as well.  - Aortic atheroembolic phenomenon - if all else is negative, we may need to consider Plavix and statin " optimization    3.  Given she has been tolerating anticoagulation at least for 36 hours now without hemorrhagic transformation, I agree that overall risk of hemorrhagic transformation now is lower.  May be equivocal considering IVC filter placement.  I have discussed with Dr. Peres, we can hold off at this time.  Okay to ambulate and okay to place BP cuff on lower extremity if needed.    4.  Agree with PT/OT evaluation for poststroke care.    We will continue to follow-up, please do not hesitate to call if questions arise.  Discussed with family and primary team.    Disclaimer: This note was dictated by speech recognition. Minor errors in transcription may be present.       11/18/2023  I discussed this case with Dr. Alvarado and I agree that CARLOS will be helpful to rule out PFO.  I discussed that with the patient and family in extent and they are in agreement to proceed with that as long as the patient is breathing status permits.  Tentatively will plan to get it done on Monday.  For now we will continue with current medications including heparin drip.  I will follow-up with the patient tomorrow.      11/19/2023  Primary team is planning for transferring the patient to rehab facility later in the coming week.  We will plan for CARLOS tentatively on Tuesday to give her more time to improve her breathing.    SIGNATURE: Govind Olvera MD PhD PATIENT NAME: Lillian Mendoza   DATE/TIME: November 19, 2023 11:02 AM MRN: [id number]

## 2023-11-19 NOTE — PROGRESS NOTES
"Patient seen and examined.  Oxygen requirements have increased.  No fevers or chills.  Denies any nausea or vomiting.  States that she has chronic peripheral edema.    Scheduled medications  aspirin, 81 mg, oral, Daily  atorvastatin, 40 mg, oral, Nightly  fluticasone, 2 spray, Each Nostril, Daily  furosemide, 40 mg, intravenous, Daily  gabapentin, 300 mg, oral, TID  ipratropium-albuteroL, 3 mL, nebulization, TID  levothyroxine, 150 mcg, oral, Daily before breakfast  nystatin, 1 Application, Topical, BID  pantoprazole, 40 mg, oral, Daily before breakfast  polyethylene glycol, 17 g, oral, Daily  warfarin, 2.5 mg, oral, Daily      Continuous medications  heparin, 0-4,500 Units/hr, Last Rate: 1,400 Units/hr (11/19/23 0857)      PRN medications  PRN medications: heparin, oxygen, sodium chloride    Blood pressure 116/69, pulse 103, temperature 36.1 °C (97 °F), temperature source Temporal, resp. rate 18, height 1.67 m (5' 5.75\"), weight 136 kg (300 lb 11.3 oz), SpO2 92 %.    Intake/Output Summary (Last 24 hours) at 11/19/2023 1701  Last data filed at 11/19/2023 0000  Gross per 24 hour   Intake 28.23 ml   Output 825 ml   Net -796.77 ml       General: No apparent distress  Respiratory: Crackles at the bases  CVS: No rub  Abdomen: Soft  Extremities: Positive peripheral edema    Cr 1.55, K 4.5  Hb 9.4    Impression:  1.  Acute kidney injury on top of chronic kidney disease stage III.  In the setting of a significant pulmonary embolus without evidence of right heart strain.  Contrast was given on November 15.  No evidence of overt contrast nephropathy.  Creatinine improved from 2.2 down to 1.55.  2.  Chronic peripheral edema.  She tells me she was not using her torsemide at home for the past year. Appears volume overloaded at this time  3.  Multifactorial anemia  4.  Pulmonary embolus  5.  Underlying chronic kidney disease stage III with a baseline creatinine in the mid 1 range with variable creatinine levels.  She has renal " atrophy on imaging    Plan:  1.  IV furosemide 40mg daily starting today  2.  Follow upPTH and vitamin D  3.  Monitor labs in AM

## 2023-11-19 NOTE — CARE PLAN
Problem: General Stroke  Goal: Demonstrate improvement in neurological exam throughout the shift  Outcome: Progressing  Goal: Maintain BP within ordered limits throughout shift  Outcome: Progressing  Goal: Participate in treatment (ie., meds, therapy) throughout shift  Outcome: Progressing  Goal: No symptoms of aspiration throughout shift  Outcome: Progressing  Goal: No symptoms of hemorrhage throughout shift  Outcome: Progressing  Goal: Tolerate enteral feeding throughout shift  Outcome: Progressing  Goal: Decreased nausea/vomiting throughout shift  Outcome: Progressing  Goal: Controlled blood glucose throughout shift  Outcome: Progressing  Goal: Out of bed three times today  Outcome: Progressing     Problem: ICU Stroke  Goal: Maintain ICP within ordered limits throughout shift  Outcome: Progressing  Goal: Tolerate EVD clamping trial throughout shift  Outcome: Progressing  Goal: Tolerate ventilator weaning trial during shift  Outcome: Progressing  Goal: Maintain patent airway throughout shift  Outcome: Progressing  Goal: Achieve/maintain targeted sodium level throughout shift  Outcome: Progressing     Problem: Nutrition  Goal: Less than 5 days NPO/clear liquids  Outcome: Progressing  Goal: Oral intake greater than 50%  Outcome: Progressing  Goal: Oral intake greater 75%  Outcome: Progressing  Goal: Consume prescribed supplement  Outcome: Progressing  Goal: Adequate PO fluid intake  Outcome: Progressing  Goal: Nutrition support goals are met within 48 hrs  Outcome: Progressing  Goal: Nutrition support is meeting 75% of nutrient needs  Outcome: Progressing  Goal: Tube feed tolerance  Outcome: Progressing  Goal: BG  mg/dL  Outcome: Progressing  Goal: Lab values WNL  Outcome: Progressing  Goal: Electrolytes WNL  Outcome: Progressing  Goal: Promote healing  Outcome: Progressing  Goal: Maintain stable weight  Outcome: Progressing  Goal: Reduce weight from edema/fluid  Outcome: Progressing  Goal: Gradual weight  gain  Outcome: Progressing  Goal: Improve ostomy output  Outcome: Progressing     Problem: Fall/Injury  Goal: Not fall by end of shift  Outcome: Progressing  Goal: Be free from injury by end of the shift  Outcome: Progressing  Goal: Verbalize understanding of personal risk factors for fall in the hospital  Outcome: Progressing  Goal: Verbalize understanding of risk factor reduction measures to prevent injury from fall in the home  Outcome: Progressing  Goal: Use assistive devices by end of the shift  Outcome: Progressing  Goal: Pace activities to prevent fatigue by end of the shift  Outcome: Progressing     Problem: Pain - Adult  Goal: Verbalizes/displays adequate comfort level or baseline comfort level  Outcome: Progressing     Problem: Safety - Adult  Goal: Free from fall injury  Outcome: Progressing     Problem: Discharge Planning  Goal: Discharge to home or other facility with appropriate resources  Outcome: Progressing     Problem: Chronic Conditions and Co-morbidities  Goal: Patient's chronic conditions and co-morbidity symptoms are monitored and maintained or improved  Outcome: Progressing     Problem: Skin  Goal: Decreased wound size/increased tissue granulation at next dressing change  Outcome: Progressing  Goal: Participates in plan/prevention/treatment measures  Outcome: Progressing  Goal: Prevent/manage excess moisture  Outcome: Progressing  Goal: Prevent/minimize sheer/friction injuries  Outcome: Progressing  Goal: Promote/optimize nutrition  Outcome: Progressing  Goal: Promote skin healing  Outcome: Progressing   The patient's goals for the shift include able to eat/drink    The clinical goals for the shift include decrease oxygen use    Over the shift, the patient did not make progress toward the following goals. Barriers to progression include motivation. Recommendations to address these barriers include reinforcement.

## 2023-11-19 NOTE — PROGRESS NOTES
Lillian Mendoza is a 83 y.o. female on day 4 of admission presenting with Acute saddle pulmonary embolism, unspecified whether acute cor pulmonale present (CMS/HCC).      Subjective   Seen today moving forward with anticoagulation with warfarin a CARLOS I believe for tomorrow to identify possibility of a PFO that would explain or describe her shower emboli to her brain and so far PT and OT try to get her to acute rehab as well she can start moving around she is status post bilateral thrombectomy for PE no IVC filter to be placed       Objective     Last Recorded Vitals  /73   Pulse 99   Temp 36.3 °C (97.3 °F) (Temporal)   Resp 18   Wt 136 kg (300 lb 11.3 oz)   SpO2 95%   Intake/Output last 3 Shifts:    Intake/Output Summary (Last 24 hours) at 11/19/2023 1203  Last data filed at 11/19/2023 0000  Gross per 24 hour   Intake 2144.66 ml   Output 825 ml   Net 1319.66 ml       Admission Weight  Weight: 111 kg (244 lb 11.4 oz) (11/15/23 0327)    Daily Weight  11/18/23 : 136 kg (300 lb 11.3 oz)    Image Results  Transthoracic Echo (TTE) [facility], [address] [place], [place] [address]Tel [phone] and                                  Fax [phone]    TRANSTHORACIC ECHOCARDIOGRAM REPORT       Patient Name:      LILLIAN MENDOZA   Reading Physician:    [id number] Govind Olvera MD  Study Date:        11/16/2023           Ordering Provider:    [id number] ANTOINETTE ENCINAS  MRN/PID:           [id number]             Fellow:  Accession#:        [id number]         Nurse:  Date of Birth/Age: 1940 / 83 years Sonographer:          Yfn Castellanos RDCS, OSMAN  Gender:            F                    Additional Staff:  Height:            165.10 cm            Admit Date:           11/15/2023  Weight:            131.54 kg            Admission Status:     Inpatient -                                                                 Routine  BSA:               2.32 m2              Encounter#:           [id number]                                          Department Location:  [address]                                                                floor/ICU  Blood Pressure: 109 /70 mmHg    Study Type:    TRANSTHORACIC ECHO (TTE) LIMITED  Diagnosis/ICD: Personal history of pulmonary embolism-Z86.711  Indication:    PE.  CPT Code:      Echo Limited-68731    Patient History:  Pertinent History: PE.    Study Detail: The following Echo studies were performed: 2D. Technically                challenging study due to body habitus. Agitated saline used as a                contrast agent for intraseptal flow evaluation.       PHYSICIAN INTERPRETATION:  Left Ventricle: The left ventricular systolic function is normal. The left ventricular cavity size was not assessed. Left ventricular diastolic filling was not assessed.  Left Atrium: The left atrium was not assessed.  Right Ventricle: The right ventricle was not assessed. Right ventricular systolic function not assessed.  Right Atrium: The right atrium was not assessed.  Aortic Valve: The aortic valve was not assessed. Aortic valve regurgitation was not assessed.  Mitral Valve: The mitral valve was not assessed. Mitral valve regurgitation was not assessed.  Tricuspid Valve: The tricuspid valve was not assessed. Tricuspid regurgitation was not assessed.  Pulmonic Valve: The pulmonic valve was not assessed. Pulmonic valve regurgitation was not assessed.  Pericardium: There is a small pericardial effusion. There is no evidence of cardiac tamponade.  Aorta: The aortic root was not assessed.       CONCLUSIONS:   1. Left ventricular systolic function is normal.   2. There is no evidence of cardiac tamponade.   3. Negative agitated saline study. No PFO.    QUANTITATIVE DATA SUMMARY:     [id number] Govind Olvera MD  Electronically signed on 11/16/2023 at 2:52:09 PM        ** Final **  MR brain wo IV contrast  Narrative: Interpreted By:  Concepcion Hopper,   STUDY:  MR BRAIN WO IV CONTRAST;  11/16/2023 2:18 pm      INDICATION:  Signs/Symptoms:stroke symptoms.      COMPARISON:  CT head 11/15/2023.      ACCESSION NUMBER(S):  [id number]      ORDERING CLINICIAN:  TERRY MCKINNEY      TECHNIQUE:  Axial T2, FLAIR, DWI, gradient echo T2 and sagittal and coronal T1  weighted images of brain were acquired.      FINDINGS:  There are multiple small foci of cortical and subcortical diffusion  restriction within the bilateral cerebral hemispheres and left  cerebellum compatible with acute infarcts. There is associated T2 and  FLAIR signal abnormality without significant mass effect. No  susceptibility artifact to suggest hemorrhage.      There is prominence of the ventricles, cortical sulci and basal  cisterns compatible with age related involutional changes and  moderate volume loss. Prominent extra-axial CSF space near the vertex  likely represents asymmetric volume loss.      There is a background of mild nonspecific subcortical and  periventricular T2 and FLAIR hyperintense signal which is compatible  with microangiopathy. Major intracranial flow voids at the skull base  are unremarkable. Evidence of bilateral lens surgery. Paranasal  sinuses and mastoid air cells are predominantly clear.      Cerebellar tonsils are above the foramen magnum. Pituitary and sella  are not enlarged.      Impression: Small foci of cortical and subcortical diffusion restriction within  the bilateral cerebral hemispheres and left cerebellum compatible  with acute infarcts. Given multiple vascular territories consider a  central embolic source. There is no mass effect, midline shift or  evidence of hemorrhage.      Volume loss and nonspecific white matter changes compatible with  microangiopathy.      MACRO:  None      Signed by: Concepcion Hopper 11/16/2023 2:37 PM  Dictation workstation:   [id number]  Vascular US  Lower Extremity Venous Duplex Bilateral             [facility]  [address] [place], [place] [address]  Tel [phone] and Fax [phone]       Vascular Lab Report  [facility] US LOWER EXTREMITY VENOUS DUPLEX BILATERAL       Patient Name:     DARLING SORIA LARAJOSE  Reading Physician: [id number] Cookie Pace MD  Study Date:       11/15/2023          Ordering           [id number] TRANG ALVARADO                                        Physician:  MRN/PID:          [id number]            Technologist:      Allyn Escobar RVT  Accession#:       [id number]        Technologist 2:  Date of           1940 / 83      Encounter#:        [id number]  Birth/Age:        years  Gender:           F  Admission Status: Inpatient           Location           [facility]                                        Performed:       Diagnosis/ICD: Other pulmonary embolism without acute cor pulmonale-I26.99  CPT Codes:     83422 Peripheral venous duplex scan for DVT complete       **CRITICAL RESULT**  Critical Result: DVT Bilateral FV, Bilateral POP V, and Right PTV  Notification called to Dr Trang Alvarado on 11/15/2023 at 3:00:00 PM by NEIL Escobar RVT.     CONCLUSIONS:  Right Lower Venous: There is acute occlusive deep vein thrombosis visualized in the mid femoral, distal femoral, popliteal and posterior tibial veins. There is acute non-occlusive deep vein thrombosis visualized in the proximal femoral vein.  Left Lower Venous: There is acute occlusive deep vein thrombosis visualized in the mid femoral, distal femoral, popliteal and proximal femoral veins. Cannot rule out thrombus in non-visualized peroneal vein due to edema.     Imaging & Doppler Findings:     Right                 Compressible      Thrombus          Flow  Distal External Iliac     Yes             None         Continuous  CFV                       Yes             None         Continuous  PFV                       Yes              None  FV Proximal             Partial    Acute non-occlusive Continuous  FV Mid                     No        Acute occlusive  FV Distal                  No        Acute occlusive  Popliteal                  No        Acute occlusive      None  Peroneal                  Yes             None  PTV                        No        Acute occlusive       Left                  Compress    Thrombus        Flow  Distal External Iliac   Yes         None       Continuous  CFV                     Yes         None       Continuous  PFV                     Yes         None  FV Proximal              No    Acute occlusive    None  FV Mid                   No    Acute occlusive  FV Distal                No    Acute occlusive  Popliteal                No    Acute occlusive    None  PTV                     Yes         None       [id number] Cookie Pace MD  Electronically signed by [id number] Cookie Pace MD on 11/16/2023 at 12:36:58 PM       ** Final **  Invasive vascular procedure     [facility], Cath Lab, [address] [place], [place] [address]    Cardiovascular Catheterization Report    Patient Name:      DARLING WYMAN   Performing Physician:  Jay Alvarado MD  Study Date:        11/15/2023           Verifying Physician:   Jay Alvarado MD  MRN/PID:           [id number]             Cardiologist/Co-scrub:  Accession#:        [id number]         Ordering Physician:    Jay ALVARADO  Date of Birth/Age: 1940 / 83 years Fellow:  Gender:            F                    Fellow:  Encounter#:        [id number]       Study:            Pulmonary Angiogram  Additional Study: Peripheral Intervention       Indications:  DARLING WYMAN is a 84 year old female who presents with intermediate-high risk PE, hx of PE 15 years ago (thought 2/2 knee TKA) s/p 1 year of warfarin; p/w stroke c/f paradoxical embolus in setting of PE. +significant O2 requirement.     Procedure Description:  After infiltration of local anesthetic, the right  "femoral vein was identified with two dimensional ultrasound. Under direct ultrasound visualization, the right femoral vein was cannulated with a percutaneous technique. A 24F sheath was placed in the vein.     Pulmonary Embolism:     We predilated with a 22F Mac dilator, and placed a 24F sheath into the vein. Post-procedure, the venous sheath was pulled and pressure was applied to the site via figure-of-8.     Prior to access, we imaged the bilateral common femoral veins to evaluate for thrombus. No thrombus was noted. We selectively engaged the pulmonary artery using a JR4 diagnostic catheter. Pre-thrombectomy RA and PA pressures were obtained.     Following this, we sequentially engaged the following PA branches with Inari [id number] for selective extirpation of material: Left main, Right main, Truncus anterior and RLL posterior basal. Successful thrombectomy was performed. Selective angiogram was performed during the procedure to help locate thrombus for extraction. Post-thrombectomy RA and PA pressures were obtained.     We did not administer therapeutic systemic heparin up front d/t stroke and concerns for potential hemorrhagic conversion. Heparinized saline was utilized to \"wash\" the FlowSaver filter to maintain patency.    Initially thrombectomy of LPA highly successful. Difficulty with RPA thrombectomy, with angiogram showing ball-like embolus. TA aspiration performed with loosening of the RPA thrombus (subacute clot). We did provide 2000 units of heparin at this time due to extended dwell time of catheter. Further thrombectomy performed of the main RPA and lower posterior branch with successful extirpation of remainder of subacute thrombus.     Hemo Personnel:  +----------+---------+  Name      Duty       +----------+---------+  Maldonado Alvarado MD 1  +----------+---------+       Hemodynamic Pressures:     +----+-----------+----------+-------------+--------------+-----+-------+-------+  Site Date " Time Phase NameSystolic mmHgDiastolic mmHgMean A-Wave V-Wave                                                       mmHg  mmHg   mmHg    +----+-----------+----------+-------------+--------------+-----+-------+-------+    RA 11/15/2023   O2 REST                              25     28     25      10:53:43 AM                                                          +----+-----------+----------+-------------+--------------+-----+-------+-------+    PA 11/15/2023   O2 REST           73            27   52                    10:56:29 AM                                                          +----+-----------+----------+-------------+--------------+-----+-------+-------+    PA 11/15/2023   O2 REST           75            31   51                    11:38:07 AM                                                          +----+-----------+----------+-------------+--------------+-----+-------+-------+    RA 11/15/2023   O2 REST                              18     21     19      11:38:30 AM                                                          +----+-----------+----------+-------------+--------------+-----+-------+-------+    PA 11/15/2023   O2 REST           67            29   41                    12:06:02 PM                                                          +----+-----------+----------+-------------+--------------+-----+-------+-------+    RA 11/15/2023   O2 REST                              23     27     23      12:06:39 PM                                                          +----+-----------+----------+-------------+--------------+-----+-------+-------+         Oxygen Saturation %:  +-----------+----------+------------+  Sample SiteO2 Sat (%)HB (g/100ml)  +-----------+----------+------------+           AO        90        15.1  +-----------+----------+------------+           PA         52        15.1  +-----------+----------+------------+           AO        90        15.1  +-----------+----------+------------+           PA        52        15.1  +-----------+----------+------------+           PA        42              +-----------+----------+------------+           PA        42              +-----------+----------+------------+       Cardiac Outputs:  +---------------+------------------+-------+  SINTIA CO (l/min)SINTIA CI (l/min/m2)SINTIA SV  +---------------+------------------+-------+              3.7               1.7   41.2  +---------------+------------------+-------+       Vascular Resistance Calculated Values (Wood Units):  +-----+----+----+  PhaseTPR TPRI  +-----+----+----+  0    14.030.5  +-----+----+----+       Complications:  No in-lab complications observed.     Cardiac Cath Post Procedure Notes:  Post Procedure Diagnosis: Bilateral pulmonary embolism R>L c/w intermediate-high                            risk PE s/p extirpation of material.  Blood Loss:               Estimated blood loss during the procedure was 50 mls.  Specimens Removed:        Number of specimen(s) removed: thrombus (acute LPA,                            subacute RPA).    ____________________________________________________________________________________  CONCLUSIONS:   1. Indication: intermediate-high risk PE with suspected paradoxical thrombus and CVA, severe hypoxia.   2. Bilateral pulmonary angiogram and exptirpation of material from bilateral PAs as noted.   3. Heparin anticoagulation and routine work up.    ICD 10 Codes:  Other pulmonary embolism with acute cor pulmonale-I26.09     CPT Codes:  Selective catheter placement, left or right pulmonary artery uni-23918; Selective catheter placement, left or right pulmonary artery bilat-53175.50; Selective catheter placement, segmental or subsegmental pulmonary artery uni-08846; Angiography, pulmonary, bilateral S&I-55507;  Extirpation of material, unilateral PA-79160; Extirpation of material, contralateral PA-81911.50; Extirpation of material, unilateral secondary vessel Subsegment 1-75779; Extirpation of material, unilateral secondary vessel Subsegment 2-06089; Moderate Sedation Services initial 15 minutes patient >5 years-85890; Moderate Sedation Services 1st additional 15 minutes patient >5 years-68212; Moderate Sedation Services 2nd additional 15 minutes patient >5 years-06503; Moderate Sedation Services 3rd additional 15 minutes patient >5 years-42052; Moderate Sedation Services 4th additional 15 minutes patient >5 years-42680; Moderate Sedation Services 5th additional 15 minutes patient >5 years-53514     [id number] Maldonado Alvarado MD  Performing Physician  Electronically signed by [id number] Maldonado Alvarado MD on 11/15/2023 at 3:56:11 PM         ** Final **  Transthoracic Echo (TTE) [address] [address] [phone] and                                  Fax [phone]    TRANSTHORACIC ECHOCARDIOGRAM REPORT       Patient Name:      DARLING Colin Physician:    [id number] Govind Olvera MD  Study Date:        11/15/2023           Ordering Provider:    [id number] ANTOINETTE ENCINAS  MRN/PID:           [id number]             Fellow:  Accession#:        [id number]         Nurse:  Date of Birth/Age: 1940 / 83 years Sonographer:          OSMAN Aguirre RDCS  Gender:            F                    Additional Staff:  Height:            165.10 cm            Admit Date:           11/15/2023  Weight:            110.68 kg            Admission Status:     Inpatient -                                                                Routine  BSA:               2.15 m2              Encounter#:           [id number]                                          Department Location:  [facility]                                                                floor/ICU  Blood Pressure: 105 /73 mmHg    Study Type:    TRANSTHORACIC ECHO (TTE) COMPLETE  Diagnosis/ICD: Other forms of dyspnea-R06.09  Indication:    Dyspnea  CPT Code:      Echo Complete w Full Doppler-20899    Patient History:  Pertinent History: Dyspnea.    Study Detail: The following Echo studies were performed: 2D, M-Mode, Doppler and                color flow. Technically challenging study due to poor acoustic                windows, patient lying in supine position, body habitus and Post                procedure and bi-pap. Definity used as a contrast agent for                endocardial border definition. Total contrast used for this                procedure was 3 mL via IV push.       PHYSICIAN INTERPRETATION:  Left Ventricle: The left ventricular systolic function is normal, with an estimated ejection fraction of 50-55%. There are no regional wall motion abnormalities. The left ventricular cavity size is normal. Left ventricular diastolic filling was not assessed.  Left Atrium: The left atrium is normal in size.  Right Ventricle: The right ventricle is severely enlarged. There is moderately reduced right ventricular systolic function.  Right Atrium: The right atrium is mildly dilated.  Aortic Valve: The aortic valve is probably trileaflet. There is trivial aortic valve regurgitation.  Mitral Valve: The mitral valve is normal in structure. There is trace mitral valve regurgitation.  Tricuspid Valve: The tricuspid valve is structurally normal. There is mild tricuspid regurgitation. The Doppler estimated RVSP is moderately elevated at 43.2 mmHg.  Pulmonic Valve: The pulmonic valve is not well visualized. The pulmonic valve regurgitation was not well visualized.  Pericardium: There is no pericardial effusion noted.  Aorta: The aortic root is abnormal. There is mild dilatation of the ascending aorta. There is mild dilatation of the aortic  root.  Systemic Veins: The inferior vena cava appears to be of normal size.       CONCLUSIONS:   1. Left ventricular systolic function is normal with a 50-55% estimated ejection fraction.   2. Severely enlarged right ventricle.   3. There is moderately reduced right ventricular systolic function.   4. Moderately elevated right ventricular systolic pressure.    QUANTITATIVE DATA SUMMARY:  2D MEASUREMENTS:                           Normal Ranges:  Ao Root d:     3.70 cm   (2.0-3.7cm)  LAs:           3.10 cm   (2.7-4.0cm)  IVSd:          1.02 cm   (0.6-1.1cm)  LVPWd:         0.84 cm   (0.6-1.1cm)  LVIDd:         3.47 cm   (3.9-5.9cm)  LVIDs:         2.14 cm  LV Mass Index: 42.8 g/m2  LV % FS        38.3 %    M-MODE MEASUREMENTS:                   Normal Ranges:  Ao Root: 4.00 cm (2.0-3.7cm)  LAs:     3.50 cm (2.7-4.0cm)    AORTA MEASUREMENTS:                     Normal Ranges:  Asc Ao, d: 3.80 cm (2.1-3.4cm)    AORTIC VALVE:                         Normal Ranges:  LVOT Diameter: 2.70 cm (1.8-2.4cm)       RIGHT VENTRICLE:  RV Basal 5.13 cm    TRICUSPID VALVE/RVSP:                              Normal Ranges:  Peak TR Velocity: 3.17 m/s  RV Syst Pressure: 43.2 mmHg (< 30mmHg)  IVC Diam:         1.50 cm       [id number] Govind Olvera MD  Electronically signed on 11/16/2023 at 9:42:05 AM       ** Final **  Review of systems weakness difficulty to get up hypoxia saturations    Physical Exam  Awake and alert and oriented x3 skin is warm and dry lungs are clear posteriorly no rales or wheezes heart has a regular rate and rhythm no murmurs gallops or rubs abdomen soft nontender no organomegaly is present no clubbing or peripheral cyanosis is present  Relevant Results               Assessment/Plan   This patient currently has cardiac telemetry ordered; if you would like to modify or discontinue the telemetry order, click here to go to the orders activity to modify/discontinue the order.              Principal Problem:    Acute  saddle pulmonary embolism, unspecified whether acute cor pulmonale present (CMS/HCC)    For now bilateral PE she is now on anticoagulation orally with Coumadin bilateral DVTs extensive no IVC filter necessary at this time we will titrate her up INR as of 2-3 also she will need statin therapy and then plus or minus on aspirin PT and OT evaluation try to get her to acute rehab downstairs if not then to [facility] for skilled therapy and continue with her other medications and will get a CARLOS done tomorrow TTE was performed did not show a PFO possibility is to identify it with a CARLOS and continue with all other present management thank you              Sage Alba, DO

## 2023-11-20 LAB
ALBUMIN SERPL BCP-MCNC: 2.6 G/DL (ref 3.4–5)
ALP SERPL-CCNC: 58 U/L (ref 33–136)
ALT SERPL W P-5'-P-CCNC: 8 U/L (ref 7–45)
ANION GAP SERPL CALC-SCNC: 11 MMOL/L (ref 10–20)
AST SERPL W P-5'-P-CCNC: 11 U/L (ref 9–39)
BILIRUB SERPL-MCNC: 0.7 MG/DL (ref 0–1.2)
BUN SERPL-MCNC: 11 MG/DL (ref 6–23)
CALCIUM SERPL-MCNC: 8.7 MG/DL (ref 8.6–10.3)
CHLORIDE SERPL-SCNC: 105 MMOL/L (ref 98–107)
CO2 SERPL-SCNC: 32 MMOL/L (ref 21–32)
CREAT SERPL-MCNC: 1.56 MG/DL (ref 0.5–1.05)
ERYTHROCYTE [DISTWIDTH] IN BLOOD BY AUTOMATED COUNT: 15.5 % (ref 11.5–14.5)
GFR SERPL CREATININE-BSD FRML MDRD: 33 ML/MIN/1.73M*2
GLUCOSE BLD MANUAL STRIP-MCNC: 100 MG/DL (ref 74–99)
GLUCOSE BLD MANUAL STRIP-MCNC: 103 MG/DL (ref 74–99)
GLUCOSE BLD MANUAL STRIP-MCNC: 103 MG/DL (ref 74–99)
GLUCOSE SERPL-MCNC: 94 MG/DL (ref 74–99)
HCT VFR BLD AUTO: 33.7 % (ref 36–46)
HGB BLD-MCNC: 10.2 G/DL (ref 12–16)
INR PPP: 1.1 (ref 0.9–1.1)
MAGNESIUM SERPL-MCNC: 1.7 MG/DL (ref 1.6–2.4)
MCH RBC QN AUTO: 30.7 PG (ref 26–34)
MCHC RBC AUTO-ENTMCNC: 30.3 G/DL (ref 32–36)
MCV RBC AUTO: 102 FL (ref 80–100)
NRBC BLD-RTO: 0 /100 WBCS (ref 0–0)
PHOSPHATE SERPL-MCNC: 3 MG/DL (ref 2.5–4.9)
PLATELET # BLD AUTO: 241 X10*3/UL (ref 150–450)
POTASSIUM SERPL-SCNC: 4 MMOL/L (ref 3.5–5.3)
PROT SERPL-MCNC: 5.5 G/DL (ref 6.4–8.2)
PROTHROMBIN TIME: 12.9 SECONDS (ref 9.8–12.8)
RBC # BLD AUTO: 3.32 X10*6/UL (ref 4–5.2)
SODIUM SERPL-SCNC: 144 MMOL/L (ref 136–145)
UFH PPP CHRO-ACNC: 0.4 IU/ML
WBC # BLD AUTO: 6.9 X10*3/UL (ref 4.4–11.3)

## 2023-11-20 PROCEDURE — 82947 ASSAY GLUCOSE BLOOD QUANT: CPT

## 2023-11-20 PROCEDURE — 99232 SBSQ HOSP IP/OBS MODERATE 35: CPT | Performed by: STUDENT IN AN ORGANIZED HEALTH CARE EDUCATION/TRAINING PROGRAM

## 2023-11-20 PROCEDURE — 1200000002 HC GENERAL ROOM WITH TELEMETRY DAILY

## 2023-11-20 PROCEDURE — 2500000004 HC RX 250 GENERAL PHARMACY W/ HCPCS (ALT 636 FOR OP/ED): Performed by: INTERNAL MEDICINE

## 2023-11-20 PROCEDURE — 36415 COLL VENOUS BLD VENIPUNCTURE: CPT | Performed by: INTERNAL MEDICINE

## 2023-11-20 PROCEDURE — 97535 SELF CARE MNGMENT TRAINING: CPT | Mod: CQ,GP

## 2023-11-20 PROCEDURE — 92526 ORAL FUNCTION THERAPY: CPT | Mod: GN

## 2023-11-20 PROCEDURE — 85520 HEPARIN ASSAY: CPT | Performed by: INTERNAL MEDICINE

## 2023-11-20 PROCEDURE — 85610 PROTHROMBIN TIME: CPT | Performed by: INTERNAL MEDICINE

## 2023-11-20 PROCEDURE — 2500000002 HC RX 250 W HCPCS SELF ADMINISTERED DRUGS (ALT 637 FOR MEDICARE OP, ALT 636 FOR OP/ED): Performed by: NURSE PRACTITIONER

## 2023-11-20 PROCEDURE — 85027 COMPLETE CBC AUTOMATED: CPT | Performed by: STUDENT IN AN ORGANIZED HEALTH CARE EDUCATION/TRAINING PROGRAM

## 2023-11-20 PROCEDURE — 36415 COLL VENOUS BLD VENIPUNCTURE: CPT | Performed by: STUDENT IN AN ORGANIZED HEALTH CARE EDUCATION/TRAINING PROGRAM

## 2023-11-20 PROCEDURE — 2500000004 HC RX 250 GENERAL PHARMACY W/ HCPCS (ALT 636 FOR OP/ED): Performed by: NURSE PRACTITIONER

## 2023-11-20 PROCEDURE — 97116 GAIT TRAINING THERAPY: CPT | Mod: CQ,GP

## 2023-11-20 PROCEDURE — 84100 ASSAY OF PHOSPHORUS: CPT | Performed by: STUDENT IN AN ORGANIZED HEALTH CARE EDUCATION/TRAINING PROGRAM

## 2023-11-20 PROCEDURE — 83735 ASSAY OF MAGNESIUM: CPT | Performed by: STUDENT IN AN ORGANIZED HEALTH CARE EDUCATION/TRAINING PROGRAM

## 2023-11-20 PROCEDURE — 97535 SELF CARE MNGMENT TRAINING: CPT | Mod: CO,GO

## 2023-11-20 PROCEDURE — 94640 AIRWAY INHALATION TREATMENT: CPT

## 2023-11-20 PROCEDURE — 2500000001 HC RX 250 WO HCPCS SELF ADMINISTERED DRUGS (ALT 637 FOR MEDICARE OP): Performed by: NURSE PRACTITIONER

## 2023-11-20 PROCEDURE — 80053 COMPREHEN METABOLIC PANEL: CPT | Performed by: STUDENT IN AN ORGANIZED HEALTH CARE EDUCATION/TRAINING PROGRAM

## 2023-11-20 RX ADMIN — PANTOPRAZOLE SODIUM 40 MG: 40 TABLET, DELAYED RELEASE ORAL at 06:12

## 2023-11-20 RX ADMIN — IPRATROPIUM BROMIDE AND ALBUTEROL SULFATE 3 ML: 2.5; .5 SOLUTION RESPIRATORY (INHALATION) at 19:12

## 2023-11-20 RX ADMIN — IPRATROPIUM BROMIDE AND ALBUTEROL SULFATE 3 ML: 2.5; .5 SOLUTION RESPIRATORY (INHALATION) at 11:32

## 2023-11-20 RX ADMIN — GABAPENTIN 300 MG: 300 CAPSULE ORAL at 20:27

## 2023-11-20 RX ADMIN — FUROSEMIDE 40 MG: 10 INJECTION, SOLUTION INTRAMUSCULAR; INTRAVENOUS at 10:12

## 2023-11-20 RX ADMIN — GABAPENTIN 300 MG: 300 CAPSULE ORAL at 16:05

## 2023-11-20 RX ADMIN — WARFARIN SODIUM 2.5 MG: 2.5 TABLET ORAL at 18:19

## 2023-11-20 RX ADMIN — GABAPENTIN 300 MG: 300 CAPSULE ORAL at 10:12

## 2023-11-20 RX ADMIN — HEPARIN SODIUM 1400 UNITS/HR: 10000 INJECTION, SOLUTION INTRAVENOUS at 20:38

## 2023-11-20 RX ADMIN — HEPARIN SODIUM 14 UNITS/HR: 10000 INJECTION, SOLUTION INTRAVENOUS at 03:15

## 2023-11-20 RX ADMIN — ASPIRIN 81 MG: 81 TABLET, COATED ORAL at 10:11

## 2023-11-20 RX ADMIN — NYSTATIN 1 APPLICATION: 100000 POWDER TOPICAL at 10:13

## 2023-11-20 RX ADMIN — ATORVASTATIN CALCIUM 40 MG: 40 TABLET, FILM COATED ORAL at 20:27

## 2023-11-20 RX ADMIN — IPRATROPIUM BROMIDE AND ALBUTEROL SULFATE 3 ML: 2.5; .5 SOLUTION RESPIRATORY (INHALATION) at 07:18

## 2023-11-20 RX ADMIN — NYSTATIN 1 APPLICATION: 100000 POWDER TOPICAL at 20:27

## 2023-11-20 RX ADMIN — LEVOTHYROXINE SODIUM 150 MCG: 150 TABLET ORAL at 06:13

## 2023-11-20 ASSESSMENT — COGNITIVE AND FUNCTIONAL STATUS - GENERAL
PERSONAL GROOMING: A LITTLE
PERSONAL GROOMING: A LITTLE
MOVING TO AND FROM BED TO CHAIR: A LOT
DAILY ACTIVITIY SCORE: 14
MOVING TO AND FROM BED TO CHAIR: A LOT
TOILETING: A LOT
STANDING UP FROM CHAIR USING ARMS: A LOT
WALKING IN HOSPITAL ROOM: A LOT
DRESSING REGULAR UPPER BODY CLOTHING: A LOT
TOILETING: A LOT
DRESSING REGULAR UPPER BODY CLOTHING: A LOT
STANDING UP FROM CHAIR USING ARMS: A LOT
MOVING FROM LYING ON BACK TO SITTING ON SIDE OF FLAT BED WITH BEDRAILS: A LOT
WALKING IN HOSPITAL ROOM: A LOT
DAILY ACTIVITIY SCORE: 14
CLIMB 3 TO 5 STEPS WITH RAILING: TOTAL
MOBILITY SCORE: 11
HELP NEEDED FOR BATHING: A LOT
MOVING FROM LYING ON BACK TO SITTING ON SIDE OF FLAT BED WITH BEDRAILS: A LOT
DRESSING REGULAR LOWER BODY CLOTHING: TOTAL
HELP NEEDED FOR BATHING: A LOT
TURNING FROM BACK TO SIDE WHILE IN FLAT BAD: A LOT
CLIMB 3 TO 5 STEPS WITH RAILING: TOTAL
TURNING FROM BACK TO SIDE WHILE IN FLAT BAD: A LOT
MOBILITY SCORE: 11
DRESSING REGULAR LOWER BODY CLOTHING: TOTAL

## 2023-11-20 ASSESSMENT — ACTIVITIES OF DAILY LIVING (ADL): HOME_MANAGEMENT_TIME_ENTRY: 26

## 2023-11-20 ASSESSMENT — PAIN - FUNCTIONAL ASSESSMENT
PAIN_FUNCTIONAL_ASSESSMENT: 0-10
PAIN_FUNCTIONAL_ASSESSMENT: 0-10

## 2023-11-20 ASSESSMENT — PAIN SCALES - GENERAL
PAINLEVEL_OUTOF10: 0 - NO PAIN
PAINLEVEL_OUTOF10: 0 - NO PAIN

## 2023-11-20 NOTE — PROGRESS NOTES
Occupational Therapy    OT Treatment    Patient Name: Lillian Mendoza  MRN: [id number]  Today's Date: 11/20/2023  Time Calculation  Start Time: 0957  Stop Time: 1023  Time Calculation (min): 26 min         Assessment:        Plan:  Treatment Interventions: ADL retraining, Functional transfer training, UE strengthening/ROM, Patient/family training, Equipment evaluation/education, Endurance training  OT Frequency: 3 times per week  OT Discharge Recommendations: Moderate intensity level of continued care  OT - OK to Discharge: Yes (from acute OT services to next level of care when medically cleared)  Treatment Interventions: ADL retraining, Functional transfer training, UE strengthening/ROM, Patient/family training, Equipment evaluation/education, Endurance training    Subjective   Previous Visit Info:     General:  General  Family/Caregiver Present:  (granddaughter present for session)       Activities of Daily Living: Grooming  Grooming Level of Assistance:  (pt. able to complete hair brushing task with sba after set-up)  Functional Standing Tolerance:  Time:  (pt. tolertaed 1.0 min. of static stand balance with min/mod a x2.)  Bed Mobility/Transfers: Bed Mobility  Bed Mobility:  (pt. able to move supine to sit with mod a x2.)    Transfers  Transfer:  (pt. able to move sit to stand with min/mod a x2 and complete bed to chair transfer using a wheeled walker.)             Outcome Measures:[facility] Daily Activity  Putting on and taking off regular lower body clothing: Total  Bathing (including washing, rinsing, drying): A lot  Putting on and taking off regular upper body clothing: A lot  Toileting, which includes using toilet, bedpan or urinal: A lot  Taking care of personal grooming such as brushing teeth: A little  Eating Meals: None  Daily Activity - Total Score: 14        Education Documentation  No documentation found.  Education Comments  No comments found.         EDUCATION:       Goals:  Encounter Problems        Encounter Problems (Active)       OT Goals       Patient will complete functional transfers at MIN A level with LRD to facilitate increased independence and safety with home going  (Progressing)       Start:  11/17/23    Expected End:  12/01/23            Patient will complete functional mobility for household distances at MIN A level with LRD to facilitate increased independence and safety with home going  (Progressing)       Start:  11/17/23    Expected End:  12/01/23            Patient will complete LB dressing at MOD A level to facilitate safety and independence for home going   (Progressing)       Start:  11/17/23    Expected End:  12/01/23            Patient will complete UB dressing at MOD I level to facilitate safety and independence for home going   (Progressing)       Start:  11/17/23    Expected End:  12/01/23            Patient will complete toileting at MOD A level to facilitate safety and independence for home going   (Progressing)       Start:  11/17/23    Expected End:  12/01/23

## 2023-11-20 NOTE — CARE PLAN
Problem: General Stroke  Goal: Demonstrate improvement in neurological exam throughout the shift  Outcome: Progressing  Goal: Maintain BP within ordered limits throughout shift  Outcome: Progressing  Goal: Participate in treatment (ie., meds, therapy) throughout shift  Outcome: Progressing  Goal: No symptoms of aspiration throughout shift  Outcome: Progressing  Goal: No symptoms of hemorrhage throughout shift  Outcome: Progressing  Goal: Tolerate enteral feeding throughout shift  Outcome: Progressing  Goal: Decreased nausea/vomiting throughout shift  Outcome: Progressing  Goal: Controlled blood glucose throughout shift  Outcome: Progressing  Goal: Out of bed three times today  Outcome: Progressing     Problem: ICU Stroke  Goal: Maintain ICP within ordered limits throughout shift  Outcome: Progressing  Goal: Tolerate EVD clamping trial throughout shift  Outcome: Progressing  Goal: Tolerate ventilator weaning trial during shift  Outcome: Progressing  Goal: Maintain patent airway throughout shift  Outcome: Progressing  Goal: Achieve/maintain targeted sodium level throughout shift  Outcome: Progressing     Problem: Nutrition  Goal: Less than 5 days NPO/clear liquids  Outcome: Progressing  Goal: Oral intake greater than 50%  Outcome: Progressing  Goal: Oral intake greater 75%  Outcome: Progressing  Goal: Consume prescribed supplement  Outcome: Progressing  Goal: Adequate PO fluid intake  Outcome: Progressing  Goal: Nutrition support goals are met within 48 hrs  Outcome: Progressing  Goal: Nutrition support is meeting 75% of nutrient needs  Outcome: Progressing  Goal: Tube feed tolerance  Outcome: Progressing  Goal: BG  mg/dL  Outcome: Progressing  Goal: Lab values WNL  Outcome: Progressing  Goal: Electrolytes WNL  Outcome: Progressing  Goal: Promote healing  Outcome: Progressing  Goal: Maintain stable weight  Outcome: Progressing  Goal: Reduce weight from edema/fluid  Outcome: Progressing  Goal: Gradual weight  gain  Outcome: Progressing  Goal: Improve ostomy output  Outcome: Progressing     Problem: Fall/Injury  Goal: Not fall by end of shift  Outcome: Progressing  Goal: Be free from injury by end of the shift  Outcome: Progressing  Goal: Verbalize understanding of personal risk factors for fall in the hospital  Outcome: Progressing  Goal: Verbalize understanding of risk factor reduction measures to prevent injury from fall in the home  Outcome: Progressing  Goal: Use assistive devices by end of the shift  Outcome: Progressing  Goal: Pace activities to prevent fatigue by end of the shift  Outcome: Progressing     Problem: Pain - Adult  Goal: Verbalizes/displays adequate comfort level or baseline comfort level  Outcome: Progressing     Problem: Safety - Adult  Goal: Free from fall injury  Outcome: Progressing     Problem: Discharge Planning  Goal: Discharge to home or other facility with appropriate resources  Outcome: Progressing     Problem: Chronic Conditions and Co-morbidities  Goal: Patient's chronic conditions and co-morbidity symptoms are monitored and maintained or improved  Outcome: Progressing     Problem: Skin  Goal: Decreased wound size/increased tissue granulation at next dressing change  Outcome: Progressing  Goal: Participates in plan/prevention/treatment measures  Outcome: Progressing  Goal: Prevent/manage excess moisture  Outcome: Progressing  Goal: Prevent/minimize sheer/friction injuries  Outcome: Progressing  Goal: Promote/optimize nutrition  Outcome: Progressing  Goal: Promote skin healing  Outcome: Progressing   The patient's goals for the shift include able to eat/drink    The clinical goals for the shift include try to wean down oxygen

## 2023-11-20 NOTE — PROGRESS NOTES
Met with pt and her grand daughter.  Plan is for CARLOS , remains on IV Heparin gtt  and is also receiving Coumadin.  Pt's grand dtg Ibrahima lives with her and helps her, she is also a RN.  AR here at [facility] is evaluating pt for possible admission, pt currently is not medically stable, on 8 liters of 02, needs source of clot.  Await therapy , will follow.  Purvi Machado RN [facility]

## 2023-11-20 NOTE — PROGRESS NOTES
"Lillian Mendoza is a 83 y.o. female on day 5 of admission presenting with Acute saddle pulmonary embolism, unspecified whether acute cor pulmonale present (CMS/[facility]).    Subjective   Seen today awaiting the CARLOS to be completed continue with warfarin I will start checking and looking at her INR's review her renal function oxygen saturations are good and her neurological improvement has been significant just in the last 3 days       Objective   ROS weakness deconditioning neurologic impairment improved  Physical Exam  Awake and alert and oriented x3 lungs are diminished but clear anteriorly heart has a regular rate and rhythm abdomen is soft and nontender  Last Recorded Vitals  Blood pressure 115/75, pulse 104, temperature 36.6 °C (97.9 °F), temperature source Temporal, resp. rate 18, height 1.67 m (5' 5.75\"), weight 128 kg (281 lb 8.4 oz), SpO2 93 %.  Intake/Output last 3 Shifts:  I/O last 3 completed shifts:  In: - (0 mL/kg)   Out: 825 (6.5 mL/kg) [Urine:825 (0.2 mL/kg/hr)]  Weight: 127.7 kg     Relevant Results           This patient currently has cardiac telemetry ordered; if you would like to modify or discontinue the telemetry order, click here to go to the orders activity to modify/discontinue the order.                 Assessment/Plan   Principal Problem:    Acute saddle pulmonary embolism, unspecified whether acute cor pulmonale present (CMS/[facility])    We are waiting CARLOS gets completed make sure she does not have a PFO that she needs to be evaluated by another specialty or conservative care but we need to get the CARLOS completed treat for her acute pulmonary embolism her strokes I want to start the process tomorrow of going to acute rehab and see if she can get down there for physical therapy and if she cannot go downstairs to acute rehab then we do have an ulterior plan to move her to [facility] in [place] that would be the secondary option Case management probably is aware of it " I will discontinue and move forward thank you       I spent  minutes in the professional and overall care of this patient.      Sage Alba, DO

## 2023-11-20 NOTE — PROGRESS NOTES
Subjective   Subjective Data and Overnight Events:  Sitting in chair.  On oxygen supplementation.  No chest pain.  Objective   Vital Signs:  Patient Vitals for the past 24 hrs:   BP Temp Temp src Pulse Resp SpO2 Weight   11/20/23 1200 115/75 36.6 °C (97.9 °F) Temporal 104 18 -- --   11/20/23 1132 -- -- -- -- -- 93 % --   11/20/23 0800 130/63 36.3 °C (97.3 °F) Temporal 89 18 92 % --   11/20/23 0718 -- -- -- -- -- 92 % --   11/20/23 0600 -- -- -- -- -- -- 128 kg (281 lb 8.4 oz)   11/20/23 0330 122/70 36.7 °C (98.1 °F) -- 84 -- 95 % --   11/19/23 2358 122/83 36.2 °C (97.2 °F) Temporal 91 18 90 % --   11/19/23 2014 117/63 36.4 °C (97.5 °F) Temporal 99 20 96 % --   11/19/23 1946 -- -- -- -- -- 93 % --   11/19/23 1600 116/69 36.1 °C (97 °F) Temporal 103 18 92 % --         Physical Exam: On oxygen supplementation  General: no acute distress  HEENT: EOMI, no scleral icterus.  Lungs: Clear to auscultation bilaterally without wheezing, rales, or rhonchi.  Cardiovascular: Regular rhythm and rate. Normal S1 and S2. No murmurs, rubs, or gallops are appreciated. JVP normal.  Extremities: 1+ edema BLE.  Neurologic: Alert and oriented x3.    Current Medications:  Scheduled medications   Medication Dose Route Frequency    aspirin  81 mg oral Daily    atorvastatin  40 mg oral Nightly    fluticasone  2 spray Each Nostril Daily    furosemide  40 mg intravenous Daily    gabapentin  300 mg oral TID    ipratropium-albuteroL  3 mL nebulization TID    levothyroxine  150 mcg oral Daily before breakfast    nystatin  1 Application Topical BID    pantoprazole  40 mg oral Daily before breakfast    polyethylene glycol  17 g oral Daily    warfarin  2.5 mg oral Daily     Continuous Medications   Medication Dose Last Rate    heparin  0-4,500 Units/hr 1,400 Units/hr (11/20/23 0730)     PRN medications   Medication    heparin    oxygen    sodium chloride       Pertinent Recent Cardiovascular Studies (personally reviewed):  Vascular studies:  Venous  duplex 11/15/2023: Bilateral fem/pop DVT. PTV RLE DVT. EIV and CFV spared.    Cardiac studies:  Echocardiogram 11/16/2023:  RV is mildly enlarged, function is mildly reduced.  There is no evidence of PFO (subcostal view only)      Laboratory values:  CMP:  Recent Labs     11/20/23  0515 11/19/23  0522 11/18/23  0316 11/17/23  0135 11/16/23  0203 11/15/23  1716 11/15/23  0325 12/12/22  0854 12/11/22  0812    142 140 138 136 138 138 140 142   K 4.0 4.5 4.7 4.7 5.3 5.1 4.5 4.9 4.7    107 108* 104 105 103 101 107 108*   CO2 32 29 25 27 21 23 23 23 26   ANIONGAP 11 11 12 12 15 17 19 15 13   BUN 11 14 19 25* 29* 29* 35* 22 24*   CREATININE 1.56* 1.55* 1.61* 1.89* 2.12* 2.21* 2.58* 1.27* 1.37*   EGFR 33* 34* 32* 26* 23* 22* 18*  --   --    MG 1.70 1.95 2.02 1.97 2.12 2.20  --  2.24 1.81       Recent Labs     11/20/23  0515 11/19/23  0522 11/18/23 0316 11/17/23  0135 11/16/23  0203   ALBUMIN 2.6* 2.6* 2.5* 2.5* 2.8*   ALKPHOS 58 59 55 59 66   ALT 8 7 7 8 8   AST 11 11 12 16 19   BILITOT 0.7 0.7 0.7 0.8 0.8       CBC:  Recent Labs     11/20/23  0515 11/19/23  0522 11/18/23  2333 11/18/23 0316 11/17/23  0135 11/16/23  2223 11/16/23  0203 11/15/23  1716   WBC 6.9 6.2 6.8 6.2 8.6 9.4 8.6 12.8*   HGB 10.2* 9.4* 10.1* 9.0* 10.0* 10.2* 11.3* 12.3   HCT 33.7* 31.3* 33.8* 29.1* 32.0* 32.8* 39.1 39.2    199 196 179 152 148* 147* 157   * 101* 102* 100 100 101* 108* 99       COAG:   Recent Labs     11/20/23  0515 11/19/23  0522 11/18/23  0751 11/18/23  0316 11/17/23  2157 11/17/23  1558 11/17/23  1047 11/17/23  0541 11/15/23  0905 11/15/23  0325 12/01/22  1710   INR 1.1 1.1 1.2*  --   --   --   --   --   --  1.3* 1.0   HAUF 0.4 0.5 0.7 0.6 0.8 0.7 0.9 0.8   < >  --   --     < > = values in this interval not displayed.       ABO:   Recent Labs     12/02/22  0601   ABO O       HEME/ENDO:  Recent Labs     11/15/23  0325 08/24/21  1459 08/17/20  0000 12/13/19  1504 11/14/18  1515   TSH  --  0.14* 0.09* 0.44  "5.34*   HGBA1C 6.1*  --   --   --   --         CARDIAC:   Recent Labs     11/15/23  0418 11/15/23  0325   TROPHS 1,116* 1,272*   BNP  --  1,683*       Recent Labs     11/15/23  0412 12/13/19  1504 11/14/18  1515   CHOL 112 182 141   LDLF  --  114* 71   HDL 30.7 46.9 37.5*   TRIG 142 105 162*       MICRO: No results for input(s): \"ESR\", \"CRP\", \"PROCAL\" in the last 95822 hours.  No results found for the last 90 days.         I have personally reviewed most recent PCP, cardiology, vascular, and/or podiatry documentation.      Assessment/Plan   83 y.o. 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 Presented with stroke sxs with dysarthria and facial droop. Out of window for TPA.  Patient status post bilateral thrombectomy for PE.  We did not perform IVC filter at that time due to no evidence of CFV thrombus seen in the Cath Lab.    Interim history includes DVT scan that showed femoropopliteal DVT bilaterally, MRI that showed evidence of embolic stroke.  No evidence of hemorrhagic transformation on multiple imaging of the head.      Plan:  1.  From a pulmonary embolism standpoint, titrate O2 down as tolerated.  Likely, she will require Coumadin for long-term anticoagulation due to BMI of 47.    2.  From a stroke perspective, uncertain as to source of embolic phenomenon.  Differential diagnosis includes the following:   - Highest suspicion is paradoxical embolus from PFO; we may need to consider CARLOS versus CMR to confirm if PFO is present despite negative TTE.  - APLA; regardless, patient will be on warfarin due to her weight and may not change treatment plan even if positive.  - Undiagnosed AF - on tele - I did not see any AF during visit today; CARLOS or CMR may be helpful here to evaluate the KATIE as well.  - Aortic atheroembolic phenomenon - if all else is negative, we may need to consider " Plavix and statin optimization    3.  Given she has been tolerating anticoagulation at least for 36 hours now without hemorrhagic transformation, I agree that overall risk of hemorrhagic transformation now is lower.  May be equivocal considering IVC filter placement.  I have discussed with Dr. Peres, we can hold off at this time.  Okay to ambulate and okay to place BP cuff on lower extremity if needed.    4.  Agree with PT/OT evaluation for poststroke care.    We will continue to follow-up, please do not hesitate to call if questions arise.  Discussed with family and primary team.    Disclaimer: This note was dictated by speech recognition. Minor errors in transcription may be present.       11/18/2023  I discussed this case with Dr. Alvarado and I agree that CARLOS will be helpful to rule out PFO.  I discussed that with the patient and family in extent and they are in agreement to proceed with that as long as the patient is breathing status permits.  Tentatively will plan to get it done on Monday.  For now we will continue with current medications including heparin drip.  I will follow-up with the patient tomorrow.      11/19/2023  Primary team is planning for transferring the patient to rehab facility later in the coming week.  We will plan for CARLOS tentatively on Tuesday to give her more time to improve her breathing.      11/20/2023  Patient is agreeable to proceed with CARLOS tomorrow.  We will keep patient n.p.o. except for medications after midnight.  Primary team at bedside and discussed.      SIGNATURE: Govind Olvera MD PhD PATIENT NAME: Lillian Mendoza   DATE/TIME: November 20, 2023 2:55 PM MRN: [id number]

## 2023-11-20 NOTE — PROGRESS NOTES
Physical Therapy    Physical Therapy Treatment    Patient Name: Lillian Mendoza  MRN: [id number]  Today's Date: 11/20/2023  Time Calculation  Start Time: 0831  Stop Time: 0901  Time Calculation (min): 30 min       Assessment/Plan         PT Plan  Treatment/Interventions: Bed mobility, Strengthening  PT Plan: Skilled PT  PT Frequency: 5 times per week  PT Discharge Recommendations: High intensity level of continued care  PT Recommended Transfer Status: Assist x2  PT - OK to Discharge: Yes      General Visit Information:   PT  Visit  PT Received On: 11/20/23       Subjective               Objective                      Treatments:  Therapeutic Exercise  Therapeutic Exercise Performed:  (sitting ble arom exs x 15 reps)    Bed Mobility  Bed Mobility:  (supine to sit mod a x 1)    Ambulation/Gait Training  Ambulation/Gait Training Performed:  (ambulated 5 feet using fixed wheeled walker min/mod a x 2)  Transfers  Transfer:  (sit to stand min/mod a  x2)    Outcome Measures:  [facility] Basic Mobility  Turning from your back to your side while in a flat bed without using bedrails: A lot  Moving from lying on your back to sitting on the side of a flat bed without using bedrails: A lot  Moving to and from bed to chair (including a wheelchair): A lot  Standing up from a chair using your arms (e.g. wheelchair or bedside chair): A lot  To walk in hospital room: A lot  Climbing 3-5 steps with railing: Total  Basic Mobility - Total Score: 11    Education Documentation  Mobility Training, taught by Reena uCriel PTA at 11/20/2023 11:16 AM.  Learner: Patient  Readiness: Acceptance  Method: Demonstration  Response: Demonstrated Understanding    Body Mechanics, taught by Reena Curiel PTA at 11/20/2023 11:16 AM.  Learner: Patient  Readiness: Acceptance  Method: Demonstration  Response: Demonstrated Understanding    Precautions, taught by Reena Curiel PTA at 11/20/2023 11:16 AM.  Learner: Patient  Readiness: Acceptance  Method:  Demonstration  Response: Demonstrated Understanding    ADL Training, taught by Reena Curiel PTA at 11/20/2023 11:16 AM.  Learner: Patient  Readiness: Acceptance  Method: Demonstration  Response: Demonstrated Understanding    Education Comments  No comments found.               Encounter Problems       Encounter Problems (Active)       PT Problem       bed mob (Progressing)       Start:  11/17/23    Expected End:  12/08/23       Min assist 1-2 bed mob         serrano (Progressing)       Start:  11/17/23    Expected End:  12/08/23       Min assist 1-2 serrano         gait (Progressing)       Start:  11/17/23    Expected End:  12/08/23       Amb 10 ft> w/ approp device/min assist 1-2         strengthening (Progressing)       Start:  11/17/23    Expected End:  12/08/23       10-30 reps LE'S            Pain - Adult

## 2023-11-20 NOTE — CARE PLAN
Problem: Skin  Goal: Decreased wound size/increased tissue granulation at next dressing change  Outcome: Progressing  Goal: Participates in plan/prevention/treatment measures  Outcome: Progressing  Goal: Prevent/manage excess moisture  Outcome: Progressing  Goal: Prevent/minimize sheer/friction injuries  Outcome: Progressing  Goal: Promote/optimize nutrition  Outcome: Progressing  Goal: Promote skin healing  Outcome: Progressing   The patient's goals for the shift include able to eat/drink    The clinical goals for the shift include try to wean down oxygen    Patient actively participated in q2 hour turning and repositioning to promote comfort and prevent skin breakdown

## 2023-11-20 NOTE — PROGRESS NOTES
Speech-Language Pathology    Inpatient  Speech-Language Pathology Treatment     Patient Name: Lillian Mendoza  MRN: [id number]  Today's Date: 11/20/2023  Time Calculation  Start Time: 1215  Stop Time: 1250  Time Calculation (min): 35 min         Current Problem:   1. Acute saddle pulmonary embolism, unspecified whether acute cor pulmonale present (CMS/HCC)  Transthoracic Echo (TTE) Complete    Transthoracic Echo (TTE) Complete    Vascular US Lower Extremity Venous Duplex Bilateral    Vascular US Lower Extremity Venous Duplex Bilateral    Transthoracic Echo (TTE) Limited    Transthoracic Echo (TTE) Limited    CANCELED: Lower extremity venous duplex bilateral    CANCELED: Lower extremity venous duplex bilateral      2. Acute kidney injury (CMS/HCC)        3. Pulmonary embolism (CMS/HCC)  Invasive vascular procedure    Invasive vascular procedure    Transesophageal Echo (CARLOS)      4. Acute stroke due to ischemia (CMS/HCC)  Transthoracic Echo (TTE) Complete    Transthoracic Echo (TTE) Complete    Transthoracic Echo (TTE) Limited    Transthoracic Echo (TTE) Limited    Transesophageal Echo (CARLOS)      5. Generalized edema  Vascular US Lower Extremity Venous Duplex Bilateral    Vascular US Lower Extremity Venous Duplex Bilateral      6. History of pulmonary embolism  CANCELED: Lower extremity venous duplex bilateral    CANCELED: Lower extremity venous duplex bilateral      7. Other pulmonary embolism with acute cor pulmonale (CMS/HCC)  Invasive vascular procedure      8. Other forms of dyspnea  Transthoracic Echo (TTE) Complete              Subjective   Current Problem:  Assess swallowing mechanism and determine safety for diet advancement.    Most Recent Visit:  SLP Received On: 11/20/23    General Visit Information:   Patient pleasant and cooperative. Granddaughter also at bedside. Patient is hopeful for diet advancement.    Currently on 10L high flow O2 NC.   CTA Chest 11/15:   1. Extensive bilateral pulmonary emboli,  greater on the right. There is no evidence for right heart strain.  2. Prominent atelectasis at the left base.      Pain Assessment:   Pain Assessment: 0-10  Pain Score: 0 - No pain      Objective       SLP Assessment:  Patient consumed the following PO trials for reassessment of swallowing mechanism: turkey sandwich and straw and cup sips of thin water and coffee. Oral motor movements appear to be WFL with no evidence of facial drooping or lingual weakness at this time. Patient sitting upright in chair, self-feeding small bites and small cup sips of liquids. Patient reports that she is generally a very slow eater. Patient demonstrates slow but sufficient mastication of soft sandwich with prompt and full oral clearance with each bite. Patient denying any globus sensation at this time. Adequate bolus control for cup and straw sips of thin liquid as evidenced by no anterior spillage or cough before the swallow. No  overt or subtle s/s of aspiration demonstrated at this time. Pulse ox remained in the mid to high 90s during time spent in room while eating/drinking.       Plan:  Recommend advancing diet to Regular solids with Thin liquids. Straws ok.  SLP TX Plan: Continue Plan of Care  Discussed POC: Patient, Caregiver/family, Nursing  Patient/Caregiver Agreeable: Yes      Dysphagia Goals:   Pt will demonstrate safe /adequate management of the prescribed diet without s/s of aspiration. GOAL ONGOING  Pt will complete oropharyngeal exercises x10-20 reps to given min to moderate cues to maximize pharyngeal /laryngeal /oral strength ./ROM/coordination to improve swallowing function. GOAL NOT ADDRESSED THIS DATE  Pt will trial advancing PO by SLP only to determine possible diet advancement verus need for a MBSS given more time for recovery. GOAL MET; DIET ADVANCED  Pt/family will verbalize demonstrate comprehension of dysphagia education and recommendations. GOAL ONGOING       Inpatient:  Education Comments  SLP provided  patient, granddaughter and RN with results/recommendations from therapy session this date; all parties voiced understanding. Provided patient and her granddaughter education about aspiration risks and s/s of aspiration to closely monitor for. Patient an granddaughter voiced understanding.

## 2023-11-21 ENCOUNTER — APPOINTMENT (OUTPATIENT)
Dept: RADIOLOGY | Facility: HOSPITAL | Age: 83
DRG: 163 | End: 2023-11-21
Payer: MEDICARE

## 2023-11-21 ENCOUNTER — APPOINTMENT (OUTPATIENT)
Dept: CARDIOLOGY | Facility: HOSPITAL | Age: 83
DRG: 163 | End: 2023-11-21
Payer: MEDICARE

## 2023-11-21 LAB
APPEARANCE UR: ABNORMAL
BACTERIA #/AREA URNS AUTO: ABNORMAL /HPF
BILIRUB UR STRIP.AUTO-MCNC: NEGATIVE MG/DL
COLOR UR: YELLOW
ERYTHROCYTE [DISTWIDTH] IN BLOOD BY AUTOMATED COUNT: 15.6 % (ref 11.5–14.5)
GLUCOSE BLD MANUAL STRIP-MCNC: 103 MG/DL (ref 74–99)
GLUCOSE BLD MANUAL STRIP-MCNC: 123 MG/DL (ref 74–99)
GLUCOSE BLD MANUAL STRIP-MCNC: 138 MG/DL (ref 74–99)
GLUCOSE BLD MANUAL STRIP-MCNC: 99 MG/DL (ref 74–99)
GLUCOSE UR STRIP.AUTO-MCNC: NEGATIVE MG/DL
HCT VFR BLD AUTO: 31.6 % (ref 36–46)
HGB BLD-MCNC: 9.7 G/DL (ref 12–16)
INR PPP: 1.2 (ref 0.9–1.1)
KETONES UR STRIP.AUTO-MCNC: NEGATIVE MG/DL
LEUKOCYTE ESTERASE UR QL STRIP.AUTO: ABNORMAL
MCH RBC QN AUTO: 30.8 PG (ref 26–34)
MCHC RBC AUTO-ENTMCNC: 30.7 G/DL (ref 32–36)
MCV RBC AUTO: 100 FL (ref 80–100)
NITRITE UR QL STRIP.AUTO: NEGATIVE
NRBC BLD-RTO: 0 /100 WBCS (ref 0–0)
PH UR STRIP.AUTO: 5 [PH]
PLATELET # BLD AUTO: 275 X10*3/UL (ref 150–450)
PROT UR STRIP.AUTO-MCNC: NEGATIVE MG/DL
PROTHROMBIN TIME: 13.4 SECONDS (ref 9.8–12.8)
RBC # BLD AUTO: 3.15 X10*6/UL (ref 4–5.2)
RBC # UR STRIP.AUTO: NEGATIVE /UL
RBC #/AREA URNS AUTO: ABNORMAL /HPF
SP GR UR STRIP.AUTO: 1.01
SQUAMOUS #/AREA URNS AUTO: ABNORMAL /HPF
UFH PPP CHRO-ACNC: 0.3 IU/ML
UROBILINOGEN UR STRIP.AUTO-MCNC: 4 MG/DL
WBC # BLD AUTO: 8 X10*3/UL (ref 4.4–11.3)
WBC #/AREA URNS AUTO: ABNORMAL /HPF

## 2023-11-21 PROCEDURE — 2500000002 HC RX 250 W HCPCS SELF ADMINISTERED DRUGS (ALT 637 FOR MEDICARE OP, ALT 636 FOR OP/ED): Performed by: NURSE PRACTITIONER

## 2023-11-21 PROCEDURE — 2500000005 HC RX 250 GENERAL PHARMACY W/O HCPCS: Performed by: INTERNAL MEDICINE

## 2023-11-21 PROCEDURE — 36415 COLL VENOUS BLD VENIPUNCTURE: CPT | Performed by: NURSE PRACTITIONER

## 2023-11-21 PROCEDURE — 82947 ASSAY GLUCOSE BLOOD QUANT: CPT

## 2023-11-21 PROCEDURE — 85027 COMPLETE CBC AUTOMATED: CPT | Performed by: NURSE PRACTITIONER

## 2023-11-21 PROCEDURE — 2060000001 HC INTERMEDIATE ICU ROOM DAILY

## 2023-11-21 PROCEDURE — 2500000004 HC RX 250 GENERAL PHARMACY W/ HCPCS (ALT 636 FOR OP/ED): Performed by: INTERNAL MEDICINE

## 2023-11-21 PROCEDURE — 85610 PROTHROMBIN TIME: CPT | Performed by: INTERNAL MEDICINE

## 2023-11-21 PROCEDURE — 71045 X-RAY EXAM CHEST 1 VIEW: CPT

## 2023-11-21 PROCEDURE — 94660 CPAP INITIATION&MGMT: CPT

## 2023-11-21 PROCEDURE — 94640 AIRWAY INHALATION TREATMENT: CPT

## 2023-11-21 PROCEDURE — 71250 CT THORAX DX C-: CPT | Performed by: STUDENT IN AN ORGANIZED HEALTH CARE EDUCATION/TRAINING PROGRAM

## 2023-11-21 PROCEDURE — 2500000005 HC RX 250 GENERAL PHARMACY W/O HCPCS: Performed by: NURSE PRACTITIONER

## 2023-11-21 PROCEDURE — 2500000004 HC RX 250 GENERAL PHARMACY W/ HCPCS (ALT 636 FOR OP/ED): Performed by: NURSE PRACTITIONER

## 2023-11-21 PROCEDURE — 71250 CT THORAX DX C-: CPT

## 2023-11-21 PROCEDURE — 36415 COLL VENOUS BLD VENIPUNCTURE: CPT | Performed by: INTERNAL MEDICINE

## 2023-11-21 PROCEDURE — 99233 SBSQ HOSP IP/OBS HIGH 50: CPT | Performed by: STUDENT IN AN ORGANIZED HEALTH CARE EDUCATION/TRAINING PROGRAM

## 2023-11-21 PROCEDURE — 2500000001 HC RX 250 WO HCPCS SELF ADMINISTERED DRUGS (ALT 637 FOR MEDICARE OP): Performed by: NURSE PRACTITIONER

## 2023-11-21 PROCEDURE — 85520 HEPARIN ASSAY: CPT | Performed by: INTERNAL MEDICINE

## 2023-11-21 PROCEDURE — 81001 URINALYSIS AUTO W/SCOPE: CPT | Performed by: INTERNAL MEDICINE

## 2023-11-21 PROCEDURE — 87086 URINE CULTURE/COLONY COUNT: CPT | Mod: PARLAB | Performed by: INTERNAL MEDICINE

## 2023-11-21 RX ORDER — IPRATROPIUM BROMIDE AND ALBUTEROL SULFATE 2.5; .5 MG/3ML; MG/3ML
3 SOLUTION RESPIRATORY (INHALATION) EVERY 2 HOUR PRN
Status: DISCONTINUED | OUTPATIENT
Start: 2023-11-21 | End: 2023-11-28 | Stop reason: HOSPADM

## 2023-11-21 RX ORDER — WARFARIN SODIUM 5 MG/1
5 TABLET ORAL DAILY
Status: COMPLETED | OUTPATIENT
Start: 2023-11-21 | End: 2023-11-23

## 2023-11-21 RX ADMIN — ASPIRIN 81 MG: 81 TABLET, COATED ORAL at 08:51

## 2023-11-21 RX ADMIN — WARFARIN SODIUM 5 MG: 5 TABLET ORAL at 18:10

## 2023-11-21 RX ADMIN — GABAPENTIN 300 MG: 300 CAPSULE ORAL at 20:34

## 2023-11-21 RX ADMIN — IPRATROPIUM BROMIDE AND ALBUTEROL SULFATE 3 ML: 2.5; .5 SOLUTION RESPIRATORY (INHALATION) at 20:52

## 2023-11-21 RX ADMIN — NYSTATIN 1 APPLICATION: 100000 POWDER TOPICAL at 20:34

## 2023-11-21 RX ADMIN — GABAPENTIN 300 MG: 300 CAPSULE ORAL at 08:51

## 2023-11-21 RX ADMIN — GABAPENTIN 300 MG: 300 CAPSULE ORAL at 15:00

## 2023-11-21 RX ADMIN — Medication 11 L/MIN: at 07:07

## 2023-11-21 RX ADMIN — Medication 50 L/MIN: at 13:20

## 2023-11-21 RX ADMIN — ATORVASTATIN CALCIUM 40 MG: 40 TABLET, FILM COATED ORAL at 20:34

## 2023-11-21 RX ADMIN — IPRATROPIUM BROMIDE AND ALBUTEROL SULFATE 3 ML: 2.5; .5 SOLUTION RESPIRATORY (INHALATION) at 07:07

## 2023-11-21 RX ADMIN — NYSTATIN 1 APPLICATION: 100000 POWDER TOPICAL at 08:53

## 2023-11-21 RX ADMIN — LEVOTHYROXINE SODIUM 150 MCG: 150 TABLET ORAL at 05:16

## 2023-11-21 RX ADMIN — FUROSEMIDE 40 MG: 10 INJECTION, SOLUTION INTRAMUSCULAR; INTRAVENOUS at 08:50

## 2023-11-21 RX ADMIN — HEPARIN SODIUM 14 UNITS/HR: 10000 INJECTION, SOLUTION INTRAVENOUS at 18:10

## 2023-11-21 RX ADMIN — IPRATROPIUM BROMIDE AND ALBUTEROL SULFATE 3 ML: 2.5; .5 SOLUTION RESPIRATORY (INHALATION) at 13:20

## 2023-11-21 RX ADMIN — PANTOPRAZOLE SODIUM 40 MG: 40 TABLET, DELAYED RELEASE ORAL at 05:16

## 2023-11-21 ASSESSMENT — PAIN SCALES - GENERAL: PAINLEVEL_OUTOF10: 0 - NO PAIN

## 2023-11-21 NOTE — NURSING NOTE
11/21/23 1045 Patient Navigator  I saw the patient again this am and she denied any questions or needs. I informed her to contact me as needed. She appreciated my visit and the information I provided.  Rylie BANKS, RN  Diabetes Care &   Stroke Educator

## 2023-11-21 NOTE — PROGRESS NOTES
Speech-Language Pathology                 Therapy Communication Note    Patient Name: Lillian Mendoza  MRN: [id number]  Today's Date: 11/21/2023     Discipline: Speech Language Pathology    Missed Visit Reason:  Pt with medical status change; increased 02 demands and now pt on airvo 50 LPM Fi02: 80% and non re-breather as well. Pt transferring to the 8th floor currently for change in condition.    Missed Time: Cancel    Comment: ST to follow up at a later time.

## 2023-11-21 NOTE — CARE PLAN
Problem: Skin  Goal: Promote/optimize nutrition  Outcome: Progressing  Flowsheets (Taken 11/21/2023 1318)  Promote/optimize nutrition:   Assist with feeding   Consume > 50% meals/supplements   Reassess MST if dietician not consulted   Offer water/supplements/favorite foods   The patient's goals for the shift include able to eat/drink    The clinical goals for the shift include patient will have improved respiratory status

## 2023-11-21 NOTE — PROGRESS NOTES
"Patient seen and examined.  Worsening hypoxia now on high flow oxygen.  Still feels that her nose is stuffed up.  CARLOS put on hold due to increasing oxygen requirements.    Scheduled medications  aspirin, 81 mg, oral, Daily  atorvastatin, 40 mg, oral, Nightly  fluticasone, 2 spray, Each Nostril, Daily  furosemide, 40 mg, intravenous, Daily  gabapentin, 300 mg, oral, TID  ipratropium-albuteroL, 3 mL, nebulization, TID  levothyroxine, 150 mcg, oral, Daily before breakfast  nystatin, 1 Application, Topical, BID  pantoprazole, 40 mg, oral, Daily before breakfast  polyethylene glycol, 17 g, oral, Daily  warfarin, 5 mg, oral, Daily      Continuous medications  heparin, 0-4,500 Units/hr, Last Rate: 1,400 Units/hr (11/21/23 0522)      PRN medications  PRN medications: heparin, oxygen, sodium chloride    Blood pressure 119/67, pulse 89, temperature 36.4 °C (97.5 °F), resp. rate 14, height 1.67 m (5' 5.75\"), weight 127 kg (280 lb 11.2 oz), SpO2 97 %.    Intake/Output Summary (Last 24 hours) at 11/21/2023 1337  Last data filed at 11/21/2023 1200  Gross per 24 hour   Intake 597.47 ml   Output 1900 ml   Net -1302.53 ml       General: Now wearing high flow oxygen.  Appears to have a prolonged expiratory phase  Respiratory: Diminished breath sounds but no crackles  CVS: No rub  Abdomen: Soft  Extremities: Positive peripheral edema    Cr 1.56, K 4.0 yesterday  Hb 9.7  UA bland, Spot Up/c 0.13g    Impression:  1.  Acute kidney injury on top of chronic kidney disease stage III.  In the setting of a significant pulmonary embolus without evidence of right heart strain.  Contrast was given on November 15.  No evidence of overt contrast nephropathy.  Creatinine improved from 2.2 down to mid 1 range.  2.  Chronic peripheral edema.  She tells me she was not using her torsemide at home for the past year. Appears volume overloaded at this time; she tells me she is having a good response to IV furosemide started yesterday  3.  Multifactorial " anemia  4.  Pulmonary embolus status post pulmonary angiogram and thrombectomy.  However now she has worsening hypoxia with a less than impressive chest x-ray.  5.  Underlying chronic kidney disease stage III with a baseline creatinine in the mid 1 range with variable creatinine levels.  She has renal atrophy on imaging    Plan:  1.  Continue IV furosemide daily for now  2.  Follow up PTH and vitamin D  3.  Check repeat labs in the morning  4.  Consider repeat contrasted CT.  For now we will just check a noncontrast CT to get a better look at the lung parenchyma.  Consider pulmonary consultation as well.  She is on anticoagulation with a heparin drip bridging to warfarin at this time

## 2023-11-21 NOTE — PROGRESS NOTES
Patient not medically stable for discharge, currently on 11 L oxygen via nc  GABY Wilson reviewing referral. Oncoming TCC to follow through hospital course for any changes in discharge plan.

## 2023-11-21 NOTE — PROGRESS NOTES
Subjective   Subjective Data and Overnight Events:  Back on Airvo and in moderate respiratory distress.  No chest pain.  Objective   Vital Signs:  Patient Vitals for the past 24 hrs:   BP Temp Temp src Pulse Resp SpO2 Weight   11/21/23 1320 -- -- -- -- -- 97 % --   11/21/23 1240 119/67 36.4 °C (97.5 °F) -- -- -- 92 % --   11/21/23 1155 112/64 36 °C (96.8 °F) -- 89 -- 94 % --   11/21/23 0950 -- -- -- -- -- 92 % --   11/21/23 0740 118/68 36 °C (96.8 °F) Temporal 90 -- 91 % --   11/21/23 0707 -- -- -- -- -- 91 % --   11/21/23 0600 -- -- -- -- -- -- 127 kg (280 lb 11.2 oz)   11/21/23 0443 106/62 36.1 °C (97 °F) Temporal 85 14 93 % 127 kg (280 lb 11.2 oz)   11/20/23 2337 102/56 36.3 °C (97.3 °F) Temporal 91 14 90 % --   11/20/23 1929 117/62 36.4 °C (97.5 °F) Temporal 90 14 94 % --   11/20/23 1600 116/77 36.2 °C (97.2 °F) Temporal 61 20 91 % --         Physical Exam update:   On oxygen supplementation wAirvo   moderate respiratory distress      Current Medications:  Scheduled medications   Medication Dose Route Frequency    aspirin  81 mg oral Daily    atorvastatin  40 mg oral Nightly    fluticasone  2 spray Each Nostril Daily    furosemide  40 mg intravenous Daily    gabapentin  300 mg oral TID    ipratropium-albuteroL  3 mL nebulization TID    levothyroxine  150 mcg oral Daily before breakfast    nystatin  1 Application Topical BID    pantoprazole  40 mg oral Daily before breakfast    polyethylene glycol  17 g oral Daily    warfarin  5 mg oral Daily     Continuous Medications   Medication Dose Last Rate    heparin  0-4,500 Units/hr 1,400 Units/hr (11/21/23 0522)     PRN medications   Medication    heparin    oxygen    sodium chloride       Pertinent Recent Cardiovascular Studies (personally reviewed):  Vascular studies:  Venous duplex 11/15/2023: Bilateral fem/pop DVT. PTV RLE DVT. EIV and CFV spared.    Cardiac studies:  Echocardiogram 11/16/2023:  RV is mildly enlarged, function is mildly reduced.  There is no  evidence of PFO (subcostal view only)      Laboratory values:  CMP:  Recent Labs     11/20/23  0515 11/19/23  0522 11/18/23  0316 11/17/23  0135 11/16/23  0203 11/15/23  1716 11/15/23  0325 12/12/22  0854 12/11/22  0812    142 140 138 136 138 138 140 142   K 4.0 4.5 4.7 4.7 5.3 5.1 4.5 4.9 4.7    107 108* 104 105 103 101 107 108*   CO2 32 29 25 27 21 23 23 23 26   ANIONGAP 11 11 12 12 15 17 19 15 13   BUN 11 14 19 25* 29* 29* 35* 22 24*   CREATININE 1.56* 1.55* 1.61* 1.89* 2.12* 2.21* 2.58* 1.27* 1.37*   EGFR 33* 34* 32* 26* 23* 22* 18*  --   --    MG 1.70 1.95 2.02 1.97 2.12 2.20  --  2.24 1.81       Recent Labs     11/20/23  0515 11/19/23  0522 11/18/23  0316 11/17/23  0135 11/16/23  0203   ALBUMIN 2.6* 2.6* 2.5* 2.5* 2.8*   ALKPHOS 58 59 55 59 66   ALT 8 7 7 8 8   AST 11 11 12 16 19   BILITOT 0.7 0.7 0.7 0.8 0.8       CBC:  Recent Labs     11/21/23  0515 11/20/23  0515 11/19/23  0522 11/18/23  2333 11/18/23  0316 11/17/23  0135 11/16/23  2223 11/16/23  0203   WBC 8.0 6.9 6.2 6.8 6.2 8.6 9.4 8.6   HGB 9.7* 10.2* 9.4* 10.1* 9.0* 10.0* 10.2* 11.3*   HCT 31.6* 33.7* 31.3* 33.8* 29.1* 32.0* 32.8* 39.1    241 199 196 179 152 148* 147*    102* 101* 102* 100 100 101* 108*       COAG:   Recent Labs     11/21/23  0515 11/20/23  0515 11/19/23  0522 11/18/23  0751 11/18/23  0316 11/17/23  2157 11/17/23  1558 11/17/23  1047 11/15/23  0905 11/15/23  0325 12/01/22  1710   INR 1.2* 1.1 1.1 1.2*  --   --   --   --   --  1.3* 1.0   HAUF 0.3 0.4 0.5 0.7 0.6 0.8 0.7 0.9   < >  --   --     < > = values in this interval not displayed.       ABO:   Recent Labs     12/02/22  0601   ABO O       HEME/ENDO:  Recent Labs     11/15/23  0325 08/24/21  1459 08/17/20  0000 12/13/19  1504 11/14/18  1515   TSH  --  0.14* 0.09* 0.44 5.34*   HGBA1C 6.1*  --   --   --   --         CARDIAC:   Recent Labs     11/15/23  0418 11/15/23  0325   TROPHS 1,116* 1,272*   BNP  --  1,683*       Recent Labs     11/15/23  0412  "12/13/19  1504 11/14/18  1515   CHOL 112 182 141   LDLF  --  114* 71   HDL 30.7 46.9 37.5*   TRIG 142 105 162*       MICRO: No results for input(s): \"ESR\", \"CRP\", \"PROCAL\" in the last 42297 hours.  No results found for the last 90 days.         I have personally reviewed most recent PCP, cardiology, vascular, and/or podiatry documentation.      Assessment/Plan   83 y.o. 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 Presented with stroke sxs with dysarthria and facial droop. Out of window for TPA.  Patient status post bilateral thrombectomy for PE.  We did not perform IVC filter at that time due to no evidence of CFV thrombus seen in the Cath Lab.    Interim history includes DVT scan that showed femoropopliteal DVT bilaterally, MRI that showed evidence of embolic stroke.  No evidence of hemorrhagic transformation on multiple imaging of the head.      Plan:  1.  From a pulmonary embolism standpoint, titrate O2 down as tolerated.  Likely, she will require Coumadin for long-term anticoagulation due to BMI of 47.    2.  From a stroke perspective, uncertain as to source of embolic phenomenon.  Differential diagnosis includes the following:   - Highest suspicion is paradoxical embolus from PFO; we may need to consider CARLOS versus CMR to confirm if PFO is present despite negative TTE.  - APLA; regardless, patient will be on warfarin due to her weight and may not change treatment plan even if positive.  - Undiagnosed AF - on tele - I did not see any AF during visit today; CARLOS or CMR may be helpful here to evaluate the KATIE as well.  - Aortic atheroembolic phenomenon - if all else is negative, we may need to consider Plavix and statin optimization    3.  Given she has been tolerating anticoagulation at least for 36 hours now without hemorrhagic transformation, I agree that overall risk of " hemorrhagic transformation now is lower.  May be equivocal considering IVC filter placement.  I have discussed with Dr. Peres, we can hold off at this time.  Okay to ambulate and okay to place BP cuff on lower extremity if needed.    4.  Agree with PT/OT evaluation for poststroke care.    We will continue to follow-up, please do not hesitate to call if questions arise.  Discussed with family and primary team.    Disclaimer: This note was dictated by speech recognition. Minor errors in transcription may be present.       11/18/2023  I discussed this case with Dr. Alvarado and I agree that CARLOS will be helpful to rule out PFO.  I discussed that with the patient and family in extent and they are in agreement to proceed with that as long as the patient is breathing status permits.  Tentatively will plan to get it done on Monday.  For now we will continue with current medications including heparin drip.  I will follow-up with the patient tomorrow.      11/19/2023  Primary team is planning for transferring the patient to rehab facility later in the coming week.  We will plan for CARLOS tentatively on Tuesday to give her more time to improve her breathing.      11/20/2023  Patient is agreeable to proceed with CARLOS tomorrow.  We will keep patient n.p.o. except for medications after midnight.  Primary team at bedside and discussed.    11/21/2023  Considering worsening respiratory status, we will hold off on doing CARLOS.  I believe this procedure can be done in outpatient setting when it is safer and after improvement from respiratory status.  Follow-up with pulmonary team recommendations.  She needs follow-up in cardiology clinic in 2 weeks after discharge.          SIGNATURE: Govind Olvera MD PhD PATIENT NAME: Lillian Mendoza   DATE/TIME: November 21, 2023 2:52 PM MRN: [id number]

## 2023-11-21 NOTE — PROGRESS NOTES
Lillian Mendoza is a 83 y.o. female on day 6 of admission presenting with Acute saddle pulmonary embolism, unspecified whether acute cor pulmonale present (CMS/HCC).      Subjective   Seen today had to be brought down to the stepdown unit her oxygen needs went up precipitously.  She is back on Airvo at 40% she is on Coumadin and heparin a CAT scan of the chest was ordered I also ordered a chest x-ray which I have to review.  She is awake and alert and oriented told her that we are going to postpone the CARLOS for now or anytime here this week we have to stabilize her breathing and her saturations first.       Objective     Last Recorded Vitals  /63   Pulse 89   Temp 36.4 °C (97.5 °F)   Resp 14   Wt 127 kg (280 lb 11.2 oz)   SpO2 93%   Intake/Output last 3 Shifts:    Intake/Output Summary (Last 24 hours) at 11/21/2023 1638  Last data filed at 11/21/2023 1200  Gross per 24 hour   Intake 597.47 ml   Output 1600 ml   Net -1002.53 ml       Admission Weight  Weight: 111 kg (244 lb 11.4 oz) (11/15/23 0327)    Daily Weight  11/21/23 : 127 kg (280 lb 11.2 oz)    Image Results  XR chest 1 view  Narrative: Interpreted By:  Tiffanie Crane,   STUDY:  XR CHEST 1 VIEW;  11/21/2023 10:02 am      INDICATION:  Signs/Symptoms:increase oxygen demand.      COMPARISON:  CT chest 11/15/2023      ACCESSION NUMBER(S):  [id number]      ORDERING CLINICIAN:  ALEX RIDER      TECHNIQUE:  Portable AP upright      FINDINGS:  Patient is rotated substantially towards the left. Cardiac silhouette  appears enlarged, which is accentuated by the rotation. Also  comparing to the CT the large hiatal hernia contributes to the  apparent cardiac silhouette enlargement. Lung volumes are shallow  with mild atelectasis at the lung bases. Bones are osteopenic. Severe  arthritis is present in both shoulders.      Impression: Apparent enlargement of the cardiac silhouette is accentuated by the  patient rotation in the very large hiatal hernia.  From the CT the  heart is probably only mildly enlarged.      Large hiatal hernia      Mild bilateral basilar atelectasis      MACRO:  None.      Signed by: Tiffanie Crane 11/21/2023 11:22 AM  Dictation workstation:   [id number]    ROS respiratory distress weakness no fever  Physical Exam  Her lungs are diminished throughout anteriorly her color is dry and pale her abdomen is soft is not distended or for she is alert and oriented she is comfortable at this time but she is on Airvo saturations are coming in and around 92 to 94% while I am evaluating her.    Relevant Results               Assessment/Plan   This patient currently has cardiac telemetry ordered; if you would like to modify or discontinue the telemetry order, click here to go to the orders activity to modify/discontinue the order.  She has a worsening respiratory status I asked for Dr. Ranjeet serna to see her I am going to review the chest x-ray that I ordered earlier we moved her down here to the stepdown at this time.  For now also I understand the CAT scan has been ordered going to postpone the CARLOS lets first get her breathing stabilized oxygenate her and then continue with same present care and therapy I will repeat her labs for the morning maintain her saturations and continue the same present care and therapy thank you, critical care time 35 minutes thank you            Principal Problem:    Acute saddle pulmonary embolism, unspecified whether acute cor pulmonale present (CMS/[facility])                  Sage Alba,

## 2023-11-21 NOTE — CARE PLAN
The patient's goals for the shift include able to eat/drink    The clinical goals for the shift include   Problem: General Stroke  Goal: Demonstrate improvement in neurological exam throughout the shift  Outcome: Progressing  Goal: Maintain BP within ordered limits throughout shift  Outcome: Progressing  Goal: Participate in treatment (ie., meds, therapy) throughout shift  Outcome: Progressing  Goal: No symptoms of aspiration throughout shift  Outcome: Progressing  Goal: No symptoms of hemorrhage throughout shift  Outcome: Progressing  Goal: Tolerate enteral feeding throughout shift  Outcome: Progressing  Goal: Decreased nausea/vomiting throughout shift  Outcome: Progressing  Goal: Controlled blood glucose throughout shift  Outcome: Progressing  Goal: Out of bed three times today  Outcome: Progressing     Problem: Discharge Planning  Goal: Discharge to home or other facility with appropriate resources  Outcome: Progressing     Problem: Chronic Conditions and Co-morbidities  Goal: Patient's chronic conditions and co-morbidity symptoms are monitored and maintained or improved  Outcome: Progressing     Problem: Skin  Goal: Decreased wound size/increased tissue granulation at next dressing change  Outcome: Progressing  Goal: Participates in plan/prevention/treatment measures  Outcome: Progressing  Goal: Prevent/manage excess moisture  Outcome: Progressing  Goal: Prevent/minimize sheer/friction injuries  Outcome: Progressing  Goal: Promote/optimize nutrition  Outcome: Progressing  Goal: Promote skin healing  Outcome: Progressing  Flowsheets (Taken 11/21/2023 0019)  Promote skin healing: Assess skin/pad under line(s)/device(s)

## 2023-11-22 LAB
25(OH)D3 SERPL-MCNC: 42 NG/ML (ref 30–100)
ALBUMIN SERPL BCP-MCNC: 2.7 G/DL (ref 3.4–5)
ANION GAP BLDA CALCULATED.4IONS-SCNC: 2 MMO/L (ref 10–25)
ANION GAP SERPL CALC-SCNC: 11 MMOL/L (ref 10–20)
APPARATUS: ABNORMAL
BASE EXCESS BLDA CALC-SCNC: 14.8 MMOL/L (ref -2–3)
BODY TEMPERATURE: 37 DEGREES CELSIUS
BUN SERPL-MCNC: 15 MG/DL (ref 6–23)
CA-I BLDA-SCNC: 1.14 MMOL/L (ref 1.1–1.33)
CALCIUM SERPL-MCNC: 8.7 MG/DL (ref 8.6–10.3)
CHLORIDE BLDA-SCNC: 100 MMOL/L (ref 98–107)
CHLORIDE SERPL-SCNC: 100 MMOL/L (ref 98–107)
CO2 SERPL-SCNC: 35 MMOL/L (ref 21–32)
CREAT SERPL-MCNC: 1.85 MG/DL (ref 0.5–1.05)
ERYTHROCYTE [DISTWIDTH] IN BLOOD BY AUTOMATED COUNT: 15.7 % (ref 11.5–14.5)
FLOW: 50 LPM
GFR SERPL CREATININE-BSD FRML MDRD: 27 ML/MIN/1.73M*2
GLUCOSE BLD MANUAL STRIP-MCNC: 109 MG/DL (ref 74–99)
GLUCOSE BLD MANUAL STRIP-MCNC: 146 MG/DL (ref 74–99)
GLUCOSE BLD MANUAL STRIP-MCNC: 158 MG/DL (ref 74–99)
GLUCOSE BLD MANUAL STRIP-MCNC: 196 MG/DL (ref 74–99)
GLUCOSE BLDA-MCNC: 122 MG/DL (ref 74–99)
GLUCOSE SERPL-MCNC: 104 MG/DL (ref 74–99)
HCO3 BLDA-SCNC: 39.3 MMOL/L (ref 22–26)
HCT VFR BLD AUTO: 33.2 % (ref 36–46)
HCT VFR BLD EST: 34 % (ref 36–46)
HGB BLD-MCNC: 10.3 G/DL (ref 12–16)
HGB BLDA-MCNC: 11.4 G/DL (ref 12–16)
HOLD SPECIMEN: NORMAL
INHALED O2 CONCENTRATION: 80 %
INR PPP: 1.2 (ref 0.9–1.1)
LACTATE BLDA-SCNC: 1.7 MMOL/L (ref 0.4–2)
MCH RBC QN AUTO: 30.7 PG (ref 26–34)
MCHC RBC AUTO-ENTMCNC: 31 G/DL (ref 32–36)
MCV RBC AUTO: 99 FL (ref 80–100)
NRBC BLD-RTO: 0 /100 WBCS (ref 0–0)
OXYHGB MFR BLDA: 96.8 % (ref 94–98)
PCO2 BLDA: 47 MM HG (ref 38–42)
PH BLDA: 7.53 PH (ref 7.38–7.42)
PHOSPHATE SERPL-MCNC: 3.1 MG/DL (ref 2.5–4.9)
PLATELET # BLD AUTO: 334 X10*3/UL (ref 150–450)
PO2 BLDA: 104 MM HG (ref 85–95)
POTASSIUM BLDA-SCNC: 3.9 MMOL/L (ref 3.5–5.3)
POTASSIUM SERPL-SCNC: 3.9 MMOL/L (ref 3.5–5.3)
PROTHROMBIN TIME: 13.9 SECONDS (ref 9.8–12.8)
PTH-INTACT SERPL-MCNC: 69.4 PG/ML (ref 18.5–88)
RBC # BLD AUTO: 3.36 X10*6/UL (ref 4–5.2)
SAO2 % BLDA: 99 % (ref 94–100)
SODIUM BLDA-SCNC: 137 MMOL/L (ref 136–145)
SODIUM SERPL-SCNC: 142 MMOL/L (ref 136–145)
UFH PPP CHRO-ACNC: 0.1 IU/ML
UFH PPP CHRO-ACNC: 0.3 IU/ML
UFH PPP CHRO-ACNC: 0.5 IU/ML
WBC # BLD AUTO: 9.1 X10*3/UL (ref 4.4–11.3)

## 2023-11-22 PROCEDURE — 82306 VITAMIN D 25 HYDROXY: CPT | Mod: PARLAB | Performed by: INTERNAL MEDICINE

## 2023-11-22 PROCEDURE — 85027 COMPLETE CBC AUTOMATED: CPT | Performed by: INTERNAL MEDICINE

## 2023-11-22 PROCEDURE — 83970 ASSAY OF PARATHORMONE: CPT | Mod: PARLAB | Performed by: INTERNAL MEDICINE

## 2023-11-22 PROCEDURE — 2500000001 HC RX 250 WO HCPCS SELF ADMINISTERED DRUGS (ALT 637 FOR MEDICARE OP): Performed by: INTERNAL MEDICINE

## 2023-11-22 PROCEDURE — 94660 CPAP INITIATION&MGMT: CPT

## 2023-11-22 PROCEDURE — 2500000001 HC RX 250 WO HCPCS SELF ADMINISTERED DRUGS (ALT 637 FOR MEDICARE OP): Performed by: NURSE PRACTITIONER

## 2023-11-22 PROCEDURE — 2500000004 HC RX 250 GENERAL PHARMACY W/ HCPCS (ALT 636 FOR OP/ED): Performed by: INTERNAL MEDICINE

## 2023-11-22 PROCEDURE — 36600 WITHDRAWAL OF ARTERIAL BLOOD: CPT

## 2023-11-22 PROCEDURE — 94640 AIRWAY INHALATION TREATMENT: CPT

## 2023-11-22 PROCEDURE — 82947 ASSAY GLUCOSE BLOOD QUANT: CPT

## 2023-11-22 PROCEDURE — 2500000004 HC RX 250 GENERAL PHARMACY W/ HCPCS (ALT 636 FOR OP/ED): Performed by: NURSE PRACTITIONER

## 2023-11-22 PROCEDURE — 84132 ASSAY OF SERUM POTASSIUM: CPT

## 2023-11-22 PROCEDURE — 85610 PROTHROMBIN TIME: CPT | Performed by: INTERNAL MEDICINE

## 2023-11-22 PROCEDURE — 85520 HEPARIN ASSAY: CPT | Performed by: INTERNAL MEDICINE

## 2023-11-22 PROCEDURE — 2060000001 HC INTERMEDIATE ICU ROOM DAILY

## 2023-11-22 PROCEDURE — 80069 RENAL FUNCTION PANEL: CPT | Performed by: INTERNAL MEDICINE

## 2023-11-22 PROCEDURE — 99232 SBSQ HOSP IP/OBS MODERATE 35: CPT | Performed by: STUDENT IN AN ORGANIZED HEALTH CARE EDUCATION/TRAINING PROGRAM

## 2023-11-22 PROCEDURE — 36415 COLL VENOUS BLD VENIPUNCTURE: CPT | Performed by: INTERNAL MEDICINE

## 2023-11-22 PROCEDURE — 2500000002 HC RX 250 W HCPCS SELF ADMINISTERED DRUGS (ALT 637 FOR MEDICARE OP, ALT 636 FOR OP/ED): Performed by: NURSE PRACTITIONER

## 2023-11-22 PROCEDURE — 92526 ORAL FUNCTION THERAPY: CPT | Mod: GN

## 2023-11-22 RX ORDER — CEFTRIAXONE 1 G/50ML
1 INJECTION, SOLUTION INTRAVENOUS EVERY 24 HOURS
Status: DISCONTINUED | OUTPATIENT
Start: 2023-11-22 | End: 2023-11-25

## 2023-11-22 RX ORDER — GABAPENTIN 100 MG/1
200 CAPSULE ORAL 3 TIMES DAILY
Status: DISCONTINUED | OUTPATIENT
Start: 2023-11-22 | End: 2023-11-24

## 2023-11-22 RX ADMIN — HEPARIN SODIUM 16 UNITS/HR: 10000 INJECTION, SOLUTION INTRAVENOUS at 18:29

## 2023-11-22 RX ADMIN — GABAPENTIN 200 MG: 100 CAPSULE ORAL at 14:41

## 2023-11-22 RX ADMIN — POLYETHYLENE GLYCOL 3350 17 G: 17 POWDER, FOR SOLUTION ORAL at 08:52

## 2023-11-22 RX ADMIN — CEFTRIAXONE SODIUM 1 G: 1 INJECTION, SOLUTION INTRAVENOUS at 20:06

## 2023-11-22 RX ADMIN — NYSTATIN 1 APPLICATION: 100000 POWDER TOPICAL at 20:30

## 2023-11-22 RX ADMIN — ATORVASTATIN CALCIUM 40 MG: 40 TABLET, FILM COATED ORAL at 20:06

## 2023-11-22 RX ADMIN — GABAPENTIN 300 MG: 300 CAPSULE ORAL at 08:52

## 2023-11-22 RX ADMIN — LEVOTHYROXINE SODIUM 150 MCG: 150 TABLET ORAL at 06:02

## 2023-11-22 RX ADMIN — IPRATROPIUM BROMIDE AND ALBUTEROL SULFATE 3 ML: 2.5; .5 SOLUTION RESPIRATORY (INHALATION) at 13:15

## 2023-11-22 RX ADMIN — GABAPENTIN 200 MG: 100 CAPSULE ORAL at 20:06

## 2023-11-22 RX ADMIN — IPRATROPIUM BROMIDE AND ALBUTEROL SULFATE 3 ML: 2.5; .5 SOLUTION RESPIRATORY (INHALATION) at 18:53

## 2023-11-22 RX ADMIN — HEPARIN SODIUM 3000 UNITS: 1000 INJECTION INTRAVENOUS; SUBCUTANEOUS at 09:01

## 2023-11-22 RX ADMIN — ASPIRIN 81 MG: 81 TABLET, COATED ORAL at 08:52

## 2023-11-22 RX ADMIN — WARFARIN SODIUM 5 MG: 5 TABLET ORAL at 18:28

## 2023-11-22 RX ADMIN — PANTOPRAZOLE SODIUM 40 MG: 40 TABLET, DELAYED RELEASE ORAL at 06:02

## 2023-11-22 RX ADMIN — FUROSEMIDE 40 MG: 10 INJECTION, SOLUTION INTRAMUSCULAR; INTRAVENOUS at 08:52

## 2023-11-22 RX ADMIN — IPRATROPIUM BROMIDE AND ALBUTEROL SULFATE 3 ML: 2.5; .5 SOLUTION RESPIRATORY (INHALATION) at 07:20

## 2023-11-22 ASSESSMENT — PAIN SCALES - GENERAL: PAINLEVEL_OUTOF10: 0 - NO PAIN

## 2023-11-22 ASSESSMENT — PAIN - FUNCTIONAL ASSESSMENT: PAIN_FUNCTIONAL_ASSESSMENT: 0-10

## 2023-11-22 NOTE — PROGRESS NOTES
Subjective   Subjective Data and Overnight Events:  Still on Airvo.  Respiratory status is a slightly better today.  No chest pain.  Objective   Vital Signs:  Patient Vitals for the past 24 hrs:   BP Temp Temp src Pulse Resp SpO2   11/22/23 1332 -- -- -- -- -- 94 %   11/22/23 1315 -- -- -- -- -- 96 %   11/22/23 1149 -- -- -- -- -- 94 %   11/22/23 1131 110/55 36.7 °C (98.1 °F) -- -- -- --   11/22/23 0751 114/73 36 °C (96.8 °F) Temporal 90 21 93 %   11/22/23 0720 -- -- -- -- -- 95 %   11/22/23 0344 108/65 36 °C (96.8 °F) -- 96 22 93 %   11/22/23 0215 -- -- -- -- -- 94 %   11/21/23 2322 98/54 36.2 °C (97.2 °F) -- 94 24 93 %   11/21/23 2250 -- -- -- -- -- (!) 88 %   11/21/23 2100 -- -- -- -- -- 92 %   11/21/23 2058 -- -- -- -- -- (!) 88 %   11/21/23 2044 108/66 -- -- 98 24 98 %   11/21/23 1704 -- -- -- -- -- 94 %   11/21/23 1551 108/63 36.4 °C (97.5 °F) -- -- -- 93 %         Physical Exam update:   On oxygen supplementation wAirvo   moderate respiratory distress      Current Medications:  Scheduled medications   Medication Dose Route Frequency    aspirin  81 mg oral Daily    atorvastatin  40 mg oral Nightly    fluticasone  2 spray Each Nostril Daily    furosemide  40 mg intravenous Daily    gabapentin  200 mg oral TID    ipratropium-albuteroL  3 mL nebulization TID    levothyroxine  150 mcg oral Daily before breakfast    nystatin  1 Application Topical BID    pantoprazole  40 mg oral Daily before breakfast    polyethylene glycol  17 g oral Daily    warfarin  5 mg oral Daily     Continuous Medications   Medication Dose Last Rate    heparin  0-4,500 Units/hr 16 Units/hr (11/22/23 0859)     PRN medications   Medication    heparin    ipratropium-albuteroL    oxygen    sodium chloride       Pertinent Recent Cardiovascular Studies (personally reviewed):  Vascular studies:  Venous duplex 11/15/2023: Bilateral fem/pop DVT. PTV RLE DVT. EIV and CFV spared.    Cardiac studies:  Echocardiogram 11/16/2023:  RV is mildly enlarged,  function is mildly reduced.  There is no evidence of PFO (subcostal view only)      Laboratory values:  CMP:  Recent Labs     11/22/23  0548 11/20/23  0515 11/19/23  0522 11/18/23  0316 11/17/23  0135 11/16/23  0203 11/15/23  1716 11/15/23  0325 12/12/22  0854 12/11/22  0812    144 142 140 138 136 138 138 140 142   K 3.9 4.0 4.5 4.7 4.7 5.3 5.1 4.5 4.9 4.7    105 107 108* 104 105 103 101 107 108*   CO2 35* 32 29 25 27 21 23 23 23 26   ANIONGAP 11 11 11 12 12 15 17 19 15 13   BUN 15 11 14 19 25* 29* 29* 35* 22 24*   CREATININE 1.85* 1.56* 1.55* 1.61* 1.89* 2.12* 2.21* 2.58* 1.27* 1.37*   EGFR 27* 33* 34* 32* 26* 23* 22* 18*  --   --    MG  --  1.70 1.95 2.02 1.97 2.12 2.20  --  2.24 1.81       Recent Labs     11/22/23  0548 11/20/23  0515 11/19/23  0522 11/18/23 0316 11/17/23  0135 11/16/23  0203   ALBUMIN 2.7* 2.6* 2.6* 2.5* 2.5* 2.8*   ALKPHOS  --  58 59 55 59 66   ALT  --  8 7 7 8 8   AST  --  11 11 12 16 19   BILITOT  --  0.7 0.7 0.7 0.8 0.8       CBC:  Recent Labs     11/22/23  0548 11/21/23  0515 11/20/23  0515 11/19/23  0522 11/18/23  2333 11/18/23  0316 11/17/23  0135 11/16/23  2223   WBC 9.1 8.0 6.9 6.2 6.8 6.2 8.6 9.4   HGB 10.3* 9.7* 10.2* 9.4* 10.1* 9.0* 10.0* 10.2*   HCT 33.2* 31.6* 33.7* 31.3* 33.8* 29.1* 32.0* 32.8*    275 241 199 196 179 152 148*   MCV 99 100 102* 101* 102* 100 100 101*       COAG:   Recent Labs     11/22/23  1315 11/22/23  0548 11/21/23  0515 11/20/23  0515 11/19/23  0522 11/18/23  0751 11/18/23  0316 11/17/23  2157 11/15/23  0905 11/15/23  0325 12/01/22  1710   INR  --  1.2* 1.2* 1.1 1.1 1.2*  --   --   --  1.3* 1.0   HAUF 0.5 0.1 0.3 0.4 0.5 0.7 0.6 0.8   < >  --   --     < > = values in this interval not displayed.       ABO:   Recent Labs     12/02/22  0601   ABO O       HEME/ENDO:  Recent Labs     11/15/23  0325 08/24/21  1459 08/17/20  0000 12/13/19  1504 11/14/18  1515   TSH  --  0.14* 0.09* 0.44 5.34*   HGBA1C 6.1*  --   --   --   --         CARDIAC:  "  Recent Labs     11/15/23  0418 11/15/23  0325   TROPHS 1,116* 1,272*   BNP  --  1,683*       Recent Labs     11/15/23  0412 12/13/19  1504 11/14/18  1515   CHOL 112 182 141   LDLF  --  114* 71   HDL 30.7 46.9 37.5*   TRIG 142 105 162*       MICRO: No results for input(s): \"ESR\", \"CRP\", \"PROCAL\" in the last 72440 hours.  No results found for the last 90 days.         I have personally reviewed most recent PCP, cardiology, vascular, and/or podiatry documentation.      Assessment/Plan   83 y.o. 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 Presented with stroke sxs with dysarthria and facial droop. Out of window for TPA.  Patient status post bilateral thrombectomy for PE.  We did not perform IVC filter at that time due to no evidence of CFV thrombus seen in the Cath Lab.    Interim history includes DVT scan that showed femoropopliteal DVT bilaterally, MRI that showed evidence of embolic stroke.  No evidence of hemorrhagic transformation on multiple imaging of the head.      Plan:  1.  From a pulmonary embolism standpoint, titrate O2 down as tolerated.  Likely, she will require Coumadin for long-term anticoagulation due to BMI of 47.    2.  From a stroke perspective, uncertain as to source of embolic phenomenon.  Differential diagnosis includes the following:   - Highest suspicion is paradoxical embolus from PFO; we may need to consider CARLOS versus CMR to confirm if PFO is present despite negative TTE.  - APLA; regardless, patient will be on warfarin due to her weight and may not change treatment plan even if positive.  - Undiagnosed AF - on tele - I did not see any AF during visit today; CARLOS or CMR may be helpful here to evaluate the KATIE as well.  - Aortic atheroembolic phenomenon - if all else is negative, we may need to consider Plavix and statin optimization    3.  Given she has been " tolerating anticoagulation at least for 36 hours now without hemorrhagic transformation, I agree that overall risk of hemorrhagic transformation now is lower.  May be equivocal considering IVC filter placement.  I have discussed with Dr. Peres, we can hold off at this time.  Okay to ambulate and okay to place BP cuff on lower extremity if needed.    4.  Agree with PT/OT evaluation for poststroke care.    We will continue to follow-up, please do not hesitate to call if questions arise.  Discussed with family and primary team.    Disclaimer: This note was dictated by speech recognition. Minor errors in transcription may be present.       11/18/2023  I discussed this case with Dr. Alvarado and I agree that CARLOS will be helpful to rule out PFO.  I discussed that with the patient and family in extent and they are in agreement to proceed with that as long as the patient is breathing status permits.  Tentatively will plan to get it done on Monday.  For now we will continue with current medications including heparin drip.  I will follow-up with the patient tomorrow.      11/19/2023  Primary team is planning for transferring the patient to rehab facility later in the coming week.  We will plan for CARLOS tentatively on Tuesday to give her more time to improve her breathing.      11/20/2023  Patient is agreeable to proceed with CARLOS tomorrow.  We will keep patient n.p.o. except for medications after midnight.  Primary team at bedside and discussed.    11/21/2023  Considering worsening respiratory status, we will hold off on doing CARLOS.  I believe this procedure can be done in outpatient setting when it is safer and after improvement from respiratory status.  Follow-up with pulmonary team recommendations.  She needs follow-up in cardiology clinic in 2 weeks after discharge.    11/22/2023  Patient had EMILI.  Nephrology is following.  Will defer diuresis decision to nephrology.  Plan for CARLOS in outpatient setting.  I will ask   Fernando to see the patient from tomorrow.        SIGNATURE: Govind Olvera MD PhD PATIENT NAME: Lillian Mendoza   DATE/TIME: November 22, 2023 2:51 PM MRN: [id number]

## 2023-11-22 NOTE — CARE PLAN
The patient's goals for the shift include able to eat/drink    The clinical goals for the shift include patient will have improved respiratory status

## 2023-11-22 NOTE — PROGRESS NOTES
Speech-Language Pathology    Inpatient  Speech-Language Pathology Treatment     Patient Name: Lillian Mendoza  MRN: [id number]  Today's Date: 11/22/2023  Time Calculation  Start Time: 1505  Stop Time: 1520  Time Calculation (min): 15 min         Current Problem:   1. Acute saddle pulmonary embolism, unspecified whether acute cor pulmonale present (CMS/HCC)  Transthoracic Echo (TTE) Complete    Transthoracic Echo (TTE) Complete    Vascular US Lower Extremity Venous Duplex Bilateral    Vascular US Lower Extremity Venous Duplex Bilateral    Transthoracic Echo (TTE) Limited    Transthoracic Echo (TTE) Limited    CANCELED: Lower extremity venous duplex bilateral    CANCELED: Lower extremity venous duplex bilateral      2. Acute kidney injury (CMS/HCC)        3. Pulmonary embolism (CMS/HCC)  Invasive vascular procedure    Invasive vascular procedure    CANCELED: Transesophageal Echo (CARLOS)    CANCELED: Transesophageal Echo (CARLOS)      4. Acute stroke due to ischemia (CMS/HCC)  Transthoracic Echo (TTE) Complete    Transthoracic Echo (TTE) Complete    Transthoracic Echo (TTE) Limited    Transthoracic Echo (TTE) Limited    CANCELED: Transesophageal Echo (CARLOS)    CANCELED: Transesophageal Echo (CARLOS)      5. Generalized edema  Vascular US Lower Extremity Venous Duplex Bilateral    Vascular US Lower Extremity Venous Duplex Bilateral      6. History of pulmonary embolism  CANCELED: Lower extremity venous duplex bilateral    CANCELED: Lower extremity venous duplex bilateral      7. Other pulmonary embolism with acute cor pulmonale (CMS/HCC)  Invasive vascular procedure      8. Other forms of dyspnea  Transthoracic Echo (TTE) Complete          Subjective   Current Problem:  Assess diet tolerance s/p advancement on 11/20.    Most Recent Visit:  SLP Received On: 11/22/23    General Visit Information:    Patient sitting upright in chair at bedside. Pleasant and cooperative.   CT chest 11/21 IMPRESSION:  1. Redemonstrated left lower  lobe scattered consolidation/infiltrates. No new major pulmonary consolidation, pleural effusion or pneumothorax.  2. Large hiatal hernia as in prior.      Pain Assessment:   Pain Assessment: 0-10  Pain Score: 0 - No pain      Objective       SLP Assessment:  SLP follow up to assess diet tolerance s/p change in respiratory status on 11/21. Patient currently on AIRVO 50L. SLP arrived into patient's room with RN present. Patient awake, alert and breathing appears comfortable. Patient denies any feelings of shortness of breath at this time. Patient consumed x2 whole pills with sips of water without evidence of coughing or choking. Patient consumed additional single straw sips of water with prompt swallow response and no evidence of aspiration. Vocal quality remained unchanged and patient denying any globus sensation. Patient reports that she is too full to eat anything right now, but reports she has been having no trouble with advanced solid textures.    Continue to recommend diet of regular solids with thin liquids. Do not eat/drink if feeling short of breath. Continue to consume PO intake in an upright position, taking small bites and single sips to minimize risk for aspiration.       Plan:  SLP TX Plan: Continue Plan of Care  Discussed POC: Patient, Caregiver/family, Nursing  Patient/Caregiver Agreeable: Yes    Dysphagia goals:   Pt will demonstrate safe /adequate management of the prescribed diet without s/s of aspiration. GOAL ONGOING; follow up 1x more d/t change in O2 demands.  Pt will complete oropharyngeal exercises x10-20 reps to given min to moderate cues to maximize pharyngeal /laryngeal /oral strength ./ROM/coordination to improve swallowing function. GOAL NOT ADDRESSED THIS DATE  Pt/family will verbalize demonstrate comprehension of dysphagia education and recommendations. GOAL ONGOING    Inpatient:  Education Comments  Discussed plan for x1 more follow up to assess diet safety/tolerance d/t recent change  "in respiratory status and provide further education to maximize PO safety. Per pulmonary note, \"patient's acute hypoxic respiratory failure is likely multifactorial due to atelectasis, clots throughout all lobes/distal main pulmonary artery and element of CHF.\"  "

## 2023-11-22 NOTE — CONSULTS
Lillian Mendoza is a 83 y.o. female on day 7 of admission presenting with Acute saddle pulmonary embolism, unspecified whether acute cor pulmonale present (CMS/HCC).      Subjective/History     This is an 83-year-old female, with history of remote PE in setting of right TKR, chronic HFpEF with EF 55 to 60% as of 9/2020, HTN, HLD, CKD 3, hypothyroidism, BLAYNE on O2 nightly, moderate persistent asthma, chronic allergic rhinitis, and chronic lymphedema, who initially presented from home to [facility] on 11/15/23 with confusion, left facial droop, and dysarthria at 2 AM that morning.  Patient was out of TNK window as she had had right hand numbness the day prior.  She was found to have acute to subacute ischemic CVA involving inferior posterior left temporal lobe.  CTA head/neck incidentally found acute bilateral PE.  Subsequent CTA chest confirmed extensive bilateral PE involving all lobes with large embolus in the distal aspect of right main pulmonary artery.  Moderate to severe embolic burden with moderate to severe right heart strain noted.  PERT team was activated.  Patient was urgently taken for thrombectomy and then admitted the ICU for further evaluation and management.  Patient began to improve and was transferred to medical floor.  Upon questioning, patient states she is a non-smoker but has significant exposure to secondhand smoke for most of her life.  Patient is endorsing a occasional cough with clear phlegm.  Patient denies fevers, chills, sore throat, or congestion.  Patient was currently on 50 L Airvo satting 96% on room.      Past Medical History:   Diagnosis Date    Personal history of diseases of the blood and blood-forming organs and certain disorders involving the immune mechanism 11/23/2020    History of anemia    Personal history of other diseases of the circulatory system 11/23/2020    History of hypertension    Personal history of other diseases of the digestive system 11/23/2020    History of  hiatal hernia    Personal history of other diseases of the musculoskeletal system and connective tissue     History of arthritis    Personal history of other diseases of the nervous system and sense organs     History of cataract    Personal history of other diseases of the respiratory system 11/23/2020    History of asthma    Personal history of other diseases of the respiratory system 11/23/2020    History of bronchitis    Personal history of other diseases of the respiratory system 11/23/2020    History of lung disease    Personal history of other diseases of urinary system 11/23/2020    History of kidney problems    Personal history of other endocrine, nutritional and metabolic disease 11/23/2020    History of thyroid disorder    Personal history of other specified conditions 11/23/2020    H/O shortness of breath       Past Surgical History:   Procedure Laterality Date    INVASIVE VASCULAR PROCEDURE N/A 11/15/2023    Procedure: Pulmonary Angiography - Bilateral;  Surgeon: Maldonado Alvarado MD;  Location: [facility];  Service: Cardiovascular;  Laterality: N/A;    OTHER SURGICAL HISTORY  11/23/2020    Knee replacement    OTHER SURGICAL HISTORY  11/23/2020    Lumpectomy    OTHER SURGICAL HISTORY  11/23/2020    Cataract surgery    OTHER SURGICAL HISTORY  11/23/2020    Cholecystectomy    OTHER SURGICAL HISTORY  11/23/2020    Hernia repair    OTHER SURGICAL HISTORY  11/23/2020    Hysterectomy       family history includes Cancer in her mother; Diabetes in her paternal grandfather and paternal grandmother.     reports that she has quit smoking. Her smoking use included cigarettes. She has never been exposed to tobacco smoke. She does not have any smokeless tobacco history on file. She reports that she does not currently use alcohol. She reports that she does not currently use drugs.      Objective       Physical Exam:  General:  Pleasant and cooperative. No apparent distress.  HEENT:  Normocephalic, atraumatic  Chest:   "Clear to auscultation bilaterally. No wheezes, rales, or rhonchi.  CV:  Regular rate and rhythm. No murmurs    Abdomen: Abdomen is soft, non-tender, non-distended. BS +   Extremities:  No lower extremity edema or cyanosis.   Neurological:  AAOx3. No focal deficits.  Skin:  Warm and dry.    Last Recorded Vitals  Blood pressure 110/55, pulse 90, temperature 36.7 °C (98.1 °F), resp. rate 21, height 1.67 m (5' 5.75\"), weight 127 kg (280 lb 11.2 oz), SpO2 94 %.  Intake/Output last 3 Shifts:  I/O last 3 completed shifts:  In: 386.5 (3 mL/kg) [I.V.:386.5 (3 mL/kg)]  Out: 1600 (12.6 mL/kg) [Urine:1600 (0.3 mL/kg/hr)]  Weight: 127.3 kg     Last CBC & BMP  Lab Results   Component Value Date    GLUCOSE 104 (H) 11/22/2023    CALCIUM 8.7 11/22/2023     11/22/2023    K 3.9 11/22/2023    CO2 35 (H) 11/22/2023     11/22/2023    BUN 15 11/22/2023    CREATININE 1.85 (H) 11/22/2023     Lab Results   Component Value Date    WBC 9.1 11/22/2023    HGB 10.3 (L) 11/22/2023    HCT 33.2 (L) 11/22/2023    MCV 99 11/22/2023     11/22/2023         Assessment / Plan        #Acute hypoxic respiratory failure   #Emphysema  #Blood clots secondary to thrombectomy  -Patient's acute hypoxic respiratory failure is likely multifactorial due to atelectasis, clots throughout all lobes/distal main pulmonary artery, and element of CHF   Plan:  -Continue with Lasix  -Continue with scheduled and as needed DuoNebs  -Continue with warfarin and heparin infusion for clots wean Airvo as tolerated    Worsening bicarb, possibly secondary to contraction alkalosis versus atelectasis induced  Plan:  -Ordered ABG  -Patient gabapentin was switched from 900-600 daily due to sedative sedative effects and poor creatinine clearance  -Ordered incentive spirometer for atelectasis       Jimmie Deleon MD   PGY-1, Internal Medicine  This is a preliminary note, please await attending attestation for final A/P    "

## 2023-11-22 NOTE — PROGRESS NOTES
Physical Therapy                 Therapy Communication Note    Patient Name: Lillian Mendoza  MRN: [id number]  Today's Date: 11/22/2023     Discipline: Physical Therapy    Missed Visit Reason:  2 attempts for tx made this date.  1st attempt at 10:16 pt waiting for nursing staff to be cleaned up; asking therapy to try back later.  2nd attempt at 13:10 pt getting blood drawn, then receiving resp. therapy tx afterward.

## 2023-11-22 NOTE — PROGRESS NOTES
NEPHROLOGY PROGRESS NOTE    REASON FOR CONSULT: EMILI on CKD stage III     SUBJECTIVE:  Patient has been transferred out of the ICU.  She is still on high flow oxygen 60 L oxygen saturation around 90%.  Has been having some nosebleed.  She had a CT chest.  She also feels like she had a UTI and her urine culture is pending.    OBJECTIVE:    Visit Vitals  /73   Pulse 96   Temp 36 °C (96.8 °F)   Resp 22          Intake/Output Summary (Last 24 hours) at 11/22/2023 0849  Last data filed at 11/21/2023 1200  Gross per 24 hour   Intake 228 ml   Output 1600 ml   Net -1372 ml          General: Awake and Alert, In no distress, obese, cooperative  HEENT: Oral mucosa moist, EOMI, high flow nasal cannula  NECK: Supple  CHEST: No crackles, no wheeze, no tachypnea  CVS: S1,S2 heard, no rubs, irregularly irregular  ABD: Soft, Non Tender, BS present  EXT: Chronic bilateral lower extremity edema    MEDICATION:    Scheduled medications  aspirin, 81 mg, oral, Daily  atorvastatin, 40 mg, oral, Nightly  fluticasone, 2 spray, Each Nostril, Daily  furosemide, 40 mg, intravenous, Daily  gabapentin, 300 mg, oral, TID  ipratropium-albuteroL, 3 mL, nebulization, TID  levothyroxine, 150 mcg, oral, Daily before breakfast  nystatin, 1 Application, Topical, BID  pantoprazole, 40 mg, oral, Daily before breakfast  polyethylene glycol, 17 g, oral, Daily  warfarin, 5 mg, oral, Daily      Continuous medications  heparin, 0-4,500 Units/hr, Last Rate: 14 Units/hr (11/21/23 1810)      PRN medications  PRN medications: heparin, ipratropium-albuteroL, oxygen, sodium chloride     RESULTS:    Lab Results   Component Value Date    WBC 9.1 11/22/2023    HGB 10.3 (L) 11/22/2023    HCT 33.2 (L) 11/22/2023    MCV 99 11/22/2023     11/22/2023        Lab Results   Component Value Date    CREATININE 1.85 (H) 11/22/2023    BUN 15 11/22/2023     11/22/2023    K 3.9 11/22/2023     11/22/2023    CO2 35 (H) 11/22/2023        Radiology Imaging  reviewed      ASSESSMENT/PLAN:     1.EMILI: Creatinine had improved to 1.55 and with IV Lasix has gone up to 1.85.  She is hypoxic.  Multifactorial etiology for EMILI this admission.  She got contrast for the chest CT and had PE and was hypoxic with soft blood pressure.  Will continue current IV diuresis and may have to back off if renal function worsens    2.  CKD stage III: Patient has a baseline creatinine between 1.3-1.8.  She has cortical thinning on her previous imaging.  She has been on chronic diuretics.    3.  Chronic peripheral edema: Currently on IV Lasix      Thank You very much for allowing me to participate in the care of this Patient    This document was created using dragon dictation and may contain unintended error    Peewee Benson MD   11/22/23

## 2023-11-22 NOTE — PROGRESS NOTES
Lillian Mendoza is a 83 y.o. female on day 7 of admission presenting with Acute saddle pulmonary embolism, unspecified whether acute cor pulmonale present (CMS/HCC).      Subjective   Seen today so far has a urinary tract infection I will start her on some IV Rocephin she has multiple medical problems going on with having a stroke bilateral PE and bilateral DVTs.  She has been more dyspneic she is requiring Airvo she still desaturating rehab is canceled at this time I will keep her on the telemetry stepdown floor for now       Objective     Last Recorded Vitals  /68   Pulse 90   Temp 36.3 °C (97.3 °F)   Resp 21   Wt 127 kg (280 lb 11.2 oz)   SpO2 93%   Intake/Output last 3 Shifts:    Intake/Output Summary (Last 24 hours) at 11/22/2023 1739  Last data filed at 11/22/2023 1400  Gross per 24 hour   Intake 87.82 ml   Output 1800 ml   Net -1712.18 ml       Admission Weight  Weight: 111 kg (244 lb 11.4 oz) (11/15/23 0327)    Daily Weight  11/21/23 : 127 kg (280 lb 11.2 oz)    Image Results  CT chest wo IV contrast  Narrative: Interpreted By:  Gunner Russo,   STUDY:  CT CHEST WO IV CONTRAST;  11/21/2023 8:15 pm      INDICATION:  Signs/Symptoms:worsening hypoxia; recent PE s/p thrombectomy.      COMPARISON:  CT pulmonary angiography dated 11/15/2023      ACCESSION NUMBER(S):  [id number]      ORDERING CLINICIAN:  BROOK GOOD      TECHNIQUE:  Helical CT imaging of the chest was performed without intravenous  contrast.      FINDINGS:          CHEST WALL, LOWER NECK AND VISUALIZED THYROID: Unremarkable.      MEDIASTINUM, AXILLA AND GISSELL: No axillary, mediastinal or definite  hilar adenopathy by imaging size criteria.  Note that the evaluation  for hilar adenopathy is limited due to the lack of intravenous  contrast.      HEART: The heart size is normal and there is no pericardial effusion.      VESSELS: The unenhanced major vessels are grossly unremarkable.      ESOPHAGUS: Large hiatal hernia      LUNG,  PLEURA, AND LARGE AIRWAYS: Scattered left lower lobe  infiltrates/consolidations grossly unchanged from prior. No new major  consolidation or pulmonary infiltrates. Right lower lobe scattered  atelectasis. No right-sided pleural effusion or pneumothorax      UPPER ABDOMEN: Large hiatal hernia. Partially atrophic right kidney.      BONES: Multilevel degenerative spine changes and facet joint disease  along with mild central compression deformity of multiple thoracic  vertebrae as in prior.      Impression: 1. Redemonstrated left lower lobe scattered  consolidation/infiltrates. No new major pulmonary consolidation,  pleural effusion or pneumothorax.  2. Large hiatal hernia as in prior.      Signed by: Gunner Russo 11/22/2023 1:48 PM  Dictation workstation:   [id number]  ROS hypoxia hypoxic respiratory failure weakness lethargy    Physical Exam  Lungs are diminished but clear heart has a regular rhythm abdomen is soft and nontender no organomegaly  Relevant Results               Assessment/Plan   This patient currently has cardiac telemetry ordered; if you would like to modify or discontinue the telemetry order, click here to go to the orders activity to modify/discontinue the order.      Hypoxic respiratory failure now urinary tract infection Rocephin 1 g IV daily I [place] start that now see hopefully she has urine culture continue all present care and therapy maintain her oxygen saturations continue with Airvo regulator repeat chest x-ray cancel rehab for now and cancel the CARLOS for now to        Principal Problem:    Acute saddle pulmonary embolism, unspecified whether acute cor pulmonale present (CMS/[facility])                  Sage Alba,

## 2023-11-23 LAB
ALBUMIN SERPL BCP-MCNC: 2.5 G/DL (ref 3.4–5)
ANION GAP SERPL CALC-SCNC: 13 MMOL/L (ref 10–20)
BUN SERPL-MCNC: 20 MG/DL (ref 6–23)
CALCIUM SERPL-MCNC: 8.4 MG/DL (ref 8.6–10.3)
CHLORIDE SERPL-SCNC: 97 MMOL/L (ref 98–107)
CO2 SERPL-SCNC: 33 MMOL/L (ref 21–32)
CREAT SERPL-MCNC: 1.9 MG/DL (ref 0.5–1.05)
ERYTHROCYTE [DISTWIDTH] IN BLOOD BY AUTOMATED COUNT: 15.2 % (ref 11.5–14.5)
GFR SERPL CREATININE-BSD FRML MDRD: 26 ML/MIN/1.73M*2
GLUCOSE BLD MANUAL STRIP-MCNC: 107 MG/DL (ref 74–99)
GLUCOSE BLD MANUAL STRIP-MCNC: 116 MG/DL (ref 74–99)
GLUCOSE BLD MANUAL STRIP-MCNC: 123 MG/DL (ref 74–99)
GLUCOSE BLD MANUAL STRIP-MCNC: 130 MG/DL (ref 74–99)
GLUCOSE SERPL-MCNC: 104 MG/DL (ref 74–99)
HCT VFR BLD AUTO: 32.6 % (ref 36–46)
HGB BLD-MCNC: 10.1 G/DL (ref 12–16)
HOLD SPECIMEN: NORMAL
INR PPP: 1.5 (ref 0.9–1.1)
MCH RBC QN AUTO: 30.2 PG (ref 26–34)
MCHC RBC AUTO-ENTMCNC: 31 G/DL (ref 32–36)
MCV RBC AUTO: 98 FL (ref 80–100)
NRBC BLD-RTO: 0 /100 WBCS (ref 0–0)
PHOSPHATE SERPL-MCNC: 3.1 MG/DL (ref 2.5–4.9)
PLATELET # BLD AUTO: 350 X10*3/UL (ref 150–450)
POTASSIUM SERPL-SCNC: 3.7 MMOL/L (ref 3.5–5.3)
PROTHROMBIN TIME: 16.8 SECONDS (ref 9.8–12.8)
RBC # BLD AUTO: 3.34 X10*6/UL (ref 4–5.2)
SODIUM SERPL-SCNC: 139 MMOL/L (ref 136–145)
UFH PPP CHRO-ACNC: 0.2 IU/ML
UFH PPP CHRO-ACNC: 0.4 IU/ML
UFH PPP CHRO-ACNC: 0.4 IU/ML
WBC # BLD AUTO: 8.4 X10*3/UL (ref 4.4–11.3)

## 2023-11-23 PROCEDURE — 85027 COMPLETE CBC AUTOMATED: CPT | Performed by: NURSE PRACTITIONER

## 2023-11-23 PROCEDURE — 36415 COLL VENOUS BLD VENIPUNCTURE: CPT | Performed by: INTERNAL MEDICINE

## 2023-11-23 PROCEDURE — 2500000002 HC RX 250 W HCPCS SELF ADMINISTERED DRUGS (ALT 637 FOR MEDICARE OP, ALT 636 FOR OP/ED): Performed by: NURSE PRACTITIONER

## 2023-11-23 PROCEDURE — 2500000001 HC RX 250 WO HCPCS SELF ADMINISTERED DRUGS (ALT 637 FOR MEDICARE OP): Performed by: INTERNAL MEDICINE

## 2023-11-23 PROCEDURE — 85520 HEPARIN ASSAY: CPT | Performed by: INTERNAL MEDICINE

## 2023-11-23 PROCEDURE — 2500000001 HC RX 250 WO HCPCS SELF ADMINISTERED DRUGS (ALT 637 FOR MEDICARE OP): Performed by: NURSE PRACTITIONER

## 2023-11-23 PROCEDURE — 2060000001 HC INTERMEDIATE ICU ROOM DAILY

## 2023-11-23 PROCEDURE — 99232 SBSQ HOSP IP/OBS MODERATE 35: CPT | Performed by: STUDENT IN AN ORGANIZED HEALTH CARE EDUCATION/TRAINING PROGRAM

## 2023-11-23 PROCEDURE — 85610 PROTHROMBIN TIME: CPT | Performed by: INTERNAL MEDICINE

## 2023-11-23 PROCEDURE — 82947 ASSAY GLUCOSE BLOOD QUANT: CPT

## 2023-11-23 PROCEDURE — 2500000004 HC RX 250 GENERAL PHARMACY W/ HCPCS (ALT 636 FOR OP/ED): Performed by: INTERNAL MEDICINE

## 2023-11-23 PROCEDURE — 80069 RENAL FUNCTION PANEL: CPT | Performed by: INTERNAL MEDICINE

## 2023-11-23 PROCEDURE — 94640 AIRWAY INHALATION TREATMENT: CPT

## 2023-11-23 PROCEDURE — 2500000004 HC RX 250 GENERAL PHARMACY W/ HCPCS (ALT 636 FOR OP/ED): Performed by: NURSE PRACTITIONER

## 2023-11-23 RX ADMIN — LEVOTHYROXINE SODIUM 150 MCG: 150 TABLET ORAL at 06:03

## 2023-11-23 RX ADMIN — FUROSEMIDE 40 MG: 10 INJECTION, SOLUTION INTRAMUSCULAR; INTRAVENOUS at 09:19

## 2023-11-23 RX ADMIN — ASPIRIN 81 MG: 81 TABLET, COATED ORAL at 09:19

## 2023-11-23 RX ADMIN — IPRATROPIUM BROMIDE AND ALBUTEROL SULFATE 3 ML: 2.5; .5 SOLUTION RESPIRATORY (INHALATION) at 06:49

## 2023-11-23 RX ADMIN — GABAPENTIN 200 MG: 100 CAPSULE ORAL at 20:54

## 2023-11-23 RX ADMIN — GABAPENTIN 200 MG: 100 CAPSULE ORAL at 14:54

## 2023-11-23 RX ADMIN — WARFARIN SODIUM 5 MG: 5 TABLET ORAL at 17:43

## 2023-11-23 RX ADMIN — CEFTRIAXONE SODIUM 1 G: 1 INJECTION, SOLUTION INTRAVENOUS at 17:47

## 2023-11-23 RX ADMIN — GABAPENTIN 200 MG: 100 CAPSULE ORAL at 09:19

## 2023-11-23 RX ADMIN — IPRATROPIUM BROMIDE AND ALBUTEROL SULFATE 3 ML: 2.5; .5 SOLUTION RESPIRATORY (INHALATION) at 19:12

## 2023-11-23 RX ADMIN — NYSTATIN 1 APPLICATION: 100000 POWDER TOPICAL at 09:40

## 2023-11-23 RX ADMIN — HEPARIN SODIUM 1600 UNITS/HR: 10000 INJECTION, SOLUTION INTRAVENOUS at 14:54

## 2023-11-23 RX ADMIN — IPRATROPIUM BROMIDE AND ALBUTEROL SULFATE 3 ML: 2.5; .5 SOLUTION RESPIRATORY (INHALATION) at 13:26

## 2023-11-23 RX ADMIN — ATORVASTATIN CALCIUM 40 MG: 40 TABLET, FILM COATED ORAL at 20:54

## 2023-11-23 RX ADMIN — PANTOPRAZOLE SODIUM 40 MG: 40 TABLET, DELAYED RELEASE ORAL at 06:03

## 2023-11-23 RX ADMIN — POLYETHYLENE GLYCOL 3350 17 G: 17 POWDER, FOR SOLUTION ORAL at 09:19

## 2023-11-23 RX ADMIN — NYSTATIN 1 APPLICATION: 100000 POWDER TOPICAL at 20:54

## 2023-11-23 ASSESSMENT — COGNITIVE AND FUNCTIONAL STATUS - GENERAL
WALKING IN HOSPITAL ROOM: A LOT
DRESSING REGULAR UPPER BODY CLOTHING: A LOT
STANDING UP FROM CHAIR USING ARMS: A LOT
DAILY ACTIVITIY SCORE: 14
MOBILITY SCORE: 11
MOVING FROM LYING ON BACK TO SITTING ON SIDE OF FLAT BED WITH BEDRAILS: A LOT
TURNING FROM BACK TO SIDE WHILE IN FLAT BAD: A LOT
CLIMB 3 TO 5 STEPS WITH RAILING: TOTAL
DRESSING REGULAR LOWER BODY CLOTHING: TOTAL
MOBILITY SCORE: 11
TOILETING: A LOT
CLIMB 3 TO 5 STEPS WITH RAILING: TOTAL
TOILETING: A LOT
MOBILITY SCORE: 11
MOVING TO AND FROM BED TO CHAIR: A LOT
STANDING UP FROM CHAIR USING ARMS: A LOT
DRESSING REGULAR LOWER BODY CLOTHING: TOTAL
MOVING TO AND FROM BED TO CHAIR: A LOT
HELP NEEDED FOR BATHING: TOTAL
DRESSING REGULAR UPPER BODY CLOTHING: A LOT
TOILETING: TOTAL
EATING MEALS: A LITTLE
MOVING TO AND FROM BED TO CHAIR: A LOT
STANDING UP FROM CHAIR USING ARMS: A LOT
MOVING FROM LYING ON BACK TO SITTING ON SIDE OF FLAT BED WITH BEDRAILS: A LOT
PERSONAL GROOMING: A LOT
WALKING IN HOSPITAL ROOM: A LOT
HELP NEEDED FOR BATHING: A LOT
WALKING IN HOSPITAL ROOM: A LOT
CLIMB 3 TO 5 STEPS WITH RAILING: TOTAL
TURNING FROM BACK TO SIDE WHILE IN FLAT BAD: A LOT
TURNING FROM BACK TO SIDE WHILE IN FLAT BAD: A LOT
PERSONAL GROOMING: A LITTLE
DAILY ACTIVITIY SCORE: 14
PERSONAL GROOMING: A LITTLE
DAILY ACTIVITIY SCORE: 9
DRESSING REGULAR UPPER BODY CLOTHING: TOTAL
MOVING FROM LYING ON BACK TO SITTING ON SIDE OF FLAT BED WITH BEDRAILS: A LOT
DRESSING REGULAR LOWER BODY CLOTHING: TOTAL
HELP NEEDED FOR BATHING: A LOT

## 2023-11-23 ASSESSMENT — PAIN - FUNCTIONAL ASSESSMENT
PAIN_FUNCTIONAL_ASSESSMENT: 0-10
PAIN_FUNCTIONAL_ASSESSMENT: 0-10

## 2023-11-23 ASSESSMENT — PAIN SCALES - GENERAL
PAINLEVEL_OUTOF10: 0 - NO PAIN

## 2023-11-23 NOTE — PROGRESS NOTES
"Physical Therapy                 Therapy Communication Note    Patient Name: Lillian Mendoza  MRN: [id number]  Today's Date: 11/23/2023     Discipline: Physical Therapy    Missed Visit Reason:  2 tx attempts made this date.  1st attempt at 8:45 pt declined, stating she is too tired and asking therapy to try back later.  2nd attempt at 11:40 pt again declining, stating she is \"just exhausted\" and would like to rest until visitors arrive later today.  "

## 2023-11-23 NOTE — CARE PLAN
The patient's goals for the shift include able to eat/drink    The clinical goals for the shift include patient will have improved respiratory status    Over the shift, the patient did not make progress toward the following goals. Barriers to progression include resolving PE's. Recommendations to address these barriers include continue on herpain IV. .

## 2023-11-23 NOTE — PROGRESS NOTES
Lillian Mendoza is a 83 y.o. female on day 8 of admission presenting with Acute saddle pulmonary embolism, unspecified whether acute cor pulmonale present (CMS/HCC).      Subjective   Seen today on Airvo still hypoxic treating her for urinary tract infection a look to see if there is any cultures pending I placed her on ceftriaxone daily       Objective     Last Recorded Vitals  /69   Pulse 80   Temp 36.7 °C (98.1 °F) (Temporal)   Resp 18   Wt 127 kg (280 lb 11.2 oz)   SpO2 97%   Intake/Output last 3 Shifts:    Intake/Output Summary (Last 24 hours) at 11/23/2023 0926  Last data filed at 11/23/2023 0652  Gross per 24 hour   Intake 161.9 ml   Output 2150 ml   Net -1988.1 ml       Admission Weight  Weight: 111 kg (244 lb 11.4 oz) (11/15/23 0327)    Daily Weight  11/21/23 : 127 kg (280 lb 11.2 oz)    Image Results  CT chest wo IV contrast  Narrative: Interpreted By:  Gunner Russo,   STUDY:  CT CHEST WO IV CONTRAST;  11/21/2023 8:15 pm      INDICATION:  Signs/Symptoms:worsening hypoxia; recent PE s/p thrombectomy.      COMPARISON:  CT pulmonary angiography dated 11/15/2023      ACCESSION NUMBER(S):  [id number]      ORDERING CLINICIAN:  BROOK GOOD      TECHNIQUE:  Helical CT imaging of the chest was performed without intravenous  contrast.      FINDINGS:          CHEST WALL, LOWER NECK AND VISUALIZED THYROID: Unremarkable.      MEDIASTINUM, AXILLA AND GISSELL: No axillary, mediastinal or definite  hilar adenopathy by imaging size criteria.  Note that the evaluation  for hilar adenopathy is limited due to the lack of intravenous  contrast.      HEART: The heart size is normal and there is no pericardial effusion.      VESSELS: The unenhanced major vessels are grossly unremarkable.      ESOPHAGUS: Large hiatal hernia      LUNG, PLEURA, AND LARGE AIRWAYS: Scattered left lower lobe  infiltrates/consolidations grossly unchanged from prior. No new major  consolidation or pulmonary infiltrates. Right lower  lobe scattered  atelectasis. No right-sided pleural effusion or pneumothorax      UPPER ABDOMEN: Large hiatal hernia. Partially atrophic right kidney.      BONES: Multilevel degenerative spine changes and facet joint disease  along with mild central compression deformity of multiple thoracic  vertebrae as in prior.      Impression: 1. Redemonstrated left lower lobe scattered  consolidation/infiltrates. No new major pulmonary consolidation,  pleural effusion or pneumothorax.  2. Large hiatal hernia as in prior.      Signed by: Gunner Russo 11/22/2023 1:48 PM  Dictation workstation:   [id number]    ROS hypoxia weakness lethargy  Physical Exam  Lungs are clear heart is regular she is alert and oriented x3  Abdomen is soft nontender  Relevant Results               Assessment/Plan   This patient currently has cardiac telemetry ordered; if you would like to modify or discontinue the telemetry order, click here to go to the orders activity to modify/discontinue the order.              Principal Problem:    Acute saddle pulmonary embolism, unspecified whether acute cor pulmonale present (CMS/HCC)                  Sage Alba, DO

## 2023-11-23 NOTE — PROGRESS NOTES
"Cardiology Consult Progress Note:    Interval History:  - Remains on airvo; no chest pains; + notes dry nares     Objective:     Inpatient Medications:  Scheduled medications   Medication Dose Route Frequency    aspirin  81 mg oral Daily    atorvastatin  40 mg oral Nightly    cefTRIAXone  1 g intravenous q24h    fluticasone  2 spray Each Nostril Daily    furosemide  40 mg intravenous Daily    gabapentin  200 mg oral TID    ipratropium-albuteroL  3 mL nebulization TID    levothyroxine  150 mcg oral Daily before breakfast    nystatin  1 Application Topical BID    pantoprazole  40 mg oral Daily before breakfast    polyethylene glycol  17 g oral Daily    warfarin  5 mg oral Daily       PRN medications   Medication    heparin    ipratropium-albuteroL    oxygen    sodium chloride       Continuous Medications   Medication Dose Last Rate    heparin  0-4,500 Units/hr 1,600 Units/hr (11/23/23 1454)       Intake / Output Summary:     Intake/Output Summary (Last 24 hours) at 11/23/2023 1543  Last data filed at 11/23/2023 1320  Gross per 24 hour   Intake 163.19 ml   Output 1550 ml   Net -1386.81 ml       Net IO Since Admission: -1,554.61 mL [11/23/23 1543]    Physical Exam:  /65   Pulse 90   Temp 36.2 °C (97.2 °F) (Temporal)   Resp 18   Ht 1.67 m (5' 5.75\")   Wt 127 kg (280 lb 11 oz)   SpO2 96%   BMI 45.65 kg/m²     Physical Exam  Constitutional:       Appearance: She is obese.   HENT:      Mouth/Throat:      Mouth: Mucous membranes are moist.   Eyes:      Extraocular Movements: Extraocular movements intact.      Conjunctiva/sclera: Conjunctivae normal.   Cardiovascular:      Rate and Rhythm: Normal rate.      Heart sounds: No murmur heard.  Pulmonary:      Comments: Remains on airvo; breath sound distant  Abdominal:      Palpations: Abdomen is soft.   Skin:     General: Skin is warm and dry.   Neurological:      Mental Status: She is alert. Mental status is at baseline.   Psychiatric:         Mood and Affect: Mood " normal.         Behavior: Behavior normal.         Lab/Radiology/Diagnostic Review:    Labs  Results for orders placed or performed during the hospital encounter of 11/15/23 (from the past 24 hour(s))   Blood Gas Arterial Full Panel   Result Value Ref Range    POCT pH, Arterial 7.53 (H) 7.38 - 7.42 pH    POCT pCO2, Arterial 47 (H) 38 - 42 mm Hg    POCT pO2, Arterial 104 (H) 85 - 95 mm Hg    POCT SO2, Arterial 99 94 - 100 %    POCT Oxy Hemoglobin, Arterial 96.8 94.0 - 98.0 %    POCT Hematocrit Calculated, Arterial 34.0 (L) 36.0 - 46.0 %    POCT Sodium, Arterial 137 136 - 145 mmol/L    POCT Potassium, Arterial 3.9 3.5 - 5.3 mmol/L    POCT Chloride, Arterial 100 98 - 107 mmol/L    POCT Ionized Calcium, Arterial 1.14 1.10 - 1.33 mmol/L    POCT Glucose, Arterial 122 (H) 74 - 99 mg/dL    POCT Lactate, Arterial 1.7 0.4 - 2.0 mmol/L    POCT Base Excess, Arterial 14.8 (H) -2.0 - 3.0 mmol/L    POCT HCO3 Calculated, Arterial 39.3 (H) 22.0 - 26.0 mmol/L    POCT Hemoglobin, Arterial 11.4 (L) 12.0 - 16.0 g/dL    POCT Anion Gap, Arterial 2 (L) 10 - 25 mmo/L    Patient Temperature 37.0 degrees Celsius    FiO2 80 %    Apparatus HIGH FLOW CANNULA     Flow 50.0 LPM   POCT GLUCOSE   Result Value Ref Range    POCT Glucose 196 (H) 74 - 99 mg/dL   POCT GLUCOSE   Result Value Ref Range    POCT Glucose 158 (H) 74 - 99 mg/dL   Heparin Assay   Result Value Ref Range    Heparin Unfractionated 0.3 See Comment Below for Therapeutic Ranges IU/mL   Lavender Top   Result Value Ref Range    Extra Tube Hold for add-ons.    SST TOP   Result Value Ref Range    Extra Tube Hold for add-ons.    Heparin Assay   Result Value Ref Range    Heparin Unfractionated 0.2 See Comment Below for Therapeutic Ranges IU/mL   Protime-INR   Result Value Ref Range    Protime 16.8 (H) 9.8 - 12.8 seconds    INR 1.5 (H) 0.9 - 1.1   Renal Function Panel   Result Value Ref Range    Glucose 104 (H) 74 - 99 mg/dL    Sodium 139 136 - 145 mmol/L    Potassium 3.7 3.5 - 5.3 mmol/L     Chloride 97 (L) 98 - 107 mmol/L    Bicarbonate 33 (H) 21 - 32 mmol/L    Anion Gap 13 10 - 20 mmol/L    Urea Nitrogen 20 6 - 23 mg/dL    Creatinine 1.90 (H) 0.50 - 1.05 mg/dL    eGFR 26 (L) >60 mL/min/1.73m*2    Calcium 8.4 (L) 8.6 - 10.3 mg/dL    Phosphorus 3.1 2.5 - 4.9 mg/dL    Albumin 2.5 (L) 3.4 - 5.0 g/dL   Heparin Assay   Result Value Ref Range    Heparin Unfractionated 0.4 See Comment Below for Therapeutic Ranges IU/mL   Lavender Top   Result Value Ref Range    Extra Tube Hold for add-ons.    POCT GLUCOSE   Result Value Ref Range    POCT Glucose 107 (H) 74 - 99 mg/dL   Heparin Assay   Result Value Ref Range    Heparin Unfractionated 0.4 See Comment Below for Therapeutic Ranges IU/mL   POCT GLUCOSE   Result Value Ref Range    POCT Glucose 130 (H) 74 - 99 mg/dL   POCT GLUCOSE   Result Value Ref Range    POCT Glucose 123 (H) 74 - 99 mg/dL       Peripheral IV 11/20/23 22 G Left Antecubital (Active)   Placement Date/Time: 11/20/23 2246   Size (Gauge): 22 G  Orientation: Left  Location: Antecubital   Number of days: 2       External Urinary Catheter Female (Active)   Placement Date/Time: 11/15/23 1350   Hand Hygiene Completed: Yes  External Catheter Type: Female   Number of days: 8        Troponin I, High Sensitivity   Date/Time Value Ref Range Status   11/15/2023 04:18 AM 1,116 (HH) 0 - 13 ng/L Final     Comment:     Previous result verified on 11/15/2023 0404 on specimen/case [id number] called with component [facility] for procedure Troponin I, High Sensitivity with value 1,272 ng/L.   11/15/2023 03:25 AM 1,272 (HH) 0 - 13 ng/L Final     BNP   Date/Time Value Ref Range Status   11/15/2023 03:25 AM 1,683 (H) 0 - 99 pg/mL Final     Hemoglobin A1C   Date/Time Value Ref Range Status   11/15/2023 03:25 AM 6.1 (H) see below % Final     LDL Calculated   Date/Time Value Ref Range Status   11/15/2023 04:12 AM 53 <=99 mg/dL Final     Comment:                                 Near   Borderline      AGE      Desirable   Optimal    High     High     Very High     0-19 Y     0 - 109     ---    110-129   >/= 130     ----    20-24 Y     0 - 119     ---    120-159   >/= 160     ----      >24 Y     0 -  99   100-129  130-159   160-189     >/=190       VLDL   Date/Time Value Ref Range Status   11/15/2023 04:12 AM 28 0 - 40 mg/dL Final   12/13/2019 03:04 PM 21 0 - 40 mg/dL Final   11/14/2018 03:15 PM 32 0 - 40 mg/dL Final       Assessment and Plan:  83 y.o. 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 Presented with stroke sxs with dysarthria and facial droop. Out of window for TPA.  Patient status post bilateral thrombectomy for PE.  We did not perform IVC filter at that time due to no evidence of CFV thrombus seen in the Cath Lab.     Interim history includes DVT scan that showed femoropopliteal DVT bilaterally, MRI that showed evidence of embolic stroke.  No evidence of hemorrhagic transformation on multiple imaging of the head.    11/23/2023- plan is for outpatient CARLOS; wean O2 as able; continue anti-coagulation as above (heparin to coumadin bridging)     Overall Recommendations:  1.  From a pulmonary embolism standpoint, titrate O2 down as tolerated.  Likely, she will require Coumadin for long-term anticoagulation due to BMI of 47.     2.  From a stroke perspective, uncertain as to source of embolic phenomenon.  Differential diagnosis includes the following:   - Highest suspicion is paradoxical embolus from PFO; we may need to consider CARLOS versus CMR to confirm if PFO is present despite negative TTE.  - APLA; regardless, patient will be on warfarin due to her weight and may not change treatment plan even if positive.  - Undiagnosed AF - on tele; CARLOS  may be helpful here to evaluate the KATIE as well; Plan is for outpatient CARLOS  - Aortic atheroembolic phenomenon - if all else is negative, we may need to  consider Plavix and statin optimization     3.  Agree with PT/OT evaluation for poststroke care.       Thank you for the cardiology consult. We will follow with you.     Gonzalez King MD

## 2023-11-24 LAB
ALBUMIN SERPL BCP-MCNC: 2.8 G/DL (ref 3.4–5)
ANION GAP SERPL CALC-SCNC: 12 MMOL/L (ref 10–20)
BACTERIA UR CULT: ABNORMAL
BUN SERPL-MCNC: 21 MG/DL (ref 6–23)
CALCIUM SERPL-MCNC: 8.3 MG/DL (ref 8.6–10.3)
CHLORIDE SERPL-SCNC: 99 MMOL/L (ref 98–107)
CO2 SERPL-SCNC: 35 MMOL/L (ref 21–32)
CREAT SERPL-MCNC: 1.85 MG/DL (ref 0.5–1.05)
GFR SERPL CREATININE-BSD FRML MDRD: 27 ML/MIN/1.73M*2
GLUCOSE BLD MANUAL STRIP-MCNC: 111 MG/DL (ref 74–99)
GLUCOSE BLD MANUAL STRIP-MCNC: 113 MG/DL (ref 74–99)
GLUCOSE BLD MANUAL STRIP-MCNC: 127 MG/DL (ref 74–99)
GLUCOSE BLD MANUAL STRIP-MCNC: 151 MG/DL (ref 74–99)
GLUCOSE SERPL-MCNC: 112 MG/DL (ref 74–99)
HOLD SPECIMEN: NORMAL
PHOSPHATE SERPL-MCNC: 3.3 MG/DL (ref 2.5–4.9)
POTASSIUM SERPL-SCNC: 3.5 MMOL/L (ref 3.5–5.3)
SODIUM SERPL-SCNC: 142 MMOL/L (ref 136–145)
UFH PPP CHRO-ACNC: 0.5 IU/ML

## 2023-11-24 PROCEDURE — 2500000004 HC RX 250 GENERAL PHARMACY W/ HCPCS (ALT 636 FOR OP/ED): Performed by: INTERNAL MEDICINE

## 2023-11-24 PROCEDURE — 2500000001 HC RX 250 WO HCPCS SELF ADMINISTERED DRUGS (ALT 637 FOR MEDICARE OP): Performed by: NURSE PRACTITIONER

## 2023-11-24 PROCEDURE — 2500000001 HC RX 250 WO HCPCS SELF ADMINISTERED DRUGS (ALT 637 FOR MEDICARE OP): Performed by: INTERNAL MEDICINE

## 2023-11-24 PROCEDURE — 2500000004 HC RX 250 GENERAL PHARMACY W/ HCPCS (ALT 636 FOR OP/ED): Performed by: NURSE PRACTITIONER

## 2023-11-24 PROCEDURE — 85520 HEPARIN ASSAY: CPT | Performed by: INTERNAL MEDICINE

## 2023-11-24 PROCEDURE — 36415 COLL VENOUS BLD VENIPUNCTURE: CPT | Performed by: INTERNAL MEDICINE

## 2023-11-24 PROCEDURE — 94640 AIRWAY INHALATION TREATMENT: CPT

## 2023-11-24 PROCEDURE — 2500000002 HC RX 250 W HCPCS SELF ADMINISTERED DRUGS (ALT 637 FOR MEDICARE OP, ALT 636 FOR OP/ED): Performed by: NURSE PRACTITIONER

## 2023-11-24 PROCEDURE — 2060000001 HC INTERMEDIATE ICU ROOM DAILY

## 2023-11-24 PROCEDURE — 80069 RENAL FUNCTION PANEL: CPT | Performed by: INTERNAL MEDICINE

## 2023-11-24 PROCEDURE — 94660 CPAP INITIATION&MGMT: CPT

## 2023-11-24 PROCEDURE — 82947 ASSAY GLUCOSE BLOOD QUANT: CPT

## 2023-11-24 RX ORDER — GABAPENTIN 100 MG/1
100 CAPSULE ORAL 3 TIMES DAILY
Status: DISCONTINUED | OUTPATIENT
Start: 2023-11-24 | End: 2023-11-28 | Stop reason: HOSPADM

## 2023-11-24 RX ORDER — ACETAZOLAMIDE 500 MG/5ML
250 INJECTION, POWDER, LYOPHILIZED, FOR SOLUTION INTRAVENOUS EVERY 8 HOURS
Status: COMPLETED | OUTPATIENT
Start: 2023-11-24 | End: 2023-11-26

## 2023-11-24 RX ADMIN — ACETAZOLAMIDE 250 MG: 500 INJECTION, POWDER, LYOPHILIZED, FOR SOLUTION INTRAVENOUS at 18:34

## 2023-11-24 RX ADMIN — HEPARIN SODIUM 1600 UNITS/HR: 10000 INJECTION, SOLUTION INTRAVENOUS at 21:53

## 2023-11-24 RX ADMIN — PANTOPRAZOLE SODIUM 40 MG: 40 TABLET, DELAYED RELEASE ORAL at 06:14

## 2023-11-24 RX ADMIN — GABAPENTIN 200 MG: 100 CAPSULE ORAL at 09:10

## 2023-11-24 RX ADMIN — CEFTRIAXONE SODIUM 1 G: 1 INJECTION, SOLUTION INTRAVENOUS at 18:28

## 2023-11-24 RX ADMIN — IPRATROPIUM BROMIDE AND ALBUTEROL SULFATE 3 ML: 2.5; .5 SOLUTION RESPIRATORY (INHALATION) at 18:27

## 2023-11-24 RX ADMIN — NYSTATIN 1 APPLICATION: 100000 POWDER TOPICAL at 09:11

## 2023-11-24 RX ADMIN — GABAPENTIN 100 MG: 100 CAPSULE ORAL at 21:40

## 2023-11-24 RX ADMIN — FUROSEMIDE 40 MG: 10 INJECTION, SOLUTION INTRAMUSCULAR; INTRAVENOUS at 09:10

## 2023-11-24 RX ADMIN — GABAPENTIN 100 MG: 100 CAPSULE ORAL at 15:33

## 2023-11-24 RX ADMIN — POLYETHYLENE GLYCOL 3350 17 G: 17 POWDER, FOR SOLUTION ORAL at 09:10

## 2023-11-24 RX ADMIN — IPRATROPIUM BROMIDE AND ALBUTEROL SULFATE 3 ML: 2.5; .5 SOLUTION RESPIRATORY (INHALATION) at 12:11

## 2023-11-24 RX ADMIN — LEVOTHYROXINE SODIUM 150 MCG: 150 TABLET ORAL at 06:14

## 2023-11-24 RX ADMIN — ASPIRIN 81 MG: 81 TABLET, COATED ORAL at 09:10

## 2023-11-24 RX ADMIN — NYSTATIN 1 APPLICATION: 100000 POWDER TOPICAL at 21:40

## 2023-11-24 RX ADMIN — SALINE NASAL SPRAY 1 SPRAY: 1.5 SOLUTION NASAL at 21:40

## 2023-11-24 RX ADMIN — ACETAZOLAMIDE 250 MG: 500 INJECTION, POWDER, LYOPHILIZED, FOR SOLUTION INTRAVENOUS at 11:32

## 2023-11-24 RX ADMIN — IPRATROPIUM BROMIDE AND ALBUTEROL SULFATE 3 ML: 2.5; .5 SOLUTION RESPIRATORY (INHALATION) at 07:34

## 2023-11-24 RX ADMIN — HEPARIN SODIUM 1600 UNITS/HR: 10000 INJECTION, SOLUTION INTRAVENOUS at 06:14

## 2023-11-24 RX ADMIN — ATORVASTATIN CALCIUM 40 MG: 40 TABLET, FILM COATED ORAL at 21:40

## 2023-11-24 ASSESSMENT — COGNITIVE AND FUNCTIONAL STATUS - GENERAL
MOVING FROM LYING ON BACK TO SITTING ON SIDE OF FLAT BED WITH BEDRAILS: A LOT
CLIMB 3 TO 5 STEPS WITH RAILING: TOTAL
WALKING IN HOSPITAL ROOM: A LOT
PERSONAL GROOMING: A LITTLE
DRESSING REGULAR LOWER BODY CLOTHING: TOTAL
TOILETING: TOTAL
MOVING FROM LYING ON BACK TO SITTING ON SIDE OF FLAT BED WITH BEDRAILS: A LOT
TURNING FROM BACK TO SIDE WHILE IN FLAT BAD: A LOT
CLIMB 3 TO 5 STEPS WITH RAILING: TOTAL
MOBILITY SCORE: 11
TOILETING: TOTAL
DRESSING REGULAR LOWER BODY CLOTHING: TOTAL
HELP NEEDED FOR BATHING: A LOT
PERSONAL GROOMING: A LITTLE
MOVING TO AND FROM BED TO CHAIR: A LOT
DRESSING REGULAR UPPER BODY CLOTHING: TOTAL
MOBILITY SCORE: 11
MOVING TO AND FROM BED TO CHAIR: A LOT
DAILY ACTIVITIY SCORE: 12
TURNING FROM BACK TO SIDE WHILE IN FLAT BAD: A LOT
WALKING IN HOSPITAL ROOM: A LOT
STANDING UP FROM CHAIR USING ARMS: A LOT
HELP NEEDED FOR BATHING: A LOT
DRESSING REGULAR UPPER BODY CLOTHING: TOTAL
DAILY ACTIVITIY SCORE: 12
STANDING UP FROM CHAIR USING ARMS: A LOT

## 2023-11-24 ASSESSMENT — PAIN - FUNCTIONAL ASSESSMENT: PAIN_FUNCTIONAL_ASSESSMENT: 0-10

## 2023-11-24 ASSESSMENT — PAIN SCALES - GENERAL
PAINLEVEL_OUTOF10: 0 - NO PAIN
PAINLEVEL_OUTOF10: 0 - NO PAIN

## 2023-11-24 NOTE — PROGRESS NOTES
Lillian Mendoza is a 83 y.o. female on day 9 of admission presenting with Acute saddle pulmonary embolism, unspecified whether acute cor pulmonale present (CMS/HCC).      Subjective     Patient is not ambulating to a significant degree.  Denies significant shortness of breath at rest.    Objective     Remains on high flow oxygen.    Vitals 24HR  Heart Rate:  [78-97]   Temp:  [35.9 °C (96.6 °F)-36.9 °C (98.4 °F)]   Resp:  [18-20]   BP: ()/(62-78)   SpO2:  [92 %-100 %]         Intake/Output last 3 Shifts:    Intake/Output Summary (Last 24 hours) at 11/24/2023 0945  Last data filed at 11/24/2023 0618  Gross per 24 hour   Intake 401.01 ml   Output 2050 ml   Net -1648.99 ml       Physical Exam    Patient is morbidly obese.  Exam limited by body habitus.  Neck is quite thick.  Cannot assess lung fields while lying.  No cardiac rub.  There is peripheral edema.      Assessment/Plan     With underlying stage G3b/A1 chronic kidney disease.  Creatinine closer to 1.5.    Patient with acute on chronic kidney disease that is multifactorial including other variable blood pressures.    I am okay with tolerating some degree of azotemia to allow euvolemia.  We can continue with furosemide 40 mg orally daily.    Electrolytes are acceptable.    No indication for JACKSON.      Fred Mobley MD

## 2023-11-24 NOTE — CARE PLAN
The patient's goals for the shift include able to eat/drink    The clinical goals for the shift include Pt will be OOB at least TID x 1 hour each    Over the shift, the patient did not make progress toward the following goals. Barriers to progression include fatigue, deconditioning, obesity. Recommendations to address these barriers include continue PT/OT, encourage OOB to chair for all meals.  Problem: Nutrition  Goal: Gradual weight gain  Outcome: Not Progressing  Note: Want to off-load fluid  Goal: Improve ostomy output  Outcome: Not Progressing  Note: No ostomy     Problem: General Stroke  Goal: Maintain BP within ordered limits throughout shift  Outcome: Progressing  Goal: Participate in treatment (ie., meds, therapy) throughout shift  Outcome: Progressing  Goal: No symptoms of aspiration throughout shift  Outcome: Progressing  Goal: No symptoms of hemorrhage throughout shift  Outcome: Progressing  Goal: Tolerate enteral feeding throughout shift  Outcome: Progressing  Goal: Controlled blood glucose throughout shift  Outcome: Progressing  Goal: Out of bed three times today  Outcome: Progressing     Problem: ICU Stroke  Goal: Maintain patent airway throughout shift  Outcome: Progressing  Goal: Achieve/maintain targeted sodium level throughout shift  Outcome: Progressing     Problem: Nutrition  Goal: Oral intake greater than 50%  Outcome: Progressing  Goal: Oral intake greater 75%  Outcome: Progressing  Goal: Consume prescribed supplement  Outcome: Progressing  Goal: Adequate PO fluid intake  Outcome: Progressing  Goal: Nutrition support goals are met within 48 hrs  Outcome: Progressing  Goal: Nutrition support is meeting 75% of nutrient needs  Outcome: Progressing  Goal: BG  mg/dL  Outcome: Progressing  Goal: Lab values WNL  Outcome: Progressing  Goal: Electrolytes WNL  Outcome: Progressing  Goal: Promote healing  Outcome: Progressing  Goal: Maintain stable weight  Outcome: Progressing  Goal: Reduce weight  from edema/fluid  Outcome: Progressing     Problem: Fall/Injury  Goal: Not fall by end of shift  Outcome: Progressing  Goal: Be free from injury by end of the shift  Outcome: Progressing  Goal: Verbalize understanding of personal risk factors for fall in the hospital  Outcome: Progressing  Goal: Verbalize understanding of risk factor reduction measures to prevent injury from fall in the home  Outcome: Progressing  Goal: Use assistive devices by end of the shift  Outcome: Progressing     Problem: Pain - Adult  Goal: Verbalizes/displays adequate comfort level or baseline comfort level  Outcome: Progressing  Flowsheets (Taken 11/24/2023 0341)  Verbalizes/displays adequate comfort level or baseline comfort level:   Assess pain using appropriate pain scale   Administer analgesics based on type and severity of pain and evaluate response   Implement non-pharmacological measures as appropriate and evaluate response   .    Problem: Nutrition  Goal: Gradual weight gain  Outcome: Not Progressing  Note: Want to off-load fluid  Goal: Improve ostomy output  Outcome: Not Progressing  Note: No ostomy

## 2023-11-24 NOTE — CARE PLAN
The patient's goals for the shift include able to eat/drink    The clinical goals for the shift include Pt will be OOB at least TID x 1 hour each    Over the shift, the patient did not make progress toward the following goals. Barriers to progression include weakness. Recommendations to address these barriers include education.

## 2023-11-24 NOTE — PROGRESS NOTES
Spiritual Care Visit    Clinical Encounter Type  Routine Visit: Introduction  Continue Visiting: Yes    Yarsanism Encounters  Yarsanism Needs: Spiritual care brochure, Prayer       found patient in fragile condition , cogent and minimally verbal.  Patient embraced spiritual care as evidenced by making sign of cross and expressing thanks for prayer and blessing. Additional visits advised.

## 2023-11-24 NOTE — PROGRESS NOTES
Lillian Mendoza is a 83 y.o. female on day 9 of admission presenting with Acute saddle pulmonary embolism, unspecified whether acute cor pulmonale present (CMS/HCC).      Subjective   Seen today remains on 40% Airvo still has infiltrate bilateral PEs she is very debilitated morbidly obese I am looking at an LTAC for her most likely she will have to go to [facility] on Monday we will make arrangements for that       Objective     Last Recorded Vitals  /67   Pulse 85   Temp 36.1 °C (97 °F) (Temporal)   Resp 20   Wt 127 kg (280 lb 11 oz)   SpO2 92%   Intake/Output last 3 Shifts:    Intake/Output Summary (Last 24 hours) at 11/24/2023 1055  Last data filed at 11/24/2023 1042  Gross per 24 hour   Intake 471.41 ml   Output 2050 ml   Net -1578.59 ml       Admission Weight  Weight: 111 kg (244 lb 11.4 oz) (11/15/23 0327)    Daily Weight  11/23/23 : 127 kg (280 lb 11 oz)    Image Results  CT chest wo IV contrast  Narrative: Interpreted By:  Gunner Russo,   STUDY:  CT CHEST WO IV CONTRAST;  11/21/2023 8:15 pm      INDICATION:  Signs/Symptoms:worsening hypoxia; recent PE s/p thrombectomy.      COMPARISON:  CT pulmonary angiography dated 11/15/2023      ACCESSION NUMBER(S):  [id number]      ORDERING CLINICIAN:  BROOK GOOD      TECHNIQUE:  Helical CT imaging of the chest was performed without intravenous  contrast.      FINDINGS:          CHEST WALL, LOWER NECK AND VISUALIZED THYROID: Unremarkable.      MEDIASTINUM, AXILLA AND GISSELL: No axillary, mediastinal or definite  hilar adenopathy by imaging size criteria.  Note that the evaluation  for hilar adenopathy is limited due to the lack of intravenous  contrast.      HEART: The heart size is normal and there is no pericardial effusion.      VESSELS: The unenhanced major vessels are grossly unremarkable.      ESOPHAGUS: Large hiatal hernia      LUNG, PLEURA, AND LARGE AIRWAYS: Scattered left lower lobe  infiltrates/consolidations grossly unchanged from prior.  No new major  consolidation or pulmonary infiltrates. Right lower lobe scattered  atelectasis. No right-sided pleural effusion or pneumothorax      UPPER ABDOMEN: Large hiatal hernia. Partially atrophic right kidney.      BONES: Multilevel degenerative spine changes and facet joint disease  along with mild central compression deformity of multiple thoracic  vertebrae as in prior.      Impression: 1. Redemonstrated left lower lobe scattered  consolidation/infiltrates. No new major pulmonary consolidation,  pleural effusion or pneumothorax.  2. Large hiatal hernia as in prior.      Signed by: Gunner Russo 11/22/2023 1:48 PM  Dictation workstation:   [id number]  Review of systems she is still dyspneic went back up to 40% on Airvo no fever    Physical Exam  Lungs are diminished heart has regular rate and rhythm abdomen soft nontender  Relevant Results               Assessment/Plan   This patient currently has cardiac telemetry ordered; if you would like to modify or discontinue the telemetry order, click here to go to the orders activity to modify/discontinue the order.              Principal Problem:    Acute saddle pulmonary embolism, unspecified whether acute cor pulmonale present (CMS/HCC)    So far oxygenate with Airvo maintain that she is at 40% FiO2 or not considering at this time a CARLOS or anything else for now she will need to be optimized before she gets any further studies completed continue anticoagulation maintain her oxygen sats monitor her renal insufficiency repeat her counts and she will have to go to LTAC I will make arrangements on Monday we will get the teams back and thank you              Sage Alba,

## 2023-11-24 NOTE — CONSULTS
Nutrition Note  Reason for Assessment  Reason for Assessment: Length of stay      Recommendation(s):  Recommend changing diet to low sodium due to her fluid issue hx. continue magic cup X 2 to be sure she meets her needs        Assessment    Subjective Data  Food and Nutrient History: Pt feels she is doing real well with her food        Objective Data  Per Flowsheet Percent Meal intake: 100  Dietary Orders (From admission, onward)       Start     Ordered    11/20/23 1732  Adult diet Regular  Diet effective now        Question:  Diet type  Answer:  Regular    11/20/23 1731    11/19/23 1309  Oral nutritional supplements  Until discontinued        Question Answer Comment   Deliver with All meals    Select supplement: Magic Cup        11/19/23 1308                  Independent after set-up  Results from last 7 days   Lab Units 11/24/23  0546 11/23/23  0411 11/22/23  0548 11/20/23  0515 11/19/23  0522 11/18/23  0316   GLUCOSE mg/dL 112* 104* 104* 94 106* 101*   SODIUM mmol/L 142 139 142 144 142 140   POTASSIUM mmol/L 3.5 3.7 3.9 4.0 4.5 4.7   CHLORIDE mmol/L 99 97* 100 105 107 108*   CO2 mmol/L 35* 33* 35* 32 29 25   BUN mg/dL 21 20 15 11 14 19   CREATININE mg/dL 1.85* 1.90* 1.85* 1.56* 1.55* 1.61*   EGFR mL/min/1.73m*2 27* 26* 27* 33* 34* 32*   CALCIUM mg/dL 8.3* 8.4* 8.7 8.7 8.7 8.6   PHOSPHORUS mg/dL 3.3 3.1 3.1 3.0 3.2 3.3   MAGNESIUM mg/dL  --   --   --  1.70 1.95 2.02     Lab Results   Component Value Date    HGBA1C 6.1 (H) 11/15/2023     Results from last 7 days   Lab Units 11/24/23  1634 11/24/23  1148 11/24/23  0613 11/23/23 2023 11/23/23  1533 11/23/23  1119 11/23/23  0558 11/22/23  1941   POCT GLUCOSE mg/dL 151* 111* 113* 116* 123* 130* 107* 158*     GI per flowsheet:  Gastrointestinal  Gastrointestinal (WDL): Within Defined Limits  Abdomen Inspection: Soft, Rounded  Abdominal Tenderness: Soft, No guarding, Nontender  Bowel Sounds: All quadrants  Bowel Sounds (All Quadrants): Active  Passing Flatus: Yes  Last  "BM Date: 11/22/23  Last bowel movement documented: 11/22/23  PMH: CVA; confusion aphasia; acute hypoxia; PE; CHF: HTN; CKD: hypothyroid; BLAYNE: asthma; GERD; OA; chronic lymphedema   Allergies: Morphine     Anthropometrics:  Height: 167 cm (5' 5.75\")  Weight: 127 kg (280 lb 10.3 oz)  BMI (Calculated): 45.65  IBW: 59 kg  %IBW: 215 %            Estimated Nutritional Needs:  Method for Estimating Needs: 6643-4311   26-30 opal kg of IBW    Method for Estimating Needs: 59-71  1-1.2 gm kg of IBW    Method for Estimating Needs: 5437-7864     20-30 ml kg of IBW  as medically indicated    Nutrition Focused Physical Findings:   Orbital Fat Pads: Well nourshed (slightly bulging fat pads)  Buccal Fat Pads: Well nourished (full, rounded cheeks)  Triceps: Well nourished (ample fat tissue)    Temporalis: Well nourished (well-defined muscle)  Pectoralis (Clavicular Region): Well nourished (clavicle not visible)  Deltoid/Trapezius: Well nourished (rounded appearance at arm, shoulder, neck)  Interosseous: Well nourished (muscle bulges)    Edema  Edema Location: chronic lymphedema       Pain Score: 0 - No pain     Nutrition Diagnosis        Patient has Nutrition Diagnosis: Yes  Ongoing  Nutrition Diagnosis 1: Obese  Related to (1): excessive energy intake and physical inactivity  As Evidenced by (1): obesity grade III    BMI  45.7           Plan    Interventions        Individualized Nutrition Prescription Provided for : Recommend changing diet to low sodium due to her fluid issue hx.  continue magic cup X 2 to be sure she meets her needs    Nutrition Monitoring and Evaluation   Biochemical Data, Medical Tests and Procedures  Monitoring and Evaluation Plan: Electrolyte/renal panel, Glucose/endocrine profile  Food/Nutrient Related History Monitoring  Monitoring and Evaluation Plan: Energy intake, Fluid intake, Amount of food    Progress towards goals: Met    Education Documentation  No documentation found.          Time Spent (min): 45 " minutes  Last Date of Nutrition Visit: 11/24/23  Nutrition Follow-Up Needed?: 3-5 days  Follow up Comment: 11/29    MZ

## 2023-11-24 NOTE — PROGRESS NOTES
"  Lillian Mendoza is a 83 y.o. female on day 9 of admission presenting with Acute saddle pulmonary embolism, unspecified whether acute cor pulmonale present (CMS/[facility]).      Assessment / Plan        #Acute hypoxic respiratory failure   #Emphysema  #Blood clots secondary to thrombectomy  -Patient's acute hypoxic respiratory failure is likely multifactorial due to atelectasis, clots throughout all lobes/distal main pulmonary artery, and element of CHF   Plan:  -Continue with Lasix  -Continue with scheduled and as needed DuoNebs  -Continue with warfarin and heparin infusion for clots wean Airvo as tolerated     Worsening bicarb, possibly secondary to contraction alkalosis versus atelectasis induced  Plan:  -Patient's bicarb continues to be elevated, ordered 250 Diamox every 8 hours for 6 doses, recheck bicarb   -Patient's gabapentin was further decreased  -Patient has been using spirometer       Jimmie Deleon MD   PGY-1, Internal Medicine  This is a preliminary note, please await attending attestation for final A/P    Subjective     Patient seen and examined. No acute overnight events.  Patient's Airvo has been weaned to 40 L at 42% from 50 L.  Patient reports she is feeling better.  Does not endorse any shortness of breath, cough, fever, sore throat or other new symptoms.      Objective       Physical Exam:  General:  Pleasant and cooperative. No apparent distress.  HEENT:  Normocephalic, atraumatic  Chest:  Clear to auscultation bilaterally. No wheezes, rales, or rhonchi.  CV:  Regular rate and rhythm. No murmurs    Abdomen: Abdomen is soft, non-tender, non-distended. BS +   Extremities:  No lower extremity edema or cyanosis.   Neurological:  AAOx3. No focal deficits.  Skin:  Warm and dry.    Last Recorded Vitals  Blood pressure 116/56, pulse 53, temperature 36 °C (96.8 °F), temperature source Temporal, resp. rate 20, height 1.67 m (5' 5.75\"), weight 127 kg (280 lb 11 oz), SpO2 96 %.  Intake/Output last 3 " Shifts:  I/O last 3 completed shifts:  In: 561 (4.4 mL/kg) [I.V.:461 (3.6 mL/kg); IV Piggyback:100]  Out: 2400 (18.9 mL/kg) [Urine:2400 (0.5 mL/kg/hr)]  Weight: 127.3 kg     Last CBC & BMP  Lab Results   Component Value Date    GLUCOSE 112 (H) 11/24/2023    CALCIUM 8.3 (L) 11/24/2023     11/24/2023    K 3.5 11/24/2023    CO2 35 (H) 11/24/2023    CL 99 11/24/2023    BUN 21 11/24/2023    CREATININE 1.85 (H) 11/24/2023     Lab Results   Component Value Date    WBC 8.4 11/23/2023    HGB 10.1 (L) 11/23/2023    HCT 32.6 (L) 11/23/2023    MCV 98 11/23/2023     11/23/2023

## 2023-11-25 LAB
25(OH)D3 SERPL-MCNC: 49 NG/ML (ref 30–100)
ALBUMIN SERPL BCP-MCNC: 2.8 G/DL (ref 3.4–5)
ANION GAP BLDA CALCULATED.4IONS-SCNC: 2 MMO/L (ref 10–25)
ANION GAP SERPL CALC-SCNC: 14 MMOL/L (ref 10–20)
APPARATUS: ABNORMAL
ARTERIAL PATENCY WRIST A: POSITIVE
BASE EXCESS BLDA CALC-SCNC: 13.1 MMOL/L (ref -2–3)
BODY TEMPERATURE: 37 DEGREES CELSIUS
BUN SERPL-MCNC: 21 MG/DL (ref 6–23)
CA-I BLDA-SCNC: 1.19 MMOL/L (ref 1.1–1.33)
CALCIUM SERPL-MCNC: 8.6 MG/DL (ref 8.6–10.3)
CHLORIDE BLDA-SCNC: 101 MMOL/L (ref 98–107)
CHLORIDE SERPL-SCNC: 100 MMOL/L (ref 98–107)
CO2 SERPL-SCNC: 33 MMOL/L (ref 21–32)
CREAT SERPL-MCNC: 1.89 MG/DL (ref 0.5–1.05)
GFR SERPL CREATININE-BSD FRML MDRD: 26 ML/MIN/1.73M*2
GLUCOSE BLD MANUAL STRIP-MCNC: 112 MG/DL (ref 74–99)
GLUCOSE BLD MANUAL STRIP-MCNC: 113 MG/DL (ref 74–99)
GLUCOSE BLD MANUAL STRIP-MCNC: 124 MG/DL (ref 74–99)
GLUCOSE BLD MANUAL STRIP-MCNC: 98 MG/DL (ref 74–99)
GLUCOSE BLDA-MCNC: 117 MG/DL (ref 74–99)
GLUCOSE SERPL-MCNC: 99 MG/DL (ref 74–99)
HCO3 BLDA-SCNC: 38.1 MMOL/L (ref 22–26)
HCT VFR BLD EST: 32 % (ref 36–46)
HGB BLDA-MCNC: 10.5 G/DL (ref 12–16)
HOLD SPECIMEN: NORMAL
INHALED O2 CONCENTRATION: 36 %
LACTATE BLDA-SCNC: 1.3 MMOL/L (ref 0.4–2)
OXYHGB MFR BLDA: 92.5 % (ref 94–98)
PCO2 BLDA: 50 MM HG (ref 38–42)
PH BLDA: 7.49 PH (ref 7.38–7.42)
PHOSPHATE SERPL-MCNC: 4.1 MG/DL (ref 2.5–4.9)
PO2 BLDA: 65 MM HG (ref 85–95)
POTASSIUM BLDA-SCNC: 3.6 MMOL/L (ref 3.5–5.3)
POTASSIUM SERPL-SCNC: 3.7 MMOL/L (ref 3.5–5.3)
SAO2 % BLDA: 95 % (ref 94–100)
SODIUM BLDA-SCNC: 137 MMOL/L (ref 136–145)
SODIUM SERPL-SCNC: 143 MMOL/L (ref 136–145)
UFH PPP CHRO-ACNC: 0.5 IU/ML

## 2023-11-25 PROCEDURE — 2500000004 HC RX 250 GENERAL PHARMACY W/ HCPCS (ALT 636 FOR OP/ED): Performed by: INTERNAL MEDICINE

## 2023-11-25 PROCEDURE — 2060000001 HC INTERMEDIATE ICU ROOM DAILY

## 2023-11-25 PROCEDURE — 2500000001 HC RX 250 WO HCPCS SELF ADMINISTERED DRUGS (ALT 637 FOR MEDICARE OP): Performed by: NURSE PRACTITIONER

## 2023-11-25 PROCEDURE — 82947 ASSAY GLUCOSE BLOOD QUANT: CPT | Performed by: INTERNAL MEDICINE

## 2023-11-25 PROCEDURE — 82805 BLOOD GASES W/O2 SATURATION: CPT | Performed by: INTERNAL MEDICINE

## 2023-11-25 PROCEDURE — 2500000001 HC RX 250 WO HCPCS SELF ADMINISTERED DRUGS (ALT 637 FOR MEDICARE OP): Performed by: INTERNAL MEDICINE

## 2023-11-25 PROCEDURE — 82947 ASSAY GLUCOSE BLOOD QUANT: CPT

## 2023-11-25 PROCEDURE — 2500000004 HC RX 250 GENERAL PHARMACY W/ HCPCS (ALT 636 FOR OP/ED): Performed by: NURSE PRACTITIONER

## 2023-11-25 PROCEDURE — 2500000002 HC RX 250 W HCPCS SELF ADMINISTERED DRUGS (ALT 637 FOR MEDICARE OP, ALT 636 FOR OP/ED): Performed by: NURSE PRACTITIONER

## 2023-11-25 PROCEDURE — 36415 COLL VENOUS BLD VENIPUNCTURE: CPT | Mod: PARLAB | Performed by: INTERNAL MEDICINE

## 2023-11-25 PROCEDURE — 2500000005 HC RX 250 GENERAL PHARMACY W/O HCPCS: Performed by: INTERNAL MEDICINE

## 2023-11-25 PROCEDURE — 82306 VITAMIN D 25 HYDROXY: CPT | Mod: PARLAB | Performed by: INTERNAL MEDICINE

## 2023-11-25 PROCEDURE — 85520 HEPARIN ASSAY: CPT | Performed by: INTERNAL MEDICINE

## 2023-11-25 PROCEDURE — 94640 AIRWAY INHALATION TREATMENT: CPT

## 2023-11-25 PROCEDURE — 80069 RENAL FUNCTION PANEL: CPT | Performed by: INTERNAL MEDICINE

## 2023-11-25 PROCEDURE — 36600 WITHDRAWAL OF ARTERIAL BLOOD: CPT

## 2023-11-25 PROCEDURE — 36415 COLL VENOUS BLD VENIPUNCTURE: CPT | Performed by: INTERNAL MEDICINE

## 2023-11-25 RX ORDER — AMOXICILLIN AND CLAVULANATE POTASSIUM 875; 125 MG/1; MG/1
875 TABLET, FILM COATED ORAL EVERY 12 HOURS SCHEDULED
Status: DISCONTINUED | OUTPATIENT
Start: 2023-11-25 | End: 2023-11-28 | Stop reason: HOSPADM

## 2023-11-25 RX ADMIN — NYSTATIN 1 APPLICATION: 100000 POWDER TOPICAL at 08:57

## 2023-11-25 RX ADMIN — ATORVASTATIN CALCIUM 40 MG: 40 TABLET, FILM COATED ORAL at 21:05

## 2023-11-25 RX ADMIN — ACETAZOLAMIDE 250 MG: 500 INJECTION, POWDER, LYOPHILIZED, FOR SOLUTION INTRAVENOUS at 20:39

## 2023-11-25 RX ADMIN — GABAPENTIN 100 MG: 100 CAPSULE ORAL at 17:05

## 2023-11-25 RX ADMIN — AMOXICILLIN AND CLAVULANATE POTASSIUM 875 MG: 875; 125 TABLET, FILM COATED ORAL at 17:07

## 2023-11-25 RX ADMIN — GABAPENTIN 100 MG: 100 CAPSULE ORAL at 21:05

## 2023-11-25 RX ADMIN — Medication 4 L/MIN: at 12:52

## 2023-11-25 RX ADMIN — LEVOTHYROXINE SODIUM 150 MCG: 150 TABLET ORAL at 06:28

## 2023-11-25 RX ADMIN — FUROSEMIDE 40 MG: 10 INJECTION, SOLUTION INTRAMUSCULAR; INTRAVENOUS at 08:54

## 2023-11-25 RX ADMIN — ACETAZOLAMIDE 250 MG: 500 INJECTION, POWDER, LYOPHILIZED, FOR SOLUTION INTRAVENOUS at 17:05

## 2023-11-25 RX ADMIN — NYSTATIN 1 APPLICATION: 100000 POWDER TOPICAL at 21:05

## 2023-11-25 RX ADMIN — ASPIRIN 81 MG: 81 TABLET, COATED ORAL at 08:54

## 2023-11-25 RX ADMIN — IPRATROPIUM BROMIDE AND ALBUTEROL SULFATE 3 ML: 2.5; .5 SOLUTION RESPIRATORY (INHALATION) at 18:58

## 2023-11-25 RX ADMIN — AMOXICILLIN AND CLAVULANATE POTASSIUM 875 MG: 875; 125 TABLET, FILM COATED ORAL at 21:05

## 2023-11-25 RX ADMIN — GABAPENTIN 100 MG: 100 CAPSULE ORAL at 08:54

## 2023-11-25 RX ADMIN — IPRATROPIUM BROMIDE AND ALBUTEROL SULFATE 3 ML: 2.5; .5 SOLUTION RESPIRATORY (INHALATION) at 07:13

## 2023-11-25 RX ADMIN — PANTOPRAZOLE SODIUM 40 MG: 40 TABLET, DELAYED RELEASE ORAL at 06:28

## 2023-11-25 RX ADMIN — IPRATROPIUM BROMIDE AND ALBUTEROL SULFATE 3 ML: 2.5; .5 SOLUTION RESPIRATORY (INHALATION) at 12:52

## 2023-11-25 RX ADMIN — POLYETHYLENE GLYCOL 3350 17 G: 17 POWDER, FOR SOLUTION ORAL at 08:54

## 2023-11-25 RX ADMIN — ACETAZOLAMIDE 250 MG: 500 INJECTION, POWDER, LYOPHILIZED, FOR SOLUTION INTRAVENOUS at 04:22

## 2023-11-25 RX ADMIN — HEPARIN SODIUM 1600 UNITS/HR: 10000 INJECTION, SOLUTION INTRAVENOUS at 17:04

## 2023-11-25 ASSESSMENT — COGNITIVE AND FUNCTIONAL STATUS - GENERAL
MOVING FROM LYING ON BACK TO SITTING ON SIDE OF FLAT BED WITH BEDRAILS: A LOT
MOVING TO AND FROM BED TO CHAIR: A LOT
MOVING TO AND FROM BED TO CHAIR: A LOT
DRESSING REGULAR UPPER BODY CLOTHING: TOTAL
HELP NEEDED FOR BATHING: A LOT
MOVING FROM LYING ON BACK TO SITTING ON SIDE OF FLAT BED WITH BEDRAILS: A LOT
DAILY ACTIVITIY SCORE: 12
DRESSING REGULAR LOWER BODY CLOTHING: TOTAL
WALKING IN HOSPITAL ROOM: A LOT
TOILETING: TOTAL
CLIMB 3 TO 5 STEPS WITH RAILING: TOTAL
DRESSING REGULAR UPPER BODY CLOTHING: TOTAL
MOBILITY SCORE: 11
TURNING FROM BACK TO SIDE WHILE IN FLAT BAD: A LOT
MOBILITY SCORE: 11
HELP NEEDED FOR BATHING: A LOT
STANDING UP FROM CHAIR USING ARMS: A LOT
PERSONAL GROOMING: A LITTLE
DAILY ACTIVITIY SCORE: 12
STANDING UP FROM CHAIR USING ARMS: A LOT
PERSONAL GROOMING: A LITTLE
WALKING IN HOSPITAL ROOM: A LOT
TURNING FROM BACK TO SIDE WHILE IN FLAT BAD: A LOT
DRESSING REGULAR LOWER BODY CLOTHING: TOTAL
CLIMB 3 TO 5 STEPS WITH RAILING: TOTAL
TOILETING: TOTAL

## 2023-11-25 ASSESSMENT — PAIN - FUNCTIONAL ASSESSMENT
PAIN_FUNCTIONAL_ASSESSMENT: 0-10
PAIN_FUNCTIONAL_ASSESSMENT: 0-10

## 2023-11-25 ASSESSMENT — PAIN SCALES - GENERAL
PAINLEVEL_OUTOF10: 0 - NO PAIN
PAINLEVEL_OUTOF10: 0 - NO PAIN

## 2023-11-25 NOTE — PROGRESS NOTES
"Lillian Mendoza is a 83 y.o. female on day 10 of admission presenting with Acute saddle pulmonary embolism, unspecified whether acute cor pulmonale present (CMS/HCC).      Subjective     Patient's breathing remained stable while sitting.       Objective     Was started on acetazolamide for an elevated bicarbonate level.       Vitals 24HR  Heart Rate:  [53-97]   Temp:  [35.6 °C (96.1 °F)-36.6 °C (97.9 °F)]   Resp:  [20]   BP: (101-120)/(53-64)   Height:  [167 cm (5' 5.75\")]   Weight:  [127 kg (280 lb 10.3 oz)]   SpO2:  [93 %-97 %]       Intake/Output last 3 Shifts:    Intake/Output Summary (Last 24 hours) at 11/25/2023 0922  Last data filed at 11/25/2023 0902  Gross per 24 hour   Intake 361.33 ml   Output 750 ml   Net -388.67 ml       Physical Exam    Remains morbidly obese in bed.  Once again neck exam is limited.  Breath sounds are diminished anteriorly.  There is no cardiac rub.  Does have peripheral edema.    Assessment/Plan     Patient with persistently elevated creatinine.  Once again willing to accept some degree of azotemia to allow for euvolemia.  As long as creatinine is 2 or less can continue with diuretics.    Difficult to determine intravascular volume given body habitus.    Would prefer to have an ABG to treat alkalosis.  If this is compensatory metabolic alkalosis this is appropriate and treatment with acetazolamide will worsen acidosis.  Also need to exercise caution with low normal potassium levels.  Acetazolamide will worsen hypokalemia.  May need to transition to amiloride.    Fred Mobley MD      "

## 2023-11-25 NOTE — PROGRESS NOTES
Lillian Mendoza is a 83 y.o. female on day 10 of admission presenting with Acute saddle pulmonary embolism, unspecified whether acute cor pulmonale present (CMS/HCC).      Subjective   Patient fully evaluated on November 2016 clinically improved.       Objective     Last Recorded Vitals  /63   Pulse 93   Temp 35.7 °C (96.3 °F)   Resp 20   Wt 127 kg (280 lb 10.3 oz)   SpO2 92%   Intake/Output last 3 Shifts:    Intake/Output Summary (Last 24 hours) at 11/25/2023 1830  Last data filed at 11/25/2023 1704  Gross per 24 hour   Intake 418 ml   Output 750 ml   Net -332 ml       Admission Weight  Weight: 111 kg (244 lb 11.4 oz) (11/15/23 0327)    Daily Weight  11/24/23 : 127 kg (280 lb 10.3 oz)    Image Results  CT chest wo IV contrast  Narrative: Interpreted By:  Gunner Russo,   STUDY:  CT CHEST WO IV CONTRAST;  11/21/2023 8:15 pm      INDICATION:  Signs/Symptoms:worsening hypoxia; recent PE s/p thrombectomy.      COMPARISON:  CT pulmonary angiography dated 11/15/2023      ACCESSION NUMBER(S):  [id number]      ORDERING CLINICIAN:  BROOK GOOD      TECHNIQUE:  Helical CT imaging of the chest was performed without intravenous  contrast.      FINDINGS:          CHEST WALL, LOWER NECK AND VISUALIZED THYROID: Unremarkable.      MEDIASTINUM, AXILLA AND GISSELL: No axillary, mediastinal or definite  hilar adenopathy by imaging size criteria.  Note that the evaluation  for hilar adenopathy is limited due to the lack of intravenous  contrast.      HEART: The heart size is normal and there is no pericardial effusion.      VESSELS: The unenhanced major vessels are grossly unremarkable.      ESOPHAGUS: Large hiatal hernia      LUNG, PLEURA, AND LARGE AIRWAYS: Scattered left lower lobe  infiltrates/consolidations grossly unchanged from prior. No new major  consolidation or pulmonary infiltrates. Right lower lobe scattered  atelectasis. No right-sided pleural effusion or pneumothorax      UPPER ABDOMEN: Large hiatal  hernia. Partially atrophic right kidney.      BONES: Multilevel degenerative spine changes and facet joint disease  along with mild central compression deformity of multiple thoracic  vertebrae as in prior.      Impression: 1. Redemonstrated left lower lobe scattered  consolidation/infiltrates. No new major pulmonary consolidation,  pleural effusion or pneumothorax.  2. Large hiatal hernia as in prior.      Signed by: Gunner Russo 11/22/2023 1:48 PM  Dictation workstation:   [id number]      Physical Exam    Relevant Results               Assessment/Plan   This patient currently has cardiac telemetry ordered; if you would like to modify or discontinue the telemetry order, click here to go to the orders activity to modify/discontinue the order.              Principal Problem:    Acute saddle pulmonary embolism, unspecified whether acute cor pulmonale present (CMS/[facility])            Anatoliy Kumar DO   Resident  Medical Critical Care     H&P      Attested     Date of Service: 11/15/2023 12:41 PM     Attested       Expand All Collapse All       Subjective   Admission History:  Lillian Mendoza is a 83 y.o. female who presents for Stroke (Pt found by granddaughter sitting on toilet slumped over. Pt with facial droop and aphasia. Last known well 0200.).     HPI  This is an 83-year-old female who initially presented from home to [facility] on 11/15/23 with confusion, left facial droop, and dysarthria at 2 AM this morning.  Per granddaughter, patient was in the bathroom at 2AM when she had called over her granddaughter for help.  It was at that time that her granddaughter noticed the facial droop and dysarthria.  Before this, patient had been complaining of right hand numbness 1 day prior.  However, family did not think much of this as patient has had intermittent right hand numbness from a prior surgery.     Upon arrival, patient is hemodynamically stable although hypoxic with SpO2 81% on NRB.  NIHSS 9.  ABG showed pH 7.39/PCO2  40/PO2 53 on NRB after which she was placed on Airvo.  Initial work-up notable for EMILI on CKD with creatinine 2.58.  This is compared to her baseline of ~1.3.  She has elevated BNP 1683 and troponin 1272.  EKG without acute ischemic changes.  CT brain attack head negative for ICH but did show acute to subacute ischemic CVA involving the inferior posterior left temporal lobe.  Case had been discussed with on-call cardiology and neurology due to concern for NSTEMI and CVA, respectively, from the ED after which patient was given ASA and started on heparin drip.  She was deemed not a TNK candidate given development of her right hand numbness beginning the day prior.  CTA head/neck negative for LVO but did incidentally show acute bilateral PE.  CTA chest was subsequently obtained which confirmed extensive bilateral PE involving all lobes with large embolus in the distal aspect of right main pulmonary artery.  Moderate to severe embolic burden with moderate to severe right heart strain noted.  PERT team was activated.  Patient was urgently taken for thrombectomy and then admitted the ICU for further evaluation and management.     Past Medical History:   History of remote PE in setting of right TKR in ~2008, previously on Coumadin  Chronic HFpEF with EF 55 to 60% as of 9/2020  Hypertension  Hyperlipidemia  CKD III  Hypothyroidism  BLAYNE on O2 nightly  Moderate persistent asthma  Chronic allergic rhinitis  GERD  RLS  Chronic lymphedema  Osteoarthritis     Past Surgical History: Cataract extraction, cholecystectomy, hernia repair, hysterectomy, right knee replacement, right breast lumpectomy  Family History: HTN, DM, CAD, COPD, asthma  Allergies: see above  Social history: Denies tobacco, alcohol, or illicit drug use.     Current Medications: see above     Review of Systems:  12-point ROS was reviewed and is negative, unless otherwise noted in HPI           Objective   Vital Signs: Reviewed  Input/Output: Reviewed  Oxygen  requirements: Reviewed  Ventilator Information: Reviewed      Physical Exam:   Constitutional: awake, alert  ENMT: mucous membranes moist, EOMI, conjunctivae clear  Head/Neck: normocephalic, atraumatic; supple, trachea midline  Respiratory/Thorax: diminished breath sounds bilaterally but otherwise clear, Airvo with NRB in place  Cardiovascular: tachycardic, regular rhythm, no murmur appreciated  Gastrointestinal: soft, nondistended, non-tender, bowel sounds appreciated  Extremities: palpable peripheral pulses, BLE with nonpitting edema  Neurological: AO x3, CN II-XII grossly intact, finger-to-nose intact, sensation grossly intact throughout; motor strength RUE 4/5, LUE 5/5, RLE 2/5, LLE 3/5; no facial droop, no dysarthria or aphasia  Psychological: appropriate mood and behavior  Skin: warm and dry     Scheduled Medications:   aspirin, 324 mg, oral, Once  [START ON 11/16/2023] aspirin, 81 mg, oral, Daily  atorvastatin, 40 mg, oral, Nightly  iohexol, 75 mL, intravenous, Once in imaging  levothyroxine, 150 mcg, oral, Daily  [START ON 11/16/2023] pantoprazole, 40 mg, oral, Daily before breakfast  perflutren lipid microspheres, 0.5-10 mL of dilution, intravenous, Once in imaging  perflutren protein A microsphere, 0.5 mL, intravenous, Once in imaging  polyethylene glycol, 17 g, oral, Daily  sulfur hexafluoride microsphr, 2 mL, intravenous, Once in imaging     Continuous Medications:   heparin, 0-4,500 Units/hr, Last Rate: Stopped (11/15/23 1031)     PRN Medications:   PRN medications: heparin, hydrALAZINE **FOLLOWED BY** [START ON 11/17/2023] hydrALAZINE, labetaloL, oxygen, oxygen           Assessment/Plan   This is an 83-year-old female, with history of remote PE in setting of right TKR, chronic HFpEF with EF 55 to 60% as of 9/2020, HTN, HLD, CKD 3, hypothyroidism, BLAYNE on O2 nightly, moderate persistent asthma, chronic allergic rhinitis, and chronic lymphedema, who initially presented from home to [facility] on 11/15/23 with  confusion, left facial droop, and dysarthria at 2 AM that morning.  Patient was out of TNK window as she had had right hand numbness the day prior.  She was found to have acute to subacute ischemic CVA involving inferior posterior left temporal lobe.  CTA head/neck incidentally found acute bilateral PE.  Subsequent CTA chest confirmed extensive bilateral PE involving all lobes with large embolus in the distal aspect of right main pulmonary artery.  Moderate to severe embolic burden with moderate to severe right heart strain noted.  PERT team was activated.  Patient was urgently taken for thrombectomy and then admitted the ICU for further evaluation and management.     Neurological:  Acute/subacute ischemic CVA, left temporal lobe  Restless legs syndrome  Stroke team called on evening of admission due to redemonstration of left facial droop. Repeat CT head (-) ICH.  - Neurochecks q4h  - Defer MRI brain until 11/16 pending respiratory status  - Avoid excess caths & drains, sedating meds  - Monitor for ICU delirium     Cardiovascular:  Acute submassive bilateral PE s/p thrombectomy on 11/15/23  Acute occlusive proximal BLE DVT   Chronic HFpEF  Hypertension  Hyperlipidemia  TTE in 9/2020 shows EF 55 to 60%  - Continue Heparin ggt  - Hemodynamically stable, not requiring pressors. Judicious use of IVF given RV overload. Maintain MAP>65.  - TTE with bubble study pending  - Interventional cardiology following.  - Vascular surgery consulted for evaluation for IVC filter in setting of proximal BLE DVTs.     Respiratory:  Acute hypoxemic respiratory failure secondary to acute submassive bilateral PE  History of remote PE in setting of right TKA in ~2008, previously on Coumadin  BLAYNE on O2 QHS  Moderate persistent asthma  Chronic allergic rhinitis  Not on continuous O2 at baseline.  Repeat ABG 7.36/42/111 on max Airvo.  - Wean FiO2 as tolerated with goal SpO2>92%.  - Aspiration precautions     Gastrointestinal:  Hepatic  steatosis  GERD  - Diet: NPO  - PPX: Protonix     Endocrine:  Hypothyroidism  No hx DM.  - Accuchecks q6h.  - Hold home Levothyroxine while NPO for now.     Renal:  EMILI on CKD III  Lactic acidosis  Right nephrolithiasis, nonobstructing  Baseline SCr ~1.2-1.3  - IVF: LR @ 75cc/hr  - Trend RFP, lactate. Avoid nephrotoxins. Monitor I&Os.  - Monitor & replete electrolytes PRN  - Nephrology consulted     Hematological:  - Trend CBC. Monitor for s/s bleeding while on Heparin ggt.     Infection Disease:  No acute issues.     Skin/MSK:  Chronic lymphedema  Osteoarthritis  No acute issues.     Vent/O2: Airvo 60/100 + NRB  Lines/Devices: PIVs  Drips: Heparin  ATBx: -  Diet: NPO given high risk of aspiration while on Airvo 60L/min.  IVF: LR @ 75cc/hr  PPX: Heparin ggt, Protonix  Code: FULL - Discussed with patient who was accompanied by granddaughter, Cassandra. Patient requesting more time to decide.  Dispo: ICU     This is a preliminary note written by the resident. Please wait for attending addendum for finalization of note and recommendations.     Anatoliy Kumar DO  Internal Medicine PGY3                  Attestation signed by Chris Moya MD at 11/16/2023  8:20 AM     Brief Attending Summary: Patient seen and evaluated with acute respiratory failure secondary to pulmonary embolism as well as acute stroke.  Patient with bilateral saddle pulmonary emboli.  Patient to undergo inari procedure today.  Will be maintained on heparin drip with close neurochecks for hemorrhagic conversion potential.  On air Vo nasal cannula requiring 60 L 60% oxygen prior to procedure to maintain saturation above 90%.     I have reviewed and evaluated the most recent data and results, personally examined the patient, and formulated the plan of care as presented above. This patient was critically ill and required continued critical care treatment. Teaching and any separately billable procedures are not included in the time calculation.     Billing  Provider Critical Care Time: 44 minutes                  Cosigned by: Chris Moya MD at 11/16/2023  8:20 AM     Revision History    Patient fully evaluated on November 25.  Transition to oral antibiotics.  Patient's Rocephin to be discontinued and started on Augmentin for E. coli UTI.  Respiratory status is improved.  Ambulate patient.            Portillo Whiting MD

## 2023-11-26 LAB
ALBUMIN SERPL BCP-MCNC: 3 G/DL (ref 3.4–5)
ALP SERPL-CCNC: 52 U/L (ref 33–136)
ALT SERPL W P-5'-P-CCNC: 8 U/L (ref 7–45)
ANION GAP SERPL CALC-SCNC: 11 MMOL/L (ref 10–20)
AST SERPL W P-5'-P-CCNC: 16 U/L (ref 9–39)
BILIRUB SERPL-MCNC: 0.6 MG/DL (ref 0–1.2)
BUN SERPL-MCNC: 25 MG/DL (ref 6–23)
CALCIUM SERPL-MCNC: 9.1 MG/DL (ref 8.6–10.3)
CHLORIDE SERPL-SCNC: 102 MMOL/L (ref 98–107)
CO2 SERPL-SCNC: 32 MMOL/L (ref 21–32)
CREAT SERPL-MCNC: 1.81 MG/DL (ref 0.5–1.05)
ERYTHROCYTE [DISTWIDTH] IN BLOOD BY AUTOMATED COUNT: 15.9 % (ref 11.5–14.5)
GFR SERPL CREATININE-BSD FRML MDRD: 28 ML/MIN/1.73M*2
GLUCOSE BLD MANUAL STRIP-MCNC: 109 MG/DL (ref 74–99)
GLUCOSE BLD MANUAL STRIP-MCNC: 112 MG/DL (ref 74–99)
GLUCOSE BLD MANUAL STRIP-MCNC: 119 MG/DL (ref 74–99)
GLUCOSE BLD MANUAL STRIP-MCNC: 126 MG/DL (ref 74–99)
GLUCOSE SERPL-MCNC: 100 MG/DL (ref 74–99)
HCT VFR BLD AUTO: 33.2 % (ref 36–46)
HGB BLD-MCNC: 10.4 G/DL (ref 12–16)
HOLD SPECIMEN: NORMAL
HOLD SPECIMEN: NORMAL
MCH RBC QN AUTO: 31.1 PG (ref 26–34)
MCHC RBC AUTO-ENTMCNC: 31.3 G/DL (ref 32–36)
MCV RBC AUTO: 99 FL (ref 80–100)
NRBC BLD-RTO: 0 /100 WBCS (ref 0–0)
PHOSPHATE SERPL-MCNC: 4 MG/DL (ref 2.5–4.9)
PLATELET # BLD AUTO: 360 X10*3/UL (ref 150–450)
POTASSIUM SERPL-SCNC: 3.8 MMOL/L (ref 3.5–5.3)
PROT SERPL-MCNC: 6.2 G/DL (ref 6.4–8.2)
RBC # BLD AUTO: 3.34 X10*6/UL (ref 4–5.2)
SODIUM SERPL-SCNC: 141 MMOL/L (ref 136–145)
UFH PPP CHRO-ACNC: 0.6 IU/ML
WBC # BLD AUTO: 9.8 X10*3/UL (ref 4.4–11.3)

## 2023-11-26 PROCEDURE — 80053 COMPREHEN METABOLIC PANEL: CPT | Performed by: INTERNAL MEDICINE

## 2023-11-26 PROCEDURE — 2500000004 HC RX 250 GENERAL PHARMACY W/ HCPCS (ALT 636 FOR OP/ED): Performed by: INTERNAL MEDICINE

## 2023-11-26 PROCEDURE — 85027 COMPLETE CBC AUTOMATED: CPT | Performed by: INTERNAL MEDICINE

## 2023-11-26 PROCEDURE — 97530 THERAPEUTIC ACTIVITIES: CPT | Mod: CQ,GP

## 2023-11-26 PROCEDURE — 94640 AIRWAY INHALATION TREATMENT: CPT

## 2023-11-26 PROCEDURE — 84100 ASSAY OF PHOSPHORUS: CPT | Performed by: INTERNAL MEDICINE

## 2023-11-26 PROCEDURE — 82947 ASSAY GLUCOSE BLOOD QUANT: CPT

## 2023-11-26 PROCEDURE — 2500000001 HC RX 250 WO HCPCS SELF ADMINISTERED DRUGS (ALT 637 FOR MEDICARE OP): Performed by: INTERNAL MEDICINE

## 2023-11-26 PROCEDURE — 36415 COLL VENOUS BLD VENIPUNCTURE: CPT | Performed by: INTERNAL MEDICINE

## 2023-11-26 PROCEDURE — 2500000001 HC RX 250 WO HCPCS SELF ADMINISTERED DRUGS (ALT 637 FOR MEDICARE OP): Performed by: NURSE PRACTITIONER

## 2023-11-26 PROCEDURE — 85520 HEPARIN ASSAY: CPT | Performed by: INTERNAL MEDICINE

## 2023-11-26 PROCEDURE — 97110 THERAPEUTIC EXERCISES: CPT | Mod: CQ,GP

## 2023-11-26 PROCEDURE — 2060000001 HC INTERMEDIATE ICU ROOM DAILY

## 2023-11-26 PROCEDURE — 2500000002 HC RX 250 W HCPCS SELF ADMINISTERED DRUGS (ALT 637 FOR MEDICARE OP, ALT 636 FOR OP/ED): Performed by: NURSE PRACTITIONER

## 2023-11-26 PROCEDURE — 2500000004 HC RX 250 GENERAL PHARMACY W/ HCPCS (ALT 636 FOR OP/ED): Performed by: NURSE PRACTITIONER

## 2023-11-26 RX ORDER — AMILORIDE HYDROCHLORIDE 5 MG/1
5 TABLET ORAL DAILY
Status: DISCONTINUED | OUTPATIENT
Start: 2023-11-26 | End: 2023-11-28 | Stop reason: HOSPADM

## 2023-11-26 RX ORDER — TORSEMIDE 20 MG/1
20 TABLET ORAL DAILY
Status: DISCONTINUED | OUTPATIENT
Start: 2023-11-26 | End: 2023-11-28 | Stop reason: HOSPADM

## 2023-11-26 RX ADMIN — IPRATROPIUM BROMIDE AND ALBUTEROL SULFATE 3 ML: 2.5; .5 SOLUTION RESPIRATORY (INHALATION) at 19:08

## 2023-11-26 RX ADMIN — AMOXICILLIN AND CLAVULANATE POTASSIUM 875 MG: 875; 125 TABLET, FILM COATED ORAL at 08:51

## 2023-11-26 RX ADMIN — POLYETHYLENE GLYCOL 3350 17 G: 17 POWDER, FOR SOLUTION ORAL at 08:51

## 2023-11-26 RX ADMIN — ACETAZOLAMIDE 250 MG: 500 INJECTION, POWDER, LYOPHILIZED, FOR SOLUTION INTRAVENOUS at 04:17

## 2023-11-26 RX ADMIN — IPRATROPIUM BROMIDE AND ALBUTEROL SULFATE 3 ML: 2.5; .5 SOLUTION RESPIRATORY (INHALATION) at 07:38

## 2023-11-26 RX ADMIN — AMILORIDE HYDROCLORIDE 5 MG: 5 TABLET ORAL at 13:12

## 2023-11-26 RX ADMIN — PANTOPRAZOLE SODIUM 40 MG: 40 TABLET, DELAYED RELEASE ORAL at 06:36

## 2023-11-26 RX ADMIN — AMOXICILLIN AND CLAVULANATE POTASSIUM 875 MG: 875; 125 TABLET, FILM COATED ORAL at 20:28

## 2023-11-26 RX ADMIN — TORSEMIDE 20 MG: 20 TABLET ORAL at 11:27

## 2023-11-26 RX ADMIN — GABAPENTIN 100 MG: 100 CAPSULE ORAL at 13:12

## 2023-11-26 RX ADMIN — GABAPENTIN 100 MG: 100 CAPSULE ORAL at 20:28

## 2023-11-26 RX ADMIN — ATORVASTATIN CALCIUM 40 MG: 40 TABLET, FILM COATED ORAL at 20:28

## 2023-11-26 RX ADMIN — LEVOTHYROXINE SODIUM 150 MCG: 150 TABLET ORAL at 06:36

## 2023-11-26 RX ADMIN — GABAPENTIN 100 MG: 100 CAPSULE ORAL at 08:51

## 2023-11-26 RX ADMIN — NYSTATIN 1 APPLICATION: 100000 POWDER TOPICAL at 20:29

## 2023-11-26 RX ADMIN — ASPIRIN 81 MG: 81 TABLET, COATED ORAL at 08:51

## 2023-11-26 RX ADMIN — NYSTATIN 1 APPLICATION: 100000 POWDER TOPICAL at 08:52

## 2023-11-26 RX ADMIN — HEPARIN SODIUM 1600 UNITS/HR: 10000 INJECTION, SOLUTION INTRAVENOUS at 11:27

## 2023-11-26 RX ADMIN — IPRATROPIUM BROMIDE AND ALBUTEROL SULFATE 3 ML: 2.5; .5 SOLUTION RESPIRATORY (INHALATION) at 13:14

## 2023-11-26 RX ADMIN — FUROSEMIDE 40 MG: 10 INJECTION, SOLUTION INTRAMUSCULAR; INTRAVENOUS at 08:51

## 2023-11-26 ASSESSMENT — COGNITIVE AND FUNCTIONAL STATUS - GENERAL
DRESSING REGULAR LOWER BODY CLOTHING: TOTAL
STANDING UP FROM CHAIR USING ARMS: A LOT
MOVING FROM LYING ON BACK TO SITTING ON SIDE OF FLAT BED WITH BEDRAILS: A LOT
TURNING FROM BACK TO SIDE WHILE IN FLAT BAD: A LOT
MOVING FROM LYING ON BACK TO SITTING ON SIDE OF FLAT BED WITH BEDRAILS: A LOT
DRESSING REGULAR UPPER BODY CLOTHING: TOTAL
STANDING UP FROM CHAIR USING ARMS: A LOT
PERSONAL GROOMING: A LITTLE
HELP NEEDED FOR BATHING: A LOT
CLIMB 3 TO 5 STEPS WITH RAILING: TOTAL
WALKING IN HOSPITAL ROOM: A LOT
CLIMB 3 TO 5 STEPS WITH RAILING: TOTAL
WALKING IN HOSPITAL ROOM: A LOT
MOBILITY SCORE: 11
TURNING FROM BACK TO SIDE WHILE IN FLAT BAD: A LOT
MOBILITY SCORE: 11
MOVING TO AND FROM BED TO CHAIR: A LOT
DAILY ACTIVITIY SCORE: 12
MOVING TO AND FROM BED TO CHAIR: A LOT
TOILETING: TOTAL

## 2023-11-26 ASSESSMENT — PAIN SCALES - GENERAL
PAINLEVEL_OUTOF10: 0 - NO PAIN
PAINLEVEL_OUTOF10: 0 - NO PAIN

## 2023-11-26 NOTE — PROGRESS NOTES
Lillian Mendoza is a 83 y.o. female on day 11 of admission presenting with Acute saddle pulmonary embolism, unspecified whether acute cor pulmonale present (CMS/[facility]).      Subjective     Patient does not have significant discomfort or shortness of breath at rest.       Objective     ABG revealed a pH of 7.49, pCO2 of 50 and PaO2 of 65.       Vitals 24HR  Heart Rate:  [89-96]   Temp:  [34.8 °C (94.6 °F)-36.2 °C (97.2 °F)]   Resp:  [20]   BP: (108-130)/(59-77)   Weight:  [114 kg (252 lb 3.3 oz)]   SpO2:  [90 %-99 %]       Intake/Output last 3 Shifts:    Intake/Output Summary (Last 24 hours) at 11/26/2023 1016  Last data filed at 11/26/2023 0607  Gross per 24 hour   Intake 535 ml   Output 700 ml   Net -165 ml       Physical Exam    Patient is morbidly obese.  Once again body habitus limits exam.  No obvious rales.  There is peripheral edema.    Assessment/Plan     Patient with creatinine over baseline due to necessary diuretics.  I am okay with continue with diuretics unless creatinine goes above 2.    ABG is consistent with primary metabolic alkalosis in addition to respiratory acidosis.    Will switch to acetazolamide to amiloride 5 mg daily.  This should help with relative hypokalemia as well.    Can transition to oral diuretics torsemide 20 mg daily.      Fred Mobley MD

## 2023-11-26 NOTE — PROGRESS NOTES
Lillian Mendoza is a 83 y.o. female on day 11 of admission presenting with Acute saddle pulmonary embolism, unspecified whether acute cor pulmonale present (CMS/HCC).      Subjective   Seen today on lower oxygen off of Airvo has multiple PEs and DVTs bilaterally morbid obesity pneumonia hypoxic respiratory failure renal insufficiency chronic anemia no fever at this time blood pressure has been stable she is still severely compromised       Objective     Last Recorded Vitals  /62   Pulse 93   Temp 36.2 °C (97.2 °F)   Resp 20   Wt 114 kg (252 lb 3.3 oz)   SpO2 90%   Intake/Output last 3 Shifts:    Intake/Output Summary (Last 24 hours) at 11/26/2023 1004  Last data filed at 11/26/2023 0607  Gross per 24 hour   Intake 535 ml   Output 700 ml   Net -165 ml       Admission Weight  Weight: 111 kg (244 lb 11.4 oz) (11/15/23 0327)    Daily Weight  11/26/23 : 114 kg (252 lb 3.3 oz)    Image Results  CT chest wo IV contrast  Narrative: Interpreted By:  Gunner Russo,   STUDY:  CT CHEST WO IV CONTRAST;  11/21/2023 8:15 pm      INDICATION:  Signs/Symptoms:worsening hypoxia; recent PE s/p thrombectomy.      COMPARISON:  CT pulmonary angiography dated 11/15/2023      ACCESSION NUMBER(S):  [id number]      ORDERING CLINICIAN:  BROOK GOOD      TECHNIQUE:  Helical CT imaging of the chest was performed without intravenous  contrast.      FINDINGS:          CHEST WALL, LOWER NECK AND VISUALIZED THYROID: Unremarkable.      MEDIASTINUM, AXILLA AND GISSELL: No axillary, mediastinal or definite  hilar adenopathy by imaging size criteria.  Note that the evaluation  for hilar adenopathy is limited due to the lack of intravenous  contrast.      HEART: The heart size is normal and there is no pericardial effusion.      VESSELS: The unenhanced major vessels are grossly unremarkable.      ESOPHAGUS: Large hiatal hernia      LUNG, PLEURA, AND LARGE AIRWAYS: Scattered left lower lobe  infiltrates/consolidations grossly unchanged  from prior. No new major  consolidation or pulmonary infiltrates. Right lower lobe scattered  atelectasis. No right-sided pleural effusion or pneumothorax      UPPER ABDOMEN: Large hiatal hernia. Partially atrophic right kidney.      BONES: Multilevel degenerative spine changes and facet joint disease  along with mild central compression deformity of multiple thoracic  vertebrae as in prior.      Impression: 1. Redemonstrated left lower lobe scattered  consolidation/infiltrates. No new major pulmonary consolidation,  pleural effusion or pneumothorax.  2. Large hiatal hernia as in prior.      Signed by: Gunner Russo 11/22/2023 1:48 PM  Dictation workstation:   [id number]  ROS shortness of breath decreased severely exercise tolerance weakness    Physical Exam  Lungs are diminished heart has a regular rate and rhythm abdomen is soft is not distended or firm  Relevant Results               Assessment/Plan   This patient currently has cardiac telemetry ordered; if you would like to modify or discontinue the telemetry order, click here to go to the orders activity to modify/discontinue the order.              Principal Problem:    Acute saddle pulmonary embolism, unspecified whether acute cor pulmonale present (CMS/HCC)    Hypoxic respiratory failure with a saddle pulmonary embolism DVTs bilaterally morbid obesity continue to maintain the oxygen saturation monitor white count for any fever repeat the CBC and the electrolytes monitor the renal function closely last BUN and creatinine 21 and 1.9 replace potassium and mag as needed reevaluate her tomorrow am thinking she will probably go to LTAC              Sage Alba, DO

## 2023-11-26 NOTE — PROGRESS NOTES
"  Lillian Mendoza is a 83 y.o. female on day 10 of admission presenting with Acute saddle pulmonary embolism, unspecified whether acute cor pulmonale present (CMS/[facility]).      Assessment / Plan        #Acute hypoxic respiratory failure   #Emphysema  #S/P thrombectomy  -Patient's acute hypoxic respiratory failure is likely multifactorial due to atelectasis, clots throughout all lobes/distal main pulmonary artery, and element of CHF   Plan:  -Judicious diuresis   -Continue with scheduled and as needed DuoNebs  -Continue with heparin infusion for clots wean Fio2 as tolerated  -Bridge Coumadin  -     Worsening bicarb, possibly secondary to contraction alkalosis versus atelectasis induced  Plan:  -Patient's bicarb continues to be elevated, ordered 250 Diamox every 8 hours for 6 doses, recheck bicarb   -Patient's gabapentin was further decreased  -Patient has been using spirometer         Subjective     Patient seen and examined. No acute overnight events.  Patient's O2 has been weaned.  Patient reports she is feeling better.  Does not endorse any shortness of breath, cough, fever, sore throat or other new symptoms.      Objective       Physical Exam:  General:  Pleasant and cooperative. No apparent distress.  HEENT:  Normocephalic, atraumatic  Chest:  Clear to auscultation bilaterally. No wheezes, rales, or rhonchi.  CV:  Regular rate and rhythm. No murmurs    Abdomen: Abdomen is soft, non-tender, non-distended. BS +   Extremities:  No lower extremity edema or cyanosis.   Neurological:  AAOx3. No focal deficits.  Skin:  Warm and dry.    Last Recorded Vitals  Blood pressure 119/77, pulse 93, temperature 36 °C (96.8 °F), resp. rate 20, height 1.67 m (5' 5.75\"), weight 127 kg (280 lb 10.3 oz), SpO2 96 %.  Intake/Output last 3 Shifts:  I/O last 3 completed shifts:  In: 611.3 (4.8 mL/kg) [P.O.:168; I.V.:443.3 (3.5 mL/kg)]  Out: 750 (5.9 mL/kg) [Urine:750 (0.2 mL/kg/hr)]  Weight: 127.3 kg     Last CBC & BMP  Lab Results "   Component Value Date    GLUCOSE 99 11/25/2023    CALCIUM 8.6 11/25/2023     11/25/2023    K 3.7 11/25/2023    CO2 33 (H) 11/25/2023     11/25/2023    BUN 21 11/25/2023    CREATININE 1.89 (H) 11/25/2023     Lab Results   Component Value Date    WBC 8.4 11/23/2023    HGB 10.1 (L) 11/23/2023    HCT 32.6 (L) 11/23/2023    MCV 98 11/23/2023     11/23/2023

## 2023-11-26 NOTE — PROGRESS NOTES
Physical Therapy    Physical Therapy Treatment    Patient Name: Lillian Mendoza  MRN: [id number]  Today's Date: 11/26/2023  Time Calculation  Start Time: 1035  Stop Time: 1111  Time Calculation (min): 36 min       Assessment/Plan   PT Assessment  End of Session Patient Position:  (Patient seated in recliner chair at end of session. Call light and needs within reach. family present.)     PT Plan  Treatment/Interventions: Bed mobility, Strengthening  PT Plan: Skilled PT  PT Frequency: 5 times per week  PT Discharge Recommendations: High intensity level of continued care  PT Recommended Transfer Status: Assist x2  PT - OK to Discharge: Yes      General Visit Information:   PT  Visit  PT Received On: 11/26/23  General  Patient Position Received:  (Patient seated in chair on arrival.)  General Comment: Patient pleasant and participates as able. family present during session. Very conversational, required cues for redirection.    Subjective     Objective   Treatments:  Therapeutic Exercise  Therapeutic Exercise Performed: Yes  Therapeutic Exercise Activity 1: Patient completed seated therex, B LE: APs, hip flexion, LAQ, heel slides, hip abd/add all [id number]    Transfers  Transfer: Yes  Transfer 1  Trials/Comments 1: Patient completed x3 sit<>stand transfer with cues for hand placement and sequencing. All trials patient required Ney/ModA x1. Increased time and effort to perform. Prolonged seated rest between trials to manage fatiuge.    Outcome Measures:  [facility] Basic Mobility  Turning from your back to your side while in a flat bed without using bedrails: A lot  Moving from lying on your back to sitting on the side of a flat bed without using bedrails: A lot  Moving to and from bed to chair (including a wheelchair): A lot  Standing up from a chair using your arms (e.g. wheelchair or bedside chair): A lot  To walk in hospital room: A lot  Climbing 3-5 steps with railing: Total  Basic Mobility - Total Score: 11    Education  Documentation  Mobility Training, taught by Jamaica Dozier PTA at 11/26/2023  1:02 PM.  Learner: Patient  Readiness: Acceptance  Method: Explanation  Response: Verbalizes Understanding  Comment: Educated patient on transfer training techniques.    Body Mechanics, taught by Jamaica Dozier PTA at 11/26/2023  1:02 PM.  Learner: Patient  Readiness: Acceptance  Method: Explanation  Response: Verbalizes Understanding  Comment: Educated patient on transfer training techniques.    Precautions, taught by Jamaica Dozier PTA at 11/26/2023  1:02 PM.  Learner: Patient  Readiness: Acceptance  Method: Explanation  Response: Verbalizes Understanding  Comment: Educated patient on transfer training techniques.    ADL Training, taught by Jamaica Dozier PTA at 11/26/2023  1:02 PM.  Learner: Patient  Readiness: Acceptance  Method: Explanation  Response: Verbalizes Understanding  Comment: Educated patient on transfer training techniques.    Education Comments  No comments found.        OP EDUCATION:       Encounter Problems       Encounter Problems (Active)       PT Problem       bed mob (Progressing)       Start:  11/17/23    Expected End:  12/08/23       Min assist 1-2 bed mob         serrano (Progressing)       Start:  11/17/23    Expected End:  12/08/23       Min assist 1-2 serrano         gait (Progressing)       Start:  11/17/23    Expected End:  12/08/23       Amb 10 ft> w/ approp device/min assist 1-2         strengthening (Progressing)       Start:  11/17/23    Expected End:  12/08/23       10-30 reps LE'S            Pain - Adult

## 2023-11-26 NOTE — CARE PLAN
Problem: General Stroke  Goal: Maintain BP within ordered limits throughout shift  Outcome: Progressing  Goal: Participate in treatment (ie., meds, therapy) throughout shift  Outcome: Progressing  Goal: No symptoms of aspiration throughout shift  Outcome: Progressing  Goal: No symptoms of hemorrhage throughout shift  Outcome: Progressing  Goal: Tolerate enteral feeding throughout shift  Outcome: Progressing  Goal: Controlled blood glucose throughout shift  Outcome: Progressing  Goal: Out of bed three times today  Outcome: Progressing     Problem: ICU Stroke  Goal: Maintain patent airway throughout shift  Outcome: Progressing  Goal: Achieve/maintain targeted sodium level throughout shift  Outcome: Progressing     Problem: Nutrition  Goal: Oral intake greater than 50%  Outcome: Progressing  Goal: Oral intake greater 75%  Outcome: Progressing  Goal: Consume prescribed supplement  Outcome: Progressing  Goal: Adequate PO fluid intake  Outcome: Progressing  Goal: Nutrition support goals are met within 48 hrs  Outcome: Progressing  Goal: Nutrition support is meeting 75% of nutrient needs  Outcome: Progressing  Goal: BG  mg/dL  Outcome: Progressing  Goal: Lab values WNL  Outcome: Progressing  Goal: Electrolytes WNL  Outcome: Progressing  Goal: Promote healing  Outcome: Progressing  Goal: Maintain stable weight  Outcome: Progressing  Goal: Reduce weight from edema/fluid  Outcome: Progressing  Goal: Gradual weight gain  Outcome: Progressing  Goal: Improve ostomy output  Outcome: Progressing     Problem: Fall/Injury  Goal: Not fall by end of shift  Outcome: Progressing  Goal: Be free from injury by end of the shift  Outcome: Progressing  Goal: Verbalize understanding of personal risk factors for fall in the hospital  Outcome: Progressing  Goal: Verbalize understanding of risk factor reduction measures to prevent injury from fall in the home  Outcome: Progressing  Goal: Use assistive devices by end of the  shift  Outcome: Progressing  Goal: Pace activities to prevent fatigue by end of the shift  Outcome: Progressing     Problem: Pain - Adult  Goal: Verbalizes/displays adequate comfort level or baseline comfort level  Outcome: Progressing     Problem: Safety - Adult  Goal: Free from fall injury  Outcome: Progressing     Problem: Discharge Planning  Goal: Discharge to home or other facility with appropriate resources  Outcome: Progressing     Problem: Chronic Conditions and Co-morbidities  Goal: Patient's chronic conditions and co-morbidity symptoms are monitored and maintained or improved  Outcome: Progressing     Problem: Skin  Goal: Decreased wound size/increased tissue granulation at next dressing change  Outcome: Progressing  Flowsheets (Taken 11/26/2023 1054)  Decreased wound size/increased tissue granulation at next dressing change:   Protective dressings over bony prominences   Promote sleep for wound healing   Utilize specialty bed per algorithm  Goal: Participates in plan/prevention/treatment measures  Outcome: Progressing  Flowsheets (Taken 11/26/2023 0447 by Tiffanie Alvarado RN)  Participates in plan/prevention/treatment measures:   Discuss with provider PT/OT consult   Increase activity/out of bed for meals   Elevate heels  Goal: Prevent/manage excess moisture  Outcome: Progressing  Flowsheets (Taken 11/26/2023 1054)  Prevent/manage excess moisture:   Cleanse incontinence/protect with barrier cream   Moisturize dry skin   Use wicking fabric (obtain order)   Follow provider orders for dressing changes   Monitor for/manage infection if present  Goal: Prevent/minimize sheer/friction injuries  Outcome: Progressing  Flowsheets (Taken 11/26/2023 0447 by Tiffanie Alvarado RN)  Prevent/minimize sheer/friction injuries:   Increase activity/out of bed for meals   Turn/reposition every 2 hours/use positioning/transfer devices  Goal: Promote/optimize nutrition  Outcome: Progressing  Flowsheets (Taken 11/26/2023 0447 by  Tiffanie Wirsching, RN)  Promote/optimize nutrition:   Offer water/supplements/favorite foods   Monitor/record intake including meals  Goal: Promote skin healing  Outcome: Progressing  Flowsheets (Taken 11/26/2023 1054)  Promote skin healing:   Rotate device position/do not position patient on device   Ensure correct size (line/device) and apply per  instructions   Turn/reposition every 2 hours/use positioning/transfer devices   Protective dressings over bony prominences   Assess skin/pad under line(s)/device(s)   The patient's goals for the shift include rest and comfort    The clinical goals for the shift include rest and comfort    Over the shift, the patient did not make progress toward the following goals. Barriers to progression include DX. Recommendations to address these barriers include education.

## 2023-11-26 NOTE — CARE PLAN
Problem: General Stroke  Goal: Out of bed three times today  Outcome: Not Progressing     Problem: Nutrition  Goal: Gradual weight gain  Outcome: Not Progressing   The patient's goals for the shift include rest and comfort    The clinical goals for the shift include rest and comfort    Over the shift, the patient did not make progress toward the following goals. Barriers to progression include pt reluctance to ambulate/exercise. Recommendations to address these barriers include continue to promote progressive ambulation and OOB 3 times for meals with return to bed in-between meals.    Problem: General Stroke  Goal: Out of bed three times today  Outcome: Not Progressing     Problem: Nutrition  Goal: Gradual weight gain  Outcome: Not Progressing

## 2023-11-27 ENCOUNTER — APPOINTMENT (OUTPATIENT)
Dept: RADIOLOGY | Facility: HOSPITAL | Age: 83
DRG: 163 | End: 2023-11-27
Payer: MEDICARE

## 2023-11-27 LAB
ALBUMIN SERPL BCP-MCNC: 2.9 G/DL (ref 3.4–5)
ANION GAP SERPL CALC-SCNC: 12 MMOL/L (ref 10–20)
BUN SERPL-MCNC: 32 MG/DL (ref 6–23)
CALCIUM SERPL-MCNC: 9 MG/DL (ref 8.6–10.3)
CHLORIDE SERPL-SCNC: 103 MMOL/L (ref 98–107)
CO2 SERPL-SCNC: 32 MMOL/L (ref 21–32)
CREAT SERPL-MCNC: 2 MG/DL (ref 0.5–1.05)
GFR SERPL CREATININE-BSD FRML MDRD: 25 ML/MIN/1.73M*2
GLUCOSE BLD MANUAL STRIP-MCNC: 112 MG/DL (ref 74–99)
GLUCOSE BLD MANUAL STRIP-MCNC: 116 MG/DL (ref 74–99)
GLUCOSE BLD MANUAL STRIP-MCNC: 117 MG/DL (ref 74–99)
GLUCOSE BLD MANUAL STRIP-MCNC: 128 MG/DL (ref 74–99)
GLUCOSE SERPL-MCNC: 107 MG/DL (ref 74–99)
HOLD SPECIMEN: NORMAL
INR PPP: 1.2 (ref 0.9–1.1)
PHOSPHATE SERPL-MCNC: 4.9 MG/DL (ref 2.5–4.9)
POTASSIUM SERPL-SCNC: 3.9 MMOL/L (ref 3.5–5.3)
PROTHROMBIN TIME: 13.5 SECONDS (ref 9.8–12.8)
SODIUM SERPL-SCNC: 143 MMOL/L (ref 136–145)
UFH PPP CHRO-ACNC: 0.5 IU/ML

## 2023-11-27 PROCEDURE — 80069 RENAL FUNCTION PANEL: CPT | Performed by: INTERNAL MEDICINE

## 2023-11-27 PROCEDURE — 97116 GAIT TRAINING THERAPY: CPT | Mod: GP,CQ

## 2023-11-27 PROCEDURE — 36415 COLL VENOUS BLD VENIPUNCTURE: CPT | Performed by: INTERNAL MEDICINE

## 2023-11-27 PROCEDURE — 2500000001 HC RX 250 WO HCPCS SELF ADMINISTERED DRUGS (ALT 637 FOR MEDICARE OP): Performed by: INTERNAL MEDICINE

## 2023-11-27 PROCEDURE — 85610 PROTHROMBIN TIME: CPT | Performed by: INTERNAL MEDICINE

## 2023-11-27 PROCEDURE — 2060000001 HC INTERMEDIATE ICU ROOM DAILY

## 2023-11-27 PROCEDURE — 85520 HEPARIN ASSAY: CPT | Performed by: INTERNAL MEDICINE

## 2023-11-27 PROCEDURE — 92526 ORAL FUNCTION THERAPY: CPT | Mod: GN

## 2023-11-27 PROCEDURE — 82947 ASSAY GLUCOSE BLOOD QUANT: CPT

## 2023-11-27 PROCEDURE — 94640 AIRWAY INHALATION TREATMENT: CPT

## 2023-11-27 PROCEDURE — 2500000004 HC RX 250 GENERAL PHARMACY W/ HCPCS (ALT 636 FOR OP/ED): Performed by: NURSE PRACTITIONER

## 2023-11-27 PROCEDURE — 71045 X-RAY EXAM CHEST 1 VIEW: CPT | Performed by: RADIOLOGY

## 2023-11-27 PROCEDURE — 97535 SELF CARE MNGMENT TRAINING: CPT | Mod: GP,CQ

## 2023-11-27 PROCEDURE — 2500000004 HC RX 250 GENERAL PHARMACY W/ HCPCS (ALT 636 FOR OP/ED): Performed by: INTERNAL MEDICINE

## 2023-11-27 PROCEDURE — 2500000001 HC RX 250 WO HCPCS SELF ADMINISTERED DRUGS (ALT 637 FOR MEDICARE OP): Performed by: NURSE PRACTITIONER

## 2023-11-27 PROCEDURE — 71045 X-RAY EXAM CHEST 1 VIEW: CPT | Mod: FY

## 2023-11-27 PROCEDURE — 2500000002 HC RX 250 W HCPCS SELF ADMINISTERED DRUGS (ALT 637 FOR MEDICARE OP, ALT 636 FOR OP/ED): Performed by: NURSE PRACTITIONER

## 2023-11-27 RX ORDER — WARFARIN SODIUM 5 MG/1
10 TABLET ORAL DAILY
Status: DISCONTINUED | OUTPATIENT
Start: 2023-11-27 | End: 2023-11-28 | Stop reason: HOSPADM

## 2023-11-27 RX ADMIN — GABAPENTIN 100 MG: 100 CAPSULE ORAL at 15:00

## 2023-11-27 RX ADMIN — IPRATROPIUM BROMIDE AND ALBUTEROL SULFATE 3 ML: 2.5; .5 SOLUTION RESPIRATORY (INHALATION) at 06:59

## 2023-11-27 RX ADMIN — POLYETHYLENE GLYCOL 3350 17 G: 17 POWDER, FOR SOLUTION ORAL at 08:44

## 2023-11-27 RX ADMIN — PANTOPRAZOLE SODIUM 40 MG: 40 TABLET, DELAYED RELEASE ORAL at 08:43

## 2023-11-27 RX ADMIN — GABAPENTIN 100 MG: 100 CAPSULE ORAL at 20:18

## 2023-11-27 RX ADMIN — ASPIRIN 81 MG: 81 TABLET, COATED ORAL at 08:43

## 2023-11-27 RX ADMIN — LEVOTHYROXINE SODIUM 150 MCG: 150 TABLET ORAL at 08:43

## 2023-11-27 RX ADMIN — GABAPENTIN 100 MG: 100 CAPSULE ORAL at 08:42

## 2023-11-27 RX ADMIN — AMOXICILLIN AND CLAVULANATE POTASSIUM 875 MG: 875; 125 TABLET, FILM COATED ORAL at 20:18

## 2023-11-27 RX ADMIN — ATORVASTATIN CALCIUM 40 MG: 40 TABLET, FILM COATED ORAL at 20:18

## 2023-11-27 RX ADMIN — IPRATROPIUM BROMIDE AND ALBUTEROL SULFATE 3 ML: 2.5; .5 SOLUTION RESPIRATORY (INHALATION) at 14:33

## 2023-11-27 RX ADMIN — AMILORIDE HYDROCLORIDE 5 MG: 5 TABLET ORAL at 08:44

## 2023-11-27 RX ADMIN — NYSTATIN 1 APPLICATION: 100000 POWDER TOPICAL at 08:45

## 2023-11-27 RX ADMIN — WARFARIN SODIUM 10 MG: 5 TABLET ORAL at 17:34

## 2023-11-27 RX ADMIN — TORSEMIDE 20 MG: 20 TABLET ORAL at 08:43

## 2023-11-27 RX ADMIN — HEPARIN SODIUM 1600 UNITS/HR: 10000 INJECTION, SOLUTION INTRAVENOUS at 06:52

## 2023-11-27 RX ADMIN — IPRATROPIUM BROMIDE AND ALBUTEROL SULFATE 3 ML: 2.5; .5 SOLUTION RESPIRATORY (INHALATION) at 20:29

## 2023-11-27 RX ADMIN — AMOXICILLIN AND CLAVULANATE POTASSIUM 875 MG: 875; 125 TABLET, FILM COATED ORAL at 08:43

## 2023-11-27 ASSESSMENT — PAIN - FUNCTIONAL ASSESSMENT: PAIN_FUNCTIONAL_ASSESSMENT: 0-10

## 2023-11-27 ASSESSMENT — COGNITIVE AND FUNCTIONAL STATUS - GENERAL
MOVING TO AND FROM BED TO CHAIR: A LOT
MOVING FROM LYING ON BACK TO SITTING ON SIDE OF FLAT BED WITH BEDRAILS: A LOT
TURNING FROM BACK TO SIDE WHILE IN FLAT BAD: A LOT
WALKING IN HOSPITAL ROOM: A LOT
MOBILITY SCORE: 11
CLIMB 3 TO 5 STEPS WITH RAILING: TOTAL
MOBILITY SCORE: 11
MOVING TO AND FROM BED TO CHAIR: A LOT
MOVING FROM LYING ON BACK TO SITTING ON SIDE OF FLAT BED WITH BEDRAILS: A LOT
STANDING UP FROM CHAIR USING ARMS: A LOT
CLIMB 3 TO 5 STEPS WITH RAILING: TOTAL
TURNING FROM BACK TO SIDE WHILE IN FLAT BAD: A LOT
STANDING UP FROM CHAIR USING ARMS: A LOT
WALKING IN HOSPITAL ROOM: A LOT

## 2023-11-27 ASSESSMENT — PAIN SCALES - GENERAL
PAINLEVEL_OUTOF10: 0 - NO PAIN

## 2023-11-27 NOTE — PROGRESS NOTES
NEPHROLOGY PROGRESS NOTE    REASON FOR CONSULT: EMILI on CKD stage III     SUBJECTIVE:  Patient continues to have some nosebleed and she is coughing up some blood.  She is on heparin drip.  She is off the high flow oxygen and is on nasal cannula.  No leg swelling.  Appetite is poor.    OBJECTIVE:    Visit Vitals  /56   Pulse 88   Temp 36.4 °C (97.5 °F)   Resp 18          Intake/Output Summary (Last 24 hours) at 11/27/2023 1028  Last data filed at 11/27/2023 0647  Gross per 24 hour   Intake 394.66 ml   Output 800 ml   Net -405.34 ml          General: Awake and Alert, In no distress, obese, cooperative  HEENT: Oral mucosa moist, EOMI,  nasal cannula  NECK: Supple  CHEST: No crackles, no wheeze, no tachypnea  CVS: S1,S2 heard, no rubs, irregularly irregular  ABD: Soft, Non Tender, BS present  EXT: No significant lower extremity edema    MEDICATION:    Scheduled medications  aMILoride, 5 mg, oral, Daily  amoxicillin-pot clavulanate, 875 mg, oral, q12h ROSA  aspirin, 81 mg, oral, Daily  atorvastatin, 40 mg, oral, Nightly  fluticasone, 2 spray, Each Nostril, Daily  gabapentin, 100 mg, oral, TID  ipratropium-albuteroL, 3 mL, nebulization, TID  levothyroxine, 150 mcg, oral, Daily before breakfast  nystatin, 1 Application, Topical, BID  pantoprazole, 40 mg, oral, Daily before breakfast  polyethylene glycol, 17 g, oral, Daily  torsemide, 20 mg, oral, Daily      Continuous medications  heparin, 0-4,500 Units/hr, Last Rate: 1,600 Units/hr (11/27/23 0652)      PRN medications  PRN medications: heparin, ipratropium-albuteroL, oxygen, sodium chloride     RESULTS:    Lab Results   Component Value Date    WBC 9.8 11/26/2023    HGB 10.4 (L) 11/26/2023    HCT 33.2 (L) 11/26/2023    MCV 99 11/26/2023     11/26/2023        Lab Results   Component Value Date    CREATININE 2.00 (H) 11/27/2023    BUN 32 (H) 11/27/2023     11/27/2023    K 3.9 11/27/2023     11/27/2023    CO2 32 11/27/2023        Radiology Imaging  reviewed      ASSESSMENT/PLAN:     1.EMILI: Creatinine at 2 this is slightly above her baseline.  Multifactorial etiology for EMILI this admission.  She got contrast for the chest CT and had PE and was hypoxic with soft blood pressure.  She is currently off IV diuresis and is on oral torsemide 20 mg daily.  She is on amiloride for the metabolic alkalosis.     2.  CKD stage III: Patient has a baseline creatinine between 1.3-1.8.  She has cortical thinning on her previous imaging.  She has been on chronic diuretics.    Thank You very much for allowing me to participate in the care of this Patient    This document was created using dragon dictation and may contain unintended error    Peewee Benson MD   11/27/23

## 2023-11-27 NOTE — PROGRESS NOTES
Physical Therapy    Physical Therapy Treatment    Patient Name: Lillian Mendoza  MRN: [id number]  Today's Date: 11/27/2023  Time Calculation  Start Time: 0932  Stop Time: 0955  Time Calculation (min): 23 min       Assessment/Plan   PT Assessment  End of Session Patient Position: Up in chair, Alarm on     PT Plan  Treatment/Interventions: Bed mobility, Strengthening  PT Plan: Skilled PT  PT Frequency: 5 times per week  PT Discharge Recommendations: High intensity level of continued care  PT Recommended Transfer Status: Assist x2  PT - OK to Discharge: Yes      General Visit Information:   PT  Visit  PT Received On: 11/27/23  General  Prior to Session Communication: Bedside nurse  Patient Position Received: Bed, 2 rail up, Alarm on    Subjective   Precautions:  Precautions  Medical Precautions: Fall precautions  Precautions Comment:  (airvo; IV; purewick)       Objective   Pain:  Pain Assessment  Pain Assessment: 0-10  Pain Score: 0 - No pain     Treatments:  Bed Mobility  Bed Mobility:  (sup -> sit with mod A x 2 using draw sheet to assist)    Ambulation/Gait Training  Ambulation/Gait Training Performed:  (pt ambulates ~5 ft bed -> chair with FWW and min A x 1-2.  forward flexed.  slow pace with decreased step/stride lengths and decreased toe to floor clearance.)  pt also able to tolerate additional 1-2 min. static stance at FWW with min A.    Transfers  Transfer:  (sit <> stand x 3 trials, including from different surface levels, FWW and min/mod A x 2.)    Outcome Measures:  [facility] Basic Mobility  Turning from your back to your side while in a flat bed without using bedrails: A lot  Moving from lying on your back to sitting on the side of a flat bed without using bedrails: A lot  Moving to and from bed to chair (including a wheelchair): A lot  Standing up from a chair using your arms (e.g. wheelchair or bedside chair): A lot  To walk in hospital room: A lot  Climbing 3-5 steps with railing: Total  Basic Mobility -  Total Score: 11    Education Documentation  Mobility Training, taught by Shirlene Ochoa PTA at 11/27/2023 11:57 AM.  Learner: Patient  Readiness: Acceptance  Method: Explanation  Response: Verbalizes Understanding, Needs Reinforcement    Education Comments  No comments found.        EDUCATION:       Encounter Problems       Encounter Problems (Active)       PT Problem       bed mob (Progressing)       Start:  11/17/23    Expected End:  12/08/23       Min assist 1-2 bed mob         serrano (Progressing)       Start:  11/17/23    Expected End:  12/08/23       Min assist 1-2 serrano         gait (Progressing)       Start:  11/17/23    Expected End:  12/08/23       Amb 10 ft> w/ approp device/min assist 1-2         strengthening (Progressing)       Start:  11/17/23    Expected End:  12/08/23       10-30 reps LE'S

## 2023-11-27 NOTE — PROGRESS NOTES
Speech-Language Pathology    Inpatient  Speech-Language Pathology Treatment     Patient Name: Lillian Mendoza  MRN: [id number]  Today's Date: 11/27/2023  Time Calculation  Start Time: 1050  Stop Time: 1100  Time Calculation (min): 10 min         Current Problem:   1. Acute saddle pulmonary embolism, unspecified whether acute cor pulmonale present (CMS/HCC)  Transthoracic Echo (TTE) Complete    Transthoracic Echo (TTE) Complete    Vascular US Lower Extremity Venous Duplex Bilateral    Vascular US Lower Extremity Venous Duplex Bilateral    Transthoracic Echo (TTE) Limited    Transthoracic Echo (TTE) Limited    CANCELED: Lower extremity venous duplex bilateral    CANCELED: Lower extremity venous duplex bilateral      2. Acute kidney injury (CMS/HCC)        3. Pulmonary embolism (CMS/HCC)  Invasive vascular procedure    Invasive vascular procedure    CANCELED: Transesophageal Echo (CARLOS)    CANCELED: Transesophageal Echo (CARLOS)      4. Acute stroke due to ischemia (CMS/HCC)  Transthoracic Echo (TTE) Complete    Transthoracic Echo (TTE) Complete    Transthoracic Echo (TTE) Limited    Transthoracic Echo (TTE) Limited    CANCELED: Transesophageal Echo (CARLOS)    CANCELED: Transesophageal Echo (CARLOS)      5. Generalized edema  Vascular US Lower Extremity Venous Duplex Bilateral    Vascular US Lower Extremity Venous Duplex Bilateral      6. History of pulmonary embolism  CANCELED: Lower extremity venous duplex bilateral    CANCELED: Lower extremity venous duplex bilateral      7. Other pulmonary embolism with acute cor pulmonale (CMS/HCC)  Invasive vascular procedure      8. Other forms of dyspnea  Transthoracic Echo (TTE) Complete            Subjective   Current Problem:  Assess diet safety/tolerance    Most Recent Visit:  SLP Received On: 11/27/23    Pain Assessment:   Pain Score: 0 - No pain      Objective     Patient currently on 2L O2 NC. 11/27 CXR Findings suggesting large hiatal hernia as previously. Bibasilar opacities  left greater than right including left lung base/retrocardiac opacification which could reflect consolidation, atelectasis, and/or pleural fluid overall grossly similar to prior. Mild pulmonary vascular congestion also similar. Continued clinical correlation and follow-up advised.     Comments:  ST assessed patient consume multiple consecutive straw sips of thin water without evidence of aspiration such as coughing or changes in vocal quality. Patient denying having any difficulty swallowing solids or liquids over the weekend. Patient off AIRVO and currently on 2L NC. Reports that she has tried multiple food choices from the menu but she is getting tired of hospital food. Verbal support/encouragement provided.       Recommendations:  Continue on diet of regular solids/thin liquids with use of general safe swallow guidelines to minimize risks for aspiration.    Plan:  SLP Plan: No skilled SLP  Discussed POC: Patient   Patient/Caregiver Agreeable: Yes  SLP - OK to Discharge: Yes; no further ST follow up recommended at this time. Medical team to re-consult ST and consider MBS if new concerns for aspiration arise.

## 2023-11-27 NOTE — PROGRESS NOTES
Lillian Mendoza is a 83 y.o. female on day 12 of admission presenting with Acute saddle pulmonary embolism, unspecified whether acute cor pulmonale present (CMS/HCC).      Subjective   Seen today she remains on the heparin drip I have to anticoagulate her with Coumadin now and she is down on her oxygen needs       Objective     Last Recorded Vitals  /56   Pulse 91   Temp 36.1 °C (97 °F)   Resp 18   Wt 114 kg (252 lb 3.3 oz)   SpO2 93%   Intake/Output last 3 Shifts:    Intake/Output Summary (Last 24 hours) at 11/27/2023 1257  Last data filed at 11/27/2023 0647  Gross per 24 hour   Intake 316.53 ml   Output 800 ml   Net -483.47 ml       Admission Weight  Weight: 111 kg (244 lb 11.4 oz) (11/15/23 0327)    Daily Weight  11/27/23 : 114 kg (252 lb 3.3 oz)    Image Results  CT chest wo IV contrast  Narrative: Interpreted By:  Gunner Russo,   STUDY:  CT CHEST WO IV CONTRAST;  11/21/2023 8:15 pm      INDICATION:  Signs/Symptoms:worsening hypoxia; recent PE s/p thrombectomy.      COMPARISON:  CT pulmonary angiography dated 11/15/2023      ACCESSION NUMBER(S):  [id number]      ORDERING CLINICIAN:  BROOK GOOD      TECHNIQUE:  Helical CT imaging of the chest was performed without intravenous  contrast.      FINDINGS:          CHEST WALL, LOWER NECK AND VISUALIZED THYROID: Unremarkable.      MEDIASTINUM, AXILLA AND GISSELL: No axillary, mediastinal or definite  hilar adenopathy by imaging size criteria.  Note that the evaluation  for hilar adenopathy is limited due to the lack of intravenous  contrast.      HEART: The heart size is normal and there is no pericardial effusion.      VESSELS: The unenhanced major vessels are grossly unremarkable.      ESOPHAGUS: Large hiatal hernia      LUNG, PLEURA, AND LARGE AIRWAYS: Scattered left lower lobe  infiltrates/consolidations grossly unchanged from prior. No new major  consolidation or pulmonary infiltrates. Right lower lobe scattered  atelectasis. No right-sided  pleural effusion or pneumothorax      UPPER ABDOMEN: Large hiatal hernia. Partially atrophic right kidney.      BONES: Multilevel degenerative spine changes and facet joint disease  along with mild central compression deformity of multiple thoracic  vertebrae as in prior.      Impression: 1. Redemonstrated left lower lobe scattered  consolidation/infiltrates. No new major pulmonary consolidation,  pleural effusion or pneumothorax.  2. Large hiatal hernia as in prior.      Signed by: Gunner Russo 11/22/2023 1:48 PM  Dictation workstation:   [id number]  Review of systems is weakness some shortness of breath no vomiting or diarrhea    Physical Exam  Lungs are clear heart has a regular rate and rhythm abdomen is soft and nontender  Skin is warm and dry  Relevant Results               Assessment/Plan   This patient currently has cardiac telemetry ordered; if you would like to modify or discontinue the telemetry order, click here to go to the orders activity to modify/discontinue the order.              Principal Problem:    Acute saddle pulmonary embolism, unspecified whether acute cor pulmonale present (CMS/[facility])    For now continue the same present care and therapy maintain her oxygen saturations start Coumadin and give her 10 mg daily start what getting an daily INR they can reported to me and I will anticoagulate her and get her off the heparin              Sage Alba, DO

## 2023-11-27 NOTE — PROGRESS NOTES
11/27/2023   Spoke with Dr Alba.  He stated he would like for pt to go to LTAC.  She is on a heparin gtt and he wants to watch her O2. Stated she was on Airvo 48 hrs ago.  Pt currently on 2L o2.    Followed up with pt in room. Discussed LTAC with her. She is agreeable.  Discussed freedom of choice.  [facility] stated [facility] would like [facility]  since it is here in the hospital.  Asked PCN to make referral to [facility].   Danielle Verduzco RN [facility]

## 2023-11-27 NOTE — CARE PLAN
The patient's goals for the shift include increase physical activity    The clinical goals for the shift include increased physical activity    \

## 2023-11-27 NOTE — PROGRESS NOTES
1533:  Notified by [facility] to send a referral to [facility].  Referral will be submitted for review.

## 2023-11-28 ENCOUNTER — HOSPITAL ENCOUNTER (OUTPATIENT)
Facility: HOSPITAL | Age: 83
Discharge: HOME | End: 2023-12-14

## 2023-11-28 VITALS
HEART RATE: 92 BPM | DIASTOLIC BLOOD PRESSURE: 56 MMHG | TEMPERATURE: 96.1 F | BODY MASS INDEX: 40.53 KG/M2 | SYSTOLIC BLOOD PRESSURE: 112 MMHG | OXYGEN SATURATION: 94 % | RESPIRATION RATE: 18 BRPM | HEIGHT: 66 IN | WEIGHT: 252.21 LBS

## 2023-11-28 LAB
ALBUMIN SERPL BCP-MCNC: 3.3 G/DL (ref 3.4–5)
ANION GAP SERPL CALC-SCNC: 12 MMOL/L (ref 10–20)
BUN SERPL-MCNC: 32 MG/DL (ref 6–23)
CALCIUM SERPL-MCNC: 9.6 MG/DL (ref 8.6–10.3)
CHLORIDE SERPL-SCNC: 101 MMOL/L (ref 98–107)
CO2 SERPL-SCNC: 30 MMOL/L (ref 21–32)
CREAT SERPL-MCNC: 2.1 MG/DL (ref 0.5–1.05)
GFR SERPL CREATININE-BSD FRML MDRD: 23 ML/MIN/1.73M*2
GLUCOSE BLD MANUAL STRIP-MCNC: 110 MG/DL (ref 74–99)
GLUCOSE SERPL-MCNC: 94 MG/DL (ref 74–99)
HOLD SPECIMEN: NORMAL
INR PPP: 1.1 (ref 0.9–1.1)
PHOSPHATE SERPL-MCNC: 4.1 MG/DL (ref 2.5–4.9)
POTASSIUM SERPL-SCNC: 4.3 MMOL/L (ref 3.5–5.3)
PROTHROMBIN TIME: 12.9 SECONDS (ref 9.8–12.8)
SODIUM SERPL-SCNC: 139 MMOL/L (ref 136–145)
UFH PPP CHRO-ACNC: 0.7 IU/ML

## 2023-11-28 PROCEDURE — 2500000001 HC RX 250 WO HCPCS SELF ADMINISTERED DRUGS (ALT 637 FOR MEDICARE OP): Performed by: INTERNAL MEDICINE

## 2023-11-28 PROCEDURE — 2500000004 HC RX 250 GENERAL PHARMACY W/ HCPCS (ALT 636 FOR OP/ED): Performed by: NURSE PRACTITIONER

## 2023-11-28 PROCEDURE — 94640 AIRWAY INHALATION TREATMENT: CPT

## 2023-11-28 PROCEDURE — 85610 PROTHROMBIN TIME: CPT | Performed by: INTERNAL MEDICINE

## 2023-11-28 PROCEDURE — 2500000004 HC RX 250 GENERAL PHARMACY W/ HCPCS (ALT 636 FOR OP/ED): Performed by: INTERNAL MEDICINE

## 2023-11-28 PROCEDURE — 2500000001 HC RX 250 WO HCPCS SELF ADMINISTERED DRUGS (ALT 637 FOR MEDICARE OP): Performed by: NURSE PRACTITIONER

## 2023-11-28 PROCEDURE — 80069 RENAL FUNCTION PANEL: CPT | Performed by: INTERNAL MEDICINE

## 2023-11-28 PROCEDURE — 82947 ASSAY GLUCOSE BLOOD QUANT: CPT

## 2023-11-28 PROCEDURE — 2500000002 HC RX 250 W HCPCS SELF ADMINISTERED DRUGS (ALT 637 FOR MEDICARE OP, ALT 636 FOR OP/ED): Performed by: NURSE PRACTITIONER

## 2023-11-28 PROCEDURE — 36415 COLL VENOUS BLD VENIPUNCTURE: CPT | Performed by: INTERNAL MEDICINE

## 2023-11-28 PROCEDURE — 85520 HEPARIN ASSAY: CPT | Performed by: INTERNAL MEDICINE

## 2023-11-28 RX ORDER — NYSTATIN 100000 [USP'U]/G
1 POWDER TOPICAL 2 TIMES DAILY
Start: 2023-11-28

## 2023-11-28 RX ORDER — HEPARIN SODIUM 1000 [USP'U]/ML
3000-6000 INJECTION, SOLUTION INTRAVENOUS; SUBCUTANEOUS EVERY 4 HOURS PRN
Start: 2023-11-28 | End: 2024-02-08 | Stop reason: WASHOUT

## 2023-11-28 RX ORDER — AMOXICILLIN AND CLAVULANATE POTASSIUM 875; 125 MG/1; MG/1
875 TABLET, FILM COATED ORAL EVERY 12 HOURS SCHEDULED
Start: 2023-11-28 | End: 2024-02-08 | Stop reason: WASHOUT

## 2023-11-28 RX ORDER — HEPARIN SODIUM 10000 [USP'U]/100ML
0-4500 INJECTION, SOLUTION INTRAVENOUS CONTINUOUS
Start: 2023-11-28 | End: 2024-02-08 | Stop reason: WASHOUT

## 2023-11-28 RX ORDER — AMILORIDE HYDROCHLORIDE 5 MG/1
5 TABLET ORAL DAILY
Start: 2023-11-29 | End: 2024-02-08 | Stop reason: WASHOUT

## 2023-11-28 RX ORDER — PANTOPRAZOLE SODIUM 40 MG/1
40 TABLET, DELAYED RELEASE ORAL
Start: 2023-11-29 | End: 2024-02-08 | Stop reason: WASHOUT

## 2023-11-28 RX ORDER — POLYETHYLENE GLYCOL 3350 17 G/17G
17 POWDER, FOR SOLUTION ORAL DAILY
Start: 2023-11-29

## 2023-11-28 RX ORDER — ATORVASTATIN CALCIUM 40 MG/1
40 TABLET, FILM COATED ORAL NIGHTLY
Start: 2023-11-28

## 2023-11-28 RX ORDER — ASPIRIN 81 MG/1
81 TABLET ORAL DAILY
Start: 2023-11-29 | End: 2024-02-08 | Stop reason: WASHOUT

## 2023-11-28 RX ORDER — WARFARIN SODIUM 5 MG/1
TABLET ORAL
Start: 2023-11-28 | End: 2024-03-03 | Stop reason: HOSPADM

## 2023-11-28 RX ADMIN — LEVOTHYROXINE SODIUM 150 MCG: 150 TABLET ORAL at 05:55

## 2023-11-28 RX ADMIN — IPRATROPIUM BROMIDE AND ALBUTEROL SULFATE 3 ML: 2.5; .5 SOLUTION RESPIRATORY (INHALATION) at 12:38

## 2023-11-28 RX ADMIN — IPRATROPIUM BROMIDE AND ALBUTEROL SULFATE 3 ML: 2.5; .5 SOLUTION RESPIRATORY (INHALATION) at 07:11

## 2023-11-28 RX ADMIN — NYSTATIN 1 APPLICATION: 100000 POWDER TOPICAL at 08:44

## 2023-11-28 RX ADMIN — GABAPENTIN 100 MG: 100 CAPSULE ORAL at 08:32

## 2023-11-28 RX ADMIN — HEPARIN SODIUM 1600 UNITS/HR: 10000 INJECTION, SOLUTION INTRAVENOUS at 01:50

## 2023-11-28 RX ADMIN — TORSEMIDE 20 MG: 20 TABLET ORAL at 08:32

## 2023-11-28 RX ADMIN — GABAPENTIN 100 MG: 100 CAPSULE ORAL at 14:37

## 2023-11-28 RX ADMIN — WARFARIN SODIUM 10 MG: 5 TABLET ORAL at 17:04

## 2023-11-28 RX ADMIN — PANTOPRAZOLE SODIUM 40 MG: 40 TABLET, DELAYED RELEASE ORAL at 05:55

## 2023-11-28 RX ADMIN — POLYETHYLENE GLYCOL 3350 17 G: 17 POWDER, FOR SOLUTION ORAL at 08:32

## 2023-11-28 RX ADMIN — AMOXICILLIN AND CLAVULANATE POTASSIUM 875 MG: 875; 125 TABLET, FILM COATED ORAL at 08:32

## 2023-11-28 RX ADMIN — AMILORIDE HYDROCLORIDE 5 MG: 5 TABLET ORAL at 08:32

## 2023-11-28 RX ADMIN — ASPIRIN 81 MG: 81 TABLET, COATED ORAL at 08:32

## 2023-11-28 ASSESSMENT — PAIN SCALES - GENERAL: PAINLEVEL_OUTOF10: 0 - NO PAIN

## 2023-11-28 NOTE — DISCHARGE SUMMARY
Discharge Diagnosis  Acute saddle pulmonary embolism, unspecified whether acute cor pulmonale present (CMS/HCC)    Issues Requiring Follow-Up      Discharge Meds     Your medication list        ASK your doctor about these medications        Instructions Last Dose Given Next Dose Due   acetaminophen 325 mg tablet  Commonly known as: Tylenol           albuterol 90 mcg/actuation inhaler           cholecalciferol 125 MCG (5000 UT) capsule  Commonly known as: Vitamin D-3           ergocalciferol 50 MCG (2000 UT) capsule capsule  Commonly known as: Vitamin D-2           fluticasone 50 mcg/actuation nasal spray  Commonly known as: Flonase           fluticasone propion-salmeteroL 250-50 mcg/dose diskus inhaler  Commonly known as: Advair Diskus           gabapentin 600 mg tablet  Commonly known as: Neurontin           ipratropium-albuteroL 0.5-2.5 mg/3 mL nebulizer solution  Commonly known as: Duo-Neb           loratadine 10 mg tablet  Commonly known as: Claritin           metoprolol succinate XL 50 mg 24 hr tablet  Commonly known as: Toprol-XL           montelukast 10 mg tablet  Commonly known as: Singulair           Singulair 4 mg chewable tablet  Generic drug: montelukast           omeprazole 40 mg DR capsule  Commonly known as: PriLOSEC           Synthroid 150 mcg tablet  Generic drug: levothyroxine           thiamine 100 mg tablet  Commonly known as: Vitamin B-1           torsemide 10 mg tablet  Commonly known as: Demadex           traMADol 50 mg tablet  Commonly known as: Ultram                    Test Results Pending At Discharge  Pending Labs       Order Current Status    Extra Tubes Preliminary result    Red Top Preliminary result            Hospital Course   83-year-old female, with history of remote PE in setting of right TKR, chronic HFpEF with EF 55 to 60% as of 9/2020, HTN, HLD, CKD 3, hypothyroidism, BLAYNE on O2 nightly, moderate persistent asthma, chronic allergic rhinitis, and chronic lymphedema, who initially  presented from home to [facility] on 11/15/23 with confusion, left facial droop, and dysarthria at 2 AM that morning.  Patient was out of TNK window as she had had right hand numbness the day prior.  She was found to have acute to subacute ischemic CVA involving inferior posterior left temporal lobe.  CTA head/neck incidentally found acute bilateral PE.  Subsequent CTA chest confirmed extensive bilateral PE involving all lobes with large embolus in the distal aspect of right main pulmonary artery.  Moderate to severe embolic burden with moderate to severe right heart strain noted.  PERT team was activated.  Patient was urgently taken for thrombectomy and then admitted the ICU for further evaluation and management.  She required Coumadin she had bilateral DVTs she had a saddle pulmonary embolus she is morbidly obese finally need to be stabilized required still further care because she was hypoxic for so long and required Airvo up until this time we to watch her and monitor her took her down to the LTAC in stable condition no acute distress discharge day management greater than 30 minutes total time thank you    Pertinent Physical Exam At Time of Discharge  Physical Exam  Lungs are clear heart is regular abdomen is soft nontender  Skin is warm and dry and she is alert and oriented x3  Outpatient Follow-Up  Future Appointments   Date Time Provider Department Center   1/25/2024 10:00 AM [facility] 3 ECHO ROOM 1 [id number] [facility]   1/25/2024 11:00 AM Maldonado Alvarado MD [id number] [place]         Sage Alba DO

## 2023-11-28 NOTE — PROGRESS NOTES
11/28/23 1238   Discharge Planning   Patient expects to be discharged to: [facility]   Does the patient need discharge transport arranged? No   RoundTrip coordination needed? No     1238:  [facility] can accept today at 6pm

## 2023-11-28 NOTE — PROGRESS NOTES
"  Lillian Mendoza is a 83 y.o. female on day 13 of admission presenting with Acute saddle pulmonary embolism, unspecified whether acute cor pulmonale present (CMS/[facility]).      Assessment / Plan        #Acute hypoxic respiratory failure   #Emphysema  #S/P thrombectomy\  #Worsening bicarb, possibly secondary to contraction alkalosis versus atelectasis induced  -Patient's acute hypoxic respiratory failure is likely multifactorial due to atelectasis, clots throughout all lobes/distal main pulmonary artery, and element of CHF   Plan:  -Judicious diuresis   -Continue with scheduled and as needed DuoNebs  -Heparin may be transition to a different blood thinner per primary team  -Patient remains to nasal cannula 2 L, continues to improve              Subjective     Patient seen and examined. No acute overnight events.  Patient's O2 has been weaned to 2 L.  Patient reports she is feeling better.  Does not endorse any shortness of breath, cough, fever, sore throat or other new symptoms.      Objective       Physical Exam:  General:  Pleasant and cooperative. No apparent distress.  HEENT:  Normocephalic, atraumatic  Chest:  Clear to auscultation bilaterally. No wheezes, rales, or rhonchi.  CV:  Regular rate and rhythm. No murmurs    Abdomen: Abdomen is soft, non-tender, non-distended. BS +   Extremities:  No lower extremity edema or cyanosis.   Neurological:  AAOx3. No focal deficits.  Skin:  Warm and dry.    Last Recorded Vitals  Blood pressure 112/66, pulse 84, temperature 35.9 °C (96.6 °F), resp. rate 18, height 1.67 m (5' 5.75\"), weight 114 kg (252 lb 3.3 oz), SpO2 96 %.  Intake/Output last 3 Shifts:  I/O last 3 completed shifts:  In: 316.5 (2.8 mL/kg) [I.V.:316.5 (2.8 mL/kg)]  Out: 1150 (10.1 mL/kg) [Urine:1150 (0.3 mL/kg/hr)]  Weight: 114.4 kg     Last CBC & BMP  Lab Results   Component Value Date    GLUCOSE 94 11/28/2023    CALCIUM 9.6 11/28/2023     11/28/2023    K 4.3 11/28/2023    CO2 30 11/28/2023     " 11/28/2023    BUN 32 (H) 11/28/2023    CREATININE 2.10 (H) 11/28/2023     Lab Results   Component Value Date    WBC 9.8 11/26/2023    HGB 10.4 (L) 11/26/2023    HCT 33.2 (L) 11/26/2023    MCV 99 11/26/2023     11/26/2023

## 2023-11-28 NOTE — PROGRESS NOTES
NEPHROLOGY PROGRESS NOTE    REASON FOR CONSULT: EMILI on CKD stage III     SUBJECTIVE:  Patient continues to have some coughing spells with blood.  She did get warfarin yesterday.  She is on heparin drip.  She is on 2 L oxygen.  No leg swelling currently.  She is on torsemide.  Appetite is okay.    OBJECTIVE:    Visit Vitals  /66   Pulse 84   Temp 35.9 °C (96.6 °F)   Resp 18          Intake/Output Summary (Last 24 hours) at 11/28/2023 1147  Last data filed at 11/28/2023 0317  Gross per 24 hour   Intake --   Output 350 ml   Net -350 ml          General: Awake and Alert, In no distress, obese, cooperative  HEENT: Oral mucosa moist, EOMI,  nasal cannula  NECK: Supple  CHEST: No crackles, no wheeze, no tachypnea  CVS: S1,S2 heard, no rubs, irregularly irregular  ABD: Soft, Non Tender, BS present  EXT: No significant lower extremity edema    MEDICATION:    Scheduled medications  aMILoride, 5 mg, oral, Daily  amoxicillin-pot clavulanate, 875 mg, oral, q12h ROSA  aspirin, 81 mg, oral, Daily  atorvastatin, 40 mg, oral, Nightly  fluticasone, 2 spray, Each Nostril, Daily  gabapentin, 100 mg, oral, TID  ipratropium-albuteroL, 3 mL, nebulization, TID  levothyroxine, 150 mcg, oral, Daily before breakfast  nystatin, 1 Application, Topical, BID  pantoprazole, 40 mg, oral, Daily before breakfast  polyethylene glycol, 17 g, oral, Daily  torsemide, 20 mg, oral, Daily  warfarin, 10 mg, oral, Daily      Continuous medications  heparin, 0-4,500 Units/hr, Last Rate: 1,600 Units/hr (11/28/23 0150)      PRN medications  PRN medications: heparin, ipratropium-albuteroL, oxygen, sodium chloride     RESULTS:    Lab Results   Component Value Date    WBC 9.8 11/26/2023    HGB 10.4 (L) 11/26/2023    HCT 33.2 (L) 11/26/2023    MCV 99 11/26/2023     11/26/2023        Lab Results   Component Value Date    CREATININE 2.10 (H) 11/28/2023    BUN 32 (H) 11/28/2023     11/28/2023    K 4.3 11/28/2023     11/28/2023    CO2 30  11/28/2023        Radiology Imaging reviewed      ASSESSMENT/PLAN:     1.EMILI: Creatinine at 2.1 this is slightly above her baseline.  Multifactorial etiology for EMILI this admission.  She got contrast for the chest CT and had PE and was hypoxic with soft blood pressure.  She is on oral torsemide 20 mg daily.  She is on amiloride for the metabolic alkalosis.     2.  CKD stage III: Patient has a baseline creatinine between 1.3-1.8.  She has cortical thinning on her previous imaging.  She has been on chronic diuretics.    Thank You very much for allowing me to participate in the care of this Patient    This document was created using dragon dictation and may contain unintended error    Peewee Benson MD   11/28/23

## 2023-11-29 LAB
APTT PPP: 152 SECONDS (ref 27–38)
APTT PPP: 52 SECONDS (ref 27–38)
INR PPP: 1.2 (ref 0.9–1.1)
PROTHROMBIN TIME: 13.4 SECONDS (ref 9.8–12.8)
UFH PPP CHRO-ACNC: 0.7 IU/ML

## 2023-11-29 PROCEDURE — 85610 PROTHROMBIN TIME: CPT | Performed by: INTERNAL MEDICINE

## 2023-11-29 PROCEDURE — 36415 COLL VENOUS BLD VENIPUNCTURE: CPT | Performed by: INTERNAL MEDICINE

## 2023-11-29 PROCEDURE — 85730 THROMBOPLASTIN TIME PARTIAL: CPT | Performed by: INTERNAL MEDICINE

## 2023-11-29 PROCEDURE — 85520 HEPARIN ASSAY: CPT | Performed by: INTERNAL MEDICINE

## 2023-11-29 NOTE — PROGRESS NOTES
NEPHROLOGY PROGRESS NOTE    REASON FOR CONSULT: EMILI on CKD stage III     SUBJECTIVE:  Patient has been transferred to [facility] room 358.  She is currently on 2 L oxygen via nasal cannula.  Continues to have some nosebleed.  She is on heparin drip.  Her leg swelling has improved.  She is on torsemide 10 mg daily in addition to amiloride 5 mg daily.  Appetite continues to be poor.      OBJECTIVE:    Vitals reviewed    General: Awake and Alert, In no distress, obese, cooperative  HEENT: Oral mucosa moist, EOMI,  nasal cannula  NECK: Supple  CHEST: No crackles, no wheeze, no tachypnea  CVS: S1,S2 heard, no rubs, irregularly irregular  ABD: Soft, Non Tender, BS present  EXT: No significant lower extremity edema    MEDICATION:    Scheduled medications reviewed      Continuous medications reviewed         RESULTS:    Lab Results   Component Value Date    WBC 9.8 11/26/2023    HGB 10.4 (L) 11/26/2023    HCT 33.2 (L) 11/26/2023    MCV 99 11/26/2023     11/26/2023        Lab Results   Component Value Date    CREATININE 2.10 (H) 11/28/2023    BUN 32 (H) 11/28/2023     11/28/2023    K 4.3 11/28/2023     11/28/2023    CO2 30 11/28/2023        Radiology Imaging reviewed      ASSESSMENT/PLAN:     1.EMILI: Patient has been transferred to [facility].  She is on heparin drip.  She is on 2 L oxygen via nasal cannula.  She is on torsemide 10 mg daily in addition to amiloride 5 mg daily for peripheral edema.  Creatinine at 2.1 from yesterday.  Initially was admitted to ICU at [place] and had sustained EMILI.  Multifactorial etiology for EMILI .  She got contrast for the chest CT and had PE and was hypoxic with soft blood pressure.  Will trend the renal panel and follow while in [facility].    2.  CKD stage III: Patient has a baseline creatinine between 1.3-1.8.  She has cortical thinning on her previous imaging.  She has been on chronic diuretics.    Thank You very much for allowing me to participate in the care  of this Patient    This document was created using dragon dictation and may contain unintended error    Peewee Benson MD   11/29/23

## 2023-11-30 ENCOUNTER — APPOINTMENT (OUTPATIENT)
Dept: ORTHOPEDIC SURGERY | Facility: CLINIC | Age: 83
End: 2023-11-30
Payer: MEDICARE

## 2023-11-30 LAB
ALBUMIN SERPL BCP-MCNC: 3.5 G/DL (ref 3.4–5)
ALP SERPL-CCNC: 51 U/L (ref 33–136)
ALT SERPL W P-5'-P-CCNC: 11 U/L (ref 7–45)
ANION GAP SERPL CALC-SCNC: 14 MMOL/L (ref 10–20)
APTT PPP: 131 SECONDS (ref 27–38)
APTT PPP: 151 SECONDS (ref 27–38)
APTT PPP: 93 SECONDS (ref 27–38)
AST SERPL W P-5'-P-CCNC: 18 U/L (ref 9–39)
BILIRUB SERPL-MCNC: 0.5 MG/DL (ref 0–1.2)
BUN SERPL-MCNC: 42 MG/DL (ref 6–23)
CALCIUM SERPL-MCNC: 9.8 MG/DL (ref 8.6–10.3)
CHLORIDE SERPL-SCNC: 99 MMOL/L (ref 98–107)
CO2 SERPL-SCNC: 29 MMOL/L (ref 21–32)
CREAT SERPL-MCNC: 2.32 MG/DL (ref 0.5–1.05)
ERYTHROCYTE [DISTWIDTH] IN BLOOD BY AUTOMATED COUNT: 16.1 % (ref 11.5–14.5)
GFR SERPL CREATININE-BSD FRML MDRD: 21 ML/MIN/1.73M*2
GLUCOSE SERPL-MCNC: 97 MG/DL (ref 74–99)
HCT VFR BLD AUTO: 33.7 % (ref 36–46)
HGB BLD-MCNC: 10.4 G/DL (ref 12–16)
INR PPP: 1.5 (ref 0.9–1.1)
MCH RBC QN AUTO: 30.7 PG (ref 26–34)
MCHC RBC AUTO-ENTMCNC: 30.9 G/DL (ref 32–36)
MCV RBC AUTO: 99 FL (ref 80–100)
NRBC BLD-RTO: 0 /100 WBCS (ref 0–0)
PLATELET # BLD AUTO: 398 X10*3/UL (ref 150–450)
POTASSIUM SERPL-SCNC: 5.2 MMOL/L (ref 3.5–5.3)
PROT SERPL-MCNC: 6.9 G/DL (ref 6.4–8.2)
PROTHROMBIN TIME: 16.4 SECONDS (ref 9.8–12.8)
RBC # BLD AUTO: 3.39 X10*6/UL (ref 4–5.2)
SODIUM SERPL-SCNC: 137 MMOL/L (ref 136–145)
WBC # BLD AUTO: 9.6 X10*3/UL (ref 4.4–11.3)

## 2023-11-30 PROCEDURE — 85730 THROMBOPLASTIN TIME PARTIAL: CPT | Performed by: INTERNAL MEDICINE

## 2023-11-30 PROCEDURE — 85027 COMPLETE CBC AUTOMATED: CPT | Performed by: INTERNAL MEDICINE

## 2023-11-30 PROCEDURE — 36415 COLL VENOUS BLD VENIPUNCTURE: CPT | Performed by: INTERNAL MEDICINE

## 2023-11-30 PROCEDURE — 85610 PROTHROMBIN TIME: CPT | Performed by: INTERNAL MEDICINE

## 2023-11-30 PROCEDURE — 80053 COMPREHEN METABOLIC PANEL: CPT | Performed by: INTERNAL MEDICINE

## 2023-12-01 LAB
APTT PPP: 28 SECONDS (ref 27–38)
APTT PPP: 40 SECONDS (ref 27–38)
APTT PPP: 96 SECONDS (ref 27–38)
INR PPP: 1.9 (ref 0.9–1.1)
INR PPP: 2 (ref 0.9–1.1)
INR PPP: 2.3 (ref 0.9–1.1)
PROTHROMBIN TIME: 21.5 SECONDS (ref 9.8–12.8)
PROTHROMBIN TIME: 22.4 SECONDS (ref 9.8–12.8)
PROTHROMBIN TIME: 26.2 SECONDS (ref 9.8–12.8)

## 2023-12-01 PROCEDURE — 36415 COLL VENOUS BLD VENIPUNCTURE: CPT | Performed by: INTERNAL MEDICINE

## 2023-12-01 PROCEDURE — 85610 PROTHROMBIN TIME: CPT | Performed by: INTERNAL MEDICINE

## 2023-12-01 PROCEDURE — 85730 THROMBOPLASTIN TIME PARTIAL: CPT | Performed by: INTERNAL MEDICINE

## 2023-12-01 NOTE — PROGRESS NOTES
NEPHROLOGY PROGRESS NOTE    REASON FOR CONSULT: EMILI on CKD stage III     SUBJECTIVE:  Patient ambulated with the help of assistance and she was able to walk.  Leg swelling has improved.  Breathing is stable.  Still on the heparin drip.  Nosebleeding has improved.  Her INR is 2.  Creatinine has gone up.  She is on torsemide 50 mg daily and amiloride 5 mg daily.    OBJECTIVE:    Vitals reviewed /62 pulse 76 respiratory rate 26    General: Awake and Alert, In no distress, obese, cooperative  HEENT: Oral mucosa moist, EOMI,  nasal cannula  NECK: Supple  CHEST: No crackles, no wheeze, no tachypnea  CVS: S1,S2 heard, no rubs, irregularly irregular  ABD: Soft, Non Tender, BS present  EXT: No significant lower extremity edema    MEDICATION:    Scheduled medications reviewed      Continuous medications reviewed         RESULTS:    Lab Results   Component Value Date    WBC 9.6 11/30/2023    HGB 10.4 (L) 11/30/2023    HCT 33.7 (L) 11/30/2023    MCV 99 11/30/2023     11/30/2023        Lab Results   Component Value Date    CREATININE 2.32 (H) 11/30/2023    BUN 42 (H) 11/30/2023     11/30/2023    K 5.2 11/30/2023    CL 99 11/30/2023    CO2 29 11/30/2023        Radiology Imaging reviewed      ASSESSMENT/PLAN:     1.EMILI: Creatinine is trending up which is in the setting of torsemide which she is getting 50 mg daily.  She is also on amiloride.  Potassium has gone up to 5.2.  Multifactorial etiology for EMILI .  She got contrast for the chest CT and had PE and was hypoxic with soft blood pressure.  Will reduce the torsemide to 10 mg daily from 50 mg daily and we will stop the amiloride.  Repeat lab on Monday.    2.  CKD stage III: Patient has a baseline creatinine between 1.3-1.8.  She has cortical thinning on her previous imaging.  She has been on chronic diuretics.    Thank You very much for allowing me to participate in the care of this Patient    This document was created using dragon dictation and may contain  unintended error    Peewee Benson MD   12/01/23

## 2023-12-02 LAB
APTT PPP: 43 SECONDS (ref 27–38)
INR PPP: 3 (ref 0.9–1.1)
PROTHROMBIN TIME: 34.7 SECONDS (ref 9.8–12.8)

## 2023-12-02 PROCEDURE — 85730 THROMBOPLASTIN TIME PARTIAL: CPT | Performed by: INTERNAL MEDICINE

## 2023-12-02 PROCEDURE — 36415 COLL VENOUS BLD VENIPUNCTURE: CPT | Performed by: INTERNAL MEDICINE

## 2023-12-03 LAB
INR PPP: 3.8 (ref 0.9–1.1)
PROTHROMBIN TIME: 43.4 SECONDS (ref 9.8–12.8)

## 2023-12-03 PROCEDURE — 85610 PROTHROMBIN TIME: CPT | Performed by: INTERNAL MEDICINE

## 2023-12-03 PROCEDURE — 36415 COLL VENOUS BLD VENIPUNCTURE: CPT | Performed by: INTERNAL MEDICINE

## 2023-12-04 LAB
ALBUMIN SERPL BCP-MCNC: 3.4 G/DL (ref 3.4–5)
ANION GAP SERPL CALC-SCNC: 13 MMOL/L (ref 10–20)
BUN SERPL-MCNC: 72 MG/DL (ref 6–23)
CALCIUM SERPL-MCNC: 9.7 MG/DL (ref 8.6–10.3)
CHLORIDE SERPL-SCNC: 102 MMOL/L (ref 98–107)
CO2 SERPL-SCNC: 29 MMOL/L (ref 21–32)
CREAT SERPL-MCNC: 2.1 MG/DL (ref 0.5–1.05)
ERYTHROCYTE [DISTWIDTH] IN BLOOD BY AUTOMATED COUNT: 16.2 % (ref 11.5–14.5)
GFR SERPL CREATININE-BSD FRML MDRD: 23 ML/MIN/1.73M*2
GLUCOSE SERPL-MCNC: 105 MG/DL (ref 74–99)
HCT VFR BLD AUTO: 35.3 % (ref 36–46)
HGB BLD-MCNC: 11.1 G/DL (ref 12–16)
INR PPP: 3.9 (ref 0.9–1.1)
MCH RBC QN AUTO: 31.4 PG (ref 26–34)
MCHC RBC AUTO-ENTMCNC: 31.4 G/DL (ref 32–36)
MCV RBC AUTO: 100 FL (ref 80–100)
NRBC BLD-RTO: 0 /100 WBCS (ref 0–0)
PHOSPHATE SERPL-MCNC: 3.4 MG/DL (ref 2.5–4.9)
PLATELET # BLD AUTO: 343 X10*3/UL (ref 150–450)
POTASSIUM SERPL-SCNC: 5.3 MMOL/L (ref 3.5–5.3)
PROTHROMBIN TIME: 44.1 SECONDS (ref 9.8–12.8)
RBC # BLD AUTO: 3.54 X10*6/UL (ref 4–5.2)
SODIUM SERPL-SCNC: 139 MMOL/L (ref 136–145)
WBC # BLD AUTO: 9.4 X10*3/UL (ref 4.4–11.3)

## 2023-12-04 PROCEDURE — 36415 COLL VENOUS BLD VENIPUNCTURE: CPT | Performed by: INTERNAL MEDICINE

## 2023-12-04 PROCEDURE — 80069 RENAL FUNCTION PANEL: CPT | Performed by: INTERNAL MEDICINE

## 2023-12-04 PROCEDURE — 85610 PROTHROMBIN TIME: CPT | Performed by: INTERNAL MEDICINE

## 2023-12-04 PROCEDURE — 85027 COMPLETE CBC AUTOMATED: CPT | Performed by: INTERNAL MEDICINE

## 2023-12-04 NOTE — PROGRESS NOTES
NEPHROLOGY PROGRESS NOTE    REASON FOR CONSULT: EMILI on CKD stage III     SUBJECTIVE:  Patient has noticed her leg swelling has improved.  Her breathing is stable.  She denies any chest pain.  She is still weak and will have to stay for some more time.      OBJECTIVE:    Vitals reviewed /62 pulse 76 respiratory rate 26    General: Awake and Alert, In no distress, obese, cooperative  HEENT: Oral mucosa moist, EOMI,  nasal cannula  NECK: Supple  CHEST: No crackles, no wheeze, no tachypnea  CVS: S1,S2 heard, no rubs, irregularly irregular  ABD: Soft, Non Tender, BS present  EXT: No significant lower extremity edema    MEDICATION:    Scheduled medications reviewed      Continuous medications reviewed         RESULTS:    Lab Results   Component Value Date    WBC 9.4 12/04/2023    HGB 11.1 (L) 12/04/2023    HCT 35.3 (L) 12/04/2023     12/04/2023     12/04/2023        Lab Results   Component Value Date    CREATININE 2.10 (H) 12/04/2023    BUN 72 (H) 12/04/2023     12/04/2023    K 5.3 12/04/2023     12/04/2023    CO2 29 12/04/2023        Radiology Imaging reviewed      ASSESSMENT/PLAN:     1.EMILI: Torsemide dose has been reduced from 50 mg to 10 mg daily.  Off the amiloride.  Creatinine is better.  Initial EMILI was multifactorial, she got contrast for the chest CT and had PE and was hypoxic with soft blood pressure.  Will repeat lab work on Thursday.  Stable for discharge from nephrology standpoint.    2.  CKD stage III: Patient has a baseline creatinine between 1.3-1.8.  She has cortical thinning on her previous imaging.  She has been on chronic diuretics.    Thank You very much for allowing me to participate in the care of this Patient    This document was created using dragon dictation and may contain unintended error    Peewee Benson MD   12/04/23

## 2023-12-05 LAB
INR PPP: 2.8 (ref 0.9–1.1)
PROTHROMBIN TIME: 32.3 SECONDS (ref 9.8–12.8)

## 2023-12-05 PROCEDURE — 36415 COLL VENOUS BLD VENIPUNCTURE: CPT | Performed by: INTERNAL MEDICINE

## 2023-12-05 PROCEDURE — 85610 PROTHROMBIN TIME: CPT | Performed by: INTERNAL MEDICINE

## 2023-12-06 LAB
INR PPP: 1.7 (ref 0.9–1.1)
PROTHROMBIN TIME: 19.7 SECONDS (ref 9.8–12.8)

## 2023-12-06 PROCEDURE — 36415 COLL VENOUS BLD VENIPUNCTURE: CPT | Performed by: INTERNAL MEDICINE

## 2023-12-06 PROCEDURE — 85610 PROTHROMBIN TIME: CPT | Performed by: INTERNAL MEDICINE

## 2023-12-07 LAB
ALBUMIN SERPL BCP-MCNC: 3.2 G/DL (ref 3.4–5)
ANION GAP SERPL CALC-SCNC: 9 MMOL/L (ref 10–20)
BUN SERPL-MCNC: 61 MG/DL (ref 6–23)
CALCIUM SERPL-MCNC: 9.4 MG/DL (ref 8.6–10.3)
CHLORIDE SERPL-SCNC: 105 MMOL/L (ref 98–107)
CO2 SERPL-SCNC: 32 MMOL/L (ref 21–32)
CREAT SERPL-MCNC: 1.87 MG/DL (ref 0.5–1.05)
ERYTHROCYTE [DISTWIDTH] IN BLOOD BY AUTOMATED COUNT: 15.9 % (ref 11.5–14.5)
GFR SERPL CREATININE-BSD FRML MDRD: 27 ML/MIN/1.73M*2
GLUCOSE SERPL-MCNC: 98 MG/DL (ref 74–99)
HCT VFR BLD AUTO: 34.3 % (ref 36–46)
HGB BLD-MCNC: 10.6 G/DL (ref 12–16)
INR PPP: 1.5 (ref 0.9–1.1)
MCH RBC QN AUTO: 31 PG (ref 26–34)
MCHC RBC AUTO-ENTMCNC: 30.9 G/DL (ref 32–36)
MCV RBC AUTO: 100 FL (ref 80–100)
NRBC BLD-RTO: 0 /100 WBCS (ref 0–0)
PHOSPHATE SERPL-MCNC: 2.9 MG/DL (ref 2.5–4.9)
PLATELET # BLD AUTO: 306 X10*3/UL (ref 150–450)
POTASSIUM SERPL-SCNC: 4.6 MMOL/L (ref 3.5–5.3)
PROTHROMBIN TIME: 16.5 SECONDS (ref 9.8–12.8)
RBC # BLD AUTO: 3.42 X10*6/UL (ref 4–5.2)
SODIUM SERPL-SCNC: 141 MMOL/L (ref 136–145)
WBC # BLD AUTO: 7.8 X10*3/UL (ref 4.4–11.3)

## 2023-12-07 PROCEDURE — 36415 COLL VENOUS BLD VENIPUNCTURE: CPT | Performed by: INTERNAL MEDICINE

## 2023-12-07 PROCEDURE — 80069 RENAL FUNCTION PANEL: CPT | Performed by: INTERNAL MEDICINE

## 2023-12-07 PROCEDURE — 85027 COMPLETE CBC AUTOMATED: CPT | Performed by: INTERNAL MEDICINE

## 2023-12-07 PROCEDURE — 85610 PROTHROMBIN TIME: CPT | Performed by: INTERNAL MEDICINE

## 2023-12-07 NOTE — PROGRESS NOTES
NEPHROLOGY PROGRESS NOTE    REASON FOR CONSULT: EMILI on CKD stage III     SUBJECTIVE:  Patient is hopeful to be discharged next week.  She is still requiring 2 person assist to get out of her bed.  She states that her grandkids can help her at home.  Appetite is okay.  Leg swelling has improved.    OBJECTIVE:    Vitals reviewed /62 pulse 76 respiratory rate 26    General: Awake and Alert, In no distress, obese, cooperative  HEENT: Oral mucosa moist, EOMI,  nasal cannula  NECK: Supple  CHEST: No crackles, no wheeze, no tachypnea  CVS: S1,S2 heard, no rubs, irregularly irregular  ABD: Soft, Non Tender, BS present  EXT: No significant lower extremity edema    MEDICATION:    Scheduled medications reviewed      Continuous medications reviewed         RESULTS:    Lab Results   Component Value Date    WBC 7.8 12/07/2023    HGB 10.6 (L) 12/07/2023    HCT 34.3 (L) 12/07/2023     12/07/2023     12/07/2023        Lab Results   Component Value Date    CREATININE 1.87 (H) 12/07/2023    BUN 61 (H) 12/07/2023     12/07/2023    K 4.6 12/07/2023     12/07/2023    CO2 32 12/07/2023        Radiology Imaging reviewed      ASSESSMENT/PLAN:     1.EMILI: Renal function has improved with creatinine at 1.87.  Torsemide dose was reduced to 10 mg daily.   Off the amiloride. Initial EMILI was multifactorial, she got contrast for the chest CT and had PE and was hypoxic with soft blood pressure.  Continue periodic lab work and follow-up.    2.  CKD stage III: Patient has a baseline creatinine between 1.3-1.8.  She has cortical thinning on her previous imaging.  She has been on chronic diuretics.    Thank You very much for allowing me to participate in the care of this Patient    This document was created using dragon dictation and may contain unintended error    Peewee Benson MD   12/07/23

## 2023-12-08 ENCOUNTER — APPOINTMENT (OUTPATIENT)
Dept: CARDIOLOGY | Facility: CLINIC | Age: 83
End: 2023-12-08
Payer: MEDICARE

## 2023-12-08 LAB
APTT PPP: 33 SECONDS (ref 27–38)
ERYTHROCYTE [DISTWIDTH] IN BLOOD BY AUTOMATED COUNT: 16 % (ref 11.5–14.5)
HCT VFR BLD AUTO: 35.8 % (ref 36–46)
HGB BLD-MCNC: 11.1 G/DL (ref 12–16)
INR PPP: 1.7 (ref 0.9–1.1)
MCH RBC QN AUTO: 31.3 PG (ref 26–34)
MCHC RBC AUTO-ENTMCNC: 31 G/DL (ref 32–36)
MCV RBC AUTO: 101 FL (ref 80–100)
NRBC BLD-RTO: 0 /100 WBCS (ref 0–0)
PLATELET # BLD AUTO: 319 X10*3/UL (ref 150–450)
PROTHROMBIN TIME: 19.5 SECONDS (ref 9.8–12.8)
RBC # BLD AUTO: 3.55 X10*6/UL (ref 4–5.2)
WBC # BLD AUTO: 8.6 X10*3/UL (ref 4.4–11.3)

## 2023-12-08 PROCEDURE — 36415 COLL VENOUS BLD VENIPUNCTURE: CPT | Performed by: INTERNAL MEDICINE

## 2023-12-08 PROCEDURE — 85027 COMPLETE CBC AUTOMATED: CPT | Performed by: INTERNAL MEDICINE

## 2023-12-08 PROCEDURE — 85610 PROTHROMBIN TIME: CPT | Performed by: INTERNAL MEDICINE

## 2023-12-10 LAB
ALBUMIN SERPL BCP-MCNC: 3.2 G/DL (ref 3.4–5)
ALP SERPL-CCNC: 50 U/L (ref 33–136)
ALT SERPL W P-5'-P-CCNC: 9 U/L (ref 7–45)
ANION GAP SERPL CALC-SCNC: 13 MMOL/L (ref 10–20)
AST SERPL W P-5'-P-CCNC: 13 U/L (ref 9–39)
BILIRUB SERPL-MCNC: 0.3 MG/DL (ref 0–1.2)
BUN SERPL-MCNC: 44 MG/DL (ref 6–23)
CALCIUM SERPL-MCNC: 9.4 MG/DL (ref 8.6–10.3)
CHLORIDE SERPL-SCNC: 106 MMOL/L (ref 98–107)
CHOLEST SERPL-MCNC: 101 MG/DL (ref 0–199)
CHOLESTEROL/HDL RATIO: 2.3
CO2 SERPL-SCNC: 26 MMOL/L (ref 21–32)
CREAT SERPL-MCNC: 1.86 MG/DL (ref 0.5–1.05)
ERYTHROCYTE [DISTWIDTH] IN BLOOD BY AUTOMATED COUNT: 16.1 % (ref 11.5–14.5)
GFR SERPL CREATININE-BSD FRML MDRD: 27 ML/MIN/1.73M*2
GLUCOSE SERPL-MCNC: 100 MG/DL (ref 74–99)
HCT VFR BLD AUTO: 33.1 % (ref 36–46)
HDLC SERPL-MCNC: 43.3 MG/DL
HGB BLD-MCNC: 10.2 G/DL (ref 12–16)
INR PPP: 2.3 (ref 0.9–1.1)
MCH RBC QN AUTO: 31.1 PG (ref 26–34)
MCHC RBC AUTO-ENTMCNC: 30.8 G/DL (ref 32–36)
MCV RBC AUTO: 101 FL (ref 80–100)
NON-HDL CHOLESTEROL: 58 MG/DL (ref 0–149)
NRBC BLD-RTO: 0 /100 WBCS (ref 0–0)
PHOSPHATE SERPL-MCNC: 3.3 MG/DL (ref 2.5–4.9)
PLATELET # BLD AUTO: 273 X10*3/UL (ref 150–450)
POTASSIUM SERPL-SCNC: 4 MMOL/L (ref 3.5–5.3)
PROT SERPL-MCNC: 6.4 G/DL (ref 6.4–8.2)
PROTHROMBIN TIME: 26.1 SECONDS (ref 9.8–12.8)
RBC # BLD AUTO: 3.28 X10*6/UL (ref 4–5.2)
SODIUM SERPL-SCNC: 141 MMOL/L (ref 136–145)
TSH SERPL-ACNC: 0.7 MIU/L (ref 0.44–3.98)
WBC # BLD AUTO: 8.7 X10*3/UL (ref 4.4–11.3)

## 2023-12-10 PROCEDURE — 85610 PROTHROMBIN TIME: CPT | Performed by: INTERNAL MEDICINE

## 2023-12-10 PROCEDURE — 36415 COLL VENOUS BLD VENIPUNCTURE: CPT | Performed by: INTERNAL MEDICINE

## 2023-12-10 PROCEDURE — 84443 ASSAY THYROID STIM HORMONE: CPT | Performed by: INTERNAL MEDICINE

## 2023-12-10 PROCEDURE — 85027 COMPLETE CBC AUTOMATED: CPT | Performed by: INTERNAL MEDICINE

## 2023-12-10 PROCEDURE — 84075 ASSAY ALKALINE PHOSPHATASE: CPT | Performed by: INTERNAL MEDICINE

## 2023-12-10 PROCEDURE — 84100 ASSAY OF PHOSPHORUS: CPT | Performed by: INTERNAL MEDICINE

## 2023-12-10 PROCEDURE — 82465 ASSAY BLD/SERUM CHOLESTEROL: CPT | Performed by: INTERNAL MEDICINE

## 2023-12-11 LAB
INR PPP: 2.5 (ref 0.9–1.1)
PROTHROMBIN TIME: 28.2 SECONDS (ref 9.8–12.8)

## 2023-12-11 PROCEDURE — 85610 PROTHROMBIN TIME: CPT | Performed by: INTERNAL MEDICINE

## 2023-12-11 PROCEDURE — 36415 COLL VENOUS BLD VENIPUNCTURE: CPT | Performed by: INTERNAL MEDICINE

## 2023-12-12 LAB
INR PPP: 3.1 (ref 0.9–1.1)
PROTHROMBIN TIME: 35.3 SECONDS (ref 9.8–12.8)

## 2023-12-12 PROCEDURE — 85610 PROTHROMBIN TIME: CPT | Performed by: INTERNAL MEDICINE

## 2023-12-12 PROCEDURE — 36415 COLL VENOUS BLD VENIPUNCTURE: CPT | Performed by: INTERNAL MEDICINE

## 2023-12-12 NOTE — PROGRESS NOTES
NEPHROLOGY PROGRESS NOTE    REASON FOR CONSULT: EMILI on CKD stage III     SUBJECTIVE:  Overall is weak but is a lot better.  Will be discharged tomorrow.  She will be going home with home care.   Leg swelling has improved.    OBJECTIVE:    Vitals reviewed /62 pulse 76 respiratory rate 26    General: Awake and Alert, In no distress, obese, cooperative  HEENT: Oral mucosa moist, EOMI,  nasal cannula  NECK: Supple  CHEST: No crackles, no wheeze, no tachypnea  CVS: S1,S2 heard, no rubs, irregularly irregular  ABD: Soft, Non Tender, BS present  EXT: No significant lower extremity edema    MEDICATION:    Scheduled medications reviewed      Continuous medications reviewed         RESULTS:    Lab Results   Component Value Date    WBC 8.7 12/10/2023    HGB 10.2 (L) 12/10/2023    HCT 33.1 (L) 12/10/2023     (H) 12/10/2023     12/10/2023        Lab Results   Component Value Date    CREATININE 1.86 (H) 12/10/2023    BUN 44 (H) 12/10/2023     12/10/2023    K 4.0 12/10/2023     12/10/2023    CO2 26 12/10/2023        Radiology Imaging reviewed      ASSESSMENT/PLAN:     1.EMILI: Renal function is stable with creatinine at 1.86.  Currently on torsemide 10 mg daily.   Off the amiloride. Initial EMILI was multifactorial, she got contrast for the chest CT and had PE and was hypoxic with soft blood pressure.  Continue periodic lab work and follow-up.    2.  CKD stage III: Patient has a baseline creatinine between 1.3-1.8.  She has cortical thinning on her previous imaging.  She has been on chronic diuretics.    Thank You very much for allowing me to participate in the care of this Patient    This document was created using dragon dictation and may contain unintended error    Peewee Benson MD   12/12/23

## 2023-12-13 ENCOUNTER — HOME HEALTH ADMISSION (OUTPATIENT)
Dept: HOME HEALTH SERVICES | Facility: HOME HEALTH | Age: 83
End: 2023-12-13
Payer: MEDICARE

## 2023-12-13 LAB
HOLD SPECIMEN: NORMAL
INR PPP: 3.3 (ref 0.9–1.1)
PROTHROMBIN TIME: 37.5 SECONDS (ref 9.8–12.8)

## 2023-12-13 PROCEDURE — 36415 COLL VENOUS BLD VENIPUNCTURE: CPT | Performed by: INTERNAL MEDICINE

## 2023-12-13 PROCEDURE — 85610 PROTHROMBIN TIME: CPT | Performed by: INTERNAL MEDICINE

## 2023-12-14 LAB
INR PPP: 2.4 (ref 0.9–1.1)
PROTHROMBIN TIME: 27.6 SECONDS (ref 9.8–12.8)

## 2023-12-14 PROCEDURE — 36415 COLL VENOUS BLD VENIPUNCTURE: CPT | Performed by: INTERNAL MEDICINE

## 2023-12-14 PROCEDURE — 85610 PROTHROMBIN TIME: CPT | Performed by: INTERNAL MEDICINE

## 2023-12-16 ENCOUNTER — HOME CARE VISIT (OUTPATIENT)
Dept: HOME HEALTH SERVICES | Facility: HOME HEALTH | Age: 83
End: 2023-12-16
Payer: MEDICARE

## 2023-12-16 VITALS
OXYGEN SATURATION: 94 % | RESPIRATION RATE: 12 BRPM | SYSTOLIC BLOOD PRESSURE: 124 MMHG | TEMPERATURE: 98.3 F | DIASTOLIC BLOOD PRESSURE: 74 MMHG | HEART RATE: 69 BPM

## 2023-12-16 PROCEDURE — G0299 HHS/HOSPICE OF RN EA 15 MIN: HCPCS | Mod: HHH

## 2023-12-16 PROCEDURE — 169592 NO-PAY CLAIM PROCEDURE

## 2023-12-16 PROCEDURE — 1090000001 HH PPS REVENUE CREDIT

## 2023-12-16 PROCEDURE — 1090000002 HH PPS REVENUE DEBIT

## 2023-12-16 PROCEDURE — 0023 HH SOC

## 2023-12-16 ASSESSMENT — ACTIVITIES OF DAILY LIVING (ADL)
OASIS_M1830: 03
ENTERING_EXITING_HOME: NEEDS ASSISTANCE

## 2023-12-16 ASSESSMENT — ENCOUNTER SYMPTOMS
PAIN: SEE NOTE
PAIN SEVERITY GOAL: 5/10
LOWEST PAIN SEVERITY IN PAST 24 HOURS: 5/10
PAIN: 1
APPETITE LEVEL: GOOD
HIGHEST PAIN SEVERITY IN PAST 24 HOURS: 6/10
PERSON REPORTING PAIN: PATIENT

## 2023-12-16 NOTE — HOME HEALTH
completed soc visit. vss. 6.10 chronic blle pain. pt.inr 1.7. md notified. lungs cta. +1 blle edema. no other remarkable issues upon assessment. next sn visit is for cp and general reassessment

## 2023-12-17 PROCEDURE — 1090000002 HH PPS REVENUE DEBIT

## 2023-12-17 PROCEDURE — 1090000001 HH PPS REVENUE CREDIT

## 2023-12-18 ENCOUNTER — HOME CARE VISIT (OUTPATIENT)
Dept: HOME HEALTH SERVICES | Facility: HOME HEALTH | Age: 83
End: 2023-12-18
Payer: MEDICARE

## 2023-12-18 PROCEDURE — 1090000002 HH PPS REVENUE DEBIT

## 2023-12-18 PROCEDURE — G0151 HHCP-SERV OF PT,EA 15 MIN: HCPCS | Mod: HHH

## 2023-12-18 PROCEDURE — 1090000001 HH PPS REVENUE CREDIT

## 2023-12-18 ASSESSMENT — ENCOUNTER SYMPTOMS
PAIN LOCATION: RIGHT LEG
PAIN: 1
OCCASIONAL FEELINGS OF UNSTEADINESS: 1
PERSON REPORTING PAIN: PATIENT
PAIN LOCATION: LEFT LEG
PAIN LOCATION - PAIN SEVERITY: 9/10
SUBJECTIVE PAIN PROGRESSION: UNCHANGED
PAIN LOCATION - PAIN SEVERITY: 9/10

## 2023-12-18 ASSESSMENT — ACTIVITIES OF DAILY LIVING (ADL)
AMBULATION ASSISTANCE ON FLAT SURFACES: 1
AMBULATION_DISTANCE/DURATION_TOLERATED: 12 FEET

## 2023-12-19 ENCOUNTER — HOSPITAL ENCOUNTER (OUTPATIENT)
Dept: CARDIOLOGY | Facility: HOSPITAL | Age: 83
Discharge: HOME | End: 2023-12-19
Payer: MEDICARE

## 2023-12-19 PROCEDURE — 1090000001 HH PPS REVENUE CREDIT

## 2023-12-19 PROCEDURE — 1090000002 HH PPS REVENUE DEBIT

## 2023-12-19 PROCEDURE — 93005 ELECTROCARDIOGRAM TRACING: CPT

## 2023-12-20 PROCEDURE — 1090000001 HH PPS REVENUE CREDIT

## 2023-12-20 PROCEDURE — 1090000002 HH PPS REVENUE DEBIT

## 2023-12-21 ENCOUNTER — HOME CARE VISIT (OUTPATIENT)
Dept: HOME HEALTH SERVICES | Facility: HOME HEALTH | Age: 83
End: 2023-12-21
Payer: MEDICARE

## 2023-12-21 VITALS
OXYGEN SATURATION: 95 % | SYSTOLIC BLOOD PRESSURE: 128 MMHG | TEMPERATURE: 98.6 F | RESPIRATION RATE: 12 BRPM | HEART RATE: 83 BPM | DIASTOLIC BLOOD PRESSURE: 72 MMHG

## 2023-12-21 PROCEDURE — 1090000002 HH PPS REVENUE DEBIT

## 2023-12-21 PROCEDURE — G0299 HHS/HOSPICE OF RN EA 15 MIN: HCPCS | Mod: HHH

## 2023-12-21 PROCEDURE — 1090000001 HH PPS REVENUE CREDIT

## 2023-12-21 PROCEDURE — G0157 HHC PT ASSISTANT EA 15: HCPCS | Mod: HHH

## 2023-12-21 ASSESSMENT — ENCOUNTER SYMPTOMS
PAIN: SEE NOTE
PAIN SEVERITY GOAL: 5/10
HIGHEST PAIN SEVERITY IN PAST 24 HOURS: 7/10
PERSON REPORTING PAIN: PATIENT
PAIN: 1
APPETITE LEVEL: GOOD
LOWEST PAIN SEVERITY IN PAST 24 HOURS: 6/10

## 2023-12-21 NOTE — HOME HEALTH
completed routine visit. vss. 7.10 blle chronic pain. pt.inr 2.0 and md notified. anxiety and difficulty sleeping noted. stage 2 noted to rt buttocks. ordered supplies and discussed management. +2 blle edema. no other remarkable issues upon assessment. next sn visit is for wound check and general reassessment

## 2023-12-22 ENCOUNTER — APPOINTMENT (OUTPATIENT)
Dept: HOME HEALTH SERVICES | Facility: HOME HEALTH | Age: 83
End: 2023-12-22
Payer: MEDICARE

## 2023-12-22 PROCEDURE — 1090000001 HH PPS REVENUE CREDIT

## 2023-12-22 PROCEDURE — 1090000002 HH PPS REVENUE DEBIT

## 2023-12-22 ASSESSMENT — ENCOUNTER SYMPTOMS: DENIES PAIN: 1

## 2023-12-23 PROCEDURE — 1090000001 HH PPS REVENUE CREDIT

## 2023-12-23 PROCEDURE — 1090000002 HH PPS REVENUE DEBIT

## 2023-12-23 PROCEDURE — A6212 FOAM DRG <=16 SQ IN W/BORDER: HCPCS | Mod: HHH

## 2023-12-24 PROCEDURE — 1090000002 HH PPS REVENUE DEBIT

## 2023-12-24 PROCEDURE — 1090000001 HH PPS REVENUE CREDIT

## 2023-12-25 PROCEDURE — 1090000001 HH PPS REVENUE CREDIT

## 2023-12-25 PROCEDURE — 1090000002 HH PPS REVENUE DEBIT

## 2023-12-26 PROCEDURE — 1090000001 HH PPS REVENUE CREDIT

## 2023-12-26 PROCEDURE — 1090000002 HH PPS REVENUE DEBIT

## 2023-12-27 ENCOUNTER — HOME CARE VISIT (OUTPATIENT)
Dept: HOME HEALTH SERVICES | Facility: HOME HEALTH | Age: 83
End: 2023-12-27
Payer: MEDICARE

## 2023-12-27 PROCEDURE — G0157 HHC PT ASSISTANT EA 15: HCPCS | Mod: HHH

## 2023-12-27 PROCEDURE — 1090000001 HH PPS REVENUE CREDIT

## 2023-12-27 PROCEDURE — 1090000002 HH PPS REVENUE DEBIT

## 2023-12-27 ASSESSMENT — ENCOUNTER SYMPTOMS
PAIN SEVERITY GOAL: 0/10
LOWEST PAIN SEVERITY IN PAST 24 HOURS: 3/10
PERSON REPORTING PAIN: PATIENT
HIGHEST PAIN SEVERITY IN PAST 24 HOURS: 6/10
PAIN LOCATION: LEFT KNEE
PAIN LOCATION: RIGHT KNEE
PAIN: 1
SUBJECTIVE PAIN PROGRESSION: WAXING AND WANING

## 2023-12-28 ENCOUNTER — HOME CARE VISIT (OUTPATIENT)
Dept: HOME HEALTH SERVICES | Facility: HOME HEALTH | Age: 83
End: 2023-12-28
Payer: MEDICARE

## 2023-12-28 VITALS
HEART RATE: 91 BPM | RESPIRATION RATE: 12 BRPM | TEMPERATURE: 98.4 F | DIASTOLIC BLOOD PRESSURE: 82 MMHG | SYSTOLIC BLOOD PRESSURE: 132 MMHG | OXYGEN SATURATION: 94 %

## 2023-12-28 PROCEDURE — G0299 HHS/HOSPICE OF RN EA 15 MIN: HCPCS | Mod: HHH

## 2023-12-28 PROCEDURE — 1090000002 HH PPS REVENUE DEBIT

## 2023-12-28 PROCEDURE — 1090000001 HH PPS REVENUE CREDIT

## 2023-12-28 ASSESSMENT — ENCOUNTER SYMPTOMS
CONSTIPATION: 1
APPETITE LEVEL: GOOD
DENIES PAIN: 1

## 2023-12-28 NOTE — HOME HEALTH
completed routine visit. vss. no pain. last bm 3 days ago and distended with hypo bsx4. +2 blle edema. poor appetite. md contacted. no other remarkable issues upon assessment. next sn visit is for gi and general reassessment.

## 2023-12-29 ENCOUNTER — APPOINTMENT (OUTPATIENT)
Dept: HOME HEALTH SERVICES | Facility: HOME HEALTH | Age: 83
End: 2023-12-29
Payer: MEDICARE

## 2023-12-29 LAB
ATRIAL RATE: 106 BPM
P AXIS: 14 DEGREES
PR INTERVAL: 206 MS
Q ONSET: 251 MS
QRS COUNT: 16 BEATS
QRS DURATION: 97 MS
QT INTERVAL: 362 MS
QTC CALCULATION(BAZETT): 467 MS
QTC FREDERICIA: 429 MS
R AXIS: -35 DEGREES
T AXIS: -9 DEGREES
T OFFSET: 432 MS
VENTRICULAR RATE: 100 BPM

## 2023-12-29 PROCEDURE — 1090000001 HH PPS REVENUE CREDIT

## 2023-12-29 PROCEDURE — 1090000002 HH PPS REVENUE DEBIT

## 2023-12-30 PROCEDURE — 1090000002 HH PPS REVENUE DEBIT

## 2023-12-30 PROCEDURE — 1090000001 HH PPS REVENUE CREDIT

## 2023-12-31 PROCEDURE — 1090000001 HH PPS REVENUE CREDIT

## 2023-12-31 PROCEDURE — 1090000002 HH PPS REVENUE DEBIT

## 2024-01-01 PROCEDURE — 1090000002 HH PPS REVENUE DEBIT

## 2024-01-01 PROCEDURE — 1090000001 HH PPS REVENUE CREDIT

## 2024-01-02 PROCEDURE — 1090000001 HH PPS REVENUE CREDIT

## 2024-01-02 PROCEDURE — 1090000002 HH PPS REVENUE DEBIT

## 2024-01-03 ENCOUNTER — HOME CARE VISIT (OUTPATIENT)
Dept: HOME HEALTH SERVICES | Facility: HOME HEALTH | Age: 84
End: 2024-01-03
Payer: MEDICARE

## 2024-01-03 PROCEDURE — 1090000002 HH PPS REVENUE DEBIT

## 2024-01-03 PROCEDURE — G0157 HHC PT ASSISTANT EA 15: HCPCS | Mod: HHH

## 2024-01-03 PROCEDURE — 1090000001 HH PPS REVENUE CREDIT

## 2024-01-03 ASSESSMENT — ENCOUNTER SYMPTOMS: DENIES PAIN: 1

## 2024-01-04 ENCOUNTER — HOME CARE VISIT (OUTPATIENT)
Dept: HOME HEALTH SERVICES | Facility: HOME HEALTH | Age: 84
End: 2024-01-04
Payer: MEDICARE

## 2024-01-04 VITALS
HEART RATE: 73 BPM | DIASTOLIC BLOOD PRESSURE: 80 MMHG | OXYGEN SATURATION: 93 % | TEMPERATURE: 97.5 F | RESPIRATION RATE: 18 BRPM | SYSTOLIC BLOOD PRESSURE: 138 MMHG

## 2024-01-04 PROCEDURE — G0300 HHS/HOSPICE OF LPN EA 15 MIN: HCPCS | Mod: HHH

## 2024-01-04 PROCEDURE — 1090000001 HH PPS REVENUE CREDIT

## 2024-01-04 PROCEDURE — 1090000002 HH PPS REVENUE DEBIT

## 2024-01-04 SDOH — ECONOMIC STABILITY: GENERAL

## 2024-01-04 ASSESSMENT — ACTIVITIES OF DAILY LIVING (ADL): MONEY MANAGEMENT (EXPENSES/BILLS): NEEDS ASSISTANCE

## 2024-01-04 ASSESSMENT — ENCOUNTER SYMPTOMS
APPETITE LEVEL: FAIR
DENIES PAIN: 1
CHANGE IN APPETITE: UNCHANGED
LAST BOWEL MOVEMENT: 66843

## 2024-01-05 ENCOUNTER — LAB REQUISITION (OUTPATIENT)
Dept: LAB | Facility: HOSPITAL | Age: 84
End: 2024-01-05
Payer: MEDICARE

## 2024-01-05 ENCOUNTER — HOME CARE VISIT (OUTPATIENT)
Dept: HOME HEALTH SERVICES | Facility: HOME HEALTH | Age: 84
End: 2024-01-05
Payer: MEDICARE

## 2024-01-05 VITALS
SYSTOLIC BLOOD PRESSURE: 108 MMHG | DIASTOLIC BLOOD PRESSURE: 64 MMHG | OXYGEN SATURATION: 92 % | RESPIRATION RATE: 17 BRPM | HEART RATE: 85 BPM | TEMPERATURE: 97.8 F

## 2024-01-05 LAB — POC INR: 7

## 2024-01-05 PROCEDURE — 1090000002 HH PPS REVENUE DEBIT

## 2024-01-05 PROCEDURE — G0157 HHC PT ASSISTANT EA 15: HCPCS | Mod: HHH

## 2024-01-05 PROCEDURE — G0299 HHS/HOSPICE OF RN EA 15 MIN: HCPCS | Mod: HHH

## 2024-01-05 PROCEDURE — 1090000001 HH PPS REVENUE CREDIT

## 2024-01-05 SDOH — ECONOMIC STABILITY: GENERAL

## 2024-01-05 SDOH — ECONOMIC STABILITY: FOOD INSECURITY: MEALS PER DAY: 2

## 2024-01-05 ASSESSMENT — PAIN SCALES - PAIN ASSESSMENT IN ADVANCED DEMENTIA (PAINAD)
CONSOLABILITY: 0 - NO NEED TO CONSOLE.
TOTALSCORE: 0
NEGVOCALIZATION: 0
NEGVOCALIZATION: 0 - NONE.
FACIALEXPRESSION: 0 - SMILING OR INEXPRESSIVE.
BREATHING: 0
BODYLANGUAGE: 0
CONSOLABILITY: 0
FACIALEXPRESSION: 0
BODYLANGUAGE: 0 - RELAXED.

## 2024-01-05 ASSESSMENT — ACTIVITIES OF DAILY LIVING (ADL)
CURRENT_FUNCTION: STAND BY ASSIST
AMBULATION ASSISTANCE: STAND BY ASSIST
MONEY MANAGEMENT (EXPENSES/BILLS): INDEPENDENT

## 2024-01-05 ASSESSMENT — ENCOUNTER SYMPTOMS
HIGHEST PAIN SEVERITY IN PAST 24 HOURS: 5/10
SHORTNESS OF BREATH: 1
LOWEST PAIN SEVERITY IN PAST 24 HOURS: 0/10
APPETITE LEVEL: FAIR
PAIN LOCATION: LEFT LEG
PAIN LOCATION - PAIN SEVERITY: 9/10
SUBJECTIVE PAIN PROGRESSION: WAXING AND WANING
LOSS OF SENSATION IN FEET: 1
PAIN: 1
PERSON REPORTING PAIN: PATIENT
FATIGUES EASILY: 1
LOWER EXTREMITY EDEMA: 1
OCCASIONAL FEELINGS OF UNSTEADINESS: 1
DYSPNEA ACTIVITY LEVEL: AFTER AMBULATING LESS THAN 10 FT
CHANGE IN APPETITE: UNCHANGED
PAIN LOCATION - PAIN FREQUENCY: CONSTANT
HIGHEST PAIN SEVERITY IN PAST 24 HOURS: 9/10
PAIN: 1
PAIN SEVERITY GOAL: 0/10
PAIN LOCATION - PAIN FREQUENCY: CONSTANT
PAIN LOCATION: RIGHT LEG
PAIN LOCATION: LEFT LEG
PAIN LOCATION: RIGHT LEG
SUBJECTIVE PAIN PROGRESSION: UNCHANGED
LOWEST PAIN SEVERITY IN PAST 24 HOURS: 9/10
PAIN SEVERITY GOAL: 0/10
DEPRESSION: 0
PERSON REPORTING PAIN: PATIENT
PAIN LOCATION - PAIN SEVERITY: 9/10

## 2024-01-05 NOTE — HOME HEALTH
pt seen today for routine visit and inr check. inr was greater than 8.labs were redrawn peripherally after 2 attempts from left hand. pt tolerated. dr hansen office notified. return call with an order to hold coumadin and daily pt inr draws until inr comes down. pt instructed to be cautious ambulating, not to strain with bowel movements, no shaving. labs were labelled and taken to [facility] lab.

## 2024-01-06 PROCEDURE — A6212 FOAM DRG <=16 SQ IN W/BORDER: HCPCS | Mod: HHH

## 2024-01-06 PROCEDURE — 1090000001 HH PPS REVENUE CREDIT

## 2024-01-06 PROCEDURE — 1090000002 HH PPS REVENUE DEBIT

## 2024-01-07 PROCEDURE — 1090000002 HH PPS REVENUE DEBIT

## 2024-01-07 PROCEDURE — 1090000001 HH PPS REVENUE CREDIT

## 2024-01-08 ENCOUNTER — HOME CARE VISIT (OUTPATIENT)
Dept: HOME HEALTH SERVICES | Facility: HOME HEALTH | Age: 84
End: 2024-01-08
Payer: MEDICARE

## 2024-01-08 VITALS
RESPIRATION RATE: 8 BRPM | DIASTOLIC BLOOD PRESSURE: 64 MMHG | HEART RATE: 95 BPM | SYSTOLIC BLOOD PRESSURE: 120 MMHG | OXYGEN SATURATION: 94 % | TEMPERATURE: 97.8 F

## 2024-01-08 LAB — POC INR: 1.8

## 2024-01-08 PROCEDURE — 1090000002 HH PPS REVENUE DEBIT

## 2024-01-08 PROCEDURE — 1090000001 HH PPS REVENUE CREDIT

## 2024-01-08 PROCEDURE — G0157 HHC PT ASSISTANT EA 15: HCPCS | Mod: HHH

## 2024-01-08 PROCEDURE — G0300 HHS/HOSPICE OF LPN EA 15 MIN: HCPCS | Mod: HHH

## 2024-01-08 SDOH — ECONOMIC STABILITY: GENERAL

## 2024-01-08 ASSESSMENT — ENCOUNTER SYMPTOMS
PERSON REPORTING PAIN: PATIENT
HIGHEST PAIN SEVERITY IN PAST 24 HOURS: 6/10
DENIES PAIN: 1
SUBJECTIVE PAIN PROGRESSION: WAXING AND WANING
LOWEST PAIN SEVERITY IN PAST 24 HOURS: 3/10
PAIN LOCATION: LEFT LEG
PAIN SEVERITY GOAL: 0/10
PAIN LOCATION: COCCYX
PAIN LOCATION: RIGHT LEG
PAIN: 1
APPETITE LEVEL: GOOD
LAST BOWEL MOVEMENT: 66847

## 2024-01-08 ASSESSMENT — ACTIVITIES OF DAILY LIVING (ADL): MONEY MANAGEMENT (EXPENSES/BILLS): INDEPENDENT

## 2024-01-09 PROCEDURE — 1090000001 HH PPS REVENUE CREDIT

## 2024-01-09 PROCEDURE — 1090000002 HH PPS REVENUE DEBIT

## 2024-01-10 PROCEDURE — 1090000002 HH PPS REVENUE DEBIT

## 2024-01-10 PROCEDURE — 1090000001 HH PPS REVENUE CREDIT

## 2024-01-11 PROCEDURE — 1090000002 HH PPS REVENUE DEBIT

## 2024-01-11 PROCEDURE — 1090000001 HH PPS REVENUE CREDIT

## 2024-01-12 ENCOUNTER — HOME CARE VISIT (OUTPATIENT)
Dept: HOME HEALTH SERVICES | Facility: HOME HEALTH | Age: 84
End: 2024-01-12
Payer: MEDICARE

## 2024-01-12 VITALS
DIASTOLIC BLOOD PRESSURE: 84 MMHG | SYSTOLIC BLOOD PRESSURE: 120 MMHG | TEMPERATURE: 97.8 F | HEART RATE: 101 BPM | OXYGEN SATURATION: 98 % | RESPIRATION RATE: 18 BRPM

## 2024-01-12 LAB — POC INR: 2

## 2024-01-12 PROCEDURE — G0157 HHC PT ASSISTANT EA 15: HCPCS | Mod: HHH

## 2024-01-12 PROCEDURE — G0300 HHS/HOSPICE OF LPN EA 15 MIN: HCPCS | Mod: HHH

## 2024-01-12 PROCEDURE — 1090000001 HH PPS REVENUE CREDIT

## 2024-01-12 PROCEDURE — 1090000002 HH PPS REVENUE DEBIT

## 2024-01-12 SDOH — ECONOMIC STABILITY: GENERAL

## 2024-01-12 ASSESSMENT — ACTIVITIES OF DAILY LIVING (ADL): MONEY MANAGEMENT (EXPENSES/BILLS): INDEPENDENT

## 2024-01-12 ASSESSMENT — ENCOUNTER SYMPTOMS
LAST BOWEL MOVEMENT: 66851
DENIES PAIN: 1
NAUSEA: 1
APPETITE LEVEL: FAIR
DENIES PAIN: 1

## 2024-01-13 PROCEDURE — 1090000001 HH PPS REVENUE CREDIT

## 2024-01-13 PROCEDURE — 1090000002 HH PPS REVENUE DEBIT

## 2024-01-13 PROCEDURE — A6212 FOAM DRG <=16 SQ IN W/BORDER: HCPCS | Mod: HHH

## 2024-01-14 PROCEDURE — 1090000001 HH PPS REVENUE CREDIT

## 2024-01-14 PROCEDURE — 1090000002 HH PPS REVENUE DEBIT

## 2024-01-15 PROCEDURE — 1090000002 HH PPS REVENUE DEBIT

## 2024-01-15 PROCEDURE — 1090000001 HH PPS REVENUE CREDIT

## 2024-01-16 ENCOUNTER — HOME CARE VISIT (OUTPATIENT)
Dept: HOME HEALTH SERVICES | Facility: HOME HEALTH | Age: 84
End: 2024-01-16
Payer: MEDICARE

## 2024-01-16 PROCEDURE — 0023 HH SOC

## 2024-01-16 PROCEDURE — 1090000001 HH PPS REVENUE CREDIT

## 2024-01-16 PROCEDURE — 1090000002 HH PPS REVENUE DEBIT

## 2024-01-16 PROCEDURE — G0151 HHCP-SERV OF PT,EA 15 MIN: HCPCS | Mod: HHH

## 2024-01-16 ASSESSMENT — ENCOUNTER SYMPTOMS
PAIN: 1
PERSON REPORTING PAIN: PATIENT
OCCASIONAL FEELINGS OF UNSTEADINESS: 1

## 2024-01-17 PROCEDURE — 1090000001 HH PPS REVENUE CREDIT

## 2024-01-17 PROCEDURE — 1090000002 HH PPS REVENUE DEBIT

## 2024-01-18 PROCEDURE — 1090000001 HH PPS REVENUE CREDIT

## 2024-01-18 PROCEDURE — 1090000002 HH PPS REVENUE DEBIT

## 2024-01-19 PROCEDURE — 1090000002 HH PPS REVENUE DEBIT

## 2024-01-19 PROCEDURE — 1090000001 HH PPS REVENUE CREDIT

## 2024-01-20 ENCOUNTER — APPOINTMENT (OUTPATIENT)
Dept: HOME HEALTH SERVICES | Facility: HOME HEALTH | Age: 84
End: 2024-01-20
Payer: MEDICARE

## 2024-01-20 PROCEDURE — 1090000002 HH PPS REVENUE DEBIT

## 2024-01-20 PROCEDURE — 1090000001 HH PPS REVENUE CREDIT

## 2024-01-21 PROCEDURE — 1090000002 HH PPS REVENUE DEBIT

## 2024-01-21 PROCEDURE — 1090000001 HH PPS REVENUE CREDIT

## 2024-01-22 ENCOUNTER — HOME CARE VISIT (OUTPATIENT)
Dept: HOME HEALTH SERVICES | Facility: HOME HEALTH | Age: 84
End: 2024-01-22
Payer: MEDICARE

## 2024-01-22 PROCEDURE — 1090000001 HH PPS REVENUE CREDIT

## 2024-01-22 PROCEDURE — 1090000002 HH PPS REVENUE DEBIT

## 2024-01-23 PROCEDURE — 1090000002 HH PPS REVENUE DEBIT

## 2024-01-23 PROCEDURE — 1090000001 HH PPS REVENUE CREDIT

## 2024-01-24 ENCOUNTER — HOME CARE VISIT (OUTPATIENT)
Dept: HOME HEALTH SERVICES | Facility: HOME HEALTH | Age: 84
End: 2024-01-24
Payer: MEDICARE

## 2024-01-24 PROCEDURE — 1090000001 HH PPS REVENUE CREDIT

## 2024-01-24 PROCEDURE — G0157 HHC PT ASSISTANT EA 15: HCPCS | Mod: HHH

## 2024-01-24 PROCEDURE — 1090000002 HH PPS REVENUE DEBIT

## 2024-01-25 ENCOUNTER — APPOINTMENT (OUTPATIENT)
Dept: CARDIOLOGY | Facility: CLINIC | Age: 84
End: 2024-01-25
Payer: MEDICARE

## 2024-01-25 PROCEDURE — 1090000001 HH PPS REVENUE CREDIT

## 2024-01-25 PROCEDURE — 1090000002 HH PPS REVENUE DEBIT

## 2024-01-25 ASSESSMENT — ENCOUNTER SYMPTOMS
PAIN: 1
LOWEST PAIN SEVERITY IN PAST 24 HOURS: 3/10
PAIN SEVERITY GOAL: 2/10
PERSON REPORTING PAIN: PATIENT
HIGHEST PAIN SEVERITY IN PAST 24 HOURS: 6/10
PAIN LOCATION: LEFT LEG
SUBJECTIVE PAIN PROGRESSION: WAXING AND WANING
PAIN LOCATION: RIGHT LEG

## 2024-01-26 PROCEDURE — 1090000002 HH PPS REVENUE DEBIT

## 2024-01-26 PROCEDURE — 1090000001 HH PPS REVENUE CREDIT

## 2024-01-27 PROCEDURE — 1090000001 HH PPS REVENUE CREDIT

## 2024-01-27 PROCEDURE — 1090000002 HH PPS REVENUE DEBIT

## 2024-01-28 PROCEDURE — 1090000002 HH PPS REVENUE DEBIT

## 2024-01-28 PROCEDURE — 1090000001 HH PPS REVENUE CREDIT

## 2024-01-29 ENCOUNTER — HOME CARE VISIT (OUTPATIENT)
Dept: HOME HEALTH SERVICES | Facility: HOME HEALTH | Age: 84
End: 2024-01-29
Payer: MEDICARE

## 2024-01-29 VITALS
DIASTOLIC BLOOD PRESSURE: 84 MMHG | RESPIRATION RATE: 12 BRPM | OXYGEN SATURATION: 95 % | SYSTOLIC BLOOD PRESSURE: 122 MMHG | TEMPERATURE: 98.2 F | HEART RATE: 82 BPM

## 2024-01-29 PROCEDURE — 1090000001 HH PPS REVENUE CREDIT

## 2024-01-29 PROCEDURE — 1090000002 HH PPS REVENUE DEBIT

## 2024-01-29 PROCEDURE — G0157 HHC PT ASSISTANT EA 15: HCPCS | Mod: HHH

## 2024-01-29 PROCEDURE — G0299 HHS/HOSPICE OF RN EA 15 MIN: HCPCS | Mod: HHH

## 2024-01-29 ASSESSMENT — ENCOUNTER SYMPTOMS
PAIN LOCATION: LEFT LEG
PAIN: SEE NOTE
PERSON REPORTING PAIN: PATIENT
HIGHEST PAIN SEVERITY IN PAST 24 HOURS: 5/10
PAIN LOCATION: RIGHT LEG
PAIN SEVERITY GOAL: 0/10
PAIN: 1
PAIN LOCATION: COCCYX
PERSON REPORTING PAIN: PATIENT
HIGHEST PAIN SEVERITY IN PAST 24 HOURS: 6/10
PAIN SEVERITY GOAL: 5/10
PAIN: 1
LOWEST PAIN SEVERITY IN PAST 24 HOURS: 5/10
LOWEST PAIN SEVERITY IN PAST 24 HOURS: 3/10
APPETITE LEVEL: GOOD

## 2024-01-29 NOTE — HOME HEALTH
completed sn dc. vss. 6/10 lt shoulder pain. [facility]g has pt inr machine and checked while i was there for a pt.inr of 2. md notified. pcg is attempting to get a new md that will work with their home results. +2 blle edema. no other remarkable issues. no further sn needs

## 2024-01-30 ENCOUNTER — TELEPHONE (OUTPATIENT)
Dept: PHARMACY | Facility: CLINIC | Age: 84
End: 2024-01-30
Payer: MEDICARE

## 2024-01-30 PROCEDURE — 1090000001 HH PPS REVENUE CREDIT

## 2024-01-30 PROCEDURE — 1090000002 HH PPS REVENUE DEBIT

## 2024-01-30 NOTE — TELEPHONE ENCOUNTER
This pt is a home monitoring patient with Dr. Alba.  He has sent us a referral for her to come to clinic.  Called pt granddaughter, Kiki.  Apparently pt just started warfarin 2-3 weeks ago.  Has been taking 4 mg daily for 2 weeks and inr was 6.  She was told to hold x 2 days and then when she checked the next day, inr was 2.  I instructed for pt to take only 2 mg today (Tues Jan 30th), 4 mg tomorrow (Wed Jan 31) and then we will see her on Thurs.  Most likely will need a larger dose reduction.  Will also need a teaching.

## 2024-01-31 ENCOUNTER — HOME CARE VISIT (OUTPATIENT)
Dept: HOME HEALTH SERVICES | Facility: HOME HEALTH | Age: 84
End: 2024-01-31
Payer: MEDICARE

## 2024-01-31 PROCEDURE — G0157 HHC PT ASSISTANT EA 15: HCPCS | Mod: HHH

## 2024-01-31 PROCEDURE — 1090000001 HH PPS REVENUE CREDIT

## 2024-01-31 PROCEDURE — 1090000002 HH PPS REVENUE DEBIT

## 2024-01-31 ASSESSMENT — ENCOUNTER SYMPTOMS
SUBJECTIVE PAIN PROGRESSION: WAXING AND WANING
PAIN LOCATION: RIGHT ARM
PAIN LOCATION: RIGHT SHOULDER
LOWEST PAIN SEVERITY IN PAST 24 HOURS: 0/10
PAIN SEVERITY GOAL: 0/10
PERSON REPORTING PAIN: PATIENT
HIGHEST PAIN SEVERITY IN PAST 24 HOURS: 5/10
PAIN: 1

## 2024-02-01 ENCOUNTER — APPOINTMENT (OUTPATIENT)
Dept: PHARMACY | Facility: CLINIC | Age: 84
End: 2024-02-01
Payer: MEDICARE

## 2024-02-01 ENCOUNTER — CLINICAL SUPPORT (OUTPATIENT)
Dept: PHARMACY | Facility: CLINIC | Age: 84
End: 2024-02-01
Payer: MEDICARE

## 2024-02-01 ENCOUNTER — ANTICOAGULATION - WARFARIN VISIT (OUTPATIENT)
Dept: PHARMACY | Facility: CLINIC | Age: 84
End: 2024-02-01
Payer: MEDICARE

## 2024-02-01 DIAGNOSIS — Z86.711 HISTORY OF PULMONARY EMBOLUS (PE): Primary | ICD-10-CM

## 2024-02-01 DIAGNOSIS — I26.99 PE (PULMONARY THROMBOEMBOLISM) (MULTI): Primary | ICD-10-CM

## 2024-02-01 PROBLEM — I50.42 CHRONIC COMBINED SYSTOLIC AND DIASTOLIC HRT FAIL (MULTI): Status: ACTIVE | Noted: 2018-10-17

## 2024-02-01 PROBLEM — J30.9 ALLERGIC RHINITIS, UNSPECIFIED: Status: ACTIVE | Noted: 2019-01-11

## 2024-02-01 PROBLEM — J44.9 CHRONIC OBSTRUCTIVE PULMONARY DISEASE (MULTI): Status: ACTIVE | Noted: 2023-08-31

## 2024-02-01 PROBLEM — J96.11 CHRONIC RESPIRATORY FAILURE WITH HYPOXIA (MULTI): Status: ACTIVE | Noted: 2023-08-31

## 2024-02-01 PROBLEM — I10 HYPERTENSION: Status: ACTIVE | Noted: 2021-08-25

## 2024-02-01 PROBLEM — G62.9 POLYNEUROPATHY: Status: ACTIVE | Noted: 2022-12-02

## 2024-02-01 PROBLEM — J45.30 MILD PERSISTENT ASTHMA, UNCOMPLICATED (HHS-HCC): Status: ACTIVE | Noted: 2019-01-11

## 2024-02-01 PROBLEM — Z86.79 H/O: HYPERTENSION: Status: ACTIVE | Noted: 2021-08-25

## 2024-02-01 PROBLEM — S42.309A FRACTURE OF SHAFT OF HUMERUS: Status: ACTIVE | Noted: 2022-12-02
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
## 2024-02-01 PROBLEM — Z86.2 HISTORY OF ANEMIA: Status: ACTIVE | Noted: 2021-08-25

## 2024-02-01 PROBLEM — E78.5 HYPERLIPIDEMIA, UNSPECIFIED: Status: ACTIVE | Noted: 2018-11-14

## 2024-02-01 PROBLEM — Z86.39 HISTORY OF THYROID DISORDER: Status: ACTIVE | Noted: 2021-08-25

## 2024-02-01 LAB
POC INR: 1.9
POC PROTHROMBIN TIME: NORMAL

## 2024-02-01 PROCEDURE — 85610 PROTHROMBIN TIME: CPT | Mod: QW

## 2024-02-01 PROCEDURE — 1090000001 HH PPS REVENUE CREDIT

## 2024-02-01 PROCEDURE — 99212 OFFICE O/P EST SF 10 MIN: CPT

## 2024-02-01 PROCEDURE — 1090000002 HH PPS REVENUE DEBIT

## 2024-02-01 NOTE — PROGRESS NOTES
Patient new to clinic.   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 Was in hospital for 1 month. Was on Heparin drip then changed to Warfarin at discharge on 12/15/23. Had [facility] for 5 weeks. Currently has home PT.   Pt was started on Warfarin 5mg daily and was on home monitoring with Dr. Alba managing. INR came back too high to calculate so dose was decreased (20%) to 4mg daily and INR was also high (6). Warfarin was held and pt was referred to clinic for INR monitoring and Warfarin dose management.   Pt here with son and granddaughter (Kiki).  No bleeding or unusual bruising.  Pt/granddaughter will bring a current medication list to next appointment.  INR=1.9  Plan: Dose adjusted to 25 mg/week (about a 10.7% decrease) and recheck INR in 1 week, when pt can be brought in.  Did a complete Warfarin teaching.    Spoke with patient 2/2/24@[id number] to clarify/confirm Warfarin dose. Pt understood dosing.

## 2024-02-02 PROCEDURE — 1090000002 HH PPS REVENUE DEBIT

## 2024-02-02 PROCEDURE — 1090000001 HH PPS REVENUE CREDIT

## 2024-02-03 PROCEDURE — 1090000001 HH PPS REVENUE CREDIT

## 2024-02-03 PROCEDURE — 1090000002 HH PPS REVENUE DEBIT

## 2024-02-04 PROCEDURE — 1090000001 HH PPS REVENUE CREDIT

## 2024-02-04 PROCEDURE — 1090000002 HH PPS REVENUE DEBIT

## 2024-02-05 PROCEDURE — 1090000002 HH PPS REVENUE DEBIT

## 2024-02-05 PROCEDURE — 1090000001 HH PPS REVENUE CREDIT

## 2024-02-06 ENCOUNTER — HOME CARE VISIT (OUTPATIENT)
Dept: HOME HEALTH SERVICES | Facility: HOME HEALTH | Age: 84
End: 2024-02-06
Payer: MEDICARE

## 2024-02-06 PROCEDURE — 1090000001 HH PPS REVENUE CREDIT

## 2024-02-06 PROCEDURE — 1090000002 HH PPS REVENUE DEBIT

## 2024-02-06 PROCEDURE — G0157 HHC PT ASSISTANT EA 15: HCPCS | Mod: HHH

## 2024-02-06 ASSESSMENT — ENCOUNTER SYMPTOMS
PAIN LOCATION: RIGHT LEG
HIGHEST PAIN SEVERITY IN PAST 24 HOURS: 9/10
LOWEST PAIN SEVERITY IN PAST 24 HOURS: 5/10
SUBJECTIVE PAIN PROGRESSION: WAXING AND WANING
PAIN LOCATION: LEFT LEG
PERSON REPORTING PAIN: PATIENT
PAIN SEVERITY GOAL: 3/10
PAIN: 1

## 2024-02-07 PROCEDURE — 1090000001 HH PPS REVENUE CREDIT

## 2024-02-07 PROCEDURE — 1090000002 HH PPS REVENUE DEBIT

## 2024-02-08 ENCOUNTER — OFFICE VISIT (OUTPATIENT)
Dept: CARDIOLOGY | Facility: CLINIC | Age: 84
End: 2024-02-08
Payer: MEDICARE

## 2024-02-08 ENCOUNTER — HOME CARE VISIT (OUTPATIENT)
Dept: HOME HEALTH SERVICES | Facility: HOME HEALTH | Age: 84
End: 2024-02-08
Payer: MEDICARE

## 2024-02-08 ENCOUNTER — APPOINTMENT (OUTPATIENT)
Dept: PHARMACY | Facility: CLINIC | Age: 84
End: 2024-02-08
Payer: MEDICARE

## 2024-02-08 ENCOUNTER — HOSPITAL ENCOUNTER (OUTPATIENT)
Dept: CARDIOLOGY | Facility: CLINIC | Age: 84
Discharge: HOME | End: 2024-02-08
Payer: MEDICARE

## 2024-02-08 VITALS
OXYGEN SATURATION: 94 % | SYSTOLIC BLOOD PRESSURE: 138 MMHG | BODY MASS INDEX: 43.29 KG/M2 | HEART RATE: 74 BPM | DIASTOLIC BLOOD PRESSURE: 80 MMHG | HEIGHT: 64 IN

## 2024-02-08 DIAGNOSIS — Z86.711 HISTORY OF PULMONARY EMBOLUS (PE): Primary | ICD-10-CM

## 2024-02-08 DIAGNOSIS — Z86.711 HISTORY OF PULMONARY EMBOLUS (PE): ICD-10-CM

## 2024-02-08 DIAGNOSIS — R06.02 SHORTNESS OF BREATH: ICD-10-CM

## 2024-02-08 LAB
LEFT VENTRICULAR OUTFLOW TRACT DIAMETER: 2.05 CM
TRICUSPID ANNULAR PLANE SYSTOLIC EXCURSION: 2.3 CM

## 2024-02-08 PROCEDURE — 93306 TTE W/DOPPLER COMPLETE: CPT

## 2024-02-08 PROCEDURE — 99214 OFFICE O/P EST MOD 30 MIN: CPT | Performed by: INTERNAL MEDICINE

## 2024-02-08 PROCEDURE — 1159F MED LIST DOCD IN RCRD: CPT | Performed by: INTERNAL MEDICINE

## 2024-02-08 PROCEDURE — 1125F AMNT PAIN NOTED PAIN PRSNT: CPT | Performed by: INTERNAL MEDICINE

## 2024-02-08 PROCEDURE — 1090000002 HH PPS REVENUE DEBIT

## 2024-02-08 PROCEDURE — 3079F DIAST BP 80-89 MM HG: CPT | Performed by: INTERNAL MEDICINE

## 2024-02-08 PROCEDURE — G0157 HHC PT ASSISTANT EA 15: HCPCS | Mod: HHH

## 2024-02-08 PROCEDURE — 1090000001 HH PPS REVENUE CREDIT

## 2024-02-08 PROCEDURE — 3075F SYST BP GE 130 - 139MM HG: CPT | Performed by: INTERNAL MEDICINE

## 2024-02-08 PROCEDURE — 93306 TTE W/DOPPLER COMPLETE: CPT | Performed by: INTERNAL MEDICINE

## 2024-02-08 RX ORDER — LANOLIN ALCOHOL/MO/W.PET/CERES
1000 CREAM (GRAM) TOPICAL DAILY
COMMUNITY

## 2024-02-08 RX ORDER — WARFARIN SODIUM 4 MG/1
4 TABLET ORAL EVERY EVENING
COMMUNITY
Start: 2024-01-08 | End: 2024-03-04 | Stop reason: ENTERED-IN-ERROR

## 2024-02-08 RX ORDER — METOPROLOL TARTRATE 25 MG/1
25 TABLET, FILM COATED ORAL DAILY
COMMUNITY
End: 2024-03-08 | Stop reason: HOSPADM

## 2024-02-08 RX ORDER — HYDROCORTISONE 25 MG/G
1 CREAM TOPICAL 2 TIMES DAILY
COMMUNITY
End: 2024-03-08 | Stop reason: HOSPADM

## 2024-02-08 ASSESSMENT — ENCOUNTER SYMPTOMS
PAIN SEVERITY GOAL: 3/10
SUBJECTIVE PAIN PROGRESSION: WAXING AND WANING
HIGHEST PAIN SEVERITY IN PAST 24 HOURS: 6/10
PAIN LOCATION: RIGHT LEG
PAIN LOCATION: LEFT LEG
PERSON REPORTING PAIN: PATIENT
PAIN: 1
LOWEST PAIN SEVERITY IN PAST 24 HOURS: 3/10

## 2024-02-08 NOTE — PROGRESS NOTES
PCP: Sage Alba DO  Cardiologist: Dr. Jenny Salazar   Lillian Mendoza is a 83 y.o. female who is here for follow up of  pulmonary embolism .  Since last procedure, endorses improving symptoms.      Patient denies chest pain and SOB.    Review of Systems:  Otherwise, limited cardiovascular review of systems is negative.    Past Medical History:  She has a past medical history of Personal history of diseases of the blood and blood-forming organs and certain disorders involving the immune mechanism (11/23/2020), Personal history of other diseases of the circulatory system (11/23/2020), Personal history of other diseases of the digestive system (11/23/2020), Personal history of other diseases of the musculoskeletal system and connective tissue, Personal history of other diseases of the nervous system and sense organs, Personal history of other diseases of the respiratory system (11/23/2020), Personal history of other diseases of the respiratory system (11/23/2020), Personal history of other diseases of the respiratory system (11/23/2020), Personal history of other diseases of urinary system (11/23/2020), Personal history of other endocrine, nutritional and metabolic disease (11/23/2020), and Personal history of other specified conditions (11/23/2020).    Surgical History:   She has a past surgical history that includes Other surgical history (11/23/2020); Other surgical history (11/23/2020); Other surgical history (11/23/2020); Other surgical history (11/23/2020); Other surgical history (11/23/2020); Other surgical history (11/23/2020); and Invasive Vascular Procedure (N/A, 11/15/2023).    Family History:   Family History   Problem Relation Name Age of Onset    Cancer Mother      Diabetes Paternal Grandmother      Diabetes Paternal Grandfather       Family History   Problem Relation Name Age of Onset    Cancer Mother      Diabetes Paternal Grandmother      Diabetes Paternal Grandfather         Social  History:   Tobacco Use: Medium Risk (11/17/2023)    Patient History     Smoking Tobacco Use: Former     Smokeless Tobacco Use: Unknown     Passive Exposure: Never       Outpatient Medications:    Current Outpatient Medications:     acetaminophen (Tylenol) 325 mg tablet, Take 3 tablets (975 mg) by mouth every 8 hours., Disp: , Rfl:     albuterol 90 mcg/actuation inhaler, Inhale 2 puffs every 4 hours if needed., Disp: , Rfl:     atorvastatin (Lipitor) 40 mg tablet, Take 1 tablet (40 mg) by mouth once daily at bedtime., Disp: , Rfl:     cholecalciferol (Vitamin D-3) 125 MCG (5000 UT) capsule, Vitamin D CAPS  Refills: 0     Active, Disp: , Rfl:     cyanocobalamin (Vitamin B-12) 1,000 mcg tablet, Take 1 tablet (1,000 mcg) by mouth once daily., Disp: , Rfl:     ergocalciferol (Vitamin D-2) 50 MCG (2000 UT) capsule capsule, 1 cap(s) orally once a day, Disp: , Rfl:     fluticasone (Flonase) 50 mcg/actuation nasal spray, 1 spray by Does not apply route once daily., Disp: , Rfl:     gabapentin (Neurontin) 600 mg tablet, 1 tab(s) orally 3 times a day, Disp: , Rfl:     hydrocortisone 2.5 % cream, Apply 1 Application topically 2 times a day., Disp: , Rfl:     ipratropium-albuteroL (Duo-Neb) 0.5-2.5 mg/3 mL nebulizer solution, Inhale 3 mL every 4 hours if needed for wheezing or shortness of breath., Disp: , Rfl:     Jantoven 4 mg tablet, Take 1 tablet (4 mg) by mouth once daily in the evening., Disp: , Rfl:     levothyroxine (Synthroid) 150 mcg tablet, Take 1 tablet (150 mcg) by mouth once daily., Disp: , Rfl:     loratadine (Claritin) 10 mg tablet, Allergy Relief 10 MG Oral Tablet  Quantity: 90  Refills: 0      Start : 8-Oct-2019  Active, Disp: , Rfl:     metoprolol tartrate (Lopressor) 25 mg tablet, Take 1 tablet (25 mg) by mouth once daily., Disp: , Rfl:     montelukast (Singulair) 10 mg tablet, Take 1 tablet (10 mg) by mouth once daily at bedtime., Disp: , Rfl:     nystatin (Mycostatin) 100,000 unit/gram powder, Apply 1  Application topically 2 times a day., Disp: , Rfl:     omeprazole (PriLOSEC) 40 mg DR capsule, Take 1 capsule (40 mg) by mouth once daily., Disp: , Rfl:     oxygen (O2) gas therapy, Inhale 1 each once every 24 hours. (Patient taking differently: Inhale 1 each once every 24 hours.), Disp: , Rfl:     polyethylene glycol (Glycolax, Miralax) 17 gram packet, Take 17 g by mouth once daily. Do not start before November 29, 2023., Disp: , Rfl:     sodium chloride (Ocean) 0.65 % nasal spray, Administer 1 spray into each nostril if needed for congestion., Disp: 30 mL, Rfl: 12    torsemide (Demadex) 10 mg tablet, 5 tab(s) orally once a day, Disp: , Rfl:     traMADol (Ultram) 50 mg tablet, Take 1 tablet (50 mg) by mouth every 6 hours if needed (pain)., Disp: , Rfl:     aMILoride (Midamor) 5 mg tablet, Take 1 tablet (5 mg) by mouth once daily. Do not start before November 29, 2023., Disp: , Rfl:     amoxicillin-pot clavulanate (Augmentin) 875-125 mg tablet, Take 1 tablet (875 mg) by mouth every 12 hours., Disp: , Rfl:     aspirin 81 mg EC tablet, Take 1 tablet (81 mg) by mouth once daily. Do not start before November 29, 2023., Disp: , Rfl:     heparin 1,000 unit/mL injection, Infuse 3-6 mL (3,000-6,000 Units) into a venous catheter every 4 hours if needed (per current Heparin Assay, UFH result)., Disp: , Rfl:     heparin sodium,porcine/D5W (heparin, porcine,) 25,000 unit/250 mL(100 unit/mL) parenteral solution in D5W infusion, Infuse 0-4,500 Units/hr at 0-45 mL/hr into a venous catheter continuously., Disp: , Rfl:     pantoprazole (ProtoNix) 40 mg EC tablet, Take 1 tablet (40 mg) by mouth once daily in the morning. Take before meals. Do not crush, chew, or split. Do not start before November 29, 2023., Disp: , Rfl:     warfarin (Coumadin) 5 mg tablet, Take as directed per After Visit Summary., Disp: , Rfl:      Allergies:  Morphine       Objective   Vital Signs:  /80 (BP Location: Left arm, Patient Position: Sitting, BP  "Cuff Size: Large adult)   Pulse 74   Ht 1.626 m (5' 4\")   LMP  (LMP Unknown)   SpO2 94%   BMI 43.29 kg/m²     Physical Exam:  General: no acute distress  HEENT: EOMI, no scleral icterus.  Lungs: Clear to auscultation bilaterally without wheezing, rales, or rhonchi.  Cardiovascular: Regular rhythm and rate. Normal S1 and S2. No murmurs, rubs, or gallops are appreciated. JVP normal.  no bruits  Abdomen: Soft, nontender, nondistended. Bowel sounds present.  Extremities: 1+ edema BLE.  Neurologic: Alert and oriented x3.    Cardiac studies:  Echocardiogram 2/8/2024:  RV size/function improved but not normalized completely      Laboratory values:  CMP:  Recent Labs     12/10/23  0331 12/07/23  0600 12/04/23  0458 11/30/23  0600 11/28/23  0608 11/27/23  0547 11/26/23  0604 11/25/23  0602 11/22/23  0548 11/20/23  0515 11/19/23  0522 11/18/23  0316 11/17/23  0135 11/16/23  0203 11/15/23  1716 11/15/23  0325 12/12/22  0854 12/11/22  0812    141 139 137 139 143 141 143   < > 144 142 140 138 136 138   < > 140 142   K 4.0 4.6 5.3 5.2 4.3 3.9 3.8 3.7   < > 4.0 4.5 4.7 4.7 5.3 5.1   < > 4.9 4.7    105 102 99 101 103 102 100   < > 105 107 108* 104 105 103   < > 107 108*   CO2 26 32 29 29 30 32 32 33*   < > 32 29 25 27 21 23   < > 23 26   ANIONGAP 13 9* 13 14 12 12 11 14   < > 11 11 12 12 15 17   < > 15 13   BUN 44* 61* 72* 42* 32* 32* 25* 21   < > 11 14 19 25* 29* 29*   < > 22 24*   CREATININE 1.86* 1.87* 2.10* 2.32* 2.10* 2.00* 1.81* 1.89*   < > 1.56* 1.55* 1.61* 1.89* 2.12* 2.21*   < > 1.27* 1.37*   EGFR 27* 27* 23* 21* 23* 25* 28* 26*   < > 33* 34* 32* 26* 23* 22*   < >  --   --    MG  --   --   --   --   --   --   --   --   --  1.70 1.95 2.02 1.97 2.12 2.20  --  2.24 1.81    < > = values in this interval not displayed.     Recent Labs     12/10/23  0331 12/07/23  0600 12/04/23  0458 11/30/23  0600 11/28/23  0608 11/27/23  0547 11/26/23  0604 11/22/23  0548 11/20/23  0515 11/19/23  0522   ALBUMIN 3.2* 3.2* 3.4 " "3.5 3.3*   < > 3.0*   < > 2.6* 2.6*   ALKPHOS 50  --   --  51  --   --  52  --  58 59   ALT 9  --   --  11  --   --  8  --  8 7   AST 13  --   --  18  --   --  16  --  11 11   BILITOT 0.3  --   --  0.5  --   --  0.6  --  0.7 0.7    < > = values in this interval not displayed.     CBC:  Recent Labs     12/10/23  0331 12/08/23  1640 12/07/23  0600 12/04/23  0458 11/30/23  0600 11/26/23  0604 11/23/23  1612 11/22/23  0548   WBC 8.7 8.6 7.8 9.4 9.6 9.8 8.4 9.1   HGB 10.2* 11.1* 10.6* 11.1* 10.4* 10.4* 10.1* 10.3*   HCT 33.1* 35.8* 34.3* 35.3* 33.7* 33.2* 32.6* 33.2*    319 306 343 398 360 350 334   * 101* 100 100 99 99 98 99     COAG:   Recent Labs     02/01/24  0000 01/12/24  1310 01/08/24  1506 01/05/24  1437 12/14/23  1112 12/13/23  0442 12/12/23  0439 12/11/23  0600 11/30/23  0600 11/29/23  0756 11/29/23  0552 11/28/23  0608 11/27/23  1319 11/27/23  0547 11/26/23  0755 11/25/23  0602 11/24/23  0546 11/23/23  0739 11/23/23  0411   INR 1.90 2.00 1.80 7.00 2.4* 3.3* 3.1* 2.5*   < >  --    < > 1.1   < >  --   --   --   --   --  1.5*   HAUF  --   --   --   --   --   --   --   --   --  0.7  --  0.7  --  0.5 0.6 0.5 0.5 0.4 0.4    < > = values in this interval not displayed.     ABO:   Recent Labs     12/02/22  0601   ABO O     HEME/ENDO:  Recent Labs     12/10/23  0331 11/15/23  0325 08/24/21  1459 08/17/20  0000 12/13/19  1504   TSH 0.70  --  0.14* 0.09* 0.44   HGBA1C  --  6.1*  --   --   --       CARDIAC:   Recent Labs     11/15/23  0418 11/15/23  0325   TROPHS 1,116* 1,272*   BNP  --  1,683*     Recent Labs     12/10/23  0331 11/15/23  0412 12/13/19  1504 11/14/18  1515   CHOL 101 112 182 141   LDLF  --   --  114* 71   HDL 43.3 30.7 46.9 37.5*   TRIG  --  142 105 162*     MICRO: No results for input(s): \"ESR\", \"CRP\", \"PROCAL\" in the last 57051 hours.  Susceptibility data from last 90 days.  Collected Specimen Info Organism Amoxicillin/Clavulanate Ampicillin Ampicillin/Sulbactam Cefazolin Cefazolin " (uncomplicated UTIs only) Ciprofloxacin Gentamicin Nitrofurantoin Piperacillin/Tazobactam Trimethoprim/Sulfamethoxazole   11/21/23 Urine from Clean Catch/Voided Escherichia coli S R I S S S S S S S          I have personally reviewed most recent PCP, cardiology, vascular, and/or podiatry documentation.      Assessment/Plan   83 y.o. female with  hx of PE s/p Inari thrombectomy 11/15 and bilateral DVT  in the background of HF, dyslipidemia and hypertension, obesity/sedentary lifestyle.    Suspect ongoing mild RV dysfunction/enlargement from underlying obesity hypoventilation syndrome and COPD. It is certainly improved compared to hospitalization for PE.    Plan:  - Continue to take Coumadin as prescribed and follow with Coumadin clinic    - goal INR 2-3  - Discontinue aspirin to minimize bleeding risk  - OK to use lymphedema pump  - FU in 1 year; long term warfarin is warranted  - pt seen with her granddaughter, who is a [employer] nurse for       I was present with the PA student who participated in the documentation of this note. I have personally seen and re-examined the patient and performed the medical decision-making components (assessment and plan of care). I have reviewed the PA student documentation and verified the findings in the note as written with additions or exceptions as stated in the body of this note.    Maldonado Alvarado MD             SIGNATURE: SAÚL MAGANA PATIENT NAME: Lillian Mendoza   DATE/TIME: February 8, 2024 2:30 PM MRN: [id number]

## 2024-02-09 ENCOUNTER — ANTICOAGULATION - WARFARIN VISIT (OUTPATIENT)
Dept: PHARMACY | Facility: CLINIC | Age: 84
End: 2024-02-09
Payer: MEDICARE

## 2024-02-09 ENCOUNTER — CLINICAL SUPPORT (OUTPATIENT)
Dept: PHARMACY | Facility: CLINIC | Age: 84
End: 2024-02-09
Payer: MEDICARE

## 2024-02-09 DIAGNOSIS — I48.91 ATRIAL FIBRILLATION, UNSPECIFIED TYPE (MULTI): Primary | ICD-10-CM

## 2024-02-09 LAB
POC INR: 2.8
POC PROTHROMBIN TIME: NORMAL

## 2024-02-09 PROCEDURE — 1090000002 HH PPS REVENUE DEBIT

## 2024-02-09 PROCEDURE — 1090000001 HH PPS REVENUE CREDIT

## 2024-02-09 PROCEDURE — 99212 OFFICE O/P EST SF 10 MIN: CPT | Performed by: INTERNAL MEDICINE

## 2024-02-09 PROCEDURE — 85610 PROTHROMBIN TIME: CPT | Mod: QW | Performed by: INTERNAL MEDICINE

## 2024-02-09 NOTE — PROGRESS NOTES
Coumadin Clinic Visit Note    Patient presents today for anticoagulation monitoring and adjustment.  Patient verified warfarin dosing schedule  Patient denies missing any doses  Patient denies unusual bruising or bleeding  Patient denies changes to medications, alcohol or tobacco use.  Consistent dietary green intake  There are no anticipated procedures at this time  INR Therapeutic today at 2.8  No changes to warfarin dose today  Next appointment 2 weeks      Monica Haider, PharmD

## 2024-02-10 PROCEDURE — 1090000002 HH PPS REVENUE DEBIT

## 2024-02-10 PROCEDURE — 1090000001 HH PPS REVENUE CREDIT

## 2024-02-11 PROCEDURE — 1090000002 HH PPS REVENUE DEBIT

## 2024-02-11 PROCEDURE — 1090000001 HH PPS REVENUE CREDIT

## 2024-02-12 ENCOUNTER — HOME CARE VISIT (OUTPATIENT)
Dept: HOME HEALTH SERVICES | Facility: HOME HEALTH | Age: 84
End: 2024-02-12
Payer: MEDICARE

## 2024-02-12 PROCEDURE — 1090000002 HH PPS REVENUE DEBIT

## 2024-02-12 PROCEDURE — 1090000001 HH PPS REVENUE CREDIT

## 2024-02-13 ENCOUNTER — HOME CARE VISIT (OUTPATIENT)
Dept: HOME HEALTH SERVICES | Facility: HOME HEALTH | Age: 84
End: 2024-02-13
Payer: MEDICARE

## 2024-02-13 ASSESSMENT — ACTIVITIES OF DAILY LIVING (ADL)
HOME_HEALTH_OASIS: 01
OASIS_M1830: 05

## 2024-02-23 ENCOUNTER — APPOINTMENT (OUTPATIENT)
Dept: PHARMACY | Facility: CLINIC | Age: 84
End: 2024-02-23
Payer: MEDICARE

## 2024-02-26 ENCOUNTER — APPOINTMENT (OUTPATIENT)
Dept: RADIOLOGY | Facility: HOSPITAL | Age: 84
DRG: 871 | End: 2024-02-26
Payer: MEDICARE

## 2024-02-26 ENCOUNTER — HOSPITAL ENCOUNTER (INPATIENT)
Facility: HOSPITAL | Age: 84
LOS: 5 days | Discharge: NURSING, REHAB OR LONG-TERM CARE | DRG: 871 | End: 2024-03-03
Attending: STUDENT IN AN ORGANIZED HEALTH CARE EDUCATION/TRAINING PROGRAM | Admitting: INTERNAL MEDICINE
Payer: MEDICARE

## 2024-02-26 ENCOUNTER — APPOINTMENT (OUTPATIENT)
Dept: CARDIOLOGY | Facility: HOSPITAL | Age: 84
DRG: 871 | End: 2024-02-26
Payer: MEDICARE

## 2024-02-26 DIAGNOSIS — R60.0 LOWER LEG EDEMA: ICD-10-CM

## 2024-02-26 DIAGNOSIS — I48.92 ATRIAL FLUTTER, UNSPECIFIED TYPE (MULTI): ICD-10-CM

## 2024-02-26 DIAGNOSIS — J96.01 ACUTE HYPOXIC RESPIRATORY FAILURE (MULTI): ICD-10-CM

## 2024-02-26 DIAGNOSIS — J18.9 COMMUNITY ACQUIRED PNEUMONIA OF LEFT LOWER LOBE OF LUNG: ICD-10-CM

## 2024-02-26 DIAGNOSIS — I48.3 TYPICAL ATRIAL FLUTTER (MULTI): ICD-10-CM

## 2024-02-26 DIAGNOSIS — I24.89 DEMAND ISCHEMIA (MULTI): ICD-10-CM

## 2024-02-26 DIAGNOSIS — A41.9 SEPSIS, DUE TO UNSPECIFIED ORGANISM, UNSPECIFIED WHETHER ACUTE ORGAN DYSFUNCTION PRESENT (MULTI): ICD-10-CM

## 2024-02-26 DIAGNOSIS — J10.1 INFLUENZA A: Primary | ICD-10-CM

## 2024-02-26 DIAGNOSIS — N17.9 AKI (ACUTE KIDNEY INJURY) (CMS-HCC): ICD-10-CM

## 2024-02-26 DIAGNOSIS — M79.89 OTHER SPECIFIED SOFT TISSUE DISORDERS: ICD-10-CM

## 2024-02-26 LAB
ALBUMIN SERPL BCP-MCNC: 3.4 G/DL (ref 3.4–5)
ALP SERPL-CCNC: 89 U/L (ref 33–136)
ALT SERPL W P-5'-P-CCNC: 11 U/L (ref 7–45)
ANION GAP SERPL CALC-SCNC: 15 MMOL/L (ref 10–20)
AST SERPL W P-5'-P-CCNC: 32 U/L (ref 9–39)
BASOPHILS # BLD MANUAL: 0 X10*3/UL (ref 0–0.1)
BASOPHILS NFR BLD MANUAL: 0 %
BILIRUB SERPL-MCNC: 0.4 MG/DL (ref 0–1.2)
BNP SERPL-MCNC: 836 PG/ML (ref 0–99)
BUN SERPL-MCNC: 25 MG/DL (ref 6–23)
BURR CELLS BLD QL SMEAR: NORMAL
CALCIUM SERPL-MCNC: 8.8 MG/DL (ref 8.6–10.3)
CARDIAC TROPONIN I PNL SERPL HS: 356 NG/L (ref 0–13)
CHLORIDE SERPL-SCNC: 104 MMOL/L (ref 98–107)
CO2 SERPL-SCNC: 25 MMOL/L (ref 21–32)
CREAT SERPL-MCNC: 2.02 MG/DL (ref 0.5–1.05)
DACRYOCYTES BLD QL SMEAR: NORMAL
EGFRCR SERPLBLD CKD-EPI 2021: 24 ML/MIN/1.73M*2
EOSINOPHIL # BLD MANUAL: 0 X10*3/UL (ref 0–0.4)
EOSINOPHIL NFR BLD MANUAL: 0 %
ERYTHROCYTE [DISTWIDTH] IN BLOOD BY AUTOMATED COUNT: 16.3 % (ref 11.5–14.5)
FLUAV RNA RESP QL NAA+PROBE: DETECTED
FLUBV RNA RESP QL NAA+PROBE: NOT DETECTED
GLUCOSE SERPL-MCNC: 88 MG/DL (ref 74–99)
HCT VFR BLD AUTO: 35.7 % (ref 36–46)
HGB BLD-MCNC: 11.1 G/DL (ref 12–16)
HOLD SPECIMEN: NORMAL
IMM GRANULOCYTES # BLD AUTO: 0.02 X10*3/UL (ref 0–0.5)
IMM GRANULOCYTES NFR BLD AUTO: 0.5 % (ref 0–0.9)
INR PPP: 1.7 (ref 0.9–1.1)
LACTATE SERPL-SCNC: 1.8 MMOL/L (ref 0.4–2)
LYMPHOCYTES # BLD MANUAL: 1.5 X10*3/UL (ref 0.8–3)
LYMPHOCYTES NFR BLD MANUAL: 34 %
MAGNESIUM SERPL-MCNC: 1.82 MG/DL (ref 1.6–2.4)
MCH RBC QN AUTO: 29.7 PG (ref 26–34)
MCHC RBC AUTO-ENTMCNC: 31.1 G/DL (ref 32–36)
MCV RBC AUTO: 96 FL (ref 80–100)
MONOCYTES # BLD MANUAL: 0.66 X10*3/UL (ref 0.05–0.8)
MONOCYTES NFR BLD MANUAL: 15 %
NEUTROPHILS # BLD MANUAL: 2.15 X10*3/UL (ref 1.6–5.5)
NEUTS BAND # BLD MANUAL: 0.13 X10*3/UL (ref 0–0.5)
NEUTS BAND NFR BLD MANUAL: 3 %
NEUTS SEG # BLD MANUAL: 2.02 X10*3/UL (ref 1.6–5)
NEUTS SEG NFR BLD MANUAL: 46 %
NRBC BLD-RTO: 0 /100 WBCS (ref 0–0)
OVALOCYTES BLD QL SMEAR: NORMAL
PLATELET # BLD AUTO: 222 X10*3/UL (ref 150–450)
POTASSIUM SERPL-SCNC: 4.4 MMOL/L (ref 3.5–5.3)
PROT SERPL-MCNC: 7.3 G/DL (ref 6.4–8.2)
PROTHROMBIN TIME: 19.2 SECONDS (ref 9.8–12.8)
RBC # BLD AUTO: 3.74 X10*6/UL (ref 4–5.2)
RBC MORPH BLD: NORMAL
RSV RNA RESP QL NAA+PROBE: NOT DETECTED
SARS-COV-2 RNA RESP QL NAA+PROBE: NOT DETECTED
SODIUM SERPL-SCNC: 140 MMOL/L (ref 136–145)
TOTAL CELLS COUNTED BLD: 100
VARIANT LYMPHS # BLD MANUAL: 0.09 X10*3/UL (ref 0–0.3)
VARIANT LYMPHS NFR BLD: 2 %
WBC # BLD AUTO: 4.4 X10*3/UL (ref 4.4–11.3)

## 2024-02-26 PROCEDURE — 85610 PROTHROMBIN TIME: CPT | Performed by: STUDENT IN AN ORGANIZED HEALTH CARE EDUCATION/TRAINING PROGRAM

## 2024-02-26 PROCEDURE — 2500000001 HC RX 250 WO HCPCS SELF ADMINISTERED DRUGS (ALT 637 FOR MEDICARE OP): Performed by: STUDENT IN AN ORGANIZED HEALTH CARE EDUCATION/TRAINING PROGRAM

## 2024-02-26 PROCEDURE — 96360 HYDRATION IV INFUSION INIT: CPT

## 2024-02-26 PROCEDURE — 36415 COLL VENOUS BLD VENIPUNCTURE: CPT | Performed by: STUDENT IN AN ORGANIZED HEALTH CARE EDUCATION/TRAINING PROGRAM

## 2024-02-26 PROCEDURE — 83880 ASSAY OF NATRIURETIC PEPTIDE: CPT | Performed by: STUDENT IN AN ORGANIZED HEALTH CARE EDUCATION/TRAINING PROGRAM

## 2024-02-26 PROCEDURE — 93005 ELECTROCARDIOGRAM TRACING: CPT

## 2024-02-26 PROCEDURE — 96361 HYDRATE IV INFUSION ADD-ON: CPT

## 2024-02-26 PROCEDURE — 80053 COMPREHEN METABOLIC PANEL: CPT | Performed by: STUDENT IN AN ORGANIZED HEALTH CARE EDUCATION/TRAINING PROGRAM

## 2024-02-26 PROCEDURE — 71275 CT ANGIOGRAPHY CHEST: CPT | Performed by: RADIOLOGY

## 2024-02-26 PROCEDURE — 87637 SARSCOV2&INF A&B&RSV AMP PRB: CPT | Performed by: STUDENT IN AN ORGANIZED HEALTH CARE EDUCATION/TRAINING PROGRAM

## 2024-02-26 PROCEDURE — 71045 X-RAY EXAM CHEST 1 VIEW: CPT

## 2024-02-26 PROCEDURE — 99291 CRITICAL CARE FIRST HOUR: CPT | Mod: 25 | Performed by: STUDENT IN AN ORGANIZED HEALTH CARE EDUCATION/TRAINING PROGRAM

## 2024-02-26 PROCEDURE — 2500000002 HC RX 250 W HCPCS SELF ADMINISTERED DRUGS (ALT 637 FOR MEDICARE OP, ALT 636 FOR OP/ED): Performed by: STUDENT IN AN ORGANIZED HEALTH CARE EDUCATION/TRAINING PROGRAM

## 2024-02-26 PROCEDURE — 71275 CT ANGIOGRAPHY CHEST: CPT

## 2024-02-26 PROCEDURE — 87040 BLOOD CULTURE FOR BACTERIA: CPT | Mod: PARLAB | Performed by: STUDENT IN AN ORGANIZED HEALTH CARE EDUCATION/TRAINING PROGRAM

## 2024-02-26 PROCEDURE — 83735 ASSAY OF MAGNESIUM: CPT | Performed by: STUDENT IN AN ORGANIZED HEALTH CARE EDUCATION/TRAINING PROGRAM

## 2024-02-26 PROCEDURE — 85027 COMPLETE CBC AUTOMATED: CPT | Performed by: STUDENT IN AN ORGANIZED HEALTH CARE EDUCATION/TRAINING PROGRAM

## 2024-02-26 PROCEDURE — 2500000004 HC RX 250 GENERAL PHARMACY W/ HCPCS (ALT 636 FOR OP/ED): Performed by: STUDENT IN AN ORGANIZED HEALTH CARE EDUCATION/TRAINING PROGRAM

## 2024-02-26 PROCEDURE — 96365 THER/PROPH/DIAG IV INF INIT: CPT | Mod: 59

## 2024-02-26 PROCEDURE — 71045 X-RAY EXAM CHEST 1 VIEW: CPT | Performed by: RADIOLOGY

## 2024-02-26 PROCEDURE — 85007 BL SMEAR W/DIFF WBC COUNT: CPT | Performed by: STUDENT IN AN ORGANIZED HEALTH CARE EDUCATION/TRAINING PROGRAM

## 2024-02-26 PROCEDURE — 83605 ASSAY OF LACTIC ACID: CPT | Performed by: STUDENT IN AN ORGANIZED HEALTH CARE EDUCATION/TRAINING PROGRAM

## 2024-02-26 PROCEDURE — 96375 TX/PRO/DX INJ NEW DRUG ADDON: CPT

## 2024-02-26 PROCEDURE — 2550000001 HC RX 255 CONTRASTS: Performed by: STUDENT IN AN ORGANIZED HEALTH CARE EDUCATION/TRAINING PROGRAM

## 2024-02-26 PROCEDURE — 84484 ASSAY OF TROPONIN QUANT: CPT | Performed by: STUDENT IN AN ORGANIZED HEALTH CARE EDUCATION/TRAINING PROGRAM

## 2024-02-26 RX ORDER — OSELTAMIVIR PHOSPHATE 30 MG/1
30 CAPSULE ORAL ONCE
Status: COMPLETED | OUTPATIENT
Start: 2024-02-26 | End: 2024-02-26

## 2024-02-26 RX ORDER — NAPROXEN SODIUM 220 MG/1
324 TABLET, FILM COATED ORAL ONCE
Status: COMPLETED | OUTPATIENT
Start: 2024-02-26 | End: 2024-02-26

## 2024-02-26 RX ORDER — CEFTRIAXONE 2 G/50ML
2 INJECTION, SOLUTION INTRAVENOUS ONCE
Status: COMPLETED | OUTPATIENT
Start: 2024-02-26 | End: 2024-02-26

## 2024-02-26 RX ADMIN — CEFTRIAXONE SODIUM 2 G: 2 INJECTION, SOLUTION INTRAVENOUS at 23:01

## 2024-02-26 RX ADMIN — SODIUM CHLORIDE 1000 ML: 9 INJECTION, SOLUTION INTRAVENOUS at 21:17

## 2024-02-26 RX ADMIN — OSELTAMAVIR PHOSPHATE 30 MG: 30 CAPSULE ORAL at 23:37

## 2024-02-26 RX ADMIN — ASPIRIN 81 MG 324 MG: 81 TABLET ORAL at 23:37

## 2024-02-26 RX ADMIN — AZITHROMYCIN MONOHYDRATE 500 MG: 500 INJECTION, POWDER, LYOPHILIZED, FOR SOLUTION INTRAVENOUS at 23:48

## 2024-02-26 RX ADMIN — IOHEXOL 80 ML: 350 INJECTION, SOLUTION INTRAVENOUS at 22:49

## 2024-02-26 ASSESSMENT — LIFESTYLE VARIABLES
HAVE YOU EVER FELT YOU SHOULD CUT DOWN ON YOUR DRINKING: NO
EVER HAD A DRINK FIRST THING IN THE MORNING TO STEADY YOUR NERVES TO GET RID OF A HANGOVER: NO
EVER FELT BAD OR GUILTY ABOUT YOUR DRINKING: NO
HAVE PEOPLE ANNOYED YOU BY CRITICIZING YOUR DRINKING: NO

## 2024-02-26 ASSESSMENT — COLUMBIA-SUICIDE SEVERITY RATING SCALE - C-SSRS
6. HAVE YOU EVER DONE ANYTHING, STARTED TO DO ANYTHING, OR PREPARED TO DO ANYTHING TO END YOUR LIFE?: NO
2. HAVE YOU ACTUALLY HAD ANY THOUGHTS OF KILLING YOURSELF?: NO
1. IN THE PAST MONTH, HAVE YOU WISHED YOU WERE DEAD OR WISHED YOU COULD GO TO SLEEP AND NOT WAKE UP?: NO

## 2024-02-27 ENCOUNTER — APPOINTMENT (OUTPATIENT)
Dept: VASCULAR MEDICINE | Facility: HOSPITAL | Age: 84
DRG: 871 | End: 2024-02-27
Payer: MEDICARE

## 2024-02-27 PROBLEM — J10.1 INFLUENZA A: Status: ACTIVE | Noted: 2024-02-27

## 2024-02-27 PROBLEM — I48.92 ATRIAL FLUTTER (MULTI): Status: ACTIVE | Noted: 2024-02-27

## 2024-02-27 LAB
ALBUMIN SERPL BCP-MCNC: 3.1 G/DL (ref 3.4–5)
AMORPH CRY #/AREA UR COMP ASSIST: ABNORMAL /HPF
ANION GAP SERPL CALC-SCNC: 16 MMOL/L (ref 10–20)
APPEARANCE UR: ABNORMAL
ATRIAL RATE: 0 BPM
BACTERIA #/AREA URNS AUTO: ABNORMAL /HPF
BILIRUB UR STRIP.AUTO-MCNC: NEGATIVE MG/DL
BUN SERPL-MCNC: 23 MG/DL (ref 6–23)
CALCIUM SERPL-MCNC: 8.1 MG/DL (ref 8.6–10.3)
CARDIAC TROPONIN I PNL SERPL HS: 292 NG/L (ref 0–13)
CARDIAC TROPONIN I PNL SERPL HS: 383 NG/L (ref 0–13)
CHLORIDE SERPL-SCNC: 106 MMOL/L (ref 98–107)
CO2 SERPL-SCNC: 21 MMOL/L (ref 21–32)
COLOR UR: YELLOW
CREAT SERPL-MCNC: 1.88 MG/DL (ref 0.5–1.05)
EGFRCR SERPLBLD CKD-EPI 2021: 27 ML/MIN/1.73M*2
GLUCOSE SERPL-MCNC: 75 MG/DL (ref 74–99)
GLUCOSE UR STRIP.AUTO-MCNC: NEGATIVE MG/DL
GRAN CASTS #/AREA UR COMP ASSIST: ABNORMAL /LPF
HOLD SPECIMEN: NORMAL
HOLD SPECIMEN: NORMAL
KETONES UR STRIP.AUTO-MCNC: NEGATIVE MG/DL
LEUKOCYTE ESTERASE UR QL STRIP.AUTO: NEGATIVE
NITRITE UR QL STRIP.AUTO: NEGATIVE
P AXIS: 0 DEGREES
PH UR STRIP.AUTO: 5 [PH]
PHOSPHATE SERPL-MCNC: 4.2 MG/DL (ref 2.5–4.9)
POTASSIUM SERPL-SCNC: 4.4 MMOL/L (ref 3.5–5.3)
PR INTERVAL: 389 MS
PROCALCITONIN SERPL-MCNC: 0.08 NG/ML
PROT UR STRIP.AUTO-MCNC: ABNORMAL MG/DL
Q ONSET: 249 MS
QRS COUNT: 21 BEATS
QRS DURATION: 103 MS
QT INTERVAL: 276 MS
QTC CALCULATION(BAZETT): 418 MS
QTC FREDERICIA: 364 MS
R AXIS: -41 DEGREES
RBC # UR STRIP.AUTO: NEGATIVE /UL
RBC #/AREA URNS AUTO: ABNORMAL /HPF
SODIUM SERPL-SCNC: 139 MMOL/L (ref 136–145)
SP GR UR STRIP.AUTO: 1.05
SQUAMOUS #/AREA URNS AUTO: ABNORMAL /HPF
T AXIS: 86 DEGREES
T OFFSET: 387 MS
T4 FREE SERPL-MCNC: 1.24 NG/DL (ref 0.61–1.12)
TSH SERPL-ACNC: 0.01 MIU/L (ref 0.44–3.98)
UROBILINOGEN UR STRIP.AUTO-MCNC: <2 MG/DL
VENTRICULAR RATE: 138 BPM
WBC #/AREA URNS AUTO: ABNORMAL /HPF

## 2024-02-27 PROCEDURE — 36415 COLL VENOUS BLD VENIPUNCTURE: CPT | Performed by: STUDENT IN AN ORGANIZED HEALTH CARE EDUCATION/TRAINING PROGRAM

## 2024-02-27 PROCEDURE — 2500000004 HC RX 250 GENERAL PHARMACY W/ HCPCS (ALT 636 FOR OP/ED): Performed by: INTERNAL MEDICINE

## 2024-02-27 PROCEDURE — 2500000002 HC RX 250 W HCPCS SELF ADMINISTERED DRUGS (ALT 637 FOR MEDICARE OP, ALT 636 FOR OP/ED): Performed by: INTERNAL MEDICINE

## 2024-02-27 PROCEDURE — 2500000002 HC RX 250 W HCPCS SELF ADMINISTERED DRUGS (ALT 637 FOR MEDICARE OP, ALT 636 FOR OP/ED): Performed by: STUDENT IN AN ORGANIZED HEALTH CARE EDUCATION/TRAINING PROGRAM

## 2024-02-27 PROCEDURE — 84484 ASSAY OF TROPONIN QUANT: CPT | Performed by: STUDENT IN AN ORGANIZED HEALTH CARE EDUCATION/TRAINING PROGRAM

## 2024-02-27 PROCEDURE — 2500000004 HC RX 250 GENERAL PHARMACY W/ HCPCS (ALT 636 FOR OP/ED): Performed by: STUDENT IN AN ORGANIZED HEALTH CARE EDUCATION/TRAINING PROGRAM

## 2024-02-27 PROCEDURE — 87899 AGENT NOS ASSAY W/OPTIC: CPT | Mod: PARLAB | Performed by: STUDENT IN AN ORGANIZED HEALTH CARE EDUCATION/TRAINING PROGRAM

## 2024-02-27 PROCEDURE — 93970 EXTREMITY STUDY: CPT | Performed by: INTERNAL MEDICINE

## 2024-02-27 PROCEDURE — 96375 TX/PRO/DX INJ NEW DRUG ADDON: CPT

## 2024-02-27 PROCEDURE — 99291 CRITICAL CARE FIRST HOUR: CPT | Performed by: STUDENT IN AN ORGANIZED HEALTH CARE EDUCATION/TRAINING PROGRAM

## 2024-02-27 PROCEDURE — 2500000001 HC RX 250 WO HCPCS SELF ADMINISTERED DRUGS (ALT 637 FOR MEDICARE OP): Performed by: INTERNAL MEDICINE

## 2024-02-27 PROCEDURE — 2500000001 HC RX 250 WO HCPCS SELF ADMINISTERED DRUGS (ALT 637 FOR MEDICARE OP): Performed by: STUDENT IN AN ORGANIZED HEALTH CARE EDUCATION/TRAINING PROGRAM

## 2024-02-27 PROCEDURE — 87449 NOS EACH ORGANISM AG IA: CPT | Mod: PARLAB | Performed by: STUDENT IN AN ORGANIZED HEALTH CARE EDUCATION/TRAINING PROGRAM

## 2024-02-27 PROCEDURE — 80069 RENAL FUNCTION PANEL: CPT | Performed by: STUDENT IN AN ORGANIZED HEALTH CARE EDUCATION/TRAINING PROGRAM

## 2024-02-27 PROCEDURE — 93010 ELECTROCARDIOGRAM REPORT: CPT | Performed by: STUDENT IN AN ORGANIZED HEALTH CARE EDUCATION/TRAINING PROGRAM

## 2024-02-27 PROCEDURE — 81001 URINALYSIS AUTO W/SCOPE: CPT | Performed by: STUDENT IN AN ORGANIZED HEALTH CARE EDUCATION/TRAINING PROGRAM

## 2024-02-27 PROCEDURE — 2020000001 HC ICU ROOM DAILY

## 2024-02-27 PROCEDURE — 2500000005 HC RX 250 GENERAL PHARMACY W/O HCPCS: Performed by: INTERNAL MEDICINE

## 2024-02-27 PROCEDURE — 84443 ASSAY THYROID STIM HORMONE: CPT | Performed by: STUDENT IN AN ORGANIZED HEALTH CARE EDUCATION/TRAINING PROGRAM

## 2024-02-27 PROCEDURE — 2500000005 HC RX 250 GENERAL PHARMACY W/O HCPCS: Performed by: STUDENT IN AN ORGANIZED HEALTH CARE EDUCATION/TRAINING PROGRAM

## 2024-02-27 PROCEDURE — 84145 PROCALCITONIN (PCT): CPT | Mod: PARLAB | Performed by: STUDENT IN AN ORGANIZED HEALTH CARE EDUCATION/TRAINING PROGRAM

## 2024-02-27 PROCEDURE — 94640 AIRWAY INHALATION TREATMENT: CPT

## 2024-02-27 PROCEDURE — 84439 ASSAY OF FREE THYROXINE: CPT | Performed by: STUDENT IN AN ORGANIZED HEALTH CARE EDUCATION/TRAINING PROGRAM

## 2024-02-27 PROCEDURE — 87086 URINE CULTURE/COLONY COUNT: CPT | Mod: PARLAB | Performed by: STUDENT IN AN ORGANIZED HEALTH CARE EDUCATION/TRAINING PROGRAM

## 2024-02-27 PROCEDURE — 87070 CULTURE OTHR SPECIMN AEROBIC: CPT | Mod: PARLAB | Performed by: INTERNAL MEDICINE

## 2024-02-27 PROCEDURE — 93970 EXTREMITY STUDY: CPT

## 2024-02-27 RX ORDER — PANTOPRAZOLE SODIUM 40 MG/1
40 TABLET, DELAYED RELEASE ORAL
Status: DISCONTINUED | OUTPATIENT
Start: 2024-02-27 | End: 2024-03-03 | Stop reason: HOSPADM

## 2024-02-27 RX ORDER — ALBUTEROL SULFATE 0.83 MG/ML
2.5 SOLUTION RESPIRATORY (INHALATION) 4 TIMES DAILY
Status: DISCONTINUED | OUTPATIENT
Start: 2024-02-27 | End: 2024-02-27

## 2024-02-27 RX ORDER — OSELTAMIVIR PHOSPHATE 75 MG/1
75 CAPSULE ORAL EVERY 12 HOURS
Status: DISCONTINUED | OUTPATIENT
Start: 2024-02-27 | End: 2024-02-27

## 2024-02-27 RX ORDER — FLUTICASONE FUROATE AND VILANTEROL 100; 25 UG/1; UG/1
1 POWDER RESPIRATORY (INHALATION)
Status: DISCONTINUED | OUTPATIENT
Start: 2024-02-27 | End: 2024-02-27

## 2024-02-27 RX ORDER — CEFTRIAXONE 2 G/50ML
2 INJECTION, SOLUTION INTRAVENOUS EVERY 24 HOURS
Status: DISCONTINUED | OUTPATIENT
Start: 2024-02-27 | End: 2024-03-03 | Stop reason: HOSPADM

## 2024-02-27 RX ORDER — GUAIFENESIN 600 MG/1
600 TABLET, EXTENDED RELEASE ORAL 2 TIMES DAILY PRN
Status: DISCONTINUED | OUTPATIENT
Start: 2024-02-27 | End: 2024-03-03 | Stop reason: HOSPADM

## 2024-02-27 RX ORDER — SODIUM CHLORIDE FOR INHALATION 3 %
3 VIAL, NEBULIZER (ML) INHALATION EVERY 6 HOURS SCHEDULED
Status: DISCONTINUED | OUTPATIENT
Start: 2024-02-27 | End: 2024-02-27

## 2024-02-27 RX ORDER — IPRATROPIUM BROMIDE AND ALBUTEROL SULFATE 2.5; .5 MG/3ML; MG/3ML
3 SOLUTION RESPIRATORY (INHALATION) EVERY 2 HOUR PRN
Status: DISCONTINUED | OUTPATIENT
Start: 2024-02-27 | End: 2024-03-03 | Stop reason: HOSPADM

## 2024-02-27 RX ORDER — SODIUM CHLORIDE 9 MG/ML
100 INJECTION, SOLUTION INTRAVENOUS CONTINUOUS
Status: DISCONTINUED | OUTPATIENT
Start: 2024-02-27 | End: 2024-02-27

## 2024-02-27 RX ORDER — NAPROXEN SODIUM 220 MG/1
81 TABLET, FILM COATED ORAL DAILY
Status: DISCONTINUED | OUTPATIENT
Start: 2024-02-27 | End: 2024-03-03 | Stop reason: HOSPADM

## 2024-02-27 RX ORDER — BUDESONIDE 0.25 MG/2ML
0.25 INHALANT ORAL
Status: DISCONTINUED | OUTPATIENT
Start: 2024-02-27 | End: 2024-03-03 | Stop reason: HOSPADM

## 2024-02-27 RX ORDER — WARFARIN 2.5 MG/1
2.5 TABLET ORAL
Status: DISCONTINUED | OUTPATIENT
Start: 2024-02-27 | End: 2024-03-03 | Stop reason: HOSPADM

## 2024-02-27 RX ORDER — WARFARIN 2 MG/1
2 TABLET ORAL DAILY
Status: DISCONTINUED | OUTPATIENT
Start: 2024-02-27 | End: 2024-02-27

## 2024-02-27 RX ORDER — ALBUTEROL SULFATE 0.83 MG/ML
2.5 SOLUTION RESPIRATORY (INHALATION)
Status: DISCONTINUED | OUTPATIENT
Start: 2024-02-27 | End: 2024-03-03 | Stop reason: HOSPADM

## 2024-02-27 RX ORDER — FLUTICASONE PROPIONATE 50 MCG
2 SPRAY, SUSPENSION (ML) NASAL DAILY
Status: DISCONTINUED | OUTPATIENT
Start: 2024-02-27 | End: 2024-03-03 | Stop reason: HOSPADM

## 2024-02-27 RX ORDER — FLUTICASONE FUROATE AND VILANTEROL 100; 25 UG/1; UG/1
1 POWDER RESPIRATORY (INHALATION) DAILY
Status: DISCONTINUED | OUTPATIENT
Start: 2024-02-27 | End: 2024-02-27

## 2024-02-27 RX ORDER — METOPROLOL TARTRATE 25 MG/1
12.5 TABLET, FILM COATED ORAL EVERY 6 HOURS
Status: DISCONTINUED | OUTPATIENT
Start: 2024-02-27 | End: 2024-02-28

## 2024-02-27 RX ORDER — MAGNESIUM SULFATE HEPTAHYDRATE 40 MG/ML
2 INJECTION, SOLUTION INTRAVENOUS ONCE
Status: COMPLETED | OUTPATIENT
Start: 2024-02-27 | End: 2024-02-27

## 2024-02-27 RX ORDER — OSELTAMIVIR PHOSPHATE 30 MG/1
30 CAPSULE ORAL DAILY
Status: DISCONTINUED | OUTPATIENT
Start: 2024-02-27 | End: 2024-02-28

## 2024-02-27 RX ORDER — IPRATROPIUM BROMIDE AND ALBUTEROL SULFATE 2.5; .5 MG/3ML; MG/3ML
3 SOLUTION RESPIRATORY (INHALATION)
Status: DISCONTINUED | OUTPATIENT
Start: 2024-02-27 | End: 2024-02-27

## 2024-02-27 RX ORDER — METOPROLOL TARTRATE 25 MG/1
25 TABLET, FILM COATED ORAL EVERY 6 HOURS
Status: DISCONTINUED | OUTPATIENT
Start: 2024-02-27 | End: 2024-02-27

## 2024-02-27 RX ORDER — WARFARIN SODIUM 5 MG/1
5 TABLET ORAL
Status: DISCONTINUED | OUTPATIENT
Start: 2024-02-28 | End: 2024-03-03 | Stop reason: HOSPADM

## 2024-02-27 RX ORDER — DILTIAZEM HCL/D5W 125 MG/125
5-15 PLASTIC BAG, INJECTION (ML) INTRAVENOUS CONTINUOUS
Status: DISCONTINUED | OUTPATIENT
Start: 2024-02-27 | End: 2024-03-03 | Stop reason: HOSPADM

## 2024-02-27 RX ORDER — LEVOTHYROXINE SODIUM 150 UG/1
150 TABLET ORAL DAILY
Status: DISCONTINUED | OUTPATIENT
Start: 2024-02-27 | End: 2024-03-03 | Stop reason: HOSPADM

## 2024-02-27 RX ORDER — FORMOTEROL FUMARATE DIHYDRATE 20 UG/2ML
20 SOLUTION RESPIRATORY (INHALATION)
Status: DISCONTINUED | OUTPATIENT
Start: 2024-02-27 | End: 2024-03-03 | Stop reason: HOSPADM

## 2024-02-27 RX ADMIN — GUAIFENESIN 600 MG: 600 TABLET, EXTENDED RELEASE ORAL at 21:37

## 2024-02-27 RX ADMIN — METOPROLOL TARTRATE 12.5 MG: 25 TABLET, FILM COATED ORAL at 11:54

## 2024-02-27 RX ADMIN — IPRATROPIUM BROMIDE AND ALBUTEROL SULFATE 3 ML: .5; 3 SOLUTION RESPIRATORY (INHALATION) at 06:29

## 2024-02-27 RX ADMIN — Medication 10 MG/HR: at 02:45

## 2024-02-27 RX ADMIN — METOPROLOL TARTRATE 12.5 MG: 25 TABLET, FILM COATED ORAL at 17:19

## 2024-02-27 RX ADMIN — IPRATROPIUM BROMIDE AND ALBUTEROL SULFATE 3 ML: .5; 3 SOLUTION RESPIRATORY (INHALATION) at 12:28

## 2024-02-27 RX ADMIN — AZITHROMYCIN 500 MG: 500 INJECTION, POWDER, LYOPHILIZED, FOR SOLUTION INTRAVENOUS at 08:44

## 2024-02-27 RX ADMIN — FLUTICASONE PROPIONATE 2 SPRAY: 50 SPRAY, METERED NASAL at 08:47

## 2024-02-27 RX ADMIN — ALBUTEROL SULFATE 2.5 MG: 2.5 SOLUTION RESPIRATORY (INHALATION) at 19:22

## 2024-02-27 RX ADMIN — PANTOPRAZOLE SODIUM 40 MG: 40 TABLET, DELAYED RELEASE ORAL at 07:00

## 2024-02-27 RX ADMIN — WARFARIN SODIUM 2.5 MG: 5 TABLET ORAL at 17:20

## 2024-02-27 RX ADMIN — MAGNESIUM SULFATE HEPTAHYDRATE 2 G: 40 INJECTION, SOLUTION INTRAVENOUS at 08:44

## 2024-02-27 RX ADMIN — SODIUM CHLORIDE SOLN NEBU 3% 3 ML: 3 NEBU SOLN at 12:32

## 2024-02-27 RX ADMIN — Medication 10 MG/HR: at 11:53

## 2024-02-27 RX ADMIN — SODIUM CHLORIDE 250 ML: 9 INJECTION, SOLUTION INTRAVENOUS at 00:50

## 2024-02-27 RX ADMIN — ASPIRIN 81 MG 81 MG: 81 TABLET ORAL at 08:44

## 2024-02-27 RX ADMIN — Medication 5 MG/HR: at 00:24

## 2024-02-27 RX ADMIN — Medication 6 L/MIN: at 08:00

## 2024-02-27 RX ADMIN — IPRATROPIUM BROMIDE AND ALBUTEROL SULFATE 3 ML: .5; 3 SOLUTION RESPIRATORY (INHALATION) at 16:01

## 2024-02-27 RX ADMIN — CEFTRIAXONE SODIUM 2 G: 2 INJECTION, SOLUTION INTRAVENOUS at 08:43

## 2024-02-27 RX ADMIN — SODIUM CHLORIDE 100 ML/HR: 9 INJECTION, SOLUTION INTRAVENOUS at 02:45

## 2024-02-27 RX ADMIN — OSELTAMIVIR PHOSPHATE 30 MG: 30 CAPSULE ORAL at 08:44

## 2024-02-27 RX ADMIN — FORMOTEROL FUMARATE DIHYDRATE 20 MCG: 20 SOLUTION RESPIRATORY (INHALATION) at 19:24

## 2024-02-27 RX ADMIN — LEVOTHYROXINE SODIUM 150 MCG: 150 TABLET ORAL at 04:54

## 2024-02-27 SDOH — SOCIAL STABILITY: SOCIAL INSECURITY: HAS ANYONE EVER THREATENED TO HURT YOUR FAMILY OR YOUR PETS?: NO

## 2024-02-27 SDOH — SOCIAL STABILITY: SOCIAL INSECURITY: ABUSE: ADULT

## 2024-02-27 SDOH — SOCIAL STABILITY: SOCIAL INSECURITY: ARE THERE ANY APPARENT SIGNS OF INJURIES/BEHAVIORS THAT COULD BE RELATED TO ABUSE/NEGLECT?: NO

## 2024-02-27 SDOH — SOCIAL STABILITY: SOCIAL INSECURITY: DOES ANYONE TRY TO KEEP YOU FROM HAVING/CONTACTING OTHER FRIENDS OR DOING THINGS OUTSIDE YOUR HOME?: NO

## 2024-02-27 SDOH — SOCIAL STABILITY: SOCIAL INSECURITY: HAVE YOU HAD THOUGHTS OF HARMING ANYONE ELSE?: YES

## 2024-02-27 SDOH — SOCIAL STABILITY: SOCIAL INSECURITY: ARE YOU OR HAVE YOU BEEN THREATENED OR ABUSED PHYSICALLY, EMOTIONALLY, OR SEXUALLY BY ANYONE?: NO

## 2024-02-27 ASSESSMENT — COGNITIVE AND FUNCTIONAL STATUS - GENERAL
PERSONAL GROOMING: A LOT
CLIMB 3 TO 5 STEPS WITH RAILING: TOTAL
MOBILITY SCORE: 12
TURNING FROM BACK TO SIDE WHILE IN FLAT BAD: A LITTLE
TOILETING: A LOT
DRESSING REGULAR UPPER BODY CLOTHING: A LOT
MOVING FROM LYING ON BACK TO SITTING ON SIDE OF FLAT BED WITH BEDRAILS: A LITTLE
STANDING UP FROM CHAIR USING ARMS: A LOT
PATIENT BASELINE BEDBOUND: NO
DAILY ACTIVITIY SCORE: 14
MOVING TO AND FROM BED TO CHAIR: A LOT
HELP NEEDED FOR BATHING: A LOT
WALKING IN HOSPITAL ROOM: TOTAL
DRESSING REGULAR LOWER BODY CLOTHING: A LOT

## 2024-02-27 ASSESSMENT — PAIN SCALES - GENERAL
PAINLEVEL_OUTOF10: 0 - NO PAIN

## 2024-02-27 ASSESSMENT — ACTIVITIES OF DAILY LIVING (ADL)
GROOMING: NEEDS ASSISTANCE
FEEDING YOURSELF: INDEPENDENT
HEARING - RIGHT EAR: FUNCTIONAL
ADEQUATE_TO_COMPLETE_ADL: YES
ASSISTIVE_DEVICE: WHEELCHAIR;WALKER
DRESSING YOURSELF: NEEDS ASSISTANCE
PATIENT'S MEMORY ADEQUATE TO SAFELY COMPLETE DAILY ACTIVITIES?: YES
WALKS IN HOME: NEEDS ASSISTANCE
BATHING: NEEDS ASSISTANCE
LACK_OF_TRANSPORTATION: PATIENT DECLINED
HEARING - LEFT EAR: FUNCTIONAL
JUDGMENT_ADEQUATE_SAFELY_COMPLETE_DAILY_ACTIVITIES: YES
TOILETING: NEEDS ASSISTANCE

## 2024-02-27 ASSESSMENT — LIFESTYLE VARIABLES
AUDIT-C TOTAL SCORE: 0
HOW OFTEN DO YOU HAVE 6 OR MORE DRINKS ON ONE OCCASION: NEVER
SKIP TO QUESTIONS 9-10: 1
AUDIT-C TOTAL SCORE: 0
HOW OFTEN DO YOU HAVE A DRINK CONTAINING ALCOHOL: NEVER
HOW MANY STANDARD DRINKS CONTAINING ALCOHOL DO YOU HAVE ON A TYPICAL DAY: PATIENT DOES NOT DRINK

## 2024-02-27 ASSESSMENT — PAIN - FUNCTIONAL ASSESSMENT
PAIN_FUNCTIONAL_ASSESSMENT: 0-10

## 2024-02-27 ASSESSMENT — PATIENT HEALTH QUESTIONNAIRE - PHQ9
1. LITTLE INTEREST OR PLEASURE IN DOING THINGS: NOT AT ALL
2. FEELING DOWN, DEPRESSED OR HOPELESS: NOT AT ALL
SUM OF ALL RESPONSES TO PHQ9 QUESTIONS 1 & 2: 0

## 2024-02-27 NOTE — SIGNIFICANT EVENT
Ms. Mendoza is an 82yo female with history of PE s/p thrombectomy 11/15/23 with bilateral DVTs on warfarin, HfpEF, HTN, HLD, CKD III-IV, lymphedema, moderate persistent asthma (off of Symbicort due to physician change) presenting with Influenza A with superimposed ?CAP leading to acute hypoxemic respiratory failure and new onset aflutter with RVR at 150. She is in the ICU for a-flutter control.    #Influenza A: continue renally dosed Tamiflu     #Possible superimposed bacterial CAP: continue CTX/azithro, follow up procal, strep pneumo and legionella antigens and MRSA nares    #Acute hypoxemic respiratory failure due to above: wean O2 as tolerated with goal saturation >92%    #Moderate persistent asthma in mild exacerbation due to influenza: pulmicort bid, formoterol daily, albuterol qid for wheezing and PRN for dyspnea and wheezing. If still wheezing on bronchodilators will consider steroids    #Atrial flutter with RVR - new onset: continue coumadin for AC, initiate metoprolol 12.5mg q6h and wean diltiazem ggt    #History of saddle PE s/p thrombectomy on warfarin: continue warfarin, pharmacy to dose      Lines/tubes: PIVs  O2: NC  Atbx: CTX/azithro  Drips: diltiazem  Nutrition: Cardiac diet  Ppx: on AC  Dispo: ICU, can likely transition to tele when off dilt drip  Nydia Lee DO

## 2024-02-27 NOTE — PROGRESS NOTES
Lillian Mendoza is a 83 y.o. female on day 0 of admission presenting with Influenza A.      Subjective   82yo female with history of PE s/p thrombectomy 11/15/23 with bilateral DVTs on warfarin, HfpEF, HTN, HLD, CKD III-IV, lymphedema, moderate persistent asthma (off of Symbicort due to physician change) presenting with Influenza A with superimposed ?CAP leading to acute hypoxemic respiratory failure and aflutter with RVR at 150.  She has had paroxysmal A-fib in the past she has not been able to take the factor Xa inhibitors because of her body habitus her size       Objective     Last Recorded Vitals  /62   Pulse 79   Temp 36.6 °C (97.9 °F) (Temporal)   Resp 14   Wt 116 kg (254 lb 13.6 oz)   SpO2 92%   Intake/Output last 3 Shifts:    Intake/Output Summary (Last 24 hours) at 2/27/2024 1438  Last data filed at 2/27/2024 1300  Gross per 24 hour   Intake 1728.92 ml   Output 200 ml   Net 1528.92 ml       Admission Weight  Weight: 118 kg (260 lb) (02/26/24 2023)    Daily Weight  02/27/24 : 116 kg (254 lb 13.6 oz)    Image Results  ECG 12 lead  Sinus tachycardia with irregular rate  Left axis deviation  Repolarization abnormality, prob rate related  In the review of systems she is not complaining of any shortness of breath no chest pain she just weak some shortness of breath tired and she said her legs were very heavy    Physical Exam  She is awake and alert and oriented x 3 her lungs are diminished throughout anteriorly her heart has an increased rate regular rhythm no rubs abdomen is soft is not distended or firm  Relevant Results               Assessment/Plan                  Principal Problem:    Influenza A  Active Problems:    Atrial flutter (CMS/HCC)    Influenza A she is on Tamiflu her pressures have been soft her atrial fibs flutter she has had that and she has had paroxysmal A-fib as well she has had bilateral DVTs and she had a large PE in the just the recent past for now just continue to maintain  her oxygen saturations keep her heart rate rhythm under control and continue all other present care and therapy and treat the influenza A thank you              Sage Alba DO

## 2024-02-27 NOTE — ED PROVIDER NOTES
HPI   Chief Complaint   Patient presents with    Weakness, Gen     Patient BIBA from home. EMS stated patient family c/o of increased weakness of the last few days, patient is usually ambulatory and has not been able to walk. Patient also has c/o flu like symptoms (cough, post nasal drip, and nausea). She states she has not been able to eat for a few days       Brought in by EMS for shortness of breath.  Notes positive sick contact and several family members.  States that she has had flulike symptoms for the past several days, called EMS today given worsening shortness of breath and cough productive of clear sputum.  She denies fevers, chest pain, abdominal pain, nausea, vomiting, and urinary symptoms.  States she has been compliant with her home warfarin for history of a PE.      History provided by:  Patient and EMS personnel   used: No                        Sotero Coma Scale Score: 15                     Patient History   Past Medical History:   Diagnosis Date    Personal history of diseases of the blood and blood-forming organs and certain disorders involving the immune mechanism 11/23/2020    History of anemia    Personal history of other diseases of the circulatory system 11/23/2020    History of hypertension    Personal history of other diseases of the digestive system 11/23/2020    History of hiatal hernia    Personal history of other diseases of the musculoskeletal system and connective tissue     History of arthritis    Personal history of other diseases of the nervous system and sense organs     History of cataract    Personal history of other diseases of the respiratory system 11/23/2020    History of asthma    Personal history of other diseases of the respiratory system 11/23/2020    History of bronchitis    Personal history of other diseases of the respiratory system 11/23/2020    History of lung disease    Personal history of other diseases of urinary system 11/23/2020    History  of kidney problems    Personal history of other endocrine, nutritional and metabolic disease 11/23/2020    History of thyroid disorder    Personal history of other specified conditions 11/23/2020    H/O shortness of breath     Past Surgical History:   Procedure Laterality Date    INVASIVE VASCULAR PROCEDURE N/A 11/15/2023    Procedure: Pulmonary Angiography - Bilateral;  Surgeon: Maldonado Alvarado MD;  Location: [facility] Cardiac Cath Lab;  Service: Cardiovascular;  Laterality: N/A;    OTHER SURGICAL HISTORY  11/23/2020    Knee replacement    OTHER SURGICAL HISTORY  11/23/2020    Lumpectomy    OTHER SURGICAL HISTORY  11/23/2020    Cataract surgery    OTHER SURGICAL HISTORY  11/23/2020    Cholecystectomy    OTHER SURGICAL HISTORY  11/23/2020    Hernia repair    OTHER SURGICAL HISTORY  11/23/2020    Hysterectomy     Family History   Problem Relation Name Age of Onset    Cancer Mother      Diabetes Paternal Grandmother      Diabetes Paternal Grandfather       Social History     Tobacco Use    Smoking status: Former     Types: Cigarettes     Passive exposure: Never    Smokeless tobacco: Not on file   Vaping Use    Vaping Use: Not on file   Substance Use Topics    Alcohol use: Not Currently    Drug use: Not Currently       Physical Exam   ED Triage Vitals [02/26/24 2023]   Temp Heart Rate Respirations BP   -- (!) 152 20 71/56      Pulse Ox Temp src Heart Rate Source Patient Position   (!) 91 % -- -- Lying      BP Location FiO2 (%)     Left arm --       Physical Exam  Vitals and nursing note reviewed.   HENT:      Head: Atraumatic.      Mouth/Throat:      Mouth: Mucous membranes are moist.   Eyes:      Conjunctiva/sclera: Conjunctivae normal.   Cardiovascular:      Rate and Rhythm: Regular rhythm. Tachycardia present.   Pulmonary:      Effort: Pulmonary effort is normal.      Comments: On 4 L nasal cannula, which is not baseline.  Decreased air movement bilaterally, which is limited secondary to body habitus.  Abdominal:      Palpations:  Abdomen is soft.      Tenderness: There is no abdominal tenderness.   Musculoskeletal:         General: No deformity.      Cervical back: Normal range of motion.   Skin:     General: Skin is warm and dry.   Neurological:      Mental Status: She is alert.      Comments: Moving all extremities         ED Course & MDM   ED Course as of 02/27/24 0003   Mon Feb 26, 2024 2124 SBPs 90s on repeat eval [AB]   2157 Flu A Result(!): Detected [AB]   2203 I performed a sepsis reperfusion exam [AB]   2203 Will start with 1000 cc bolus as opposed to 30 cc/kg bolus, given patient's known history of CHF [AB]   2206 INR(!): 1.7 [AB]   2212 Troponin I, High Sensitivity(!!): 356  Will trend.  Certainly could be secondary to demand ischemia, given her elevated heart rates.  Will conservatively give aspirin.  Given her history of PE, will also scan for acute PE with CT PE. [AB]   2222 .  Still mentating appropriately. [AB]   2244 CT angio chest for pulmonary embolism  Per my view, there is no obvious central PE.  There is a consolidation in the left lower lobe which could be secondary to atelectasis or pneumonia.  Will conservatively treat with antibiotics. [AB]   2259 . HR 140s. Will repeat ECG to eval for sinus tach vs aflutter. Would not rate control regardless given septic for Flu A. [AB]   2318 ECG 12 lead  My ECG interpretation #1: Narrow complex rhythm with what appears to be irregular RR interval, heart rate 138, no ST segment elevation; could be consistent with atrial flutter with 2-1 block versus SVT versus sinus tachycardia [AB]   2319 ECG 12 lead  My interpretation of ECG #2: Narrow complex rhythm with what appears to be irregular RR interval, possibly more flutter appearing waves in lead III, no ST segment elevation, QTc 499.  Again, concerning for atrial flutter with 2 1 block versus sinus tachycardia.  SVT thought to be less likely.  Given how persistently the patient is maintaining a heart rate of 150  despite fluid resuscitation, more so suspicious of atrial flutter with 2 1 block. [AB]   2326 Spoke with Dr. Castillo with ICU who will come evaluate the patient. [AB]      ED Course User Index  [AB] Dao Savage MD         Diagnoses as of 02/27/24 0003   Influenza A   Community acquired pneumonia of left lower lobe of lung   Sepsis, due to unspecified organism, unspecified whether acute organ dysfunction present (CMS/[facility])   Acute hypoxic respiratory failure (CMS/[facility])   Atrial flutter, unspecified type (CMS/[facility])   EMILI (acute kidney injury) (CMS/[facility])   Demand ischemia       Medical Decision Making  Clinical picture concerning for sepsis secondary to influenza A complicated by acute hypoxic respiratory failure, EMILI, new onset atrial flutter, and demand ischemia.  Given new oxygen requirement and possible consolidation on CT imaging, did conservatively cover for CAP as well.  Given history of PE with INR of 1.7, did also consider PE, this was not seen on CT PE.    Systolic pressures increased with gentle fluid hydration.  Persistently maintained heart rates in the 150s, concerning for atrial flutter, which is likely secondary to the patient's infection.  Will continue to treat the underlying infection and will hold off on rate control at this time.  Will escalate care to the ICU for further management.    Amount and/or Complexity of Data Reviewed  Labs: ordered.  Radiology: ordered.  ECG/medicine tests: ordered and independent interpretation performed. Decision-making details documented in ED Course.  Discussion of management or test interpretation with external provider(s): The admitting intensivist    Risk  Decision regarding hospitalization.        Procedure  Critical Care    Performed by: Dao Savage MD  Authorized by: Dao Savage MD    Critical care provider statement:     Critical care time (minutes):  60    Critical care time was exclusive of:  Separately billable procedures and treating other  patients    Critical care was necessary to treat or prevent imminent or life-threatening deterioration of the following conditions:  Sepsis and respiratory failure (atrial flutter)    Critical care was time spent personally by me on the following activities:  Blood draw for specimens, development of treatment plan with patient or surrogate, discussions with consultants, evaluation of patient's response to treatment, examination of patient, obtaining history from patient or surrogate, ordering and performing treatments and interventions, ordering and review of laboratory studies, ordering and review of radiographic studies, pulse oximetry, re-evaluation of patient's condition and review of old charts    Care discussed with: admitting provider         Dao Savage MD  02/27/24 0003

## 2024-02-27 NOTE — H&P
History Of Present Illness  Lillian Mendoza is a 83 y.o. female with previous PE on AC/HTN/asthma/ presenting with poor PO intake flu-like symptoms.  Was not vaccinated for influenza this season and was found to have influenza A/CAP/tachyarrhythmia.  Had been hypotensive upon presentation to ED with good response to fluids.  Started on tamiflu and abx as well as cardizem for HR control.       Past Medical History  Past Medical History:   Diagnosis Date    Personal history of diseases of the blood and blood-forming organs and certain disorders involving the immune mechanism 11/23/2020    History of anemia    Personal history of other diseases of the circulatory system 11/23/2020    History of hypertension    Personal history of other diseases of the digestive system 11/23/2020    History of hiatal hernia    Personal history of other diseases of the musculoskeletal system and connective tissue     History of arthritis    Personal history of other diseases of the nervous system and sense organs     History of cataract    Personal history of other diseases of the respiratory system 11/23/2020    History of asthma    Personal history of other diseases of the respiratory system 11/23/2020    History of bronchitis    Personal history of other diseases of the respiratory system 11/23/2020    History of lung disease    Personal history of other diseases of urinary system 11/23/2020    History of kidney problems    Personal history of other endocrine, nutritional and metabolic disease 11/23/2020    History of thyroid disorder    Personal history of other specified conditions 11/23/2020    H/O shortness of breath       Surgical History  Past Surgical History:   Procedure Laterality Date    INVASIVE VASCULAR PROCEDURE N/A 11/15/2023    Procedure: Pulmonary Angiography - Bilateral;  Surgeon: Maldonado Alvarado MD;  Location: [facility];  Service: Cardiovascular;  Laterality: N/A;    OTHER SURGICAL HISTORY  11/23/2020    Knee  "replacement    OTHER SURGICAL HISTORY  11/23/2020    Lumpectomy    OTHER SURGICAL HISTORY  11/23/2020    Cataract surgery    OTHER SURGICAL HISTORY  11/23/2020    Cholecystectomy    OTHER SURGICAL HISTORY  11/23/2020    Hernia repair    OTHER SURGICAL HISTORY  11/23/2020    Hysterectomy        Social History  She reports that she has quit smoking. Her smoking use included cigarettes. She has never been exposed to tobacco smoke. She does not have any smokeless tobacco history on file. She reports that she does not currently use alcohol. She reports that she does not currently use drugs.    Family History  Family History   Problem Relation Name Age of Onset    Cancer Mother      Diabetes Paternal Grandmother      Diabetes Paternal Grandfather          Allergies  Morphine    Review of Systems  14 point ROS negative other than poor PO intake, cough, mild SOB, nausea.  Denies CP/palps/HA     Physical Exam  NCAT, EOMI, MM dry  S1S2 tachy, no MMR appreciated  B/l air entry, no wheezes, has some rhonchi in the posterior lung fields  Obese, soft, NT, ND BS+  B/l LE edema, good distal pulses  AA+O x3, in NAD, moves all ext, follows commands, no focal neuro deficits     Last Recorded Vitals  Blood pressure 106/57, pulse (!) 144, temperature 36.1 °C (97 °F), temperature source Tympanic, resp. rate 19, height 1.651 m (5' 5\"), weight 118 kg (260 lb), SpO2 (!) 92 %.    Relevant Results  Results for orders placed or performed during the hospital encounter of 02/26/24 (from the past 24 hour(s))   CBC and Auto Differential   Result Value Ref Range    WBC 4.4 4.4 - 11.3 x10*3/uL    nRBC 0.0 0.0 - 0.0 /100 WBCs    RBC 3.74 (L) 4.00 - 5.20 x10*6/uL    Hemoglobin 11.1 (L) 12.0 - 16.0 g/dL    Hematocrit 35.7 (L) 36.0 - 46.0 %    MCV 96 80 - 100 fL    MCH 29.7 26.0 - 34.0 pg    MCHC 31.1 (L) 32.0 - 36.0 g/dL    RDW 16.3 (H) 11.5 - 14.5 %    Platelets 222 150 - 450 x10*3/uL    Immature Granulocytes %, Automated 0.5 0.0 - 0.9 %    Immature " Granulocytes Absolute, Automated 0.02 0.00 - 0.50 x10*3/uL   B-Type Natriuretic Peptide   Result Value Ref Range     (H) 0 - 99 pg/mL   Manual Differential   Result Value Ref Range    Neutrophils %, Manual 46.0 40.0 - 80.0 %    Bands %, Manual 3.0 0.0 - 5.0 %    Lymphocytes %, Manual 34.0 13.0 - 44.0 %    Monocytes %, Manual 15.0 2.0 - 10.0 %    Eosinophils %, Manual 0.0 0.0 - 6.0 %    Basophils %, Manual 0.0 0.0 - 2.0 %    Atypical Lymphocytes %, Manual 2.0 0.0 - 2.0 %    Seg Neutrophils Absolute, Manual 2.02 1.60 - 5.00 x10*3/uL    Bands Absolute, Manual 0.13 0.00 - 0.50 x10*3/uL    Lymphocytes Absolute, Manual 1.50 0.80 - 3.00 x10*3/uL    Monocytes Absolute, Manual 0.66 0.05 - 0.80 x10*3/uL    Eosinophils Absolute, Manual 0.00 0.00 - 0.40 x10*3/uL    Basophils Absolute, Manual 0.00 0.00 - 0.10 x10*3/uL    Atypical Lymphs Absolute, Manual 0.09 0.00 - 0.30 x10*3/uL    Total Cells Counted 100     Neutrophils Absolute, Manual 2.15 1.60 - 5.50 x10*3/uL    RBC Morphology See Below     Ovalocytes Few     Teardrop Cells Few     Jamaal Cells Few    Sars-CoV-2 and Influenza A/B PCR   Result Value Ref Range    Flu A Result Detected (A) Not Detected    Flu B Result Not Detected Not Detected    Coronavirus 2019, PCR Not Detected Not Detected   RSV PCR   Result Value Ref Range    RSV PCR Not Detected Not Detected   Magnesium   Result Value Ref Range    Magnesium 1.82 1.60 - 2.40 mg/dL   Comprehensive metabolic panel   Result Value Ref Range    Glucose 88 74 - 99 mg/dL    Sodium 140 136 - 145 mmol/L    Potassium 4.4 3.5 - 5.3 mmol/L    Chloride 104 98 - 107 mmol/L    Bicarbonate 25 21 - 32 mmol/L    Anion Gap 15 10 - 20 mmol/L    Urea Nitrogen 25 (H) 6 - 23 mg/dL    Creatinine 2.02 (H) 0.50 - 1.05 mg/dL    eGFR 24 (L) >60 mL/min/1.73m*2    Calcium 8.8 8.6 - 10.3 mg/dL    Albumin 3.4 3.4 - 5.0 g/dL    Alkaline Phosphatase 89 33 - 136 U/L    Total Protein 7.3 6.4 - 8.2 g/dL    AST 32 9 - 39 U/L    Bilirubin, Total 0.4 0.0 -  1.2 mg/dL    ALT 11 7 - 45 U/L   Troponin I, High Sensitivity   Result Value Ref Range    Troponin I, High Sensitivity 356 (HH) 0 - 13 ng/L   Lactate   Result Value Ref Range    Lactate 1.8 0.4 - 2.0 mmol/L   Blood Culture    Specimen: Peripheral Venipuncture; Blood culture   Result Value Ref Range    Blood Culture Loaded on Instrument - Culture in progress    Light Blue Top   Result Value Ref Range    Extra Tube Hold for add-ons.    Protime-INR   Result Value Ref Range    Protime 19.2 (H) 9.8 - 12.8 seconds    INR 1.7 (H) 0.9 - 1.1   Troponin I, High Sensitivity   Result Value Ref Range    Troponin I, High Sensitivity 383 (HH) 0 - 13 ng/L       CT angio chest for pulmonary embolism    Result Date: 2/26/2024  Interpreted By:  Priyanka Gaitan, STUDY: CT ANGIO CHEST FOR PULMONARY EMBOLISM;  2/26/2024 10:47 pm   INDICATION: Signs/Symptoms:History of PE on warfarin with an INR of 1.7 presents with new oxygen requirement, tachycardia, hypotension, and elevated troponin.  Evaluate for acute PE.  Patient is flu a positive..   COMPARISON: 11/21/2023   ACCESSION NUMBER(S): [id number]   ORDERING CLINICIAN: ALEJANDRA TOVAR   TECHNIQUE: Contiguous axial images of the chest were obtained after the intravenous administration of contrast using angiographic PE protocol. Coronal and sagittal reformatted images were reconstructed from the axial data. MIP images were created and reviewed.   FINDINGS: MEDIASTINUM AND LYMPH NODES: No enlarged intrathoracic or axillary lymph nodes. Slightly patulous appearance of the esophagus with a large hiatal hernia with the stomach predominantly contained in the chest, similar to prior imaging. No pneumomediastinum.   VESSELS: A mildly aneurysmal ascending a thoracic aorta measuring up to 4.2 by 4.3 cm. Minimal aortic atherosclerosis. Enlarged main pulmonary artery measuring up to 3.7 cm, suggestive of pulmonary hypertension. No pulmonary embolism identified to the segmental level.   HEART: Normal  in size.  No significant coronary artery calcifications. No significant pericardial effusion.   LUNG, AIRWAYS, AND PLEURA: The central airways are patent. Bilateral atelectasis, most pronounced adjacent to the hiatal hernia in the left lower lobe. No pleural effusion or pneumothorax.   OSSEOUS STRUCTURES/CHEST WALL: The visualized axilla is unremarkable. Thyroid not well visualized mild degenerative changes present. No acute osseous abnormality.   UPPER ABDOMEN/OTHER: No acute abnormality.       No acute pulmonary embolism to the segmental level..   Left lower lobe airspace opacities similar to prior imaging suggestive of atelectasis with large adjacent hiatal hernia. Superimposed atypical infection or aspiration however not entirely excluded.   Mildly aneurysmal ascending thoracic aorta up to 4.3 cm.   3.7 cm enlargement of the main pulmonary artery suggestive of pulmonary hypertension.         MACRO: None.   Signed by: Priyanka Gaitan 2/26/2024 11:03 PM Dictation workstation:   [id number]    XR chest 1 view    Result Date: 2/26/2024  Interpreted By:  Curtis Rogers, STUDY: XR CHEST 1 VIEW;  2/26/2024 9:39 pm   INDICATION: Signs/Symptoms:SOB.   COMPARISON: Chest x-ray 11/27/ 1.   ACCESSION NUMBER(S): [id number]   ORDERING CLINICIAN: ALEJANDRA OTVAR   FINDINGS: Multiple overlying are present.   CARDIOMEDIASTINAL SILHOUETTE: Cardiac silhouette is enlarged but stable. Pulmonary vascular congestion noted.   LUNGS: Retrocardiac opacity with blunting of the left costophrenic angle may relate to combination of effusion and atelectasis. Superimposed infection not excluded. No pneumothorax.   ABDOMEN: Large hiatal hernia.   BONES: Dextroconvex scoliosis. Advanced bilateral shoulder osteoarthrosis. Partial visualization of fixation plate and screws in the right mid humerus.       Cardiomegaly with mild pulmonary vascular congestion.   Large hiatal hernia. Retrocardiac opacity may relate to combination of small effusion and  atelectasis. Superimposed infection not excluded. Correlate clinically.   MACRO: None   Signed by: Curtis Rogers 2/26/2024 9:49 PM Dictation workstation:   [id number]        Assessment/Plan   Principal Problem:    Influenza A  CAP, tachyarrhythmia, CKD, history of VTE-PE/asthma    Plan: hemodynamic  monitoring, wean cardizem to HR < 100, gentle fluids, AC with goal INR 2-3, supplemental o2 PRN, GI and DVT prophylaxis, therapeutic tamiflu, empiric abx- rocephin/azithro, follow cultures, replace lytes as needed, trend Cr, glycemic monitoring, pain control.    I spent > 35 minutes in the professional and overall care of this patient.      Devon Castillo, DO

## 2024-02-27 NOTE — CARE PLAN
Problem: Respiratory  Goal: Clear secretions with interventions this shift  Outcome: Progressing  Flowsheets (Taken 2/27/2024 0942)  Clear secretions with interventions this shift:   Incentive spirometry   Suctioning  Goal: Wean oxygen to maintain O2 saturation per order/standard this shift  Outcome: Progressing  Flowsheets (Taken 2/27/2024 0942)  Wean oxygen to maintain O2 saturation per order/standard this shift: Incentive spirometry  Note: Pt on 6L with humidity at shift change, productive cough, sputum sent to lab, IS to 500   The patient's goals for the shift include      The clinical goals for the shift include      Over the shift, the patient did not make progress toward the following goals. Barriers to progression include hours of sleep. Recommendations to address these barriers include IS 10 times/hour while awake.

## 2024-02-27 NOTE — PROGRESS NOTES
ICU progress note:    Subjective:  Presented from home, states she lives with her granddaughter. Has not been eating much recently but would like some food now.     Visit Vitals  /82   Pulse 79   Temp 36.3 °C (97.3 °F) (Temporal)   Resp 25      Physical Exam:   General:  Alert, awake, cooperative.  HEENT:  Normocephalic, atraumatic.  Neck:  Trachea midline.  No JVD.   Chest:  B/L rhonchi, scattered wheezes. no crackles.   CV:  Irregular rhythm.  Positive S1/S2.   GI: Bowel sounds present in all four quadrants, abdomen is soft, non-tender, non-distended.  Extremities: Trace lower extremity edema, L >R.  No cyanosis  Neurological:  AAOx3. Moving all extremities.   Skin:  Warm and dry.      Lab Results   Component Value Date    WBC 4.4 02/26/2024    HGB 11.1 (L) 02/26/2024    HCT 35.7 (L) 02/26/2024    MCV 96 02/26/2024     02/26/2024      Lab Results   Component Value Date    GLUCOSE 75 02/27/2024    CALCIUM 8.1 (L) 02/27/2024     02/27/2024    K 4.4 02/27/2024    CO2 21 02/27/2024     02/27/2024    BUN 23 02/27/2024    CREATININE 1.88 (H) 02/27/2024      aspirin, 81 mg, oral, Daily  cefTRIAXone, 2 g, intravenous, q24h  fluticasone, 2 spray, Each Nostril, Daily  ipratropium-albuteroL, 3 mL, nebulization, 4x daily  levothyroxine, 150 mcg, oral, Daily  magnesium sulfate, 2 g, intravenous, Once  oseltamivir, 30 mg, oral, Daily  pantoprazole, 40 mg, oral, Daily before breakfast  warfarin, 2 mg, oral, Daily       dilTIAZem, 5-15 mg/hr, Last Rate: 10 mg/hr (02/27/24 0434)  sodium chloride 0.9%, 100 mL/hr, Last Rate: 100 mL/hr (02/27/24 0434)       PRN medications: ipratropium-albuteroL, oxygen     Investigations:  Labs, radiological imaging and cardiac work up were reviewed    Assessment & Plan:    Neurological System:  # CVA, history of  -Baseline mentation AO x3  -Avoid excessive caths/ lines, monitor for ICU delirium    Cardiovascular System:  #Aflutter w/ RVR, new onset  #Elevated troponin 2/2  demand ischemia  #HTN  -EKG obtained which confirms a flutter  -On cardizem gtt. Start Tartrate 12.5 mg Q6 and wean cardizem.   -Patient had recent echo 2/8 EF 55 to 60%  -Check TSH  -Byugt7aida 6, she is already on anticoagulation w/ Warfarin  -Monitor hemodynamics closely   -Troponin downtrending, likely in setting of tachycardia    Respiratory System:  #Acute hypoxic respiratory failure requiring supplemental   O2  #CAP  #Acute influenza A infection  #BLAYNE  #Pulmonary hypertension  #Asthma  -CTA (-) for PE  -Currently on 5L NC, wean as tolerated. Uses 2L qhs at baseline.   -Rocephin/ Azithro  -Tamiflu  -Follow-up pneumonia workup  -Incentive spirometry  -Bronchodilators, hypertonic saline, mucolytics  -Start Pulmicort and formoterol    Gastrointestinal System:  -Passed swallow  eval, diet advanced    Endocrine System:  -Accu-checks   -Check TSH    Renal System:  #CKDIV (Baseline ~Cr 1.8-2)  -Trend BMP, monitor UOP, optimize electrolytes   -Recommend outpatient nephrology follow up    Hematological System:  #Hx of PE on Coumadin  -Continue coumdin, daily INR.   -Trend CBC. Monitor for signs of active bleeding.     Infection Disease System:  #Pneumonia  #Acute influenza A infection  -MRSA nares, strep/Legionella urinary antigen  -Follow-up blood and sputum cultures  -Check UA  -Check procal  -Continue Rocephin/Azithro  -Monitor for signs of worsening infection     Vascular/extremities:  -Lower extremity duplex to evaluate for LLE swelling    Vent/O2:  5L NC  Lines/Devices: PIV  Drips: Cardizem gtt  ATBx: Rocephin / Azithro  Diet: Cardiac  PPX: Coumadin  Code:  Full Code  Dispo: CLAUDINE Rucker D.O.  Internal Medicine PGY-3     This is a preliminary note with physician attestation to follow.

## 2024-02-27 NOTE — CARE PLAN
The patient's goals for the shift include      The clinical goals for the shift include DECREASE O2 DEMAND    Over the shift, the patient did not make progress toward the following goals. Barriers to progression include flu a positive. Recommendations to address these barriers include continue plan of care.

## 2024-02-28 LAB
ANION GAP SERPL CALC-SCNC: 11 MMOL/L (ref 10–20)
BUN SERPL-MCNC: 19 MG/DL (ref 6–23)
CALCIUM SERPL-MCNC: 7.9 MG/DL (ref 8.6–10.3)
CHLORIDE SERPL-SCNC: 104 MMOL/L (ref 98–107)
CO2 SERPL-SCNC: 26 MMOL/L (ref 21–32)
CREAT SERPL-MCNC: 1.44 MG/DL (ref 0.5–1.05)
EGFRCR SERPLBLD CKD-EPI 2021: 37 ML/MIN/1.73M*2
ERYTHROCYTE [DISTWIDTH] IN BLOOD BY AUTOMATED COUNT: 16.3 % (ref 11.5–14.5)
GLUCOSE SERPL-MCNC: 97 MG/DL (ref 74–99)
HCT VFR BLD AUTO: 35.6 % (ref 36–46)
HGB BLD-MCNC: 10.6 G/DL (ref 12–16)
INR PPP: 1.6 (ref 0.9–1.1)
LEGIONELLA AG UR QL: NEGATIVE
MAGNESIUM SERPL-MCNC: 1.93 MG/DL (ref 1.6–2.4)
MCH RBC QN AUTO: 29 PG (ref 26–34)
MCHC RBC AUTO-ENTMCNC: 29.8 G/DL (ref 32–36)
MCV RBC AUTO: 97 FL (ref 80–100)
NRBC BLD-RTO: 0 /100 WBCS (ref 0–0)
PLATELET # BLD AUTO: 217 X10*3/UL (ref 150–450)
POTASSIUM SERPL-SCNC: 4.1 MMOL/L (ref 3.5–5.3)
PROTHROMBIN TIME: 18.5 SECONDS (ref 9.8–12.8)
RBC # BLD AUTO: 3.66 X10*6/UL (ref 4–5.2)
S PNEUM AG UR QL: NEGATIVE
SODIUM SERPL-SCNC: 137 MMOL/L (ref 136–145)
WBC # BLD AUTO: 4 X10*3/UL (ref 4.4–11.3)

## 2024-02-28 PROCEDURE — 1100000001 HC PRIVATE ROOM DAILY

## 2024-02-28 PROCEDURE — 99232 SBSQ HOSP IP/OBS MODERATE 35: CPT | Performed by: STUDENT IN AN ORGANIZED HEALTH CARE EDUCATION/TRAINING PROGRAM

## 2024-02-28 PROCEDURE — 2500000002 HC RX 250 W HCPCS SELF ADMINISTERED DRUGS (ALT 637 FOR MEDICARE OP, ALT 636 FOR OP/ED): Performed by: STUDENT IN AN ORGANIZED HEALTH CARE EDUCATION/TRAINING PROGRAM

## 2024-02-28 PROCEDURE — 94640 AIRWAY INHALATION TREATMENT: CPT

## 2024-02-28 PROCEDURE — 36415 COLL VENOUS BLD VENIPUNCTURE: CPT | Performed by: STUDENT IN AN ORGANIZED HEALTH CARE EDUCATION/TRAINING PROGRAM

## 2024-02-28 PROCEDURE — 2500000002 HC RX 250 W HCPCS SELF ADMINISTERED DRUGS (ALT 637 FOR MEDICARE OP, ALT 636 FOR OP/ED): Performed by: INTERNAL MEDICINE

## 2024-02-28 PROCEDURE — 83735 ASSAY OF MAGNESIUM: CPT | Performed by: INTERNAL MEDICINE

## 2024-02-28 PROCEDURE — 2500000004 HC RX 250 GENERAL PHARMACY W/ HCPCS (ALT 636 FOR OP/ED): Performed by: STUDENT IN AN ORGANIZED HEALTH CARE EDUCATION/TRAINING PROGRAM

## 2024-02-28 PROCEDURE — 2500000004 HC RX 250 GENERAL PHARMACY W/ HCPCS (ALT 636 FOR OP/ED): Performed by: INTERNAL MEDICINE

## 2024-02-28 PROCEDURE — 36415 COLL VENOUS BLD VENIPUNCTURE: CPT | Performed by: INTERNAL MEDICINE

## 2024-02-28 PROCEDURE — 85027 COMPLETE CBC AUTOMATED: CPT | Performed by: INTERNAL MEDICINE

## 2024-02-28 PROCEDURE — 87081 CULTURE SCREEN ONLY: CPT | Mod: PARLAB | Performed by: STUDENT IN AN ORGANIZED HEALTH CARE EDUCATION/TRAINING PROGRAM

## 2024-02-28 PROCEDURE — 2500000001 HC RX 250 WO HCPCS SELF ADMINISTERED DRUGS (ALT 637 FOR MEDICARE OP): Performed by: INTERNAL MEDICINE

## 2024-02-28 PROCEDURE — 2500000001 HC RX 250 WO HCPCS SELF ADMINISTERED DRUGS (ALT 637 FOR MEDICARE OP): Performed by: STUDENT IN AN ORGANIZED HEALTH CARE EDUCATION/TRAINING PROGRAM

## 2024-02-28 PROCEDURE — 85610 PROTHROMBIN TIME: CPT | Performed by: STUDENT IN AN ORGANIZED HEALTH CARE EDUCATION/TRAINING PROGRAM

## 2024-02-28 PROCEDURE — 80048 BASIC METABOLIC PNL TOTAL CA: CPT | Performed by: INTERNAL MEDICINE

## 2024-02-28 PROCEDURE — 87040 BLOOD CULTURE FOR BACTERIA: CPT | Mod: PARLAB | Performed by: STUDENT IN AN ORGANIZED HEALTH CARE EDUCATION/TRAINING PROGRAM

## 2024-02-28 PROCEDURE — 97165 OT EVAL LOW COMPLEX 30 MIN: CPT | Mod: GO

## 2024-02-28 PROCEDURE — 97162 PT EVAL MOD COMPLEX 30 MIN: CPT | Mod: GP

## 2024-02-28 RX ORDER — OSELTAMIVIR PHOSPHATE 30 MG/1
30 CAPSULE ORAL 2 TIMES DAILY
Status: DISCONTINUED | OUTPATIENT
Start: 2024-02-28 | End: 2024-03-03 | Stop reason: HOSPADM

## 2024-02-28 RX ORDER — VANCOMYCIN HYDROCHLORIDE 750 MG/150ML
750 INJECTION, SOLUTION INTRAVENOUS EVERY 24 HOURS
Status: DISCONTINUED | OUTPATIENT
Start: 2024-02-28 | End: 2024-02-28 | Stop reason: ALTCHOICE

## 2024-02-28 RX ORDER — VANCOMYCIN HYDROCHLORIDE 1 G/20ML
INJECTION, POWDER, LYOPHILIZED, FOR SOLUTION INTRAVENOUS DAILY PRN
Status: DISCONTINUED | OUTPATIENT
Start: 2024-02-28 | End: 2024-02-28 | Stop reason: ALTCHOICE

## 2024-02-28 RX ORDER — TRAMADOL HYDROCHLORIDE 50 MG/1
50 TABLET ORAL NIGHTLY PRN
Status: DISCONTINUED | OUTPATIENT
Start: 2024-02-28 | End: 2024-03-03 | Stop reason: HOSPADM

## 2024-02-28 RX ORDER — METOPROLOL TARTRATE 25 MG/1
25 TABLET, FILM COATED ORAL 2 TIMES DAILY
Status: DISCONTINUED | OUTPATIENT
Start: 2024-02-28 | End: 2024-03-03 | Stop reason: HOSPADM

## 2024-02-28 RX ORDER — POLYETHYLENE GLYCOL 3350 17 G/17G
17 POWDER, FOR SOLUTION ORAL DAILY
Status: DISCONTINUED | OUTPATIENT
Start: 2024-02-29 | End: 2024-03-03 | Stop reason: HOSPADM

## 2024-02-28 RX ORDER — MAGNESIUM SULFATE HEPTAHYDRATE 40 MG/ML
2 INJECTION, SOLUTION INTRAVENOUS ONCE
Status: COMPLETED | OUTPATIENT
Start: 2024-02-28 | End: 2024-02-28

## 2024-02-28 RX ADMIN — MAGNESIUM SULFATE HEPTAHYDRATE 2 G: 40 INJECTION, SOLUTION INTRAVENOUS at 14:23

## 2024-02-28 RX ADMIN — ASPIRIN 81 MG 81 MG: 81 TABLET ORAL at 09:32

## 2024-02-28 RX ADMIN — ALBUTEROL SULFATE 2.5 MG: 2.5 SOLUTION RESPIRATORY (INHALATION) at 19:00

## 2024-02-28 RX ADMIN — FORMOTEROL FUMARATE DIHYDRATE 20 MCG: 20 SOLUTION RESPIRATORY (INHALATION) at 19:01

## 2024-02-28 RX ADMIN — OSELTAMIVIR PHOSPHATE 30 MG: 30 CAPSULE ORAL at 11:11

## 2024-02-28 RX ADMIN — ALBUTEROL SULFATE 2.5 MG: 2.5 SOLUTION RESPIRATORY (INHALATION) at 07:15

## 2024-02-28 RX ADMIN — ALBUTEROL SULFATE 2.5 MG: 2.5 SOLUTION RESPIRATORY (INHALATION) at 11:40

## 2024-02-28 RX ADMIN — METOPROLOL TARTRATE 12.5 MG: 25 TABLET, FILM COATED ORAL at 01:00

## 2024-02-28 RX ADMIN — AZITHROMYCIN 500 MG: 500 INJECTION, POWDER, LYOPHILIZED, FOR SOLUTION INTRAVENOUS at 12:13

## 2024-02-28 RX ADMIN — TRAMADOL HYDROCHLORIDE 50 MG: 50 TABLET, COATED ORAL at 02:53

## 2024-02-28 RX ADMIN — PANTOPRAZOLE SODIUM 40 MG: 40 TABLET, DELAYED RELEASE ORAL at 06:36

## 2024-02-28 RX ADMIN — METOPROLOL TARTRATE 25 MG: 25 TABLET, FILM COATED ORAL at 20:45

## 2024-02-28 RX ADMIN — FORMOTEROL FUMARATE DIHYDRATE 20 MCG: 20 SOLUTION RESPIRATORY (INHALATION) at 07:18

## 2024-02-28 RX ADMIN — METOPROLOL TARTRATE 12.5 MG: 25 TABLET, FILM COATED ORAL at 11:11

## 2024-02-28 RX ADMIN — FLUTICASONE PROPIONATE 2 SPRAY: 50 SPRAY, METERED NASAL at 09:32

## 2024-02-28 RX ADMIN — OSELTAMIVIR PHOSPHATE 30 MG: 30 CAPSULE ORAL at 20:45

## 2024-02-28 RX ADMIN — TRAMADOL HYDROCHLORIDE 50 MG: 50 TABLET, COATED ORAL at 20:45

## 2024-02-28 RX ADMIN — METOPROLOL TARTRATE 12.5 MG: 25 TABLET, FILM COATED ORAL at 06:36

## 2024-02-28 RX ADMIN — ALBUTEROL SULFATE 2.5 MG: 2.5 SOLUTION RESPIRATORY (INHALATION) at 14:50

## 2024-02-28 RX ADMIN — BUDESONIDE 0.25 MG: 0.25 INHALANT ORAL at 07:15

## 2024-02-28 RX ADMIN — VANCOMYCIN HYDROCHLORIDE 750 MG: 750 INJECTION, SOLUTION INTRAVENOUS at 06:36

## 2024-02-28 RX ADMIN — WARFARIN SODIUM 5 MG: 5 TABLET ORAL at 18:48

## 2024-02-28 RX ADMIN — LEVOTHYROXINE SODIUM 150 MCG: 150 TABLET ORAL at 06:36

## 2024-02-28 RX ADMIN — CEFTRIAXONE SODIUM 2 G: 2 INJECTION, SOLUTION INTRAVENOUS at 09:32

## 2024-02-28 ASSESSMENT — COGNITIVE AND FUNCTIONAL STATUS - GENERAL
DRESSING REGULAR LOWER BODY CLOTHING: TOTAL
TOILETING: A LOT
CLIMB 3 TO 5 STEPS WITH RAILING: TOTAL
MOVING TO AND FROM BED TO CHAIR: TOTAL
MOBILITY SCORE: 8
EATING MEALS: A LITTLE
TURNING FROM BACK TO SIDE WHILE IN FLAT BAD: A LOT
STANDING UP FROM CHAIR USING ARMS: TOTAL
DRESSING REGULAR UPPER BODY CLOTHING: A LOT
MOVING FROM LYING ON BACK TO SITTING ON SIDE OF FLAT BED WITH BEDRAILS: A LOT
WALKING IN HOSPITAL ROOM: TOTAL
STANDING UP FROM CHAIR USING ARMS: TOTAL
CLIMB 3 TO 5 STEPS WITH RAILING: TOTAL
PERSONAL GROOMING: A LITTLE
STANDING UP FROM CHAIR USING ARMS: TOTAL
CLIMB 3 TO 5 STEPS WITH RAILING: TOTAL
DRESSING REGULAR LOWER BODY CLOTHING: A LOT
DAILY ACTIVITIY SCORE: 15
TURNING FROM BACK TO SIDE WHILE IN FLAT BAD: TOTAL
MOVING TO AND FROM BED TO CHAIR: A LOT
TOILETING: A LOT
TOILETING: TOTAL
PERSONAL GROOMING: A LITTLE
HELP NEEDED FOR BATHING: A LOT
WALKING IN HOSPITAL ROOM: TOTAL
DRESSING REGULAR LOWER BODY CLOTHING: A LOT
MOVING FROM LYING ON BACK TO SITTING ON SIDE OF FLAT BED WITH BEDRAILS: TOTAL
DAILY ACTIVITIY SCORE: 13
PERSONAL GROOMING: A LITTLE
TURNING FROM BACK TO SIDE WHILE IN FLAT BAD: A LOT
MOVING TO AND FROM BED TO CHAIR: TOTAL
MOVING FROM LYING ON BACK TO SITTING ON SIDE OF FLAT BED WITH BEDRAILS: A LITTLE
HELP NEEDED FOR BATHING: A LOT
DRESSING REGULAR UPPER BODY CLOTHING: A LOT
MOBILITY SCORE: 6
WALKING IN HOSPITAL ROOM: TOTAL
DAILY ACTIVITIY SCORE: 14
MOBILITY SCORE: 10
DRESSING REGULAR UPPER BODY CLOTHING: A LOT
HELP NEEDED FOR BATHING: A LOT

## 2024-02-28 ASSESSMENT — PAIN SCALES - GENERAL
PAINLEVEL_OUTOF10: 0 - NO PAIN
PAINLEVEL_OUTOF10: 4
PAINLEVEL_OUTOF10: 0 - NO PAIN
PAINLEVEL_OUTOF10: 8

## 2024-02-28 ASSESSMENT — PAIN - FUNCTIONAL ASSESSMENT
PAIN_FUNCTIONAL_ASSESSMENT: 0-10

## 2024-02-28 ASSESSMENT — PAIN DESCRIPTION - ORIENTATION: ORIENTATION: OTHER (COMMENT)

## 2024-02-28 ASSESSMENT — PAIN DESCRIPTION - LOCATION: LOCATION: LEG

## 2024-02-28 NOTE — CONSULTS
Vancomycin Dosing by Pharmacy- INITIAL    Lillian Mendoza is a 83 y.o. year old female who Pharmacy has been consulted for vancomycin dosing for other Bacteremia . Based on the patient's indication and renal status this patient will be dosed based on a goal AUC of 400-600.     Renal function is currently stable.    Visit Vitals  BP 99/60   Pulse 77   Temp 36.2 °C (97.2 °F) (Temporal)   Resp 19        Lab Results   Component Value Date    CREATININE 1.88 (H) 02/27/2024    CREATININE 2.02 (H) 02/26/2024    CREATININE 1.86 (H) 12/10/2023    CREATININE 1.87 (H) 12/07/2023        Lab Results   Component Value Date    PATIENTTEMP 37.0 11/25/2023    PATIENTTEMP 37.0 11/22/2023    PATIENTTEMP 37.0 11/15/2023          Assessment/Plan     Patient will not be given a loading dose.  Will initiate vancomycin maintenance,  750 mg every 24 hours.    This dosing regimen is predicted by [employer] to result in the following pharmacokinetic parameters:  Regimen: 750 mg IV every 24 hours.  Start time: 03:18 on 02/28/2024  Exposure target: AUC24 (range)400-600 mg/L.hr   AUC24,ss: 563 mg/L.hr  Probability of AUC24 > 400: 77 %  Ctrough,ss: 18.6 mg/L  Probability of Ctrough,ss > 20: 44 %  Probability of nephrotoxicity (Lodise NORMA 2009): 15 %    Follow-up level will be ordered on 2/29 at 1000 unless clinically indicated sooner.  Will continue to monitor renal function daily while on vancomycin and order serum creatinine at least every 48 hours if not already ordered.  Follow for continued vancomycin needs, clinical response, and signs/symptoms of toxicity.       Dulce Connolly, [facility]

## 2024-02-28 NOTE — PROGRESS NOTES
Physical Therapy    Physical Therapy Evaluation    Patient Name: Lillian Mendoza  MRN: [id number]  Today's Date: 2/28/2024   Time Calculation  Start Time: 1043  Stop Time: 1107  Time Calculation (min): 24 min    Assessment/Plan   PT Assessment  End of Session Communication: Bedside nurse, Care Coordinator  End of Session Patient Position: Bed, 4 rail up, Alarm off, not on at start of session  IP OR SWING BED PT PLAN  Inpatient or Swing Bed: Inpatient  PT Plan  PT Frequency: 3 times per week  PT Discharge Recommendations: Moderate intensity level of continued care  PT - OK to Discharge: Yes      Subjective   General Visit Information:  General  Reason for Referral: Influenza A  Referred By: Alka  Past Medical History Relevant to Rehab: asthma,bronchitis,anemia,OA,CKD,PE  Prior to Session Communication: Bedside nurse  Patient Position Received: Bed, 3 rail up, Alarm off, not on at start of session  Home Living:  Home Living  Type of Home: House  Lives With:  (GR DTR)  Home Adaptive Equipment: Walker rolling or standard, Hospital bed  Home Layout: One level  Home Access: Ramped entrance  Prior Level of Function:  Prior Function Per Pt/Caregiver Report  Ambulatory Assistance: Independent  Precautions:  Precautions  Medical Precautions: Cardiac precautions, Fall precautions, Oxygen therapy device and L/min (4L)  Vital Signs:       Objective   Pain:     Cognition:  Cognition  Overall Cognitive Status: Within Functional Limits    General Assessments:    Functional Assessments:  Bed Mobility  Bed Mobility: Yes (MAX assist x2)    Transfers  Transfer: Yes (on hold)    Ambulation/Gait Training  Ambulation/Gait Training Performed: Yes (on hold)  Extremity/Trunk Assessments:    Outcome Measures:  [facility] Basic Mobility  Turning from your back to your side while in a flat bed without using bedrails: A lot  Moving from lying on your back to sitting on the side of a flat bed without using bedrails: A lot  Moving to and from bed to  chair (including a wheelchair): Total  Standing up from a chair using your arms (e.g. wheelchair or bedside chair): Total  To walk in hospital room: Total  Climbing 3-5 steps with railing: Total  Basic Mobility - Total Score: 8    Encounter Problems       Encounter Problems (Active)       PT Problem       PT Goal 1       Start:  02/28/24    Expected End:  03/20/24       Min assist bed mob         serrano       Start:  02/28/24    Expected End:  03/20/24       Min x1 serrano         gait       Start:  02/28/24    Expected End:  03/20/24       Amb 10 ft>wh walker/min x1            Pain - Adult              Education Documentation  Mobility Training, taught by Stan Fall, PT at 2/28/2024  4:06 PM.  Learner: Patient  Readiness: Acceptance  Method: Explanation  Response: Verbalizes Understanding    Precautions, taught by Stan Fall, PT at 2/28/2024  4:06 PM.  Learner: Patient  Readiness: Acceptance  Method: Explanation  Response: Verbalizes Understanding    ADL Training, taught by Stan Fall, PT at 2/28/2024  4:06 PM.  Learner: Patient  Readiness: Acceptance  Method: Explanation  Response: Verbalizes Understanding    Education Comments  No comments found.

## 2024-02-28 NOTE — PROGRESS NOTES
This TCC met with patient at bedside, introduced self and explained role.  Demographic information and insurance verified.  Patient is from home with granddaughter, needs assistance with ADLs, ambulates with walker, no longer driving.  Patient has home O2 at 2 LMP at  for sleep apnea.  Denies SW needs at this time.  Patient plans to return home at discharge.   Patient agreeable to [facility], stating she has had therapy at home in the past with [facility].  Patient's preference would be [facility], if needed.  Disposition pending hospital course, PT/OT evals.  Patient will need transportation arranged at discharge.  TCC will continue to follow care progression for discharge planning needs.     02/27/24 1918   Discharge Planning   Living Arrangements Family members  (Granddaughter)   Support Systems Children;Family members   Assistance Needed needs assistnce with ADLs, ambuolates with walker, no longer driving.  Granddaughter does laundry, cooking cleaning.   Type of Residence Private residence   Number of Stairs to Enter Residence 2  (Has ramp to entrance)   Number of Stairs Within Residence   (no longer ambulates stairs)   Do you have animals or pets at home? No   Home or Post Acute Services In home services   Type of Home Care Services Home OT;Home PT   Patient expects to be discharged to: home   Does the patient need discharge transport arranged? Yes   RoundTrip coordination needed? Yes   Has discharge transport been arranged? No     Alicia Celestin RN BSN, TCC

## 2024-02-28 NOTE — PROGRESS NOTES
Lillian Mendoza is a 83 y.o. female on day 1 of admission presenting with Influenza A.      Subjective   Seen today recovering from flu a saturations have been good managing her INR is Dr. Benson is her nephrologist she had an appointment family asked if I could consult him and I did       Objective     Last Recorded Vitals  /78   Pulse 76   Temp 36.8 °C (98.2 °F) (Temporal)   Resp 19   Wt 117 kg (258 lb 13.1 oz)   SpO2 95%   Intake/Output last 3 Shifts:    Intake/Output Summary (Last 24 hours) at 2/28/2024 1816  Last data filed at 2/28/2024 1623  Gross per 24 hour   Intake 760 ml   Output 1000 ml   Net -240 ml       Admission Weight  Weight: 118 kg (260 lb) (02/26/24 2023)    Daily Weight  02/28/24 : 117 kg (258 lb 13.1 oz)    Image Results  ECG 12 lead  Atrial flutter with 4:1 AV conduction  Nonspecific ST abnormality  Abnormal ECG  No previous ECGs available  In the review of systems is weakness lethargy no vomiting or diarrhea    Physical Exam  Lungs are diminished but clear heart has a regular rate and rhythm abdomen is soft is not distended or firm  Relevant Results               Assessment/Plan                  Principal Problem:    Influenza A  Active Problems:    Atrial flutter (CMS/HCC)    So far recovering from flu a maintaining oxygen saturations repeat the counts follow the INR daily monitor her renal insufficiency chronic kidney disease saturations are good she can probably move up to the stepdown thank you              Sage Alba,

## 2024-02-28 NOTE — PROGRESS NOTES
Occupational Therapy    Evaluation    Patient Name: Lillian Mendoza  MRN: [id number]  Today's Date: 2/28/2024  Time Calculation  Start Time: 1042  Stop Time: 1110  Time Calculation (min): 28 min        Assessment:  End of Session Communication: Bedside nurse  End of Session Patient Position: Bed, 4 rail up, Alarm off, not on at start of session     Plan:  Treatment Interventions: ADL retraining, Functional transfer training, UE strengthening/ROM, Endurance training, Compensatory technique education  OT Frequency: 3 times per week  OT Discharge Recommendations: Moderate intensity level of continued care  OT - OK to Discharge: Yes (to next level of care when cleared by medical team)  Treatment Interventions: ADL retraining, Functional transfer training, UE strengthening/ROM, Endurance training, Compensatory technique education    Subjective   Current Problem:  1. Influenza A        2. Community acquired pneumonia of left lower lobe of lung        3. Sepsis, due to unspecified organism, unspecified whether acute organ dysfunction present (CMS/HCC)        4. Acute hypoxic respiratory failure (CMS/HCC)        5. Atrial flutter, unspecified type (CMS/HCC)  CANCELED: Transthoracic Echo (TTE) Complete    CANCELED: Transthoracic Echo (TTE) Complete      6. EMILI (acute kidney injury) (CMS/HCC)        7. Demand ischemia        8. Typical atrial flutter (CMS/HCC)  CANCELED: Transthoracic Echo (TTE) Complete    CANCELED: Transthoracic Echo (TTE) Complete      9. Lower leg edema  Lower extremity venous duplex bilateral    Lower extremity venous duplex bilateral      10. Other specified soft tissue disorders  Lower extremity venous duplex bilateral        General:  General  Reason for Referral: impaired adl  Referred By: Alka  Past Medical History Relevant to Rehab: pt. admitted with flu like sx/poor intake, fall at home when attempting to sit into the chair.  pmh:  anemia, ckd, htn, oa, asthma, bronchitis, PE.  (-) DVT BLE's,  no acute pe  Prior to Session Communication: Bedside nurse  Patient Position Received: Bed, 4 rail up, Alarm off, not on at start of session  General Comment: pt., agreeable to therapy intervention, however is fearful of falling  Precautions:  Precautions Comment: telemetry, purewick, o2 4 L/min  Vital Signs:  Heart Rate:  (90's at rest, increasing to 140's with sitting eob)  Pain:  Pain Assessment  Pain Assessment:  (no pain complaints)    Objective   Cognition:  Overall Cognitive Status: Within Functional Limits           Home Living:  Home Living Comments: pt. lives with granddaughter who works as an RN, another granddaugther and son assist when she is at work, pt. states having very close to 24/7 supervision/assist, bsc, hospital bed, tub/shower that is to be converted to stall shower very soon  Prior Function:  Prior Function Comments: granddaughters assist with spongebathing/dressing, use of bsc next to hospital bed, family completes all iadl tasks and driving  IADL History:     ADL:  ADL Comments: dependent to don/dof socks, would anticipate other aspects of LB adl to require dependent/max assist, set up for grooming/feeding  Activity Tolerance:     Bed Mobility/Transfers: Bed Mobility  Bed Mobility:  (max assist x 2 supine<> sit)       Sitting Balance:  Static Sitting Balance  Static Sitting-Comment/Number of Minutes: pt. able to tolerate  x 2-3 minutes, pt's heartrate increasing while sitting and in a fib, 140's, decided to return pt. to bed     Strength:  Strength Comments: bue's strength at least 3/5 distal    Outcome Measures:[facility] Daily Activity  Putting on and taking off regular lower body clothing: Total  Bathing (including washing, rinsing, drying): A lot  Putting on and taking off regular upper body clothing: A lot  Toileting, which includes using toilet, bedpan or urinal: Total  Taking care of personal grooming such as brushing teeth: A little  Eating Meals: None  Daily Activity - Total Score:  13        Education Documentation  Precautions, taught by Sonal Gilbert OT at 2/28/2024  2:27 PM.  Learner: Patient  Readiness: Acceptance  Method: Explanation  Response: Verbalizes Understanding, Needs Reinforcement    ADL Training, taught by Sonal Gilbert OT at 2/28/2024  2:27 PM.  Learner: Patient  Readiness: Acceptance  Method: Explanation  Response: Verbalizes Understanding, Needs Reinforcement    Education Comments  No comments found.        OP EDUCATION:       Goals:  Encounter Problems       Encounter Problems (Active)       OT Goals       Increase functional mobility and  functional transfers to cga for bed/chair/toilet with dme prn   (Progressing)       Start:  02/28/24    Expected End:  03/13/24            increase bue ther ex/activity x 7-10 minutes and increase standing tolerance x 3-5 minutes with cga to promote greater activity tolerance for assist with adl.   (Progressing)       Start:  02/28/24    Expected End:  03/13/24            Increase lb dressing/bathing to cga with dme prn  (Progressing)       Start:  02/28/24    Expected End:  03/13/24            Increase grooming/ub bathing to sba with dme prn seated eob (Progressing)       Start:  02/28/24    Expected End:  03/13/24            Increase toileting to cga with dme prn  (Progressing)       Start:  02/28/24    Expected End:  03/13/24

## 2024-02-28 NOTE — PROGRESS NOTES
Vancomycin Dosing by Pharmacy- Cessation of Therapy    Consult to pharmacy for vancomycin dosing has been discontinued by the prescriber, pharmacy will sign off at this time.    Please call pharmacy if there are further questions or re-enter a consult if vancomycin is resumed.     Cristian Ramos, PharmD

## 2024-02-28 NOTE — SIGNIFICANT EVENT
Ms. 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 She is in the ICU for a-flutter control.    #CoNS in blood culture - likely contaminant. Repeat cx to confirm.      #Influenza A: continue renally dosed Tamiflu      #Possible superimposed bacterial CAP: continue CTX, dc azithro, strep pneumo and legionella antigens and MRSA nares     #Acute hypoxemic respiratory failure due to above: wean O2 as tolerated with goal saturation >92%     #Moderate persistent asthma in mild exacerbation due to influenza: pulmicort bid, formoterol daily, albuterol qid for wheezing and PRN for dyspnea and wheezing. If still wheezing on bronchodilators will consider steroids     #Atrial flutter with RVR - new onset: continue coumadin for AC, off dilt ggt. Metop 25mg bid     #History of saddle PE s/p thrombectomy on warfarin: continue warfarin, pharmacy to dose        Lines/tubes: PIVs  O2: NC  Atbx: CTX   Drips: none  Nutrition: Cardiac diet  Ppx: on AC  Dispo: ttransfer to erika Lee DO

## 2024-02-28 NOTE — PROGRESS NOTES
ICU progress note:    Subjective:  Patient up in bed eating breakfast this morning.  States she feels little better, however states she is very anxious.  Denies palpitations or shortness of breath.    Visit Vitals  BP 99/59   Pulse 85   Temp 36.8 °C (98.2 °F) (Temporal)   Resp 18      Physical Exam:   General:  Alert, awake, cooperative.  HEENT:  Normocephalic, atraumatic.  Neck:  Trachea midline.  No JVD.   Chest:  B/L rhonchi, scattered wheezes. no crackles.   CV:  Irregular rhythm.  Positive S1/S2.   GI: Bowel sounds present in all four quadrants, abdomen is soft, non-tender, non-distended.  Extremities: Trace lower extremity edema, L >R.  No cyanosis  Neurological:  AAOx3. Moving all extremities.   Skin:  Warm and dry.      Lab Results   Component Value Date    WBC 4.0 (L) 02/28/2024    HGB 10.6 (L) 02/28/2024    HCT 35.6 (L) 02/28/2024    MCV 97 02/28/2024     02/28/2024      Lab Results   Component Value Date    GLUCOSE 97 02/28/2024    CALCIUM 7.9 (L) 02/28/2024     02/28/2024    K 4.1 02/28/2024    CO2 26 02/28/2024     02/28/2024    BUN 19 02/28/2024    CREATININE 1.44 (H) 02/28/2024      albuterol, 2.5 mg, nebulization, 4x daily  aspirin, 81 mg, oral, Daily  budesonide, 0.25 mg, nebulization, Daily  cefTRIAXone, 2 g, intravenous, q24h  fluticasone, 2 spray, Each Nostril, Daily  formoterol, 20 mcg, nebulization, BID  levothyroxine, 150 mcg, oral, Daily  magnesium sulfate, 2 g, intravenous, Once  metoprolol tartrate, 25 mg, oral, BID  oseltamivir, 30 mg, oral, BID  pantoprazole, 40 mg, oral, Daily before breakfast  warfarin, 2.5 mg, oral, Once per day on Sun Tue Thu Sat  warfarin, 5 mg, oral, Once per day on Mon Wed Fri       dilTIAZem, 5-15 mg/hr, Last Rate: Stopped (02/27/24 1645)       PRN medications: guaiFENesin, ipratropium-albuteroL, oxygen, traMADol     Investigations:  Labs, radiological imaging and cardiac work up were reviewed    Assessment & Plan:    Neurological System:  #  CVA, history of  -Baseline mentation AO x3  -Avoid excessive caths/ lines, monitor for ICU delirium    Cardiovascular System:  #Aflutter w/ RVR, new onset  #Elevated troponin 2/2 demand ischemia  #HTN  -Wean off Cardizem gtt. transition to tartrate 25 mg twice daily. Will need OP cardiology follow up.   -Patient had recent echo 2/8 EF 55 to 60%  -[id number] 6, she is already on anticoagulation w/ Warfarin  -Monitor hemodynamics closely   -Troponin downtrending, likely in setting of tachycardia  -Will need home diuretics resumed when blood pressures improved.    Respiratory System:  #Acute hypoxic respiratory failure requiring supplemental   O2  #CAP  #Acute influenza A infection  #BLAYNE  #Pulmonary hypertension  #Asthma  -CTA (-) for PE  -Currently on 3L NC, wean as tolerated. Uses 2L qhs at baseline.   -Continue Rocephin  -Tamiflu (2/5)  -Incentive spirometry  -Bronchodilators, hypertonic saline, mucolytics  -Cont Pulmicort and formoterol    Gastrointestinal System:  -Passed swallow  eval, diet advanced    Endocrine System:  #Hypothyroidism  #Abnormal TFTs  -Continue levothyroxine  -Mildly elevated T4 with suppressed TSH, clinically insignificant in setting of acute illness.  Recommend repeat thyroid function testing in 2 to 3 months.    Renal System:  #CKDIII-IV  -Trend BMP, monitor UOP, optimize electrolytes   -Recommend outpatient nephrology follow up, she will need to reschedule her appointment with Dr. Benson.    Hematological System:  #Hx of PE on Coumadin  -Continue coumdin, daily INR.   -Trend CBC. Monitor for signs of active bleeding.     Infection Disease System:  #Pneumonia  #Acute influenza A infection  MRSA nares- IP  strep/Legionella urinary antigen (-), DC Davidro  Follow-up and sputum culture  Blood culture 1/4 growing CONS, likely contaminant  -Repeat blood cultures  -Pro-Evan 0.08  -Continue Rocephin (day 2)  -Monitor for signs of worsening infection     Vascular/extremities:  -Lower extremity duplex  negative for DVT    Vent/O2:  3L NC  Lines/Devices: PIV  Drips: -  ATBx: Rocephin   Diet: Cardiac  PPX: Coumadin  Code:  Full Code  Dispo: CLAUDINE Rucker D.O.  Internal Medicine PGY-3     This is a preliminary note with physician attestation to follow.

## 2024-02-29 LAB
ANION GAP SERPL CALC-SCNC: 13 MMOL/L (ref 10–20)
BACTERIA SPEC RESP CULT: ABNORMAL
BACTERIA SPEC RESP CULT: NORMAL
BACTERIA UR CULT: NORMAL
BUN SERPL-MCNC: 13 MG/DL (ref 6–23)
CALCIUM SERPL-MCNC: 8.3 MG/DL (ref 8.6–10.3)
CHLORIDE SERPL-SCNC: 107 MMOL/L (ref 98–107)
CO2 SERPL-SCNC: 25 MMOL/L (ref 21–32)
CREAT SERPL-MCNC: 1.3 MG/DL (ref 0.5–1.05)
EGFRCR SERPLBLD CKD-EPI 2021: 41 ML/MIN/1.73M*2
ERYTHROCYTE [DISTWIDTH] IN BLOOD BY AUTOMATED COUNT: 16.1 % (ref 11.5–14.5)
GLUCOSE SERPL-MCNC: 98 MG/DL (ref 74–99)
GRAM STN SPEC: ABNORMAL
GRAM STN SPEC: NORMAL
GRAM STN SPEC: NORMAL
HCT VFR BLD AUTO: 34.5 % (ref 36–46)
HGB BLD-MCNC: 10.6 G/DL (ref 12–16)
MAGNESIUM SERPL-MCNC: 2.23 MG/DL (ref 1.6–2.4)
MCH RBC QN AUTO: 29.4 PG (ref 26–34)
MCHC RBC AUTO-ENTMCNC: 30.7 G/DL (ref 32–36)
MCV RBC AUTO: 96 FL (ref 80–100)
NRBC BLD-RTO: 0 /100 WBCS (ref 0–0)
PLATELET # BLD AUTO: 180 X10*3/UL (ref 150–450)
POTASSIUM SERPL-SCNC: 3.9 MMOL/L (ref 3.5–5.3)
RBC # BLD AUTO: 3.61 X10*6/UL (ref 4–5.2)
SODIUM SERPL-SCNC: 141 MMOL/L (ref 136–145)
WBC # BLD AUTO: 4.8 X10*3/UL (ref 4.4–11.3)

## 2024-02-29 PROCEDURE — 2500000002 HC RX 250 W HCPCS SELF ADMINISTERED DRUGS (ALT 637 FOR MEDICARE OP, ALT 636 FOR OP/ED): Performed by: STUDENT IN AN ORGANIZED HEALTH CARE EDUCATION/TRAINING PROGRAM

## 2024-02-29 PROCEDURE — 2500000001 HC RX 250 WO HCPCS SELF ADMINISTERED DRUGS (ALT 637 FOR MEDICARE OP): Performed by: INTERNAL MEDICINE

## 2024-02-29 PROCEDURE — 94640 AIRWAY INHALATION TREATMENT: CPT

## 2024-02-29 PROCEDURE — 2500000002 HC RX 250 W HCPCS SELF ADMINISTERED DRUGS (ALT 637 FOR MEDICARE OP, ALT 636 FOR OP/ED): Performed by: INTERNAL MEDICINE

## 2024-02-29 PROCEDURE — 87205 SMEAR GRAM STAIN: CPT | Mod: PARLAB | Performed by: STUDENT IN AN ORGANIZED HEALTH CARE EDUCATION/TRAINING PROGRAM

## 2024-02-29 PROCEDURE — 2500000001 HC RX 250 WO HCPCS SELF ADMINISTERED DRUGS (ALT 637 FOR MEDICARE OP): Performed by: NURSE PRACTITIONER

## 2024-02-29 PROCEDURE — 2500000005 HC RX 250 GENERAL PHARMACY W/O HCPCS: Performed by: INTERNAL MEDICINE

## 2024-02-29 PROCEDURE — 85027 COMPLETE CBC AUTOMATED: CPT | Performed by: STUDENT IN AN ORGANIZED HEALTH CARE EDUCATION/TRAINING PROGRAM

## 2024-02-29 PROCEDURE — 83735 ASSAY OF MAGNESIUM: CPT | Performed by: STUDENT IN AN ORGANIZED HEALTH CARE EDUCATION/TRAINING PROGRAM

## 2024-02-29 PROCEDURE — 2500000004 HC RX 250 GENERAL PHARMACY W/ HCPCS (ALT 636 FOR OP/ED): Performed by: INTERNAL MEDICINE

## 2024-02-29 PROCEDURE — 36415 COLL VENOUS BLD VENIPUNCTURE: CPT | Performed by: STUDENT IN AN ORGANIZED HEALTH CARE EDUCATION/TRAINING PROGRAM

## 2024-02-29 PROCEDURE — 1210000001 HC SEMI-PRIVATE ROOM DAILY

## 2024-02-29 PROCEDURE — 82947 ASSAY GLUCOSE BLOOD QUANT: CPT | Performed by: STUDENT IN AN ORGANIZED HEALTH CARE EDUCATION/TRAINING PROGRAM

## 2024-02-29 PROCEDURE — 2500000001 HC RX 250 WO HCPCS SELF ADMINISTERED DRUGS (ALT 637 FOR MEDICARE OP): Performed by: STUDENT IN AN ORGANIZED HEALTH CARE EDUCATION/TRAINING PROGRAM

## 2024-02-29 RX ORDER — ONDANSETRON 4 MG/1
4 TABLET, ORALLY DISINTEGRATING ORAL EVERY 8 HOURS PRN
Status: COMPLETED | OUTPATIENT
Start: 2024-02-29 | End: 2024-02-29

## 2024-02-29 RX ORDER — GABAPENTIN 100 MG/1
200 CAPSULE ORAL 2 TIMES DAILY
Status: DISCONTINUED | OUTPATIENT
Start: 2024-02-29 | End: 2024-02-29

## 2024-02-29 RX ORDER — TRAMADOL HYDROCHLORIDE 50 MG/1
50 TABLET ORAL EVERY 6 HOURS PRN
Status: DISCONTINUED | OUTPATIENT
Start: 2024-02-29 | End: 2024-03-03 | Stop reason: HOSPADM

## 2024-02-29 RX ORDER — ALPRAZOLAM 0.25 MG/1
0.5 TABLET ORAL 3 TIMES DAILY PRN
Status: DISCONTINUED | OUTPATIENT
Start: 2024-02-29 | End: 2024-03-03 | Stop reason: HOSPADM

## 2024-02-29 RX ORDER — ACETAMINOPHEN 325 MG/1
650 TABLET ORAL EVERY 4 HOURS PRN
Status: DISCONTINUED | OUTPATIENT
Start: 2024-02-29 | End: 2024-03-03 | Stop reason: HOSPADM

## 2024-02-29 RX ORDER — ONDANSETRON HYDROCHLORIDE 2 MG/ML
4 INJECTION, SOLUTION INTRAVENOUS EVERY 8 HOURS PRN
Status: COMPLETED | OUTPATIENT
Start: 2024-02-29 | End: 2024-02-29

## 2024-02-29 RX ORDER — GABAPENTIN 300 MG/1
300 CAPSULE ORAL 3 TIMES DAILY
Status: DISCONTINUED | OUTPATIENT
Start: 2024-02-29 | End: 2024-03-03 | Stop reason: HOSPADM

## 2024-02-29 RX ADMIN — ONDANSETRON 4 MG: 4 TABLET, ORALLY DISINTEGRATING ORAL at 11:03

## 2024-02-29 RX ADMIN — TRAMADOL HYDROCHLORIDE 50 MG: 50 TABLET, COATED ORAL at 21:36

## 2024-02-29 RX ADMIN — ACETAMINOPHEN 650 MG: 325 TABLET ORAL at 23:03

## 2024-02-29 RX ADMIN — PANTOPRAZOLE SODIUM 40 MG: 40 TABLET, DELAYED RELEASE ORAL at 06:21

## 2024-02-29 RX ADMIN — ALBUTEROL SULFATE 2.5 MG: 2.5 SOLUTION RESPIRATORY (INHALATION) at 11:04

## 2024-02-29 RX ADMIN — TRAMADOL HYDROCHLORIDE 50 MG: 50 TABLET, COATED ORAL at 13:07

## 2024-02-29 RX ADMIN — LEVOTHYROXINE SODIUM 150 MCG: 150 TABLET ORAL at 06:21

## 2024-02-29 RX ADMIN — METOPROLOL TARTRATE 25 MG: 25 TABLET, FILM COATED ORAL at 21:36

## 2024-02-29 RX ADMIN — ASPIRIN 81 MG 81 MG: 81 TABLET ORAL at 09:02

## 2024-02-29 RX ADMIN — FLUTICASONE PROPIONATE 2 SPRAY: 50 SPRAY, METERED NASAL at 09:07

## 2024-02-29 RX ADMIN — BUDESONIDE 0.25 MG: 0.25 INHALANT ORAL at 07:31

## 2024-02-29 RX ADMIN — GABAPENTIN 200 MG: 100 CAPSULE ORAL at 17:57

## 2024-02-29 RX ADMIN — ALPRAZOLAM 0.5 MG: 0.25 TABLET ORAL at 23:01

## 2024-02-29 RX ADMIN — ALBUTEROL SULFATE 2.5 MG: 2.5 SOLUTION RESPIRATORY (INHALATION) at 07:31

## 2024-02-29 RX ADMIN — ALBUTEROL SULFATE 2.5 MG: 2.5 SOLUTION RESPIRATORY (INHALATION) at 20:02

## 2024-02-29 RX ADMIN — FORMOTEROL FUMARATE DIHYDRATE 20 MCG: 20 SOLUTION RESPIRATORY (INHALATION) at 20:02

## 2024-02-29 RX ADMIN — METOPROLOL TARTRATE 25 MG: 25 TABLET, FILM COATED ORAL at 09:02

## 2024-02-29 RX ADMIN — POLYETHYLENE GLYCOL 3350 17 G: 17 POWDER, FOR SOLUTION ORAL at 09:02

## 2024-02-29 RX ADMIN — ONDANSETRON 4 MG: 4 TABLET, ORALLY DISINTEGRATING ORAL at 17:58

## 2024-02-29 RX ADMIN — OSELTAMIVIR PHOSPHATE 30 MG: 30 CAPSULE ORAL at 09:02

## 2024-02-29 RX ADMIN — FORMOTEROL FUMARATE DIHYDRATE 20 MCG: 20 SOLUTION RESPIRATORY (INHALATION) at 07:34

## 2024-02-29 RX ADMIN — CEFTRIAXONE SODIUM 2 G: 2 INJECTION, SOLUTION INTRAVENOUS at 09:02

## 2024-02-29 RX ADMIN — GABAPENTIN 300 MG: 300 CAPSULE ORAL at 21:36

## 2024-02-29 RX ADMIN — WARFARIN SODIUM 2.5 MG: 5 TABLET ORAL at 17:57

## 2024-02-29 RX ADMIN — ALBUTEROL SULFATE 2.5 MG: 2.5 SOLUTION RESPIRATORY (INHALATION) at 14:15

## 2024-02-29 RX ADMIN — OSELTAMIVIR PHOSPHATE 30 MG: 30 CAPSULE ORAL at 21:36

## 2024-02-29 ASSESSMENT — PAIN - FUNCTIONAL ASSESSMENT
PAIN_FUNCTIONAL_ASSESSMENT: 0-10

## 2024-02-29 ASSESSMENT — COGNITIVE AND FUNCTIONAL STATUS - GENERAL
STANDING UP FROM CHAIR USING ARMS: TOTAL
DAILY ACTIVITIY SCORE: 15
PERSONAL GROOMING: A LITTLE
DRESSING REGULAR LOWER BODY CLOTHING: A LOT
CLIMB 3 TO 5 STEPS WITH RAILING: TOTAL
WALKING IN HOSPITAL ROOM: TOTAL
TURNING FROM BACK TO SIDE WHILE IN FLAT BAD: A LOT
HELP NEEDED FOR BATHING: A LOT
DRESSING REGULAR UPPER BODY CLOTHING: A LOT
MOBILITY SCORE: 10
MOVING FROM LYING ON BACK TO SITTING ON SIDE OF FLAT BED WITH BEDRAILS: A LITTLE
MOVING TO AND FROM BED TO CHAIR: A LOT
TOILETING: A LOT

## 2024-02-29 ASSESSMENT — PAIN SCALES - GENERAL
PAINLEVEL_OUTOF10: 9
PAINLEVEL_OUTOF10: 5 - MODERATE PAIN
PAINLEVEL_OUTOF10: 0 - NO PAIN

## 2024-02-29 ASSESSMENT — PAIN DESCRIPTION - LOCATION: LOCATION: GENERALIZED

## 2024-02-29 NOTE — CONSULTS
Lillian Mendoza is a 83 y.o. female on day 2 of admission presenting with Influenza A.      HPI     Patient is a 83-year-old female with medical history significant for pulmonary embolism on anticoagulation, hypertension, asthma, CKD stage III presenting to [facility] due to poor p.o. intake and flulike symptoms.  Patient's hospital course was complicated byAcute hypoxic respiratory failure requiring supplemental oxygen and a flutter with RVR requiring Cardizem drip.  Patient is now currently hemodynamically stable and has been weaned off the Cardizem drip and now stable for floor transfer.  Nephrology consulted for management of patient's CKD.    Past medical history as noted above  Social history: Patient reports she quit smoking.  Denies alcohol use or drug use  Family history: Paternal history of diabetes    OBJECTIVE     Vitals:    02/29/24 0600 02/29/24 0700 02/29/24 0731 02/29/24 0800   BP: 121/75 114/74     Pulse: 78 80     Resp: 15 14     Temp:    36.6 °C (97.9 °F)   TempSrc:    Temporal   SpO2: 94% 95% 95%    Weight:       Height:          Results from last 7 days   Lab Units 02/29/24  0456 02/28/24  0539 02/26/24  2105   WBC AUTO x10*3/uL 4.8   < > 4.4   HEMOGLOBIN g/dL 10.6*   < > 11.1*   HEMATOCRIT % 34.5*   < > 35.7*   PLATELETS AUTO x10*3/uL 180   < > 222   LYMPHO PCT MAN %  --   --  34.0   MONO PCT MAN %  --   --  15.0   EOSINO PCT MAN %  --   --  0.0    < > = values in this interval not displayed.     Results from last 7 days   Lab Units 02/29/24  0456 02/27/24  0808 02/26/24  2119   SODIUM mmol/L 141   < > 140   POTASSIUM mmol/L 3.9   < > 4.4   CHLORIDE mmol/L 107   < > 104   CO2 mmol/L 25   < > 25   BUN mg/dL 13   < > 25*   CREATININE mg/dL 1.30*   < > 2.02*   CALCIUM mg/dL 8.3*   < > 8.8   PROTEIN TOTAL g/dL  --   --  7.3   BILIRUBIN TOTAL mg/dL  --   --  0.4   ALK PHOS U/L  --   --  89   ALT U/L  --   --  11   AST U/L  --   --  32   GLUCOSE mg/dL 98   < > 88    < > = values in this interval not  displayed.       Scheduled Medications  albuterol, 2.5 mg, nebulization, 4x daily  aspirin, 81 mg, oral, Daily  budesonide, 0.25 mg, nebulization, Daily  cefTRIAXone, 2 g, intravenous, q24h  fluticasone, 2 spray, Each Nostril, Daily  formoterol, 20 mcg, nebulization, BID  levothyroxine, 150 mcg, oral, Daily  metoprolol tartrate, 25 mg, oral, BID  oseltamivir, 30 mg, oral, BID  pantoprazole, 40 mg, oral, Daily before breakfast  polyethylene glycol, 17 g, oral, Daily  warfarin, 2.5 mg, oral, Once per day on Sun Tue Thu Sat  warfarin, 5 mg, oral, Once per day on Mon Wed Fri       Physical Exam    Physical Exam:  General:  Pleasant and cooperative. No apparent distress.  HEENT:  Normocephalic, atraumatic, mucus membranes moist.   Neck:  Trachea midline.  No JVD.    Chest:  Clear to auscultation bilaterally. No wheezes, rales, or rhonchi.  CV:  Regular rate and rhythm.  Positive S1/S2.   Abdomen: Bowel sounds present in all four quadrants, abdomen is soft, non-tender, non-distended.  Extremities:  No lower extremity edema or cyanosis.   Neurological:  AAOx3. No focal deficits.  Skin:  Warm and dry.        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  On outpatient review, pt takes torsemide 10mg daily  -Pt makes good urine and cr. Is at baseline. For now hold nephrotoxic agents and continue current management per primary team.       Alan Isabel,   PGY-2, Internal Medicine   Please SecureChat for any further questions  This is a preliminary note, please await attending attestation for final A/P

## 2024-02-29 NOTE — PROGRESS NOTES
This  met with patient to discuss discharge plan. Originally patient had hoped to go home with home care. Therapy is currently recommending MOD level intensity at discharge. SW provided SNF list to patient. She requested this  call her grand daughter to discuss. Kiki states that she will be coming in tomorrow and can review the list.  will continue to follow. Message with questions.  ODELL Gary

## 2024-02-29 NOTE — PROGRESS NOTES
Lillian Mendoza is a 83 y.o. female on day 2 of admission presenting with Influenza A.      Subjective   Seen today so far is complaining of anxiety she is also complaining of pain she is not on her gabapentin also she wants something for anxiety her sats have been good but she is complaining of just more anxiety today no headaches no chest pain or shortness of breath       Objective     Last Recorded Vitals  /75   Pulse 77   Temp 36.3 °C (97.3 °F) (Temporal)   Resp 24   Wt 117 kg (258 lb 13.1 oz)   SpO2 91%   Intake/Output last 3 Shifts:    Intake/Output Summary (Last 24 hours) at 2/29/2024 1745  Last data filed at 2/29/2024 1200  Gross per 24 hour   Intake 250 ml   Output 1400 ml   Net -1150 ml       Admission Weight  Weight: 118 kg (260 lb) (02/26/24 2023)    Daily Weight  02/28/24 : 117 kg (258 lb 13.1 oz)    Image Results  ECG 12 lead  Atrial flutter with 4:1 AV conduction  Nonspecific ST abnormality  Abnormal ECG  No previous ECGs available  In the review of systems is weakness lethargy no vomiting or diarrhea    Physical Exam  Lungs are diminished but clear heart has a regular rate and rhythm abdomen is soft is not distended or firm  Relevant Results               Assessment/Plan                  Principal Problem:    Influenza A  Active Problems:    Atrial flutter (CMS/HCC)    So far influenza A I am going to add some alprazolam 3 times a day as needed for anxiety I am going to add some gabapentin to the renally adjusted dose 200 mg twice daily because of her GFR being 40 keep her and maintain her oxygen saturations continue with all other present care and therapy and management thank you              Sage Alba DO

## 2024-03-01 PROBLEM — I48.92 ATRIAL FLUTTER (MULTI): Status: RESOLVED | Noted: 2024-02-27 | Resolved: 2024-03-01

## 2024-03-01 PROBLEM — J10.1 INFLUENZA A: Status: RESOLVED | Noted: 2024-02-27 | Resolved: 2024-03-01

## 2024-03-01 LAB
ANION GAP SERPL CALC-SCNC: 7 MMOL/L (ref 10–20)
ATRIAL RATE: 320 BPM
BUN SERPL-MCNC: 12 MG/DL (ref 6–23)
CALCIUM SERPL-MCNC: 8.3 MG/DL (ref 8.6–10.3)
CHLORIDE SERPL-SCNC: 107 MMOL/L (ref 98–107)
CO2 SERPL-SCNC: 32 MMOL/L (ref 21–32)
CREAT SERPL-MCNC: 1.16 MG/DL (ref 0.5–1.05)
DIASTOLIC BLOOD PRESSURE: 74 MMHG
EGFRCR SERPLBLD CKD-EPI 2021: 47 ML/MIN/1.73M*2
GLUCOSE SERPL-MCNC: 94 MG/DL (ref 74–99)
HOLD SPECIMEN: NORMAL
P AXIS: 259 DEGREES
POTASSIUM SERPL-SCNC: 4.2 MMOL/L (ref 3.5–5.3)
Q ONSET: 227 MS
QRS COUNT: 13 BEATS
QRS DURATION: 80 MS
QT INTERVAL: 390 MS
QTC CALCULATION(BAZETT): 449 MS
QTC FREDERICIA: 429 MS
R AXIS: -2 DEGREES
SODIUM SERPL-SCNC: 142 MMOL/L (ref 136–145)
STAPHYLOCOCCUS SPEC CULT: NORMAL
SYSTOLIC BLOOD PRESSURE: 129 MMHG
T AXIS: 39 DEGREES
T OFFSET: 422 MS
VENTRICULAR RATE: 80 BPM

## 2024-03-01 PROCEDURE — 2500000001 HC RX 250 WO HCPCS SELF ADMINISTERED DRUGS (ALT 637 FOR MEDICARE OP): Performed by: NURSE PRACTITIONER

## 2024-03-01 PROCEDURE — 2500000002 HC RX 250 W HCPCS SELF ADMINISTERED DRUGS (ALT 637 FOR MEDICARE OP, ALT 636 FOR OP/ED): Performed by: NURSE PRACTITIONER

## 2024-03-01 PROCEDURE — 2500000004 HC RX 250 GENERAL PHARMACY W/ HCPCS (ALT 636 FOR OP/ED): Performed by: NURSE PRACTITIONER

## 2024-03-01 PROCEDURE — 94640 AIRWAY INHALATION TREATMENT: CPT

## 2024-03-01 PROCEDURE — 1200000002 HC GENERAL ROOM WITH TELEMETRY DAILY

## 2024-03-01 PROCEDURE — 97535 SELF CARE MNGMENT TRAINING: CPT | Mod: GO

## 2024-03-01 PROCEDURE — 80048 BASIC METABOLIC PNL TOTAL CA: CPT | Performed by: INTERNAL MEDICINE

## 2024-03-01 PROCEDURE — 36415 COLL VENOUS BLD VENIPUNCTURE: CPT | Performed by: INTERNAL MEDICINE

## 2024-03-01 RX ORDER — WARFARIN 2.5 MG/1
TABLET ORAL
Start: 2024-03-02 | End: 2024-04-22 | Stop reason: ALTCHOICE

## 2024-03-01 RX ORDER — AMOXICILLIN AND CLAVULANATE POTASSIUM 875; 125 MG/1; MG/1
875 TABLET, FILM COATED ORAL 2 TIMES DAILY
Qty: 14 TABLET | Refills: 0 | Status: SHIPPED | OUTPATIENT
Start: 2024-03-01 | End: 2024-03-08 | Stop reason: HOSPADM

## 2024-03-01 RX ORDER — PANTOPRAZOLE SODIUM 40 MG/1
40 TABLET, DELAYED RELEASE ORAL
Start: 2024-03-02

## 2024-03-01 RX ORDER — GUAIFENESIN 600 MG/1
600 TABLET, EXTENDED RELEASE ORAL 2 TIMES DAILY PRN
Start: 2024-03-01 | End: 2024-03-08 | Stop reason: HOSPADM

## 2024-03-01 RX ORDER — TORSEMIDE 10 MG/1
TABLET ORAL
Start: 2024-03-07 | End: 2024-03-08 | Stop reason: HOSPADM

## 2024-03-01 RX ORDER — NAPROXEN SODIUM 220 MG/1
81 TABLET, FILM COATED ORAL DAILY
Start: 2024-03-02

## 2024-03-01 RX ORDER — WARFARIN SODIUM 5 MG/1
TABLET ORAL
Start: 2024-03-01 | End: 2024-04-22 | Stop reason: ALTCHOICE

## 2024-03-01 RX ORDER — ALPRAZOLAM 0.5 MG/1
0.5 TABLET ORAL 3 TIMES DAILY PRN
Qty: 7 TABLET | Refills: 0 | Status: SHIPPED | OUTPATIENT
Start: 2024-03-01 | End: 2024-03-08 | Stop reason: HOSPADM

## 2024-03-01 RX ORDER — OSELTAMIVIR PHOSPHATE 30 MG/1
30 CAPSULE ORAL 2 TIMES DAILY
Qty: 4 CAPSULE | Refills: 0
Start: 2024-03-01 | End: 2024-03-08 | Stop reason: HOSPADM

## 2024-03-01 RX ORDER — TRAMADOL HYDROCHLORIDE 50 MG/1
50 TABLET ORAL NIGHTLY PRN
Qty: 10 TABLET | Refills: 0 | Status: SHIPPED | OUTPATIENT
Start: 2024-03-01

## 2024-03-01 RX ORDER — BUDESONIDE 0.25 MG/2ML
0.25 INHALANT ORAL
Start: 2024-03-02

## 2024-03-01 RX ORDER — FORMOTEROL FUMARATE DIHYDRATE 20 UG/2ML
20 SOLUTION RESPIRATORY (INHALATION)
Start: 2024-03-01

## 2024-03-01 RX ADMIN — GABAPENTIN 300 MG: 300 CAPSULE ORAL at 11:10

## 2024-03-01 RX ADMIN — ASPIRIN 81 MG 81 MG: 81 TABLET ORAL at 11:10

## 2024-03-01 RX ADMIN — OSELTAMIVIR PHOSPHATE 30 MG: 30 CAPSULE ORAL at 11:09

## 2024-03-01 RX ADMIN — FLUTICASONE PROPIONATE 2 SPRAY: 50 SPRAY, METERED NASAL at 11:12

## 2024-03-01 RX ADMIN — METOPROLOL TARTRATE 25 MG: 25 TABLET, FILM COATED ORAL at 11:14

## 2024-03-01 RX ADMIN — FORMOTEROL FUMARATE DIHYDRATE 20 MCG: 20 SOLUTION RESPIRATORY (INHALATION) at 07:20

## 2024-03-01 RX ADMIN — ALBUTEROL SULFATE 2.5 MG: 2.5 SOLUTION RESPIRATORY (INHALATION) at 18:52

## 2024-03-01 RX ADMIN — FORMOTEROL FUMARATE DIHYDRATE 20 MCG: 20 SOLUTION RESPIRATORY (INHALATION) at 18:53

## 2024-03-01 RX ADMIN — ALBUTEROL SULFATE 2.5 MG: 2.5 SOLUTION RESPIRATORY (INHALATION) at 14:36

## 2024-03-01 RX ADMIN — PANTOPRAZOLE SODIUM 40 MG: 40 TABLET, DELAYED RELEASE ORAL at 06:01

## 2024-03-01 RX ADMIN — ALBUTEROL SULFATE 2.5 MG: 2.5 SOLUTION RESPIRATORY (INHALATION) at 07:00

## 2024-03-01 RX ADMIN — GABAPENTIN 300 MG: 300 CAPSULE ORAL at 16:03

## 2024-03-01 RX ADMIN — BUDESONIDE 0.25 MG: 0.25 INHALANT ORAL at 07:00

## 2024-03-01 RX ADMIN — ALPRAZOLAM 0.5 MG: 0.25 TABLET ORAL at 11:20

## 2024-03-01 RX ADMIN — WARFARIN SODIUM 5 MG: 5 TABLET ORAL at 18:22

## 2024-03-01 RX ADMIN — TRAMADOL HYDROCHLORIDE 50 MG: 50 TABLET, COATED ORAL at 11:20

## 2024-03-01 RX ADMIN — ALBUTEROL SULFATE 2.5 MG: 2.5 SOLUTION RESPIRATORY (INHALATION) at 11:19

## 2024-03-01 RX ADMIN — LEVOTHYROXINE SODIUM 150 MCG: 150 TABLET ORAL at 06:01

## 2024-03-01 RX ADMIN — POLYETHYLENE GLYCOL 3350 17 G: 17 POWDER, FOR SOLUTION ORAL at 11:11

## 2024-03-01 RX ADMIN — CEFTRIAXONE SODIUM 2 G: 2 INJECTION, SOLUTION INTRAVENOUS at 11:05

## 2024-03-01 ASSESSMENT — COGNITIVE AND FUNCTIONAL STATUS - GENERAL
STANDING UP FROM CHAIR USING ARMS: A LOT
TURNING FROM BACK TO SIDE WHILE IN FLAT BAD: A LOT
MOVING FROM LYING ON BACK TO SITTING ON SIDE OF FLAT BED WITH BEDRAILS: A LOT
MOVING FROM LYING ON BACK TO SITTING ON SIDE OF FLAT BED WITH BEDRAILS: TOTAL
STANDING UP FROM CHAIR USING ARMS: A LOT
DAILY ACTIVITIY SCORE: 15
CLIMB 3 TO 5 STEPS WITH RAILING: A LOT
DRESSING REGULAR UPPER BODY CLOTHING: A LOT
DRESSING REGULAR UPPER BODY CLOTHING: A LOT
TOILETING: TOTAL
PERSONAL GROOMING: A LITTLE
MOBILITY SCORE: 12
TURNING FROM BACK TO SIDE WHILE IN FLAT BAD: A LOT
PERSONAL GROOMING: A LITTLE
EATING MEALS: A LITTLE
MOVING FROM LYING ON BACK TO SITTING ON SIDE OF FLAT BED WITH BEDRAILS: A LOT
MOVING TO AND FROM BED TO CHAIR: A LOT
MOVING TO AND FROM BED TO CHAIR: A LOT
TURNING FROM BACK TO SIDE WHILE IN FLAT BAD: A LOT
MOVING TO AND FROM BED TO CHAIR: A LOT
HELP NEEDED FOR BATHING: A LOT
WALKING IN HOSPITAL ROOM: A LOT
DAILY ACTIVITIY SCORE: 12
MOBILITY SCORE: 11
CLIMB 3 TO 5 STEPS WITH RAILING: A LOT
WALKING IN HOSPITAL ROOM: A LOT
STANDING UP FROM CHAIR USING ARMS: A LOT
MOBILITY SCORE: 11
HELP NEEDED FOR BATHING: A LOT
TOILETING: A LOT
CLIMB 3 TO 5 STEPS WITH RAILING: TOTAL
DRESSING REGULAR LOWER BODY CLOTHING: A LOT
WALKING IN HOSPITAL ROOM: A LOT
DRESSING REGULAR LOWER BODY CLOTHING: TOTAL

## 2024-03-01 ASSESSMENT — ACTIVITIES OF DAILY LIVING (ADL): HOME_MANAGEMENT_TIME_ENTRY: 15

## 2024-03-01 ASSESSMENT — PAIN SCALES - GENERAL
PAINLEVEL_OUTOF10: 5 - MODERATE PAIN
PAINLEVEL_OUTOF10: 0 - NO PAIN
PAINLEVEL_OUTOF10: 7

## 2024-03-01 ASSESSMENT — PAIN - FUNCTIONAL ASSESSMENT
PAIN_FUNCTIONAL_ASSESSMENT: 0-10

## 2024-03-01 NOTE — PROGRESS NOTES
Physical Therapy    Physical Therapy Treatment    Patient Name: Lillian Mendoza  MRN: [id number]  Today's Date: 3/1/2024  Time Calculation  Start Time: 1324  Stop Time: 1354  Time Calculation (min): 30 min       Assessment/Plan   PT Assessment  End of Session Communication: Bedside nurse, Care Coordinator  End of Session Patient Position: Bed, 2 rail up, Alarm on (All needs in reach, no complaints noted, granddaughter present)  PT Plan  Treatment/Interventions: Bed mobility, Transfer training, Gait training  PT Plan: Skilled PT  PT Frequency: 3 times per week  PT Discharge Recommendations: Moderate intensity level of continued care  PT - OK to Discharge: Yes    Current Problem:  Patient Active Problem List   Diagnosis    Arthralgia of shoulder region, right    Epigastric hernia    Umbilical hernia without obstruction and without gangrene    Facet degeneration of lumbar region    Hearing loss    Hiatal hernia    History of pulmonary embolus (PE)    Lower leg edema    Lymphedema    Otalgia, left    Renal impairment    Acute saddle pulmonary embolism, unspecified whether acute cor pulmonale present (CMS/HCC)    Allergic rhinitis, unspecified    Chronic obstructive pulmonary disease (CMS/HCC)    Chronic respiratory failure with hypoxia (CMS/HCC)    Fall    Fracture of shaft of humerus    History of anemia    Hyperlipidemia, unspecified    Mild persistent asthma, uncomplicated    History of thyroid disorder    Hypertension    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)    Polyneuropathy     General Visit Information:   PT  Visit  PT Received On: 03/01/24  General  Prior to Session Communication: Bedside nurse  Patient Position Received: Bed, 2 rail up, Alarm on (All needs in reach, no complaints noted, granddaughter present)    Subjective     Precautions:  Precautions  Medical Precautions: Cardiac precautions, Fall precautions, Oxygen therapy device and L/min (4L)  Precautions Comment: Flu Droplet  precautions, 4LPM    Objective     Pain:  Pain Assessment  Pain Assessment:  (0/10)    Cognition:  Cognition  Overall Cognitive Status: Within Functional Limits    Treatments:  Bed Mobility  Bed Mobility:  (supine <> sitting: max A x 2)  Ambulation/Gait Training  Ambulation/Gait Training Performed:  (Pt was only able to side step x 2' using RW with max A x 2. Pt demonstrated retropulsion in standing and impaired gait mechanics with inability to lift L LE when stepping.)  Transfers  Transfer:  (STS from EOB x 2 trials: max A x 2)    Outcome Measures:  [facility] Basic Mobility  Turning from your back to your side while in a flat bed without using bedrails: A lot  Moving from lying on your back to sitting on the side of a flat bed without using bedrails: A lot  Moving to and from bed to chair (including a wheelchair): A lot  Standing up from a chair using your arms (e.g. wheelchair or bedside chair): A lot  To walk in hospital room: A lot  Climbing 3-5 steps with railing: Total  Basic Mobility - Total Score: 11    Education Documentation  Mobility Training, taught by Elaine Koroma PT at 3/1/2024  2:07 PM.  Learner: Patient  Readiness: Acceptance  Method: Explanation  Response: Verbalizes Understanding    Education Comments  No comments found.    EDUCATION:     Encounter Problems       Encounter Problems (Active)       PT Problem       PT Goal 1 (Progressing)       Start:  02/28/24    Expected End:  03/20/24       Min assist bed mob         serrano (Progressing)       Start:  02/28/24    Expected End:  03/20/24       Min x1 serrano         gait (Progressing)       Start:  02/28/24    Expected End:  03/20/24       Amb 10 ft>wh walker/min x1            Pain - Adult

## 2024-03-01 NOTE — PROGRESS NOTES
Lillian Mendoza is a 83 y.o. female on day 3 of admission presenting with Influenza A.      SUBJECTIVE     Seen and examined at bedside.  Patient notes she feels better compared to yesterday.  Patient is scheduled for discharge and will be discharged to nursing home facility    OBJECTIVE     Vitals:    03/01/24 0600 03/01/24 0700 03/01/24 0801 03/01/24 1121   BP:   124/70 132/82   BP Location:   Left arm    Patient Position:   Lying    Pulse:   77 76   Resp:   20 20   Temp:   36.1 °C (97 °F) 35.9 °C (96.6 °F)   TempSrc:   Temporal    SpO2:  94% 93% 93%   Weight: 115 kg (254 lb 10.1 oz)      Height:          Results from last 7 days   Lab Units 02/29/24  0456 02/28/24  0539 02/26/24  2105   WBC AUTO x10*3/uL 4.8   < > 4.4   HEMOGLOBIN g/dL 10.6*   < > 11.1*   HEMATOCRIT % 34.5*   < > 35.7*   PLATELETS AUTO x10*3/uL 180   < > 222   LYMPHO PCT MAN %  --   --  34.0   MONO PCT MAN %  --   --  15.0   EOSINO PCT MAN %  --   --  0.0    < > = values in this interval not displayed.     Results from last 7 days   Lab Units 03/01/24  0809 02/27/24  0808 02/26/24  2119   SODIUM mmol/L 142   < > 140   POTASSIUM mmol/L 4.2   < > 4.4   CHLORIDE mmol/L 107   < > 104   CO2 mmol/L 32   < > 25   BUN mg/dL 12   < > 25*   CREATININE mg/dL 1.16*   < > 2.02*   CALCIUM mg/dL 8.3*   < > 8.8   PROTEIN TOTAL g/dL  --   --  7.3   BILIRUBIN TOTAL mg/dL  --   --  0.4   ALK PHOS U/L  --   --  89   ALT U/L  --   --  11   AST U/L  --   --  32   GLUCOSE mg/dL 94   < > 88    < > = values in this interval not displayed.       Scheduled Medications  albuterol, 2.5 mg, nebulization, 4x daily  aspirin, 81 mg, oral, Daily  budesonide, 0.25 mg, nebulization, Daily  cefTRIAXone, 2 g, intravenous, q24h  fluticasone, 2 spray, Each Nostril, Daily  formoterol, 20 mcg, nebulization, BID  gabapentin, 300 mg, oral, TID  levothyroxine, 150 mcg, oral, Daily  metoprolol tartrate, 25 mg, oral, BID  oseltamivir, 30 mg, oral, BID  pantoprazole, 40 mg, oral, Daily  before breakfast  polyethylene glycol, 17 g, oral, Daily  warfarin, 2.5 mg, oral, Once per day on Sun Tue Thu Sat  warfarin, 5 mg, oral, Once per day on Mon Wed Fri       Physical Exam    Physical Exam:  General:  Pleasant and cooperative. No apparent distress.  HEENT:  Normocephalic, atraumatic, mucus membranes moist.   Neck:  Trachea midline.  No JVD.    Chest:  Clear to auscultation bilaterally. No wheezes, rales, or rhonchi.  CV:  Regular rate and rhythm.  Positive S1/S2.   Abdomen: Bowel sounds present in all four quadrants, abdomen is soft, non-tender, non-distended.  Extremities:  No lower extremity edema or cyanosis.   Neurological:  AAOx3. No focal deficits.  Skin:  Warm and dry.    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  On outpatient review, pt takes torsemide 10mg daily.  For now we will not prescribe any diuretics upon discharge and will follow-up with patient in 4 weeks to reassess before deciding if she needs to be on diuretics or not.  -Pt makes good urine and cr. Is at baseline. For now hold nephrotoxic agents and continue current management per primary team.             Alan Isabel DO  PGY-2, Internal Medicine   Please SecureChat for any further questions  This is a preliminary note, please await attending attestation for final A/P

## 2024-03-01 NOTE — CARE PLAN
Patient seen and examined.  Reports improvement of her symptoms.  Reports she is ready for discharge however she is resistant to going to a skilled nursing facility.  Patients AMPAK is 8.  Patient and family to review alternatives for discharge however at this time we will discharge to SNF.  Case discussed with Dr. Whiting who is covering for attending.

## 2024-03-01 NOTE — PROGRESS NOTES
Lillian Mendoza is a 83 y.o. female on day 3 of admission presenting with Influenza A.      Subjective   Seen today has influenza A, no hypoxia   Stable to go to [facility] for physical therapy  So far her saturations have been excellent as well       Objective     Last Recorded Vitals  /69   Pulse 77   Temp 36.5 °C (97.7 °F)   Resp 20   Wt 115 kg (254 lb 10.1 oz)   SpO2 96%   Intake/Output last 3 Shifts:    Intake/Output Summary (Last 24 hours) at 3/1/2024 1643  Last data filed at 2/29/2024 1800  Gross per 24 hour   Intake --   Output 200 ml   Net -200 ml       Admission Weight  Weight: 118 kg (260 lb) (02/26/24 2023)    Daily Weight  03/01/24 : 115 kg (254 lb 10.1 oz)    Image Results  ECG 12 lead  Atrial flutter with 4:1 AV conduction  Nonspecific ST abnormality  Abnormal ECG  No previous ECGs available  In the review of systems so far no fever no vomiting or diarrhea    Physical Exam  Lungs are diminished but clear heart has regular rate and rhythm abdomen is soft is not distended or firm  Relevant Results               Assessment/Plan   This patient currently has cardiac telemetry ordered; if you would like to modify or discontinue the telemetry order, click here to go to the orders activity to modify/discontinue the order.              Active Problems:  There are no active Hospital Problems.    For now continue the same present care and therapy she can be discharged to [facility] for physical therapy I told her that is a good thing so continue with all the same present management thank you              Sage Alba,

## 2024-03-01 NOTE — CARE PLAN
The patient's goals for the shift include      The clinical goals for the shift include patient will  report decreased anxiety and pain

## 2024-03-01 NOTE — PROGRESS NOTES
Called Ibrahima the grand-daughter for preferences and she may want to take pt home, told Ibrahima pt needs 24/7 care and assist of two, per this grand daughter there will be up to 4 adults caring for pt and her stated she lives with her, this grand daughter is coming to see pt and discuss this matter.   Asking for therapy to treat pt today.  Looking for discharge soon.   If pt goes skilled no precert needed.  Purvi Machado RN TCC    5120  spoke with family and pt, plan is for skilled care, preference is [facility].   Therapy treated pt again and [facility] score recommending skilled care.    Referral placed by team.   Pt has discharge written .  Purvi Machado RN TCC    0928  Dr Alba aware of discharge to skilled and he will sign the [facility]on sonam asap.  Purvi Machado RN TCC

## 2024-03-01 NOTE — DOCUMENTATION CLARIFICATION NOTE
"    PATIENT:               DARLING WYMAN  ACCT #:                  [id number]  MRN:                       [id number]  :                       1940  ADMIT DATE:       2024 7:57 PM  DISCH DATE:  RESPONDING PROVIDER #:        [id number]          PROVIDER RESPONSE TEXT:    Sepsis with associated acute renal organ dysfunction - EMILI    CDI QUERY TEXT:    [facility]_Sepsis Link Organ Dysfunction    Instruction:    Based on your assessment of the patient and the clinical information, please provide the requested documentation by clicking on the appropriate radio button and enter any additional information if prompted.    Question: Please further clarify if a relationship exists between the Sepsis and acute organ dysfunction    When answering this query, please exercise your independent professional judgment. The fact that a question is being asked, does not imply that any particular answer is desired or expected.    The patient's clinical indicators include:  Clinical Information  82 yo presenting with flu-like symptoms    Clinical Indicators  ED  \"Sepsis\", \"Clinical picture concerning for sepsis secondary to influenza A complicated by acute hypoxic respiratory failure, EMILI, new onset atrial flutter, and demand ischemia\"    H and P  \"83 y.o. female with previous PE on AC/HTN/asthma/ presenting with poor PO intake flu-like symptoms.  Was not vaccinated for influenza this season and was found to have influenza A/CAP/tachyarrhythmia.  Had been hypotensive\", \"has some rhonchi in the posterior lung fields\", \"Influenza A  CAP, tachyarrhythmia\"    24  WBCs 4.4  Temp  97.0  HR  150-153   RR 20-26    Creatinine 2.2   BNP  836   Troponins 356-383    Xray  \"Cardiomegaly with mild pulmonary vascular congestion.   Large hiatal hernia. Retrocardiac opacity may relate to combination of small effusion and atelectasis. Superimposed infection not excluded. Correlate clinically\"    Treatment  Tamiflu, IV NS bolus 1000 mls, IV NS bolus " 250 mls, LR bolus 250 mls, IV Rocephin, IV Zithromax, lab monitoring, supplemental oxygen    Risk Factors  84 yo with Influenza A infection, history of smoking, history of HTN and asthma  Options provided:  -- Sepsis with associated acute renal organ dysfunction - EMILI  -- Sepsis with associated acute cardiac organ dysfunction - demand ischemia, hypotension  -- Sepsis with associated acute multi-system organ dysfunction - EMILI, hypotension and demand ischemia  -- Sepsis with other associated acute organ dysfunction, Please specify additional information below  -- Other - I will add my own diagnosis  -- Refer to Clinical Documentation Reviewer    Query created by: Rylie Mays on 2/28/2024 11:18 AM      Electronically signed by:  RAF KABA DO 3/1/2024 11:05 AM

## 2024-03-01 NOTE — PROGRESS NOTES
Occupational Therapy    OT Treatment    Patient Name: Lillian Mendoza  MRN: [id number]  Today's Date: 3/1/2024  Time Calculation  Start Time: 1322  Stop Time: 1358  Time Calculation (min): 36 min         Assessment:  End of Session Communication: Bedside nurse, Care Coordinator  End of Session Patient Position: Bed, 2 rail up, Alarm on (all current needs met)     Plan:  Treatment Interventions: ADL retraining, Functional transfer training, UE strengthening/ROM, Endurance training, Compensatory technique education  OT Frequency: 3 times per week  OT Discharge Recommendations: Moderate intensity level of continued care  OT - OK to Discharge: Yes (to next level of care when cleared by medical team)  Treatment Interventions: ADL retraining, Functional transfer training, UE strengthening/ROM, Endurance training, Compensatory technique education    Subjective   Previous Visit Info:  OT Last Visit  OT Received On: 03/01/24  General:  General  Family/Caregiver Present: Yes  Caregiver Feedback: granddtr/Kiki present and supportive; receptive to discharge planning discussion and education  Co-Treatment: PT  Co-Treatment Reason: 2 person assist  Prior to Session Communication: Bedside nurse  Patient Position Received: Bed, 2 rail up, Alarm on (pt supine in bed, agreeable to OT)  Precautions:  Medical Precautions: Fall precautions, Oxygen therapy device and L/min  Precautions Comment: Flu droplet precautions, 4LPM    Pain:  Pain Assessment  Pain Assessment: 0-10  Pain Score: 0 - No pain    Objective    Cognition:  Cognition  Overall Cognitive Status: Within Functional Limits    Activities of Daily Living: Grooming  Grooming Level of Assistance: Minimum assistance  Grooming Where Assessed: Edge of bed  Grooming Comments: pt completes oral cares seated EOB with SBA, pt brushes hair with min A to reach back side  Functional Standing Tolerance:  Time: 1 minute  Activity: functional mobility/transfers  Functional Standing  Tolerance Comments: poor standing balance. poor standing tolerance  Bed Mobility/Transfers: Bed Mobility  Bed Mobility:  (sup<>sit EOB with max A x 2; support at trunk and LEs, use of chux pad)    Transfers  Transfer:  (STS from bed level 2x max A x2)      Ambulation/Gait Training:  Ambulation/Gait Training  Ambulation/Gait Training Performed:  (Pt completes side steps to HOB 1st attempt via HHA max A x2, 2nd attempt via FWW and max A x 2; able to clear R foot from floor, but slides L foot despite assist with weight shifting)  Sitting Balance:  Static Sitting Balance  Static Sitting-Level of Assistance: Independent  Standing Balance:  Static Standing Balance  Static Standing-Level of Assistance: Maximum assistance    Outcome Measures:  [facility] Daily Activity  Putting on and taking off regular lower body clothing: Total  Bathing (including washing, rinsing, drying): A lot  Putting on and taking off regular upper body clothing: A lot  Toileting, which includes using toilet, bedpan or urinal: Total  Taking care of personal grooming such as brushing teeth: A little  Eating Meals: A little  Daily Activity - Total Score: 12        Education Documentation  Precautions, taught by Maddison Will OT at 3/1/2024  2:22 PM.  Learner: Family, Patient  Readiness: Acceptance  Method: Explanation  Response: Verbalizes Understanding    ADL Training, taught by Maddison Will OT at 3/1/2024  2:22 PM.  Learner: Family, Patient  Readiness: Acceptance  Method: Explanation  Response: Verbalizes Understanding    Precautions, taught by Maddison Will OT at 3/1/2024  2:21 PM.  Learner: Patient  Readiness: Acceptance  Method: Explanation  Response: Verbalizes Understanding    ADL Training, taught by Maddison Will OT at 3/1/2024  2:21 PM.  Learner: Patient  Readiness: Acceptance  Method: Explanation  Response: Verbalizes Understanding    Education Comments  No comments found.        OP EDUCATION:       Goals:  Encounter Problems        Encounter Problems (Active)       OT Goals       Increase functional mobility and  functional transfers to cga for bed/chair/toilet with dme prn   (Progressing)       Start:  02/28/24    Expected End:  03/13/24            increase bue ther ex/activity x 7-10 minutes and increase standing tolerance x 3-5 minutes with cga to promote greater activity tolerance for assist with adl.   (Progressing)       Start:  02/28/24    Expected End:  03/13/24            Increase lb dressing/bathing to cga with dme prn  (Progressing)       Start:  02/28/24    Expected End:  03/13/24            Increase grooming/ub bathing to sba with dme prn seated eob (Progressing)       Start:  02/28/24    Expected End:  03/13/24            Increase toileting to cga with dme prn  (Progressing)       Start:  02/28/24    Expected End:  03/13/24

## 2024-03-01 NOTE — CARE PLAN
The patient's goals for the shift include      The clinical goals for the shift include patient will report decreased anxiety    Over the shift, the patient did  make progress toward the following goals. Barriers to progression include none. Recommendations to address these barriers include none.

## 2024-03-02 LAB
ANION GAP SERPL CALC-SCNC: 11 MMOL/L (ref 10–20)
BACTERIA BLD AEROBE CULT: ABNORMAL
BACTERIA BLD CULT: ABNORMAL
BACTERIA BLD CULT: NORMAL
BUN SERPL-MCNC: 12 MG/DL (ref 6–23)
CALCIUM SERPL-MCNC: 8.9 MG/DL (ref 8.6–10.3)
CHLORIDE SERPL-SCNC: 106 MMOL/L (ref 98–107)
CO2 SERPL-SCNC: 29 MMOL/L (ref 21–32)
CREAT SERPL-MCNC: 1.4 MG/DL (ref 0.5–1.05)
EGFRCR SERPLBLD CKD-EPI 2021: 38 ML/MIN/1.73M*2
GLUCOSE SERPL-MCNC: 101 MG/DL (ref 74–99)
GRAM STN SPEC: ABNORMAL
HOLD SPECIMEN: NORMAL
POTASSIUM SERPL-SCNC: 4.6 MMOL/L (ref 3.5–5.3)
SODIUM SERPL-SCNC: 141 MMOL/L (ref 136–145)

## 2024-03-02 PROCEDURE — 2500000001 HC RX 250 WO HCPCS SELF ADMINISTERED DRUGS (ALT 637 FOR MEDICARE OP): Performed by: NURSE PRACTITIONER

## 2024-03-02 PROCEDURE — 2500000002 HC RX 250 W HCPCS SELF ADMINISTERED DRUGS (ALT 637 FOR MEDICARE OP, ALT 636 FOR OP/ED): Performed by: NURSE PRACTITIONER

## 2024-03-02 PROCEDURE — 2500000004 HC RX 250 GENERAL PHARMACY W/ HCPCS (ALT 636 FOR OP/ED): Performed by: NURSE PRACTITIONER

## 2024-03-02 PROCEDURE — 80048 BASIC METABOLIC PNL TOTAL CA: CPT | Performed by: INTERNAL MEDICINE

## 2024-03-02 PROCEDURE — 1200000002 HC GENERAL ROOM WITH TELEMETRY DAILY

## 2024-03-02 PROCEDURE — 94640 AIRWAY INHALATION TREATMENT: CPT

## 2024-03-02 PROCEDURE — 2500000005 HC RX 250 GENERAL PHARMACY W/O HCPCS: Performed by: NURSE PRACTITIONER

## 2024-03-02 PROCEDURE — 36415 COLL VENOUS BLD VENIPUNCTURE: CPT | Performed by: INTERNAL MEDICINE

## 2024-03-02 RX ADMIN — WARFARIN SODIUM 2.5 MG: 5 TABLET ORAL at 18:30

## 2024-03-02 RX ADMIN — LEVOTHYROXINE SODIUM 150 MCG: 150 TABLET ORAL at 05:48

## 2024-03-02 RX ADMIN — FORMOTEROL FUMARATE DIHYDRATE 20 MCG: 20 SOLUTION RESPIRATORY (INHALATION) at 21:37

## 2024-03-02 RX ADMIN — METOPROLOL TARTRATE 25 MG: 25 TABLET, FILM COATED ORAL at 04:51

## 2024-03-02 RX ADMIN — ALBUTEROL SULFATE 2.5 MG: 2.5 SOLUTION RESPIRATORY (INHALATION) at 11:02

## 2024-03-02 RX ADMIN — TRAMADOL HYDROCHLORIDE 50 MG: 50 TABLET, COATED ORAL at 21:23

## 2024-03-02 RX ADMIN — ALBUTEROL SULFATE 2.5 MG: 2.5 SOLUTION RESPIRATORY (INHALATION) at 20:24

## 2024-03-02 RX ADMIN — BUDESONIDE 0.25 MG: 0.25 INHALANT ORAL at 06:34

## 2024-03-02 RX ADMIN — GABAPENTIN 300 MG: 300 CAPSULE ORAL at 18:31

## 2024-03-02 RX ADMIN — ALBUTEROL SULFATE 2.5 MG: 2.5 SOLUTION RESPIRATORY (INHALATION) at 14:55

## 2024-03-02 RX ADMIN — ALBUTEROL SULFATE 2.5 MG: 2.5 SOLUTION RESPIRATORY (INHALATION) at 06:34

## 2024-03-02 RX ADMIN — OSELTAMIVIR PHOSPHATE 30 MG: 30 CAPSULE ORAL at 10:45

## 2024-03-02 RX ADMIN — FORMOTEROL FUMARATE DIHYDRATE 20 MCG: 20 SOLUTION RESPIRATORY (INHALATION) at 06:35

## 2024-03-02 RX ADMIN — GABAPENTIN 300 MG: 300 CAPSULE ORAL at 22:06

## 2024-03-02 RX ADMIN — METOPROLOL TARTRATE 25 MG: 25 TABLET, FILM COATED ORAL at 21:24

## 2024-03-02 RX ADMIN — ASPIRIN 81 MG 81 MG: 81 TABLET ORAL at 10:45

## 2024-03-02 RX ADMIN — PANTOPRAZOLE SODIUM 40 MG: 40 TABLET, DELAYED RELEASE ORAL at 05:51

## 2024-03-02 RX ADMIN — CEFTRIAXONE SODIUM 2 G: 2 INJECTION, SOLUTION INTRAVENOUS at 10:44

## 2024-03-02 RX ADMIN — GABAPENTIN 300 MG: 300 CAPSULE ORAL at 10:45

## 2024-03-02 RX ADMIN — METOPROLOL TARTRATE 25 MG: 25 TABLET, FILM COATED ORAL at 10:46

## 2024-03-02 RX ADMIN — OSELTAMIVIR PHOSPHATE 30 MG: 30 CAPSULE ORAL at 21:24

## 2024-03-02 RX ADMIN — Medication: at 14:55

## 2024-03-02 ASSESSMENT — COGNITIVE AND FUNCTIONAL STATUS - GENERAL
MOVING FROM LYING ON BACK TO SITTING ON SIDE OF FLAT BED WITH BEDRAILS: A LOT
DAILY ACTIVITIY SCORE: 12
WALKING IN HOSPITAL ROOM: A LITTLE
TOILETING: A LOT
CLIMB 3 TO 5 STEPS WITH RAILING: TOTAL
MOVING TO AND FROM BED TO CHAIR: A LOT
HELP NEEDED FOR BATHING: A LOT
HELP NEEDED FOR BATHING: A LOT
MOVING FROM LYING ON BACK TO SITTING ON SIDE OF FLAT BED WITH BEDRAILS: A LOT
TURNING FROM BACK TO SIDE WHILE IN FLAT BAD: A LOT
STANDING UP FROM CHAIR USING ARMS: A LOT
DRESSING REGULAR LOWER BODY CLOTHING: A LOT
TOILETING: TOTAL
PERSONAL GROOMING: A LOT
PERSONAL GROOMING: A LITTLE
DRESSING REGULAR UPPER BODY CLOTHING: A LOT
EATING MEALS: A LITTLE
MOVING TO AND FROM BED TO CHAIR: A LOT
CLIMB 3 TO 5 STEPS WITH RAILING: TOTAL
WALKING IN HOSPITAL ROOM: A LOT
DAILY ACTIVITIY SCORE: 14
MOBILITY SCORE: 11
DRESSING REGULAR LOWER BODY CLOTHING: TOTAL
DRESSING REGULAR UPPER BODY CLOTHING: A LOT
MOBILITY SCORE: 13
STANDING UP FROM CHAIR USING ARMS: A LITTLE
TURNING FROM BACK TO SIDE WHILE IN FLAT BAD: A LOT

## 2024-03-02 ASSESSMENT — PAIN SCALES - GENERAL
PAINLEVEL_OUTOF10: 0 - NO PAIN
PAINLEVEL_OUTOF10: 0 - NO PAIN

## 2024-03-02 NOTE — PROGRESS NOTES
Lillian Mendoza is a 83 y.o. female on day 4 of admission presenting with Influenza A.      Subjective   Seen today so far has flu a comfortable does not complain of chest pain or shortness of breath no fever at this time she has been afebrile and her saturations are satisfactory       Objective     Last Recorded Vitals  /53 (BP Location: Right arm, Patient Position: Lying)   Pulse 95   Temp 36.2 °C (97.2 °F) (Temporal)   Resp 20   Wt 116 kg (256 lb 9.9 oz)   SpO2 92%   Intake/Output last 3 Shifts:    Intake/Output Summary (Last 24 hours) at 3/2/2024 1551  Last data filed at 3/2/2024 1114  Gross per 24 hour   Intake 100 ml   Output --   Net 100 ml       Admission Weight  Weight: 118 kg (260 lb) (02/26/24 2023)    Daily Weight  03/02/24 : 116 kg (256 lb 9.9 oz)    Image Results  ECG 12 lead  Atrial flutter with 4:1 AV conduction  Nonspecific ST abnormality  Abnormal ECG    Confirmed by Govind Olvera ([id number]) on 3/1/2024 6:52:23 PM  In her review of systems is weakness lethargy deconditioning weakness    Physical Exam  Lungs are diminished but clear heart has a regular rate and rhythm abdomen soft is not distended or firm  Relevant Results               Assessment/Plan   This patient currently has cardiac telemetry ordered; if you would like to modify or discontinue the telemetry order, click here to go to the orders activity to modify/discontinue the order.              Active Problems:  There are no active Hospital Problems.    Flu a continue with isolation and she is to go to rehab so continue the same present care and therapy and management thank you              Sage Alba DO

## 2024-03-02 NOTE — PROGRESS NOTES
NEPHROLOGY PROGRESS NOTE    REASON FOR CONSULT: CKD stage III    SUBJECTIVE:  Patient is resting comfortably.  Son is at bedside.  She did not sleep that well.  Did ambulate with help.  Appetite is okay.  No shortness of breath.      OBJECTIVE:    Visit Vitals  /53   Pulse 95   Temp 36.2 °C (97.2 °F)   Resp 20          Intake/Output Summary (Last 24 hours) at 3/2/2024 1341  Last data filed at 3/2/2024 1114  Gross per 24 hour   Intake 100 ml   Output --   Net 100 ml        General: Awake and Alert, In no distress, Cooperative  HEENT: Oral mucosa moist, EOMI  NECK: Supple  CHEST: No crackles, no wheeze, no tachypnea  CVS: S1,S2 heard, no rubs, RRR  ABD: Soft, Non Tender, BS present  EXT: Chronic lymphedema without significant pitting    MEDICATION:    Scheduled medications  albuterol, 2.5 mg, nebulization, 4x daily  aspirin, 81 mg, oral, Daily  budesonide, 0.25 mg, nebulization, Daily  cefTRIAXone, 2 g, intravenous, q24h  fluticasone, 2 spray, Each Nostril, Daily  formoterol, 20 mcg, nebulization, BID  gabapentin, 300 mg, oral, TID  levothyroxine, 150 mcg, oral, Daily  metoprolol tartrate, 25 mg, oral, BID  oseltamivir, 30 mg, oral, BID  pantoprazole, 40 mg, oral, Daily before breakfast  polyethylene glycol, 17 g, oral, Daily  warfarin, 2.5 mg, oral, Once per day on Sun Tue Thu Sat  warfarin, 5 mg, oral, Once per day on Mon Wed Fri      Continuous medications  dilTIAZem, 5-15 mg/hr, Last Rate: Stopped (02/27/24 1645)      PRN medications  PRN medications: acetaminophen, ALPRAZolam, guaiFENesin, ipratropium-albuteroL, oxygen, traMADol, traMADol     RESULTS:    Lab Results   Component Value Date    WBC 4.8 02/29/2024    HGB 10.6 (L) 02/29/2024    HCT 34.5 (L) 02/29/2024    MCV 96 02/29/2024     02/29/2024        Lab Results   Component Value Date    CREATININE 1.40 (H) 03/02/2024    BUN 12 03/02/2024     03/02/2024    K 4.6 03/02/2024     03/02/2024    CO2 29 03/02/2024        Radiology Imaging  reviewed      ASSESSMENT/PLAN:     1.  CKD stage III: Patient's creatinine currently at 1.4 which is her baseline.  She has history of pulmonary hypertension and peripheral edema has had several episodes of EMILI.    2.  Peripheral edema: Currently stable.  She has chronic lymphedema.  Has history of DVT.  Holding her torsemide unless the swelling gets worse.  Eventually she could be on torsemide 20 mg twice weekly.      Thank You very much for allowing me to participate in the care of this Patient    This document was created using dragon dictation and may contain unintended error    Peewee Benson MD   03/02/24

## 2024-03-02 NOTE — CARE PLAN
The patient's goals for the shift include      The clinical goals for the shift include maintain safety    Pt family at bedside, awaiting for any information on discharge to [facility].  Reached out to SW, no word at this time.

## 2024-03-03 VITALS
TEMPERATURE: 97 F | SYSTOLIC BLOOD PRESSURE: 147 MMHG | WEIGHT: 261.25 LBS | OXYGEN SATURATION: 91 % | HEART RATE: 79 BPM | DIASTOLIC BLOOD PRESSURE: 98 MMHG | BODY MASS INDEX: 43.53 KG/M2 | HEIGHT: 65 IN | RESPIRATION RATE: 18 BRPM

## 2024-03-03 LAB
BACTERIA BLD CULT: NORMAL
BACTERIA BLD CULT: NORMAL

## 2024-03-03 PROCEDURE — 2500000002 HC RX 250 W HCPCS SELF ADMINISTERED DRUGS (ALT 637 FOR MEDICARE OP, ALT 636 FOR OP/ED): Performed by: NURSE PRACTITIONER

## 2024-03-03 PROCEDURE — 2500000005 HC RX 250 GENERAL PHARMACY W/O HCPCS: Performed by: NURSE PRACTITIONER

## 2024-03-03 PROCEDURE — 94640 AIRWAY INHALATION TREATMENT: CPT

## 2024-03-03 PROCEDURE — 2500000004 HC RX 250 GENERAL PHARMACY W/ HCPCS (ALT 636 FOR OP/ED): Performed by: NURSE PRACTITIONER

## 2024-03-03 PROCEDURE — 2500000001 HC RX 250 WO HCPCS SELF ADMINISTERED DRUGS (ALT 637 FOR MEDICARE OP): Performed by: NURSE PRACTITIONER

## 2024-03-03 RX ADMIN — FORMOTEROL FUMARATE DIHYDRATE 20 MCG: 20 SOLUTION RESPIRATORY (INHALATION) at 07:06

## 2024-03-03 RX ADMIN — Medication: at 07:09

## 2024-03-03 RX ADMIN — POLYETHYLENE GLYCOL 3350 17 G: 17 POWDER, FOR SOLUTION ORAL at 08:53

## 2024-03-03 RX ADMIN — GABAPENTIN 300 MG: 300 CAPSULE ORAL at 08:55

## 2024-03-03 RX ADMIN — METOPROLOL TARTRATE 25 MG: 25 TABLET, FILM COATED ORAL at 08:55

## 2024-03-03 RX ADMIN — PANTOPRAZOLE SODIUM 40 MG: 40 TABLET, DELAYED RELEASE ORAL at 06:07

## 2024-03-03 RX ADMIN — OSELTAMIVIR PHOSPHATE 30 MG: 30 CAPSULE ORAL at 08:59

## 2024-03-03 RX ADMIN — ACETAMINOPHEN 650 MG: 325 TABLET ORAL at 08:55

## 2024-03-03 RX ADMIN — ALBUTEROL SULFATE 2.5 MG: 2.5 SOLUTION RESPIRATORY (INHALATION) at 12:06

## 2024-03-03 RX ADMIN — GABAPENTIN 300 MG: 300 CAPSULE ORAL at 15:08

## 2024-03-03 RX ADMIN — ASPIRIN 81 MG 81 MG: 81 TABLET ORAL at 08:55

## 2024-03-03 RX ADMIN — ALBUTEROL SULFATE 2.5 MG: 2.5 SOLUTION RESPIRATORY (INHALATION) at 07:06

## 2024-03-03 RX ADMIN — BUDESONIDE 0.25 MG: 0.25 INHALANT ORAL at 07:05

## 2024-03-03 RX ADMIN — ALBUTEROL SULFATE 2.5 MG: 2.5 SOLUTION RESPIRATORY (INHALATION) at 15:18

## 2024-03-03 RX ADMIN — CEFTRIAXONE SODIUM 2 G: 2 INJECTION, SOLUTION INTRAVENOUS at 08:59

## 2024-03-03 RX ADMIN — LEVOTHYROXINE SODIUM 150 MCG: 150 TABLET ORAL at 06:07

## 2024-03-03 ASSESSMENT — COGNITIVE AND FUNCTIONAL STATUS - GENERAL
CLIMB 3 TO 5 STEPS WITH RAILING: TOTAL
TURNING FROM BACK TO SIDE WHILE IN FLAT BAD: A LITTLE
MOBILITY SCORE: 16
HELP NEEDED FOR BATHING: A LOT
DRESSING REGULAR UPPER BODY CLOTHING: A LOT
MOVING FROM LYING ON BACK TO SITTING ON SIDE OF FLAT BED WITH BEDRAILS: A LITTLE
EATING MEALS: A LITTLE
MOVING TO AND FROM BED TO CHAIR: A LITTLE
PERSONAL GROOMING: A LOT
DRESSING REGULAR LOWER BODY CLOTHING: A LOT
DAILY ACTIVITIY SCORE: 13
TOILETING: A LOT
WALKING IN HOSPITAL ROOM: A LITTLE
STANDING UP FROM CHAIR USING ARMS: A LITTLE

## 2024-03-03 ASSESSMENT — PAIN SCALES - GENERAL
PAINLEVEL_OUTOF10: 4
PAINLEVEL_OUTOF10: 4

## 2024-03-03 NOTE — DISCHARGE SUMMARY
Discharge Diagnosis  Influenza A    Issues Requiring Follow-Up      Discharge Meds     Your medication list        START taking these medications        Instructions Last Dose Given Next Dose Due   ALPRAZolam 0.5 mg tablet  Commonly known as: Xanax      Take 1 tablet (0.5 mg) by mouth 3 times a day as needed for anxiety.       amoxicillin-pot clavulanate 875-125 mg tablet  Commonly known as: Augmentin      Take 1 tablet (875 mg) by mouth 2 times a day for 7 days.       aspirin 81 mg chewable tablet      Chew 1 tablet (81 mg) once daily. Do not start before March 2, 2024.       budesonide 0.25 mg/2 mL nebulizer solution  Commonly known as: Pulmicort      Take 2 mL (0.25 mg) by nebulization once daily. Rinse mouth with water after use to reduce aftertaste and incidence of candidiasis. Do not swallow. Do not start before March 2, 2024.       formoterol 20 mcg/2 mL nebulizer solution  Commonly known as: Perforomist      Take 2 mL (20 mcg) by nebulization 2 times a day.       guaiFENesin 600 mg 12 hr tablet  Commonly known as: Mucinex      Take 1 tablet (600 mg) by mouth 2 times a day as needed for cough. Do not crush, chew, or split.       oseltamivir 30 mg capsule  Commonly known as: Tamiflu      Take 1 capsule (30 mg) by mouth 2 times a day for 2 days.       pantoprazole 40 mg EC tablet  Commonly known as: ProtoNix      Take 1 tablet (40 mg) by mouth once daily in the morning. Take before meals. Do not crush, chew, or split. Do not start before March 2, 2024.              CHANGE how you take these medications        Instructions Last Dose Given Next Dose Due   Jantoven 4 mg tablet  Generic drug: warfarin  What changed: Another medication with the same name was added. Make sure you understand how and when to take each.      Take as directed. If you are unsure how to take this medication, talk to your nurse or doctor.       warfarin 5 mg tablet  Commonly known as: Coumadin  What changed: Another medication with the same  name was added. Make sure you understand how and when to take each.      Take as directed. If you are unsure how to take this medication, talk to your nurse or doctor.  Original instructions: Take as directed per After Visit Summary.       warfarin 2.5 mg tablet  Commonly known as: Coumadin  What changed: You were already taking a medication with the same name, and this prescription was added. Make sure you understand how and when to take each.      Take as directed. If you are unsure how to take this medication, talk to your nurse or doctor.  Original instructions: Take as directed per After Visit Summary. Do not start before March 2, 2024.       torsemide 10 mg tablet  Commonly known as: Demadex  Start taking on: March 7, 2024  What changed:   See the new instructions.  These instructions start on March 7, 2024. If you are unsure what to do until then, ask your doctor or other care provider.      5 tab(s) orally once a day Do not start before March 7, 2024.       traMADol 50 mg tablet  Commonly known as: Ultram  What changed:   when to take this  reasons to take this      Take 1 tablet (50 mg) by mouth as needed at bedtime for severe pain (7 - 10).              CONTINUE taking these medications        Instructions Last Dose Given Next Dose Due   acetaminophen 325 mg tablet  Commonly known as: Tylenol           albuterol 90 mcg/actuation inhaler           atorvastatin 40 mg tablet  Commonly known as: Lipitor      Take 1 tablet (40 mg) by mouth once daily at bedtime.       cholecalciferol 125 MCG (5000 UT) capsule  Commonly known as: Vitamin D-3           cyanocobalamin 1,000 mcg tablet  Commonly known as: Vitamin B-12           ergocalciferol 50 MCG (2000 UT) capsule capsule  Commonly known as: Vitamin D-2           fluticasone 50 mcg/actuation nasal spray  Commonly known as: Flonase           gabapentin 600 mg tablet  Commonly known as: Neurontin           hydrocortisone 2.5 % cream           ipratropium-albuteroL  0.5-2.5 mg/3 mL nebulizer solution  Commonly known as: Duo-Neb           loratadine 10 mg tablet  Commonly known as: Claritin           metoprolol tartrate 25 mg tablet  Commonly known as: Lopressor           montelukast 10 mg tablet  Commonly known as: Singulair           nystatin 100,000 unit/gram powder  Commonly known as: Mycostatin      Apply 1 Application topically 2 times a day.       omeprazole 40 mg DR capsule  Commonly known as: PriLOSEC           oxygen gas therapy  Commonly known as: O2      Inhale 1 each once every 24 hours.       polyethylene glycol 17 gram packet  Commonly known as: Glycolax, Miralax      Take 17 g by mouth once daily. Do not start before November 29, 2023.       sodium chloride 0.65 % nasal spray  Commonly known as: [place]      Administer 1 spray into each nostril if needed for congestion.       Synthroid 150 mcg tablet  Generic drug: levothyroxine                     Where to Get Your Medications        You can get these medications from any pharmacy    Bring a paper prescription for each of these medications  ALPRAZolam 0.5 mg tablet  traMADol 50 mg tablet       Information about where to get these medications is not yet available    Ask your nurse or doctor about these medications  amoxicillin-pot clavulanate 875-125 mg tablet  aspirin 81 mg chewable tablet  budesonide 0.25 mg/2 mL nebulizer solution  formoterol 20 mcg/2 mL nebulizer solution  guaiFENesin 600 mg 12 hr tablet  oseltamivir 30 mg capsule  pantoprazole 40 mg EC tablet  torsemide 10 mg tablet  warfarin 2.5 mg tablet  warfarin 5 mg tablet         Test Results Pending At Discharge  Pending Labs       Order Current Status    Blood Culture Preliminary result    Blood Culture Preliminary result            Hospital Course   83 y.o. female with previous PE on AC/HTN/asthma/ presenting with poor PO intake flu-like symptoms.  Was not vaccinated for influenza this season and was found to have influenza A/CAP/tachyarrhythmia.  Had  been hypotensive upon presentation to ED with good response to fluids.  Started on tamiflu and abx as well as cardizem for HR control.   So far treated her recover for influenza A she did physical therapy went ahead and made arrangements discharged her in stable condition discharge day management greater than 30 minutes total time thank you    Pertinent Physical Exam At Time of Discharge  Physical Exam  Lungs are clear heart has regular rate and rhythm abdomen is soft is not distended or firm  Outpatient Follow-Up  Future Appointments   Date Time Provider Department Center   3/13/2025  3:00 PM Maldonado Alvarado MD [id number] [place]         Sage Alba DO

## 2024-03-03 NOTE — CARE PLAN
The patient's goals for the shift include  rest and comfort    The clinical goals for the shift include maintain 02 saturation at >92%      Problem: Respiratory  Goal: Clear secretions with interventions this shift  Outcome: Progressing  Goal: Wean oxygen to maintain O2 saturation per order/standard this shift  Outcome: Progressing     Problem: Pain - Adult  Goal: Verbalizes/displays adequate comfort level or baseline comfort level  Outcome: Progressing

## 2024-03-03 NOTE — PROGRESS NOTES
03/03/24 1224   Discharge Planning   Support Systems Family members;Children     Patient with discharge order, going to [facility].  Flavia needs signature.  MD en route to sign.  Dru ARREGUIN [facility]

## 2024-03-04 ENCOUNTER — APPOINTMENT (OUTPATIENT)
Dept: CARDIOLOGY | Facility: HOSPITAL | Age: 84
DRG: 291 | End: 2024-03-04
Payer: MEDICARE

## 2024-03-04 ENCOUNTER — HOSPITAL ENCOUNTER (INPATIENT)
Facility: HOSPITAL | Age: 84
LOS: 4 days | Discharge: HOME | DRG: 291 | End: 2024-03-08
Attending: EMERGENCY MEDICINE | Admitting: INTERNAL MEDICINE
Payer: MEDICARE

## 2024-03-04 ENCOUNTER — APPOINTMENT (OUTPATIENT)
Dept: RADIOLOGY | Facility: HOSPITAL | Age: 84
DRG: 291 | End: 2024-03-04
Payer: MEDICARE

## 2024-03-04 DIAGNOSIS — R79.89 ELEVATED BRAIN NATRIURETIC PEPTIDE (BNP) LEVEL: ICD-10-CM

## 2024-03-04 DIAGNOSIS — I50.42 CHRONIC COMBINED SYSTOLIC AND DIASTOLIC HRT FAIL (MULTI): ICD-10-CM

## 2024-03-04 DIAGNOSIS — J96.11 CHRONIC RESPIRATORY FAILURE WITH HYPOXIA (MULTI): ICD-10-CM

## 2024-03-04 DIAGNOSIS — R06.02 SHORTNESS OF BREATH: Primary | ICD-10-CM

## 2024-03-04 DIAGNOSIS — J18.9 PNEUMONIA DUE TO INFECTIOUS ORGANISM, UNSPECIFIED LATERALITY, UNSPECIFIED PART OF LUNG: ICD-10-CM

## 2024-03-04 DIAGNOSIS — I50.31 ACUTE DIASTOLIC CONGESTIVE HEART FAILURE (MULTI): ICD-10-CM

## 2024-03-04 DIAGNOSIS — J44.0 CHRONIC OBSTRUCTIVE PULMONARY DISEASE WITH ACUTE LOWER RESPIRATORY INFECTION (MULTI): ICD-10-CM

## 2024-03-04 DIAGNOSIS — I48.91 ATRIAL FIBRILLATION WITH RAPID VENTRICULAR RESPONSE (MULTI): ICD-10-CM

## 2024-03-04 DIAGNOSIS — F51.01 PRIMARY INSOMNIA: ICD-10-CM

## 2024-03-04 PROBLEM — N18.9 CHRONIC KIDNEY DISEASE: Status: ACTIVE | Noted: 2024-03-04

## 2024-03-04 PROBLEM — K57.92 DIVERTICULITIS: Status: ACTIVE | Noted: 2024-03-04

## 2024-03-04 PROBLEM — J45.909 ASTHMA (HHS-HCC): Status: ACTIVE | Noted: 2024-03-04

## 2024-03-04 PROBLEM — E78.5 HYPERLIPIDEMIA: Status: ACTIVE | Noted: 2024-03-04

## 2024-03-04 PROBLEM — E03.9 HYPOTHYROID: Status: ACTIVE | Noted: 2024-03-04

## 2024-03-04 PROBLEM — M19.90 ARTHRITIS: Status: ACTIVE | Noted: 2024-03-04

## 2024-03-04 PROBLEM — Z86.79 PERSONAL HISTORY OF OTHER DISEASES OF THE CIRCULATORY SYSTEM: Status: ACTIVE | Noted: 2024-03-04

## 2024-03-04 PROBLEM — Z87.19 PERSONAL HISTORY OF OTHER DISEASES OF THE DIGESTIVE SYSTEM: Status: ACTIVE | Noted: 2024-03-04

## 2024-03-04 PROBLEM — I63.9 CVA (CEREBRAL VASCULAR ACCIDENT) (MULTI): Status: ACTIVE | Noted: 2024-03-04

## 2024-03-04 LAB
ALBUMIN SERPL BCP-MCNC: 3.4 G/DL (ref 3.4–5)
ALP SERPL-CCNC: 77 U/L (ref 33–136)
ALT SERPL W P-5'-P-CCNC: 10 U/L (ref 7–45)
ANION GAP BLDA CALCULATED.4IONS-SCNC: 9 MMO/L (ref 10–25)
ANION GAP BLDV CALCULATED.4IONS-SCNC: 3 MMOL/L (ref 10–25)
ANION GAP SERPL CALC-SCNC: 9 MMOL/L (ref 10–20)
APPARATUS: ABNORMAL
APPEARANCE UR: CLEAR
AST SERPL W P-5'-P-CCNC: 14 U/L (ref 9–39)
BASE EXCESS BLDA CALC-SCNC: 7.2 MMOL/L (ref -2–3)
BASE EXCESS BLDV CALC-SCNC: 11.4 MMOL/L (ref -2–3)
BASOPHILS # BLD AUTO: 0.03 X10*3/UL (ref 0–0.1)
BASOPHILS NFR BLD AUTO: 0.4 %
BILIRUB SERPL-MCNC: 0.6 MG/DL (ref 0–1.2)
BILIRUB UR STRIP.AUTO-MCNC: NEGATIVE MG/DL
BNP SERPL-MCNC: 506 PG/ML (ref 0–99)
BODY TEMPERATURE: ABNORMAL
BODY TEMPERATURE: ABNORMAL
BUN SERPL-MCNC: 11 MG/DL (ref 6–23)
CA-I BLDA-SCNC: 1.17 MMOL/L (ref 1.1–1.33)
CA-I BLDV-SCNC: 1.26 MMOL/L (ref 1.1–1.33)
CALCIUM SERPL-MCNC: 9.6 MG/DL (ref 8.6–10.3)
CARDIAC TROPONIN I PNL SERPL HS: 17 NG/L (ref 0–13)
CARDIAC TROPONIN I PNL SERPL HS: 19 NG/L (ref 0–13)
CHLORIDE BLDA-SCNC: 105 MMOL/L (ref 98–107)
CHLORIDE BLDV-SCNC: 105 MMOL/L (ref 98–107)
CHLORIDE SERPL-SCNC: 104 MMOL/L (ref 98–107)
CO2 SERPL-SCNC: 34 MMOL/L (ref 21–32)
COLOR UR: YELLOW
CREAT SERPL-MCNC: 1.13 MG/DL (ref 0.5–1.05)
EGFRCR SERPLBLD CKD-EPI 2021: 49 ML/MIN/1.73M*2
EOSINOPHIL # BLD AUTO: 0.12 X10*3/UL (ref 0–0.4)
EOSINOPHIL NFR BLD AUTO: 1.8 %
ERYTHROCYTE [DISTWIDTH] IN BLOOD BY AUTOMATED COUNT: 15.8 % (ref 11.5–14.5)
FLOW: 8 LPM
FLUAV RNA RESP QL NAA+PROBE: NOT DETECTED
FLUBV RNA RESP QL NAA+PROBE: NOT DETECTED
GLUCOSE BLDA-MCNC: 96 MG/DL (ref 74–99)
GLUCOSE BLDV-MCNC: 107 MG/DL (ref 74–99)
GLUCOSE SERPL-MCNC: 107 MG/DL (ref 74–99)
GLUCOSE UR STRIP.AUTO-MCNC: NEGATIVE MG/DL
HCO3 BLDA-SCNC: 31.2 MMOL/L (ref 22–26)
HCO3 BLDV-SCNC: 37.8 MMOL/L (ref 22–26)
HCT VFR BLD AUTO: 39.6 % (ref 36–46)
HCT VFR BLD EST: 34 % (ref 36–46)
HCT VFR BLD EST: 37 % (ref 36–46)
HGB BLD-MCNC: 12 G/DL (ref 12–16)
HGB BLDA-MCNC: 11.4 G/DL (ref 12–16)
HGB BLDV-MCNC: 12.3 G/DL (ref 12–16)
HOLD SPECIMEN: NORMAL
IMM GRANULOCYTES # BLD AUTO: 0.03 X10*3/UL (ref 0–0.5)
IMM GRANULOCYTES NFR BLD AUTO: 0.4 % (ref 0–0.9)
INHALED O2 CONCENTRATION: 40 %
INHALED O2 CONCENTRATION: 64 %
INR PPP: 2.4 (ref 0.9–1.1)
KETONES UR STRIP.AUTO-MCNC: NEGATIVE MG/DL
LACTATE BLDA-SCNC: 1.9 MMOL/L (ref 0.4–2)
LACTATE BLDV-SCNC: 1.5 MMOL/L (ref 0.4–2)
LACTATE SERPL-SCNC: 1.5 MMOL/L (ref 0.4–2)
LEUKOCYTE ESTERASE UR QL STRIP.AUTO: ABNORMAL
LYMPHOCYTES # BLD AUTO: 2.55 X10*3/UL (ref 0.8–3)
LYMPHOCYTES NFR BLD AUTO: 38.1 %
MCH RBC QN AUTO: 28.6 PG (ref 26–34)
MCHC RBC AUTO-ENTMCNC: 30.3 G/DL (ref 32–36)
MCV RBC AUTO: 94 FL (ref 80–100)
MONOCYTES # BLD AUTO: 0.91 X10*3/UL (ref 0.05–0.8)
MONOCYTES NFR BLD AUTO: 13.6 %
NEUTROPHILS # BLD AUTO: 3.05 X10*3/UL (ref 1.6–5.5)
NEUTROPHILS NFR BLD AUTO: 45.7 %
NITRITE UR QL STRIP.AUTO: NEGATIVE
NRBC BLD-RTO: 0 /100 WBCS (ref 0–0)
OXYHGB MFR BLDA: 94.3 % (ref 94–98)
OXYHGB MFR BLDV: 68.5 % (ref 45–75)
PCO2 BLDA: 41 MM HG (ref 38–42)
PCO2 BLDV: 57 MM HG (ref 41–51)
PH BLDA: 7.49 PH (ref 7.38–7.42)
PH BLDV: 7.43 PH (ref 7.33–7.43)
PH UR STRIP.AUTO: 5 [PH]
PLATELET # BLD AUTO: 323 X10*3/UL (ref 150–450)
PO2 BLDA: 70 MM HG (ref 85–95)
PO2 BLDV: 39 MM HG (ref 35–45)
POTASSIUM BLDA-SCNC: 4.2 MMOL/L (ref 3.5–5.3)
POTASSIUM BLDV-SCNC: 4.5 MMOL/L (ref 3.5–5.3)
POTASSIUM SERPL-SCNC: 4.3 MMOL/L (ref 3.5–5.3)
PROCALCITONIN SERPL-MCNC: 0.06 NG/ML
PROT SERPL-MCNC: 6.9 G/DL (ref 6.4–8.2)
PROT UR STRIP.AUTO-MCNC: NEGATIVE MG/DL
PROTHROMBIN TIME: 27 SECONDS (ref 9.8–12.8)
RBC # BLD AUTO: 4.2 X10*6/UL (ref 4–5.2)
RBC # UR STRIP.AUTO: ABNORMAL /UL
RBC #/AREA URNS AUTO: ABNORMAL /HPF
SAO2 % BLDA: 96 % (ref 94–100)
SAO2 % BLDV: 70 % (ref 45–75)
SARS-COV-2 RNA RESP QL NAA+PROBE: NOT DETECTED
SODIUM BLDA-SCNC: 141 MMOL/L (ref 136–145)
SODIUM BLDV-SCNC: 141 MMOL/L (ref 136–145)
SODIUM SERPL-SCNC: 143 MMOL/L (ref 136–145)
SP GR UR STRIP.AUTO: 1.01
SPECIMEN DRAWN FROM PATIENT: ABNORMAL
SQUAMOUS #/AREA URNS AUTO: ABNORMAL /HPF
T4 FREE SERPL-MCNC: 1.1 NG/DL (ref 0.61–1.12)
TSH SERPL-ACNC: 0.32 MIU/L (ref 0.44–3.98)
UROBILINOGEN UR STRIP.AUTO-MCNC: <2 MG/DL
WBC # BLD AUTO: 6.7 X10*3/UL (ref 4.4–11.3)
WBC #/AREA URNS AUTO: ABNORMAL /HPF
YEAST BUDDING #/AREA UR COMP ASSIST: PRESENT /HPF

## 2024-03-04 PROCEDURE — 87040 BLOOD CULTURE FOR BACTERIA: CPT | Mod: STJLAB | Performed by: STUDENT IN AN ORGANIZED HEALTH CARE EDUCATION/TRAINING PROGRAM

## 2024-03-04 PROCEDURE — 85610 PROTHROMBIN TIME: CPT | Performed by: STUDENT IN AN ORGANIZED HEALTH CARE EDUCATION/TRAINING PROGRAM

## 2024-03-04 PROCEDURE — 2500000002 HC RX 250 W HCPCS SELF ADMINISTERED DRUGS (ALT 637 FOR MEDICARE OP, ALT 636 FOR OP/ED): Performed by: STUDENT IN AN ORGANIZED HEALTH CARE EDUCATION/TRAINING PROGRAM

## 2024-03-04 PROCEDURE — 84145 PROCALCITONIN (PCT): CPT | Mod: STJLAB | Performed by: INTERNAL MEDICINE

## 2024-03-04 PROCEDURE — 99285 EMERGENCY DEPT VISIT HI MDM: CPT | Performed by: EMERGENCY MEDICINE

## 2024-03-04 PROCEDURE — 93308 TTE F-UP OR LMTD: CPT | Performed by: INTERNAL MEDICINE

## 2024-03-04 PROCEDURE — 2500000002 HC RX 250 W HCPCS SELF ADMINISTERED DRUGS (ALT 637 FOR MEDICARE OP, ALT 636 FOR OP/ED): Performed by: NURSE PRACTITIONER

## 2024-03-04 PROCEDURE — 2060000001 HC INTERMEDIATE ICU ROOM DAILY

## 2024-03-04 PROCEDURE — 2500000004 HC RX 250 GENERAL PHARMACY W/ HCPCS (ALT 636 FOR OP/ED): Performed by: EMERGENCY MEDICINE

## 2024-03-04 PROCEDURE — 85025 COMPLETE CBC W/AUTO DIFF WBC: CPT | Performed by: STUDENT IN AN ORGANIZED HEALTH CARE EDUCATION/TRAINING PROGRAM

## 2024-03-04 PROCEDURE — 36415 COLL VENOUS BLD VENIPUNCTURE: CPT | Performed by: STUDENT IN AN ORGANIZED HEALTH CARE EDUCATION/TRAINING PROGRAM

## 2024-03-04 PROCEDURE — 99222 1ST HOSP IP/OBS MODERATE 55: CPT | Performed by: INTERNAL MEDICINE

## 2024-03-04 PROCEDURE — 93010 ELECTROCARDIOGRAM REPORT: CPT | Performed by: EMERGENCY MEDICINE

## 2024-03-04 PROCEDURE — 84484 ASSAY OF TROPONIN QUANT: CPT | Performed by: STUDENT IN AN ORGANIZED HEALTH CARE EDUCATION/TRAINING PROGRAM

## 2024-03-04 PROCEDURE — 96374 THER/PROPH/DIAG INJ IV PUSH: CPT | Mod: 59

## 2024-03-04 PROCEDURE — 84132 ASSAY OF SERUM POTASSIUM: CPT | Performed by: STUDENT IN AN ORGANIZED HEALTH CARE EDUCATION/TRAINING PROGRAM

## 2024-03-04 PROCEDURE — 94640 AIRWAY INHALATION TREATMENT: CPT

## 2024-03-04 PROCEDURE — 81001 URINALYSIS AUTO W/SCOPE: CPT | Performed by: STUDENT IN AN ORGANIZED HEALTH CARE EDUCATION/TRAINING PROGRAM

## 2024-03-04 PROCEDURE — 99222 1ST HOSP IP/OBS MODERATE 55: CPT | Performed by: NURSE PRACTITIONER

## 2024-03-04 PROCEDURE — 99285 EMERGENCY DEPT VISIT HI MDM: CPT | Mod: 25

## 2024-03-04 PROCEDURE — 96365 THER/PROPH/DIAG IV INF INIT: CPT

## 2024-03-04 PROCEDURE — 2500000001 HC RX 250 WO HCPCS SELF ADMINISTERED DRUGS (ALT 637 FOR MEDICARE OP): Performed by: NURSE PRACTITIONER

## 2024-03-04 PROCEDURE — 87086 URINE CULTURE/COLONY COUNT: CPT | Mod: STJLAB | Performed by: STUDENT IN AN ORGANIZED HEALTH CARE EDUCATION/TRAINING PROGRAM

## 2024-03-04 PROCEDURE — 2500000005 HC RX 250 GENERAL PHARMACY W/O HCPCS

## 2024-03-04 PROCEDURE — 83605 ASSAY OF LACTIC ACID: CPT | Performed by: STUDENT IN AN ORGANIZED HEALTH CARE EDUCATION/TRAINING PROGRAM

## 2024-03-04 PROCEDURE — 94660 CPAP INITIATION&MGMT: CPT

## 2024-03-04 PROCEDURE — 2500000004 HC RX 250 GENERAL PHARMACY W/ HCPCS (ALT 636 FOR OP/ED): Performed by: NURSE PRACTITIONER

## 2024-03-04 PROCEDURE — 93005 ELECTROCARDIOGRAM TRACING: CPT

## 2024-03-04 PROCEDURE — 71045 X-RAY EXAM CHEST 1 VIEW: CPT | Mod: FOREIGN READ | Performed by: RADIOLOGY

## 2024-03-04 PROCEDURE — 2500000004 HC RX 250 GENERAL PHARMACY W/ HCPCS (ALT 636 FOR OP/ED): Performed by: STUDENT IN AN ORGANIZED HEALTH CARE EDUCATION/TRAINING PROGRAM

## 2024-03-04 PROCEDURE — 82805 BLOOD GASES W/O2 SATURATION: CPT | Performed by: STUDENT IN AN ORGANIZED HEALTH CARE EDUCATION/TRAINING PROGRAM

## 2024-03-04 PROCEDURE — 36600 WITHDRAWAL OF ARTERIAL BLOOD: CPT

## 2024-03-04 PROCEDURE — 84443 ASSAY THYROID STIM HORMONE: CPT | Performed by: NURSE PRACTITIONER

## 2024-03-04 PROCEDURE — 84132 ASSAY OF SERUM POTASSIUM: CPT | Performed by: NURSE PRACTITIONER

## 2024-03-04 PROCEDURE — 87636 SARSCOV2 & INF A&B AMP PRB: CPT | Performed by: STUDENT IN AN ORGANIZED HEALTH CARE EDUCATION/TRAINING PROGRAM

## 2024-03-04 PROCEDURE — 96366 THER/PROPH/DIAG IV INF ADDON: CPT

## 2024-03-04 PROCEDURE — 84439 ASSAY OF FREE THYROXINE: CPT | Performed by: NURSE PRACTITIONER

## 2024-03-04 PROCEDURE — 83880 ASSAY OF NATRIURETIC PEPTIDE: CPT | Performed by: STUDENT IN AN ORGANIZED HEALTH CARE EDUCATION/TRAINING PROGRAM

## 2024-03-04 PROCEDURE — 93308 TTE F-UP OR LMTD: CPT

## 2024-03-04 PROCEDURE — 96375 TX/PRO/DX INJ NEW DRUG ADDON: CPT

## 2024-03-04 PROCEDURE — 71045 X-RAY EXAM CHEST 1 VIEW: CPT

## 2024-03-04 PROCEDURE — 96367 TX/PROPH/DG ADDL SEQ IV INF: CPT

## 2024-03-04 RX ORDER — WARFARIN 2.5 MG/1
2.5 TABLET ORAL
Status: DISCONTINUED | OUTPATIENT
Start: 2024-03-05 | End: 2024-03-08 | Stop reason: HOSPADM

## 2024-03-04 RX ORDER — ACETAMINOPHEN 325 MG/1
650 TABLET ORAL EVERY 4 HOURS PRN
Status: DISCONTINUED | OUTPATIENT
Start: 2024-03-04 | End: 2024-03-08 | Stop reason: HOSPADM

## 2024-03-04 RX ORDER — ACETAMINOPHEN 650 MG/1
650 SUPPOSITORY RECTAL EVERY 4 HOURS PRN
Status: DISCONTINUED | OUTPATIENT
Start: 2024-03-04 | End: 2024-03-08 | Stop reason: HOSPADM

## 2024-03-04 RX ORDER — PANTOPRAZOLE SODIUM 40 MG/1
40 TABLET, DELAYED RELEASE ORAL
Status: DISCONTINUED | OUTPATIENT
Start: 2024-03-05 | End: 2024-03-08 | Stop reason: HOSPADM

## 2024-03-04 RX ORDER — LORATADINE 10 MG/1
10 TABLET ORAL DAILY
Status: DISCONTINUED | OUTPATIENT
Start: 2024-03-04 | End: 2024-03-08 | Stop reason: HOSPADM

## 2024-03-04 RX ORDER — DILTIAZEM HYDROCHLORIDE 5 MG/ML
10 INJECTION INTRAVENOUS ONCE
Status: COMPLETED | OUTPATIENT
Start: 2024-03-04 | End: 2024-03-04

## 2024-03-04 RX ORDER — DILTIAZEM HYDROCHLORIDE 5 MG/ML
INJECTION INTRAVENOUS
Status: COMPLETED
Start: 2024-03-04 | End: 2024-03-04

## 2024-03-04 RX ORDER — ALPRAZOLAM 0.5 MG/1
0.5 TABLET ORAL 3 TIMES DAILY PRN
Status: DISCONTINUED | OUTPATIENT
Start: 2024-03-04 | End: 2024-03-08 | Stop reason: HOSPADM

## 2024-03-04 RX ORDER — WARFARIN SODIUM 5 MG/1
5 TABLET ORAL
Status: DISCONTINUED | OUTPATIENT
Start: 2024-03-04 | End: 2024-03-08 | Stop reason: HOSPADM

## 2024-03-04 RX ORDER — IPRATROPIUM BROMIDE AND ALBUTEROL SULFATE 2.5; .5 MG/3ML; MG/3ML
SOLUTION RESPIRATORY (INHALATION)
Status: DISPENSED
Start: 2024-03-04 | End: 2024-03-04

## 2024-03-04 RX ORDER — MONTELUKAST SODIUM 10 MG/1
10 TABLET ORAL DAILY
Status: DISCONTINUED | OUTPATIENT
Start: 2024-03-04 | End: 2024-03-08 | Stop reason: HOSPADM

## 2024-03-04 RX ORDER — LEVOTHYROXINE SODIUM 150 UG/1
150 TABLET ORAL DAILY
Status: DISCONTINUED | OUTPATIENT
Start: 2024-03-04 | End: 2024-03-08 | Stop reason: HOSPADM

## 2024-03-04 RX ORDER — METOPROLOL TARTRATE 25 MG/1
25 TABLET, FILM COATED ORAL DAILY
Status: DISCONTINUED | OUTPATIENT
Start: 2024-03-04 | End: 2024-03-04

## 2024-03-04 RX ORDER — FORMOTEROL FUMARATE DIHYDRATE 20 UG/2ML
20 SOLUTION RESPIRATORY (INHALATION)
Status: DISCONTINUED | OUTPATIENT
Start: 2024-03-04 | End: 2024-03-08 | Stop reason: HOSPADM

## 2024-03-04 RX ORDER — POLYETHYLENE GLYCOL 3350 17 G/17G
17 POWDER, FOR SOLUTION ORAL DAILY
Status: DISCONTINUED | OUTPATIENT
Start: 2024-03-04 | End: 2024-03-08 | Stop reason: HOSPADM

## 2024-03-04 RX ORDER — ACETAMINOPHEN 160 MG/5ML
650 SOLUTION ORAL EVERY 4 HOURS PRN
Status: DISCONTINUED | OUTPATIENT
Start: 2024-03-04 | End: 2024-03-08 | Stop reason: HOSPADM

## 2024-03-04 RX ORDER — DILTIAZEM HCL/D5W 125 MG/125
5-15 PLASTIC BAG, INJECTION (ML) INTRAVENOUS CONTINUOUS
Status: DISCONTINUED | OUTPATIENT
Start: 2024-03-04 | End: 2024-03-04

## 2024-03-04 RX ORDER — NAPROXEN SODIUM 220 MG/1
81 TABLET, FILM COATED ORAL DAILY
Status: DISCONTINUED | OUTPATIENT
Start: 2024-03-04 | End: 2024-03-08 | Stop reason: HOSPADM

## 2024-03-04 RX ORDER — TRAMADOL HYDROCHLORIDE 50 MG/1
50 TABLET ORAL NIGHTLY PRN
Status: DISCONTINUED | OUTPATIENT
Start: 2024-03-04 | End: 2024-03-08 | Stop reason: HOSPADM

## 2024-03-04 RX ORDER — GABAPENTIN 600 MG/1
600 TABLET ORAL 3 TIMES DAILY
Status: DISCONTINUED | OUTPATIENT
Start: 2024-03-04 | End: 2024-03-08 | Stop reason: HOSPADM

## 2024-03-04 RX ORDER — TORSEMIDE 10 MG/1
5 TABLET ORAL DAILY
Status: DISCONTINUED | OUTPATIENT
Start: 2024-03-07 | End: 2024-03-05

## 2024-03-04 RX ORDER — METOPROLOL SUCCINATE 25 MG/1
25 TABLET, EXTENDED RELEASE ORAL DAILY
Status: DISCONTINUED | OUTPATIENT
Start: 2024-03-04 | End: 2024-03-08 | Stop reason: HOSPADM

## 2024-03-04 RX ORDER — BUDESONIDE 0.25 MG/2ML
0.25 INHALANT ORAL
Status: DISCONTINUED | OUTPATIENT
Start: 2024-03-04 | End: 2024-03-08 | Stop reason: HOSPADM

## 2024-03-04 RX ORDER — KETOROLAC TROMETHAMINE 15 MG/ML
15 INJECTION, SOLUTION INTRAMUSCULAR; INTRAVENOUS ONCE
Status: COMPLETED | OUTPATIENT
Start: 2024-03-04 | End: 2024-03-04

## 2024-03-04 RX ORDER — ATORVASTATIN CALCIUM 40 MG/1
40 TABLET, FILM COATED ORAL NIGHTLY
Status: DISCONTINUED | OUTPATIENT
Start: 2024-03-04 | End: 2024-03-08 | Stop reason: HOSPADM

## 2024-03-04 RX ORDER — HYDROXYZINE HYDROCHLORIDE 10 MG/1
10 TABLET, FILM COATED ORAL EVERY 6 HOURS PRN
Status: DISCONTINUED | OUTPATIENT
Start: 2024-03-04 | End: 2024-03-08 | Stop reason: HOSPADM

## 2024-03-04 RX ORDER — HYDROCORTISONE 25 MG/G
1 CREAM TOPICAL 2 TIMES DAILY
Status: DISCONTINUED | OUTPATIENT
Start: 2024-03-04 | End: 2024-03-08 | Stop reason: HOSPADM

## 2024-03-04 RX ORDER — FLUTICASONE PROPIONATE 50 MCG
1 SPRAY, SUSPENSION (ML) NASAL DAILY
Status: DISCONTINUED | OUTPATIENT
Start: 2024-03-04 | End: 2024-03-08 | Stop reason: HOSPADM

## 2024-03-04 RX ORDER — CYANOCOBALAMIN (VITAMIN B-12) 500 MCG
400 TABLET ORAL DAILY
Status: DISCONTINUED | OUTPATIENT
Start: 2024-03-04 | End: 2024-03-04 | Stop reason: ALTCHOICE

## 2024-03-04 RX ORDER — METOPROLOL SUCCINATE 50 MG/1
50 TABLET, EXTENDED RELEASE ORAL DAILY
Status: DISCONTINUED | OUTPATIENT
Start: 2024-03-04 | End: 2024-03-04

## 2024-03-04 RX ORDER — ACETAMINOPHEN 325 MG/1
650 TABLET ORAL ONCE
Status: DISCONTINUED | OUTPATIENT
Start: 2024-03-04 | End: 2024-03-08 | Stop reason: HOSPADM

## 2024-03-04 RX ORDER — DILTIAZEM HCL/D5W 125 MG/125
5-15 PLASTIC BAG, INJECTION (ML) INTRAVENOUS CONTINUOUS
Status: DISCONTINUED | OUTPATIENT
Start: 2024-03-04 | End: 2024-03-05

## 2024-03-04 RX ORDER — NYSTATIN 100000 [USP'U]/G
1 POWDER TOPICAL 2 TIMES DAILY
Status: DISCONTINUED | OUTPATIENT
Start: 2024-03-04 | End: 2024-03-08 | Stop reason: HOSPADM

## 2024-03-04 RX ORDER — CYANOCOBALAMIN (VITAMIN B-12) 500 MCG
400 TABLET ORAL DAILY
COMMUNITY

## 2024-03-04 RX ORDER — ADENOSINE 3 MG/ML
6 INJECTION, SOLUTION INTRAVENOUS ONCE
Status: COMPLETED | OUTPATIENT
Start: 2024-03-04 | End: 2024-03-04

## 2024-03-04 RX ORDER — VANCOMYCIN 2 GRAM/500 ML IN 0.9 % SODIUM CHLORIDE INTRAVENOUS
2 ONCE
Status: COMPLETED | OUTPATIENT
Start: 2024-03-04 | End: 2024-03-04

## 2024-03-04 RX ORDER — IPRATROPIUM BROMIDE AND ALBUTEROL SULFATE 2.5; .5 MG/3ML; MG/3ML
3 SOLUTION RESPIRATORY (INHALATION) EVERY 6 HOURS PRN
Status: DISCONTINUED | OUTPATIENT
Start: 2024-03-04 | End: 2024-03-08 | Stop reason: HOSPADM

## 2024-03-04 RX ORDER — GUAIFENESIN 600 MG/1
600 TABLET, EXTENDED RELEASE ORAL 2 TIMES DAILY PRN
Status: DISCONTINUED | OUTPATIENT
Start: 2024-03-04 | End: 2024-03-08 | Stop reason: HOSPADM

## 2024-03-04 RX ORDER — IPRATROPIUM BROMIDE AND ALBUTEROL SULFATE 2.5; .5 MG/3ML; MG/3ML
3 SOLUTION RESPIRATORY (INHALATION) EVERY 20 MIN
Status: COMPLETED | OUTPATIENT
Start: 2024-03-04 | End: 2024-03-04

## 2024-03-04 RX ORDER — LANOLIN ALCOHOL/MO/W.PET/CERES
1000 CREAM (GRAM) TOPICAL DAILY
Status: DISCONTINUED | OUTPATIENT
Start: 2024-03-04 | End: 2024-03-08 | Stop reason: HOSPADM

## 2024-03-04 RX ORDER — AZITHROMYCIN 500 MG/1
500 TABLET, FILM COATED ORAL
Status: DISCONTINUED | OUTPATIENT
Start: 2024-03-05 | End: 2024-03-04

## 2024-03-04 RX ORDER — CHOLECALCIFEROL (VITAMIN D3) 25 MCG
50 TABLET ORAL DAILY
Status: DISCONTINUED | OUTPATIENT
Start: 2024-03-04 | End: 2024-03-08 | Stop reason: HOSPADM

## 2024-03-04 RX ADMIN — LEVOTHYROXINE SODIUM 150 MCG: 150 TABLET ORAL at 14:57

## 2024-03-04 RX ADMIN — HYDROCORTISONE 1 APPLICATION: 25 CREAM TOPICAL at 20:23

## 2024-03-04 RX ADMIN — ATORVASTATIN CALCIUM 40 MG: 40 TABLET, FILM COATED ORAL at 20:23

## 2024-03-04 RX ADMIN — WARFARIN SODIUM 5 MG: 5 TABLET ORAL at 20:22

## 2024-03-04 RX ADMIN — ASPIRIN 81 MG CHEWABLE TABLET 81 MG: 81 TABLET CHEWABLE at 15:36

## 2024-03-04 RX ADMIN — Medication 15 MG/HR: at 16:05

## 2024-03-04 RX ADMIN — PIPERACILLIN SODIUM AND TAZOBACTAM SODIUM 4.5 G: 4; .5 INJECTION, SOLUTION INTRAVENOUS at 08:58

## 2024-03-04 RX ADMIN — CYANOCOBALAMIN TAB 1000 MCG 1000 MCG: 1000 TAB at 15:36

## 2024-03-04 RX ADMIN — HYDROXYZINE HYDROCHLORIDE 10 MG: 10 TABLET ORAL at 18:34

## 2024-03-04 RX ADMIN — NYSTATIN 1 APPLICATION: 100000 POWDER TOPICAL at 20:24

## 2024-03-04 RX ADMIN — FORMOTEROL FUMARATE 20 MCG: 20 SOLUTION RESPIRATORY (INHALATION) at 21:50

## 2024-03-04 RX ADMIN — ACETAMINOPHEN 650 MG: 325 TABLET ORAL at 15:35

## 2024-03-04 RX ADMIN — GABAPENTIN 600 MG: 300 CAPSULE ORAL at 20:23

## 2024-03-04 RX ADMIN — GABAPENTIN 600 MG: 300 CAPSULE ORAL at 16:50

## 2024-03-04 RX ADMIN — Medication 50 MCG: at 15:36

## 2024-03-04 RX ADMIN — TRAMADOL HYDROCHLORIDE 50 MG: 50 TABLET, COATED ORAL at 21:23

## 2024-03-04 RX ADMIN — DILTIAZEM HYDROCHLORIDE 10 MG: 5 INJECTION, SOLUTION INTRAVENOUS at 08:44

## 2024-03-04 RX ADMIN — IPRATROPIUM BROMIDE AND ALBUTEROL SULFATE 3 ML: 2.5; .5 SOLUTION RESPIRATORY (INHALATION) at 08:04

## 2024-03-04 RX ADMIN — MONTELUKAST 10 MG: 10 TABLET, FILM COATED ORAL at 20:23

## 2024-03-04 RX ADMIN — KETOROLAC TROMETHAMINE 15 MG: 15 INJECTION, SOLUTION INTRAMUSCULAR; INTRAVENOUS at 11:24

## 2024-03-04 RX ADMIN — SODIUM CHLORIDE 1000 ML: 9 INJECTION, SOLUTION INTRAVENOUS at 08:17

## 2024-03-04 RX ADMIN — Medication 10 MG/HR: at 08:44

## 2024-03-04 RX ADMIN — DILTIAZEM HYDROCHLORIDE 10 MG: 5 INJECTION INTRAVENOUS at 08:44

## 2024-03-04 RX ADMIN — IPRATROPIUM BROMIDE AND ALBUTEROL SULFATE 3 ML: 2.5; .5 SOLUTION RESPIRATORY (INHALATION) at 08:05

## 2024-03-04 RX ADMIN — AZITHROMYCIN MONOHYDRATE 500 MG: 500 INJECTION, POWDER, LYOPHILIZED, FOR SOLUTION INTRAVENOUS at 13:16

## 2024-03-04 RX ADMIN — Medication 2 G: at 09:40

## 2024-03-04 RX ADMIN — POLYETHYLENE GLYCOL 3350 17 G: 17 POWDER, FOR SOLUTION ORAL at 15:36

## 2024-03-04 RX ADMIN — IPRATROPIUM BROMIDE AND ALBUTEROL SULFATE 3 ML: 2.5; .5 SOLUTION RESPIRATORY (INHALATION) at 08:02

## 2024-03-04 RX ADMIN — METOPROLOL SUCCINATE 25 MG: 25 TABLET, EXTENDED RELEASE ORAL at 16:16

## 2024-03-04 RX ADMIN — ADENOSINE 6 MG: 3 INJECTION INTRAVENOUS at 08:31

## 2024-03-04 SDOH — SOCIAL STABILITY: SOCIAL INSECURITY: HAVE YOU HAD THOUGHTS OF HARMING ANYONE ELSE?: NO

## 2024-03-04 SDOH — SOCIAL STABILITY: SOCIAL INSECURITY: DO YOU FEEL UNSAFE GOING BACK TO THE PLACE WHERE YOU ARE LIVING?: NO

## 2024-03-04 SDOH — SOCIAL STABILITY: SOCIAL INSECURITY: HAS ANYONE EVER THREATENED TO HURT YOUR FAMILY OR YOUR PETS?: NO

## 2024-03-04 SDOH — SOCIAL STABILITY: SOCIAL INSECURITY: DOES ANYONE TRY TO KEEP YOU FROM HAVING/CONTACTING OTHER FRIENDS OR DOING THINGS OUTSIDE YOUR HOME?: NO

## 2024-03-04 SDOH — SOCIAL STABILITY: SOCIAL INSECURITY: DO YOU FEEL ANYONE HAS EXPLOITED OR TAKEN ADVANTAGE OF YOU FINANCIALLY OR OF YOUR PERSONAL PROPERTY?: NO

## 2024-03-04 SDOH — SOCIAL STABILITY: SOCIAL INSECURITY: ARE YOU OR HAVE YOU BEEN THREATENED OR ABUSED PHYSICALLY, EMOTIONALLY, OR SEXUALLY BY ANYONE?: NO

## 2024-03-04 SDOH — SOCIAL STABILITY: SOCIAL INSECURITY: ABUSE: ADULT

## 2024-03-04 SDOH — SOCIAL STABILITY: SOCIAL INSECURITY: WERE YOU ABLE TO COMPLETE ALL THE BEHAVIORAL HEALTH SCREENINGS?: YES

## 2024-03-04 SDOH — SOCIAL STABILITY: SOCIAL INSECURITY: ARE THERE ANY APPARENT SIGNS OF INJURIES/BEHAVIORS THAT COULD BE RELATED TO ABUSE/NEGLECT?: NO

## 2024-03-04 ASSESSMENT — ENCOUNTER SYMPTOMS
PALPITATIONS: 0
CONFUSION: 0
COUGH: 0
HEADACHES: 1
SHORTNESS OF BREATH: 0
SORE THROAT: 1
PSYCHIATRIC NEGATIVE: 1
DYSPNEA ON EXERTION: 1
DIZZINESS: 0
SHORTNESS OF BREATH: 1
COLOR CHANGE: 0
SPUTUM PRODUCTION: 0
EYES NEGATIVE: 1
CHILLS: 0
HALLUCINATIONS: 0
FEVER: 0
WEAKNESS: 1
FACIAL ASYMMETRY: 0
PALPITATIONS: 1
GASTROINTESTINAL NEGATIVE: 1
ABDOMINAL PAIN: 0
ENDOCRINE NEGATIVE: 1
FREQUENCY: 0
DYSURIA: 0
MUSCULOSKELETAL NEGATIVE: 1
DIARRHEA: 0
APPETITE CHANGE: 1
NECK STIFFNESS: 0
LIGHT-HEADEDNESS: 0

## 2024-03-04 ASSESSMENT — PATIENT HEALTH QUESTIONNAIRE - PHQ9
1. LITTLE INTEREST OR PLEASURE IN DOING THINGS: NOT AT ALL
2. FEELING DOWN, DEPRESSED OR HOPELESS: NOT AT ALL
SUM OF ALL RESPONSES TO PHQ9 QUESTIONS 1 & 2: 0
1. LITTLE INTEREST OR PLEASURE IN DOING THINGS: NOT AT ALL

## 2024-03-04 ASSESSMENT — COGNITIVE AND FUNCTIONAL STATUS - GENERAL
MOVING TO AND FROM BED TO CHAIR: A LOT
PERSONAL GROOMING: A LOT
TOILETING: A LOT
STANDING UP FROM CHAIR USING ARMS: A LITTLE
CLIMB 3 TO 5 STEPS WITH RAILING: TOTAL
PERSONAL GROOMING: A LOT
DAILY ACTIVITIY SCORE: 13
DRESSING REGULAR LOWER BODY CLOTHING: A LOT
CLIMB 3 TO 5 STEPS WITH RAILING: TOTAL
STANDING UP FROM CHAIR USING ARMS: A LOT
TURNING FROM BACK TO SIDE WHILE IN FLAT BAD: A LITTLE
WALKING IN HOSPITAL ROOM: A LITTLE
DRESSING REGULAR UPPER BODY CLOTHING: A LOT
PATIENT BASELINE BEDBOUND: NO
TOILETING: A LOT
MOVING TO AND FROM BED TO CHAIR: A LITTLE
HELP NEEDED FOR BATHING: A LOT
TURNING FROM BACK TO SIDE WHILE IN FLAT BAD: A LOT
HELP NEEDED FOR BATHING: A LOT
MOBILITY SCORE: 16
WALKING IN HOSPITAL ROOM: A LOT
EATING MEALS: A LITTLE
EATING MEALS: A LITTLE
DRESSING REGULAR LOWER BODY CLOTHING: A LOT
MOBILITY SCORE: 11
MOVING FROM LYING ON BACK TO SITTING ON SIDE OF FLAT BED WITH BEDRAILS: A LOT
DRESSING REGULAR UPPER BODY CLOTHING: A LOT
DAILY ACTIVITIY SCORE: 13
MOVING FROM LYING ON BACK TO SITTING ON SIDE OF FLAT BED WITH BEDRAILS: A LITTLE

## 2024-03-04 ASSESSMENT — PAIN SCALES - GENERAL
PAINLEVEL_OUTOF10: 0 - NO PAIN
PAINLEVEL_OUTOF10: 7
PAINLEVEL_OUTOF10: 0 - NO PAIN

## 2024-03-04 ASSESSMENT — LIFESTYLE VARIABLES
EVER FELT BAD OR GUILTY ABOUT YOUR DRINKING: NO
SUBSTANCE_ABUSE_PAST_12_MONTHS: NO
SUBSTANCE_ABUSE_PAST_12_MONTHS: NO
HAVE YOU EVER FELT YOU SHOULD CUT DOWN ON YOUR DRINKING: NO
SKIP TO QUESTIONS 9-10: 1
HOW OFTEN DO YOU HAVE 6 OR MORE DRINKS ON ONE OCCASION: NEVER
HAVE PEOPLE ANNOYED YOU BY CRITICIZING YOUR DRINKING: NO
PRESCIPTION_ABUSE_PAST_12_MONTHS: NO
SKIP TO QUESTIONS 9-10: 1
HOW MANY STANDARD DRINKS CONTAINING ALCOHOL DO YOU HAVE ON A TYPICAL DAY: PATIENT DOES NOT DRINK
HOW OFTEN DO YOU HAVE A DRINK CONTAINING ALCOHOL: NEVER
HOW MANY STANDARD DRINKS CONTAINING ALCOHOL DO YOU HAVE ON A TYPICAL DAY: PATIENT DOES NOT DRINK
AUDIT-C TOTAL SCORE: 0
HOW OFTEN DO YOU HAVE A DRINK CONTAINING ALCOHOL: NEVER
AUDIT-C TOTAL SCORE: 0
AUDIT-C TOTAL SCORE: 0
HOW OFTEN DO YOU HAVE 6 OR MORE DRINKS ON ONE OCCASION: NEVER
PRESCIPTION_ABUSE_PAST_12_MONTHS: NO
AUDIT-C TOTAL SCORE: 0
EVER HAD A DRINK FIRST THING IN THE MORNING TO STEADY YOUR NERVES TO GET RID OF A HANGOVER: NO

## 2024-03-04 ASSESSMENT — ACTIVITIES OF DAILY LIVING (ADL)
GROOMING: NEEDS ASSISTANCE
HEARING - LEFT EAR: FUNCTIONAL
ASSISTIVE_DEVICE: WALKER;CANE
PATIENT'S MEMORY ADEQUATE TO SAFELY COMPLETE DAILY ACTIVITIES?: YES
TOILETING: NEEDS ASSISTANCE
BATHING: NEEDS ASSISTANCE
JUDGMENT_ADEQUATE_SAFELY_COMPLETE_DAILY_ACTIVITIES: YES
LACK_OF_TRANSPORTATION: NO
ADEQUATE_TO_COMPLETE_ADL: YES
FEEDING YOURSELF: INDEPENDENT
WALKS IN HOME: NEEDS ASSISTANCE
HEARING - RIGHT EAR: FUNCTIONAL
DRESSING YOURSELF: NEEDS ASSISTANCE

## 2024-03-04 ASSESSMENT — PAIN DESCRIPTION - LOCATION
LOCATION: FOOT
LOCATION: HEAD

## 2024-03-04 ASSESSMENT — COLUMBIA-SUICIDE SEVERITY RATING SCALE - C-SSRS
1. IN THE PAST MONTH, HAVE YOU WISHED YOU WERE DEAD OR WISHED YOU COULD GO TO SLEEP AND NOT WAKE UP?: NO
6. HAVE YOU EVER DONE ANYTHING, STARTED TO DO ANYTHING, OR PREPARED TO DO ANYTHING TO END YOUR LIFE?: NO
2. HAVE YOU ACTUALLY HAD ANY THOUGHTS OF KILLING YOURSELF?: NO

## 2024-03-04 ASSESSMENT — PAIN - FUNCTIONAL ASSESSMENT
PAIN_FUNCTIONAL_ASSESSMENT: 0-10

## 2024-03-04 ASSESSMENT — PAIN DESCRIPTION - ORIENTATION: ORIENTATION: LEFT

## 2024-03-04 NOTE — CONSULTS
Cardiology Consult           PATIENT NAME:  Lillian Mendoza       MRN: [id number]     DATE of SERVICE: March 4, 2024           Primary Care Physician: Sage Alba DO      Consulting Physician:  Dr James             Reason for consult:  atrial flutter with  RVR    HPI:  This is a 83 y.o. female obese with hx of recent (a week ago ) influenza A /CAP/ tachyarrhythmia .PE on coumadin in 11/2023 and remote EP  in the setting of post knee replacement .CVA in 11/2023.CKD -3 HTN.chronic diastolic heart  failure. asthma and hypothyroidism who presents with sx of palpitation and increase SOB after she was discharged yesterday from [place] after she was admitted for a week due to influenza A /CAP/tachyarrhythmia .SHE Kenji any chest pain.in the ER ,she received adenosine after initial EKG shown;SVT and second EKG shown; atrial flutter for which Cardizem drip was started .     A week ago.  The pt was on Cardizem for rate control in [place] and discharged with metoprolol 25 mg every day On 3/3/24 . In view discharge summary.she was in sinus at discharge .  In 11/23; There is no atrial fib or flutter documented     Echocardiogram:  in 11/2023 and 2/8/24  ;EF 55-60%  ,however severe enlarged RV in 11/23     EKG: today 3/4/24; ? SVT  atrial flutter on second EKG  EKG on 3/27/26 atrial flutter   EKG in 11/23;sinus tachycardiac     Lab;  Mg 2.23 K+ 4.3 troponin 19-17       Past Medical History:     Past Medical History:   Diagnosis Date    Arthritis     Asthma     Chronic kidney disease     CVA (cerebral vascular accident) (CMS/HCC)     Diverticulitis     Hiatal hernia     Hyperlipidemia     Hypertension     Hypothyroid     Personal history of diseases of the blood and blood-forming organs and certain disorders involving the immune mechanism 11/23/2020    History of anemia    Personal history of other diseases of the circulatory system 11/23/2020    History of hypertension    Personal history of other diseases of the  digestive system 11/23/2020    History of hiatal hernia    Personal history of other diseases of the musculoskeletal system and connective tissue     History of arthritis    Personal history of other diseases of the nervous system and sense organs     History of cataract    Personal history of other diseases of the respiratory system 11/23/2020    History of asthma    Personal history of other diseases of the respiratory system 11/23/2020    History of bronchitis    Personal history of other diseases of the respiratory system 11/23/2020    History of lung disease    Personal history of other diseases of urinary system 11/23/2020    History of kidney problems    Personal history of other endocrine, nutritional and metabolic disease 11/23/2020    History of thyroid disorder    Personal history of other specified conditions 11/23/2020    H/O shortness of breath    Pulmonary embolus (CMS/HCC)               Past Surgical History:     Past Surgical History:   Procedure Laterality Date    CARDIAC CATHETERIZATION      CATARACT EXTRACTION      HYSTERECTOMY      INVASIVE VASCULAR PROCEDURE N/A 11/15/2023    Procedure: Pulmonary Angiography - Bilateral;  Surgeon: Maldonado Alvarado MD;  [facility] Cardiac [facility] Lab;  Service: Cardiovascular;  Laterality: N/A;    JOINT REPLACEMENT      OTHER SURGICAL HISTORY  11/23/2020    Knee replacement    OTHER SURGICAL HISTORY  11/23/2020    Lumpectomy    OTHER SURGICAL HISTORY  11/23/2020    Cataract surgery    OTHER SURGICAL HISTORY  11/23/2020    Cholecystectomy    OTHER SURGICAL HISTORY  11/23/2020    Hernia repair    OTHER SURGICAL HISTORY  11/23/2020    Hysterectomy                    Family History:     Family History   Problem Relation Name Age of Onset    Cancer Mother      Diabetes Paternal Grandmother      Diabetes Paternal Grandfather              Social History:    Social History     Tobacco Use    Smoking status: Never     Passive exposure: Never   Substance Use Topics    Alcohol use:  Not Currently    Drug use: Not Currently            Allergies:      Allergies   Allergen Reactions    Morphine Rash and GI Upset              Medications:     Prior to Admission Medications   Prescriptions Last Dose Informant Patient Reported? Taking?   ALPRAZolam (Xanax) 0.5 mg tablet   No No   Sig: Take 1 tablet (0.5 mg) by mouth 3 times a day as needed for anxiety.   acetaminophen (Tylenol) 325 mg tablet   Yes No   Sig: Take 3 tablets (975 mg) by mouth every 8 hours.   albuterol 90 mcg/actuation inhaler   Yes No   Sig: Inhale 2 puffs every 4 hours if needed for wheezing or shortness of breath.   amoxicillin-pot clavulanate (Augmentin) 875-125 mg tablet   No No   Sig: Take 1 tablet (875 mg) by mouth 2 times a day for 7 days.   Patient taking differently: Take 1 tablet (875 mg) by mouth 2 times a day. Facility list has med from 3-3-24 to 3-10-24   aspirin 81 mg chewable tablet   No No   Sig: Chew 1 tablet (81 mg) once daily. Do not start before March 2, 2024.   atorvastatin (Lipitor) 40 mg tablet   No No   Sig: Take 1 tablet (40 mg) by mouth once daily at bedtime.   budesonide (Pulmicort) 0.25 mg/2 mL nebulizer solution   No No   Sig: Take 2 mL (0.25 mg) by nebulization once daily. Rinse mouth with water after use to reduce aftertaste and incidence of candidiasis. Do not swallow. Do not start before March 2, 2024.   cholecalciferol (Vitamin D-3) 10 MCG (400 UNIT) tablet   Yes No   Sig: Take 1 tablet (10 mcg) by mouth once daily.   cyanocobalamin (Vitamin B-12) 1,000 mcg tablet   Yes No   Sig: Take 1 tablet (1,000 mcg) by mouth once daily.   ergocalciferol, vitamin D2, 50 mcg (2,000 unit) tablet   Yes No   Sig: Take 2,000 Units by mouth once daily.   fluticasone (Flonase) 50 mcg/actuation nasal spray   Yes No   Sig: Administer 1 spray into each nostril once daily.   formoterol (Perforomist) 20 mcg/2 mL nebulizer solution   No No   Sig: Take 2 mL (20 mcg) by nebulization 2 times a day.   gabapentin (Neurontin) 600 mg  tablet   Yes No   Sig: Take 1 tablet (600 mg) by mouth 3 times a day.   guaiFENesin (Mucinex) 600 mg 12 hr tablet   No No   Sig: Take 1 tablet (600 mg) by mouth 2 times a day as needed for cough. Do not crush, chew, or split.   hydrocortisone 2.5 % cream   Yes No   Sig: Apply 1 Application topically 2 times a day.   ipratropium-albuteroL (Duo-Neb) 0.5-2.5 mg/3 mL nebulizer solution   Yes No   Sig: Inhale 3 mL every 4 hours if needed for wheezing.   levothyroxine (Synthroid) 150 mcg tablet   Yes No   Sig: Take 1 tablet (150 mcg) by mouth once daily.   loratadine (Claritin) 10 mg tablet   Yes No   Sig: Take 1 tablet (10 mg) by mouth once daily.   metoprolol tartrate (Lopressor) 25 mg tablet   Yes No   Sig: Take 1 tablet (25 mg) by mouth once daily. Hold if HR<50 or SBP<110   montelukast (Singulair) 10 mg tablet   Yes No   Sig: Take 1 tablet (10 mg) by mouth once daily.   nystatin (Mycostatin) 100,000 unit/gram powder   No No   Sig: Apply 1 Application topically 2 times a day.   omeprazole (PriLOSEC) 40 mg DR capsule   Yes No   Sig: Take 1 capsule (40 mg) by mouth once daily in the morning.   oseltamivir (Tamiflu) 30 mg capsule   No No   Sig: Take 1 capsule (30 mg) by mouth 2 times a day for 2 days.   Patient taking differently: Take 1 capsule (30 mg) by mouth 2 times a day. Facility has med starting 3/4/24 with no end date   oxygen (O2) gas therapy   No No   Sig: Inhale 1 each once every 24 hours.   pantoprazole (ProtoNix) 40 mg EC tablet   No No   Sig: Take 1 tablet (40 mg) by mouth once daily in the morning. Take before meals. Do not crush, chew, or split. Do not start before 2024.   polyethylene glycol (Glycolax, Miralax) 17 gram packet   No No   Sig: Take 17 g by mouth once daily. Do not start before 2023.   sodium chloride (Ocean) 0.65 % nasal spray   No No   Sig: Administer 1 spray into each nostril if needed for congestion.   torsemide (Demadex) 10 mg tablet   No No   Si tab(s) orally  once a day Do not start before March 7, 2024.   traMADol (Ultram) 50 mg tablet   No No   Sig: Take 1 tablet (50 mg) by mouth as needed at bedtime for severe pain (7 - 10).   warfarin (Coumadin) 2.5 mg tablet   No No   Sig: Take as directed per After Visit Summary. Do not start before March 2, 2024.   Patient taking differently: Take 1 tablet (2.5 mg) by mouth once daily at bedtime. Every Sun/Tues/Thur/Sat   warfarin (Coumadin) 5 mg tablet   No No   Sig: Take as directed per After Visit Summary.   Patient taking differently: Take 1 tablet (5 mg) by mouth once daily at bedtime. Every Mon/Wed/Fri      Facility-Administered Medications: None      Scheduled medications   Medication Dose Route Frequency    acetaminophen  650 mg oral Once    aspirin  81 mg oral Daily    atorvastatin  40 mg oral Nightly    azithromycin  500 mg intravenous Once    [START ON 3/5/2024] azithromycin  500 mg oral q24h ROSA    budesonide  0.25 mg nebulization Daily    cholecalciferol  400 Units oral Daily    cyanocobalamin  1,000 mcg oral Daily    ergocalciferol  50 mcg oral Daily    fluticasone  1 spray Each Nostril Daily    formoterol  20 mcg nebulization BID    gabapentin  600 mg oral TID    hydrocortisone  1 Application Topical BID    ipratropium-albuteroL        levothyroxine  150 mcg oral Daily    loratadine  10 mg oral Daily    metoprolol tartrate  25 mg oral Daily    montelukast  10 mg oral Daily    nystatin  1 Application Topical BID    oxygen   inhalation q24h    [START ON 3/5/2024] pantoprazole  40 mg oral Daily before breakfast    piperacillin-tazobactam  3.375 g intravenous q8h    polyethylene glycol  17 g oral Daily    [Held by provider] torsemide  5 mg oral Daily    [START ON 3/5/2024] warfarin  2.5 mg oral Once per day on Sun Tue Thu Sat    warfarin  5 mg oral Once per day on Mon Wed Fri     PRN medications   Medication    acetaminophen    Or    acetaminophen    Or    acetaminophen    [Held by provider] ALPRAZolam    guaiFENesin     "ipratropium-albuteroL    ipratropium-albuteroL    oxygen    sodium chloride    traMADol     Continuous Medications   Medication Dose Last Rate    dilTIAZem  5-15 mg/hr 15 mg/hr (03/04/24 0906)    dilTIAZem  5-15 mg/hr         Review of Systems   Constitutional: Positive for malaise/fatigue.   HENT: Negative.     Eyes: Negative.    Cardiovascular:  Positive for dyspnea on exertion and palpitations. Negative for chest pain and leg swelling.   Respiratory:  Positive for shortness of breath. Negative for cough and sputum production.    Endocrine: Negative.    Skin: Negative.    Musculoskeletal: Negative.    Gastrointestinal: Negative.    Genitourinary: Negative.    Neurological:  Negative for dizziness and light-headedness.   Psychiatric/Behavioral: Negative.          Vitals:     /83   Pulse 84   Temp 36.5 °C (97.7 °F) (Temporal)   Resp 19   Ht 1.651 m (5' 5\")   Wt 118 kg (260 lb 2.3 oz)   LMP  (LMP Unknown)   SpO2 94%   BMI 43.29 kg/m²     Patient Vitals for the past 24 hrs:   BP Temp Temp src Pulse Resp SpO2 Height Weight   03/04/24 1300 133/83 -- -- 84 19 94 % -- --   03/04/24 1200 121/67 -- -- 85 (!) 22 94 % -- --   03/04/24 1145 125/68 -- -- 84 (!) 21 (!) 91 % -- --   03/04/24 1130 134/81 -- -- 86 (!) 23 (!) 91 % -- --   03/04/24 1115 118/59 -- -- (!) 133 (!) 25 94 % -- --   03/04/24 1100 139/72 -- -- 97 (!) 25 94 % -- --   03/04/24 1045 133/87 -- -- 100 (!) 24 94 % -- --   03/04/24 1030 150/72 -- -- 94 (!) 22 (!) 93 % -- --   03/04/24 1015 136/72 -- -- (!) 115 (!) 22 (!) 92 % -- --   03/04/24 1000 111/65 -- -- (!) 150 (!) 21 (!) 91 % -- --   03/04/24 0945 151/80 -- -- (!) 125 (!) 21 (!) 93 % -- --   03/04/24 0930 147/74 -- -- (!) 135 20 94 % -- --   03/04/24 0915 151/78 -- -- (!) 143 (!) 21 (!) 93 % -- --   03/04/24 0900 143/81 -- -- (!) 140 (!) 21 95 % -- --   03/04/24 0845 172/84 -- -- (!) 117 19 95 % -- --   03/04/24 0840 (!) 167/101 -- -- (!) 140 (!) 23 94 % -- --   03/04/24 0830 (!) 153/109 -- " "-- (!) 156 (!) 22 96 % -- --   03/04/24 0815 -- -- -- (!) 160 (!) 28 94 % -- --   03/04/24 0800 (!) 170/115 36.5 °C (97.7 °F) Temporal (!) 160 (!) 23 94 % 1.651 m (5' 5\") 118 kg (260 lb 2.3 oz)        Body mass index is 43.29 kg/m².     Vitals:    03/04/24 0800   Weight: 118 kg (260 lb 2.3 oz)        No intake or output data in the 24 hours ending 03/04/24 1322        Physical Exam  HENT:      Head: Normocephalic.   Cardiovascular:      Rate and Rhythm: Rhythm irregular.   Pulmonary:      Comments: Diminished lung sounds   Abdominal:      Comments: obese   Musculoskeletal:         General: Normal range of motion.      Cervical back: Normal range of motion.   Skin:     General: Skin is warm.   Neurological:      Mental Status: She is alert and oriented to person, place, and time.   Psychiatric:         Mood and Affect: Mood normal.          Labs:     No results found for: \"CKTOTAL\", \"CKMB\", \"CKMBINDEX\", \"TROPONINI\"      Lab Results   Component Value Date    TROPHS 17 (H) 03/04/2024    TROPHS 19 (H) 03/04/2024    TROPHS 292 (HH) 02/27/2024      Lab Results   Component Value Date    GLUCOSE 107 (H) 03/04/2024    CALCIUM 9.6 03/04/2024     03/04/2024    K 4.3 03/04/2024    CO2 34 (H) 03/04/2024     03/04/2024    BUN 11 03/04/2024    CREATININE 1.13 (H) 03/04/2024      Lab Results   Component Value Date    WBC 6.7 03/04/2024    HGB 12.0 03/04/2024    HCT 39.6 03/04/2024    MCV 94 03/04/2024     03/04/2024        LABS:  ABGs: No results found for: \"PH\"  PRO-BNP: No results found for: \"PROBNP\"   TSH:   Lab Results   Component Value Date    TSH 0.01 (L) 02/27/2024      Lipid Profile: No results found for: \"TRIG\", \"HDL\", \"LDLCALC\", \"CHOL\"   Hemoglobin A1C: No results found for: \"HGBA1C\"   Magnesium:  No results found for: \"MG\"    P     XR chest 1 view    Result Date: 3/4/2024  STUDY: Chest Radiograph;  3/4/2024 at 8:25 AM. INDICATION: Shortness of breath, recent pneumonia with worsening hypoxia. " COMPARISON: CTA chest 2/26/2024; XR chest 2/26/2024, 10/27/2023; CT chest 11/21/2023. ACCESSION NUMBER(S): [id number] ORDERING CLINICIAN: ASHLEY POND TECHNIQUE:  Frontal chest was obtained at 08:25 hours. FINDINGS: CARDIOMEDIASTINAL SILHOUETTE: Heart is enlarged with increased pulmonary vascularity.  LUNGS: Elevation of left hemidiaphragm.  Small pleural effusions with bibasilar areas of atelectasis and/or consolidation.  Upper lung zones are clear  ABDOMEN: No remarkable upper abdominal findings.  BONES: No acute osseous changes.    Increased pulmonary vascularity. Small pleural effusions with bibasilar areas of atelectasis and/or consolidation. Signed by Akshat Nj MD    ECG 12 lead    Result Date: 3/1/2024  Atrial flutter with 4:1 AV conduction Nonspecific ST abnormality Abnormal ECG Confirmed by Govind Olvera ([id number]) on 3/1/2024 6:52:23 PM    Lower extremity venous duplex bilateral    Result Date: 2/27/2024           [place] [address] Tel [phone] and Fax [phone]  [facility] EXTREMITY [facility]EN[facility]UP[facility] BILATERAL  Patient Name:      DARLING Colin Physician: [id number] Rodriguez MD Study Date:        2/27/2024           Ordering           [id number] MUNDO BRITO                                        Physician: MRN/PID:           [id number]            Technologist:      Allyn Escobar CARLO Accession#:        [id number]        Technologist 2: Date of Birth/Age: 1940 / 83      Encounter#:        [id number]                    years Gender:            F Admission Status:  Inpatient           Location           [facility]                                        Performed:  Diagnosis/ICD: Other specified soft tissue disorders-M79.89 Indication:    Limb swelling CPT Codes:     19317 Peripheral venous duplex scan for DVT complete  CONCLUSIONS: Right Lower Venous:  No evidence of acute deep vein thrombus visualized in the right lower extremity. Left Lower Venous: No evidence of acute deep vein thrombus visualized in the left lower extremity.  Comparison: Compared with study from 11/15/2023, resolution of prior bilateral deep vein thrombosis.  Imaging & Doppler Findings:  Right                 Compressible Thrombus        Flow Distal External Iliac     Yes        None   Spontaneous/Phasic CFV                       Yes        None   Spontaneous/Phasic PFV                       Yes        None FV Proximal               Yes        None   Spontaneous/Phasic FV Mid                    Yes        None FV Distal                 Yes        None Popliteal                 Yes        None   Spontaneous/Phasic Peroneal                  Yes        None PTV                       Yes        None  Left                  Compress Thrombus        Flow Distal External Iliac   Yes      None   Spontaneous/Phasic CFV                     Yes      None   Spontaneous/Phasic PFV                     Yes      None FV Proximal             Yes      None   Spontaneous/Phasic FV Mid                  Yes      None FV Distal               Yes      None Popliteal               Yes      None   Spontaneous/Phasic Peroneal                Yes      None PTV                     Yes      None  [id number] Sharee Rodriguez MD Electronically signed by [id number] Sharee Rodriguez MD on 2/27/2024 at 4:47:21 PM  ** Final **     ECG 12 lead    Result Date: 2/27/2024  Sinus tachycardia with irregular rate Left axis deviation Repolarization abnormality, prob rate related See ED provider note for full interpretation and clinical correlation Confirmed by Reena Persaud ([id number]) on 2/27/2024 4:19:53 PM    CT angio chest for pulmonary embolism    Result Date: 2/26/2024  Interpreted By:  Priyanka Gaitan, STUDY: CT ANGIO CHEST FOR PULMONARY EMBOLISM;  2/26/2024 10:47 pm   INDICATION: Signs/Symptoms:History of PE on warfarin with an INR of 1.7 presents  with new oxygen requirement, tachycardia, hypotension, and elevated troponin.  Evaluate for acute PE.  Patient is flu a positive..   COMPARISON: 11/21/2023   ACCESSION NUMBER(S): [id number]   ORDERING CLINICIAN: ALEJANDRA TOVAR   TECHNIQUE: Contiguous axial images of the chest were obtained after the intravenous administration of contrast using angiographic PE protocol. Coronal and sagittal reformatted images were reconstructed from the axial data. MIP images were created and reviewed.   FINDINGS: MEDIASTINUM AND LYMPH NODES: No enlarged intrathoracic or axillary lymph nodes. Slightly patulous appearance of the esophagus with a large hiatal hernia with the stomach predominantly contained in the chest, similar to prior imaging. No pneumomediastinum.   VESSELS: A mildly aneurysmal ascending a thoracic aorta measuring up to 4.2 by 4.3 cm. Minimal aortic atherosclerosis. Enlarged main pulmonary artery measuring up to 3.7 cm, suggestive of pulmonary hypertension. No pulmonary embolism identified to the segmental level.   HEART: Normal in size.  No significant coronary artery calcifications. No significant pericardial effusion.   LUNG, AIRWAYS, AND PLEURA: The central airways are patent. Bilateral atelectasis, most pronounced adjacent to the hiatal hernia in the left lower lobe. No pleural effusion or pneumothorax.   OSSEOUS STRUCTURES/CHEST WALL: The visualized axilla is unremarkable. Thyroid not well visualized mild degenerative changes present. No acute osseous abnormality.   UPPER ABDOMEN/OTHER: No acute abnormality.       No acute pulmonary embolism to the segmental level..   Left lower lobe airspace opacities similar to prior imaging suggestive of atelectasis with large adjacent hiatal hernia. Superimposed atypical infection or aspiration however not entirely excluded.   Mildly aneurysmal ascending thoracic aorta up to 4.3 cm.   3.7 cm enlargement of the main pulmonary artery suggestive of pulmonary hypertension.          MACRO: None.   Signed by: Priyanka Gaitan 2/26/2024 11:03 PM Dictation workstation:   [id number]    XR chest 1 view    Result Date: 2/26/2024  Interpreted By:  Curtis Rogers, STUDY: XR CHEST 1 VIEW;  2/26/2024 9:39 pm   INDICATION: Signs/Symptoms:SOB.   COMPARISON: Chest x-ray 11/27/ 1.   ACCESSION NUMBER(S): [id number]   ORDERING CLINICIAN: ALEJANDRA TOVAR   FINDINGS: Multiple overlying are present.   CARDIOMEDIASTINAL SILHOUETTE: Cardiac silhouette is enlarged but stable. Pulmonary vascular congestion noted.   LUNGS: Retrocardiac opacity with blunting of the left costophrenic angle may relate to combination of effusion and atelectasis. Superimposed infection not excluded. No pneumothorax.   ABDOMEN: Large hiatal hernia.   BONES: Dextroconvex scoliosis. Advanced bilateral shoulder osteoarthrosis. Partial visualization of fixation plate and screws in the right mid humerus.       Cardiomegaly with mild pulmonary vascular congestion.   Large hiatal hernia. Retrocardiac opacity may relate to combination of small effusion and atelectasis. Superimposed infection not excluded. Correlate clinically.   MACRO: None   Signed by: Curtis Rogers 2/26/2024 9:49 PM Dictation workstation:   [id number]    Transthoracic echo (TTE) complete    Result Date: 2/8/2024               [facility], Echo Lab [address], [place] [address]             Tel [phone] and Fax [phone] TRANSTHORACIC ECHOCARDIOGRAM REPORT  Patient Name:      DARLING WYMAN   Reading Physician:    [id number] Amrit Mac MD Study Date:        2/8/2024             Ordering Provider:    [id number] TRANG WEINSTEIN MRN/PID:           [id number]             Fellow: Accession#:        [id number]         Nurse: Date of Birth/Age: 1940 / 83 years Sonographer:          Sara Victor                                                               [facility]  Gender:            F                    Additional Staff: Height:            165.10 cm            Admit Date: Weight:            114.31 kg            Admission Status:     Outpatient BSA:               2.18 m2              Encounter#:           [id number]                                         Department Location:  [place]                                                               Echo Lab Study Type:    TRANSTHORACIC ECHO (TTE) COMPLETE Diagnosis/ICD: Personal history of pulmonary embolism-Z86.711 Indication:    Hx of PE; SOB CPT Code:      Echo Complete w Full Doppler-[id number] Patient History: Pertinent History: CVA; Acute saddle PE; TKR; Chronic HFpEF; HTN; HLD; CKD III;                    Hypothyroidism; BLAYNE; Asthma;                    Chronic lymphedema. Study Detail: The following Echo studies were performed: 2D, M-Mode, Doppler and               color flow. Technically challenging study due to body habitus,               patient lying in supine position and skin cracked/bleeding in               apical window.  PHYSICIAN INTERPRETATION: Left Ventricle: The left ventricular systolic function is normal, with an estimated ejection fraction of 55-60%. There are no regional wall motion abnormalities. The left ventricular cavity size is normal. Left ventricular diastolic filling was indeterminate. Left Atrium: The left atrium was not well visualized. Right Ventricle: The right ventricle is slightly enlarged. There is low normal right ventricular systolic function. Right Atrium: The right atrium was not well visualized. Aortic Valve: The aortic valve was not well visualized. There is indeterminate aortic valve regurgitation. Mitral Valve: The mitral valve was not well visualized. Mitral valve regurgitation was not assessed. Tricuspid Valve: The tricuspid valve was not well visualized. Tricuspid regurgitation was not assessed. Pulmonic Valve: The pulmonic valve is not well visualized. Pulmonic valve  regurgitation was not assessed. Pericardium: There is no pericardial effusion noted. There is a pericardial fat pad present. Aorta: The aortic root is abnormal. There is moderate dilatation of the ascending aorta. There is mild dilatation of the aortic root.  CONCLUSIONS:  1. Left ventricular systolic function is normal with a 55-60% estimated ejection fraction.  2. Poorly visualized anatomical structures due to suboptimal image quality.  3. There is low normal right ventricular systolic function.  4. Consider cardiac MRI for further evaulation of cardiac function if clincally indicated.  5. There is moderate dilatation of the ascending aorta. QUANTITATIVE DATA SUMMARY: 2D MEASUREMENTS:                    Normal Ranges: Ao Root d: 4.00 cm (2.0-3.7cm) AORTA MEASUREMENTS:                    Normal Ranges: Asc Ao, d: 4.30 cm (2.1-3.4cm) AORTIC VALVE:                        Normal Ranges: LVOT Diameter: 2.05 cm (1.8-2.4cm)  RIGHT VENTRICLE: TAPSE: 23.0 mm AORTA: Asc Ao Diam 4.35 cm  [id number] Amrit Mac MD Electronically signed on 2/8/2024 at 2:31:56 PM  ** Final **           Assessment/Plan:   1.Atrial flutter,paroxysmal ;A  83 y.o. female obese with hx of recent (a week ago ) influenza A /CAP/ tachyarrhythmia .PE on coumadin in 11/2023 and remote EP  in the setting of post knee replacement .CVA in 11/2023.CKD -3 HTN.asthma and hypothyroidism who presents with sx of palpitation and increase SOB for which EKG shown;SVT/atrial flutter and adenosine was given /follow by cardizem drip  Echocardiogram:  in 11/2023 and 2/8/24  ;EF 55-60%  ,however severe enlarged RV in 11/23     EKG: today 3/4/24; ? SVT  atrial flutter on second EKG  EKG on 3/27/26 atrial flutter   EKG in 11/23;sinus tachycardiac     Lab;  Mg 2.23 K+ 4.3 troponin 19-17 INR 2.4     -consult Dr Rodriguez  -roderick echo   -continue treat underline infection   -continue Cardizem drip with goal of HR< 100 at rest   -continue coumadin, goal of INR 2-3.  INR 2.4 now   -change metoprolol to Toprol 25 every day giving CHF and rate control,     2,Chronic  diastolic heart failure : EF 55-60%. .clinically. No volume overload    she was discharged on torsemide 10 mg every day yesterday and informed to be started on 3/7/24     Suspect her SOB primary to to pneumonia and atrial flutter with RVR    -will review limited echo and diuretic as needed   -discontinue IV hydration    4,HTN;stable. Bp at 110/88 mmhg

## 2024-03-04 NOTE — H&P
"History Of Present Illness  Lillian Mendoza is a 83 y.o. female from [facility] with past medical history of hypertension, hyperlipidemia, CVA (November 2023), chronic kidney disease stage III (baseline creatinine 1.4), pulmonary embolus (on Coumadin), asthma, hypothyroid presenting with shortness of breath and palpitations.  Patient tells me she was short of breath and her heart was racing which is what brought her into the emergency room.  EKG revealed SVT and patient received adenosine with following EKG revealing a flutter.  Patient was started on Cardizem drip, and current heart rates 80s to 90s.  Patient denies any dizziness, vision changes, sweats, chills, shortness of breath, palpitations, nausea/vomiting/diarrhea.  Patient complains of sore throat and headache.  Tells me earlier today she did have some nausea which is since resolved.  She also had shortness of breath and palpitations which also have resolved.  Of note patient was just discharged from [facility] after being treated for for influenza and pneumonia.  Per patient's son she had some hallucinations while at [facility], which have resolved since being admitted to the nursing home.    Labs done today significant for BNP of 506, INR 2.4, troponin 19/17.  Chest x-ray revealed \"Increased pulmonary vascularity. Small pleural effusions with bibasilar areas of atelectasis and/or consolidation\" and patient was started on vancomycin, Zosyn and azithromycin in emergency department.     Past Medical History  Past Medical History:   Diagnosis Date    Arthritis     Asthma     Chronic kidney disease     CVA (cerebral vascular accident) (CMS/HCC)     Diverticulitis     Hiatal hernia     Hyperlipidemia     Hypertension     Hypothyroid     Personal history of diseases of the blood and blood-forming organs and certain disorders involving the immune mechanism 11/23/2020    History of anemia    Personal history of other diseases of the circulatory " system 11/23/2020    History of hypertension    Personal history of other diseases of the digestive system 11/23/2020    History of hiatal hernia    Personal history of other diseases of the musculoskeletal system and connective tissue     History of arthritis    Personal history of other diseases of the nervous system and sense organs     History of cataract    Personal history of other diseases of the respiratory system 11/23/2020    History of asthma    Personal history of other diseases of the respiratory system 11/23/2020    History of bronchitis    Personal history of other diseases of the respiratory system 11/23/2020    History of lung disease    Personal history of other diseases of urinary system 11/23/2020    History of kidney problems    Personal history of other endocrine, nutritional and metabolic disease 11/23/2020    History of thyroid disorder    Personal history of other specified conditions 11/23/2020    H/O shortness of breath    Pulmonary embolus (CMS/HCC)        Surgical History  Past Surgical History:   Procedure Laterality Date    CARDIAC CATHETERIZATION      CATARACT EXTRACTION      HYSTERECTOMY      INVASIVE VASCULAR PROCEDURE N/A 11/15/2023    Procedure: Pulmonary Angiography - Bilateral;  Surgeon: Maldonado Alvarado MD;  Location: [facility] Cardiac Cath Lab;  Service: Cardiovascular;  Laterality: N/A;    JOINT REPLACEMENT      OTHER SURGICAL HISTORY  11/23/2020    Knee replacement    OTHER SURGICAL HISTORY  11/23/2020    Lumpectomy    OTHER SURGICAL HISTORY  11/23/2020    Cataract surgery    OTHER SURGICAL HISTORY  11/23/2020    Cholecystectomy    OTHER SURGICAL HISTORY  11/23/2020    Hernia repair    OTHER SURGICAL HISTORY  11/23/2020    Hysterectomy        Social History  She reports that she has never smoked. She has never been exposed to tobacco smoke. She does not have any smokeless tobacco history on file. She reports that she does not currently use alcohol. She reports that she does not currently  use drugs.    Family History  Family History   Problem Relation Name Age of Onset    Cancer Mother      Diabetes Paternal Grandmother      Diabetes Paternal Grandfather          Allergies  Morphine    Review of Systems   Constitutional:  Positive for appetite change. Negative for chills and fever.        Poor p.o. intake since hospitalization at [facility].   HENT:  Positive for sore throat. Negative for congestion and drooling.    Respiratory:  Negative for cough and shortness of breath.         Shortness of breath has resolved.   Cardiovascular:  Negative for chest pain and palpitations.        Palpitations have resolved since being admitted through the emergency department.   Gastrointestinal:  Negative for abdominal pain and diarrhea.        Transient nausea this morning which has resolved   Genitourinary:  Negative for dysuria, frequency and urgency.   Musculoskeletal:  Positive for gait problem. Negative for neck stiffness.   Skin:  Negative for color change, pallor and rash.   Neurological:  Positive for weakness and headaches. Negative for facial asymmetry.   Psychiatric/Behavioral:  Negative for behavioral problems, confusion and hallucinations.         Physical Exam  Constitutional:       Comments: Currently on BiPAP mask   HENT:      Head: Normocephalic and atraumatic.      Nose: Nose normal.      Mouth/Throat:      Mouth: Mucous membranes are dry.   Eyes:      Extraocular Movements: Extraocular movements intact.      Pupils: Pupils are equal, round, and reactive to light.   Cardiovascular:      Rate and Rhythm: Normal rate. Rhythm irregular.   Pulmonary:      Effort: Pulmonary effort is normal.      Breath sounds: Wheezing present.   Abdominal:      General: Bowel sounds are normal.      Palpations: Abdomen is soft.   Musculoskeletal:         General: Swelling present.      Cervical back: Neck supple.      Comments: Patient moves all extremities, patient with lower extremity edema   Skin:     General: Skin is  "warm and dry.      Capillary Refill: Capillary refill takes less than 2 seconds.   Neurological:      Mental Status: She is alert and oriented to person, place, and time.   Psychiatric:         Mood and Affect: Mood normal.         Behavior: Behavior normal.          Last Recorded Vitals  Blood pressure 125/68, pulse 84, temperature 36.5 °C (97.7 °F), temperature source Temporal, resp. rate (!) 21, height 1.651 m (5' 5\"), weight 118 kg (260 lb 2.3 oz), SpO2 (!) 91 %.    Relevant Results  Scheduled medications  acetaminophen, 650 mg, oral, Once  azithromycin, 500 mg, intravenous, Once  [START ON 3/5/2024] azithromycin, 500 mg, oral, q24h ROSA  ipratropium-albuteroL, , ,   piperacillin-tazobactam, 3.375 g, intravenous, q8h  polyethylene glycol, 17 g, oral, Daily      Continuous medications  dilTIAZem, 5-15 mg/hr, Last Rate: 15 mg/hr (03/04/24 0906)  dilTIAZem, 5-15 mg/hr      PRN medications  PRN medications: acetaminophen **OR** acetaminophen **OR** acetaminophen, ipratropium-albuteroL, oxygen   Results for orders placed or performed during the hospital encounter of 03/04/24 (from the past 24 hour(s))   BLOOD GAS VENOUS FULL PANEL   Result Value Ref Range    POCT pH, Venous 7.43 7.33 - 7.43 pH    POCT pCO2, Venous 57 (H) 41 - 51 mm Hg    POCT pO2, Venous 39 35 - 45 mm Hg    POCT SO2, Venous 70 45 - 75 %    POCT Oxy Hemoglobin, Venous 68.5 45.0 - 75.0 %    POCT Hematocrit Calculated, Venous 37.0 36.0 - 46.0 %    POCT Sodium, Venous 141 136 - 145 mmol/L    POCT Potassium, Venous 4.5 3.5 - 5.3 mmol/L    POCT Chloride, Venous 105 98 - 107 mmol/L    POCT Ionized Calicum, Venous 1.26 1.10 - 1.33 mmol/L    POCT Glucose, Venous 107 (H) 74 - 99 mg/dL    POCT Lactate, Venous 1.5 0.4 - 2.0 mmol/L    POCT Base Excess, Venous 11.4 (H) -2.0 - 3.0 mmol/L    POCT HCO3 Calculated, Venous 37.8 (H) 22.0 - 26.0 mmol/L    POCT Hemoglobin, Venous 12.3 12.0 - 16.0 g/dL    POCT Anion Gap, Venous 3.0 (L) 10.0 - 25.0 mmol/L    Patient " Temperature      FiO2 40 %   CBC and Auto Differential   Result Value Ref Range    WBC 6.7 4.4 - 11.3 x10*3/uL    nRBC 0.0 0.0 - 0.0 /100 WBCs    RBC 4.20 4.00 - 5.20 x10*6/uL    Hemoglobin 12.0 12.0 - 16.0 g/dL    Hematocrit 39.6 36.0 - 46.0 %    MCV 94 80 - 100 fL    MCH 28.6 26.0 - 34.0 pg    MCHC 30.3 (L) 32.0 - 36.0 g/dL    RDW 15.8 (H) 11.5 - 14.5 %    Platelets 323 150 - 450 x10*3/uL    Neutrophils % 45.7 40.0 - 80.0 %    Immature Granulocytes %, Automated 0.4 0.0 - 0.9 %    Lymphocytes % 38.1 13.0 - 44.0 %    Monocytes % 13.6 2.0 - 10.0 %    Eosinophils % 1.8 0.0 - 6.0 %    Basophils % 0.4 0.0 - 2.0 %    Neutrophils Absolute 3.05 1.60 - 5.50 x10*3/uL    Immature Granulocytes Absolute, Automated 0.03 0.00 - 0.50 x10*3/uL    Lymphocytes Absolute 2.55 0.80 - 3.00 x10*3/uL    Monocytes Absolute 0.91 (H) 0.05 - 0.80 x10*3/uL    Eosinophils Absolute 0.12 0.00 - 0.40 x10*3/uL    Basophils Absolute 0.03 0.00 - 0.10 x10*3/uL   Lactate   Result Value Ref Range    Lactate 1.5 0.4 - 2.0 mmol/L   B-type Natriuretic Peptide   Result Value Ref Range     (H) 0 - 99 pg/mL   Protime-INR   Result Value Ref Range    Protime 27.0 (H) 9.8 - 12.8 seconds    INR 2.4 (H) 0.9 - 1.1   Troponin I, High Sensitivity, Initial   Result Value Ref Range    Troponin I, High Sensitivity 19 (H) 0 - 13 ng/L   Comprehensive Metabolic Panel   Result Value Ref Range    Glucose 107 (H) 74 - 99 mg/dL    Sodium 143 136 - 145 mmol/L    Potassium 4.3 3.5 - 5.3 mmol/L    Chloride 104 98 - 107 mmol/L    Bicarbonate 34 (H) 21 - 32 mmol/L    Anion Gap 9 (L) 10 - 20 mmol/L    Urea Nitrogen 11 6 - 23 mg/dL    Creatinine 1.13 (H) 0.50 - 1.05 mg/dL    eGFR 49 (L) >60 mL/min/1.73m*2    Calcium 9.6 8.6 - 10.3 mg/dL    Albumin 3.4 3.4 - 5.0 g/dL    Alkaline Phosphatase 77 33 - 136 U/L    Total Protein 6.9 6.4 - 8.2 g/dL    AST 14 9 - 39 U/L    Bilirubin, Total 0.6 0.0 - 1.2 mg/dL    ALT 10 7 - 45 U/L   Sars-CoV-2 and Influenza A/B PCR   Result Value Ref  Range    Flu A Result Not Detected Not Detected    Flu B Result Not Detected Not Detected    Coronavirus 2019, PCR Not Detected Not Detected   Urinalysis with Reflex Culture and Microscopic   Result Value Ref Range    Color, Urine Yellow Straw, Yellow    Appearance, Urine Clear Clear    Specific Gravity, Urine 1.008 1.005 - 1.035    pH, Urine 5.0 5.0, 5.5, 6.0, 6.5, 7.0, 7.5, 8.0    Protein, Urine NEGATIVE NEGATIVE mg/dL    Glucose, Urine NEGATIVE NEGATIVE mg/dL    Blood, Urine SMALL (1+) (A) NEGATIVE    Ketones, Urine NEGATIVE NEGATIVE mg/dL    Bilirubin, Urine NEGATIVE NEGATIVE    Urobilinogen, Urine <2.0 <2.0 mg/dL    Nitrite, Urine NEGATIVE NEGATIVE    Leukocyte Esterase, Urine TRACE (A) NEGATIVE   Microscopic Only, Urine   Result Value Ref Range    WBC, Urine 6-10 (A) 1-5, NONE /HPF    RBC, Urine 1-2 NONE, 1-2, 3-5 /HPF    Squamous Epithelial Cells, Urine 1-9 (SPARSE) Reference range not established. /HPF    Budding Yeast, Urine PRESENT (A) NONE /HPF   Troponin, High Sensitivity, 1 Hour   Result Value Ref Range    Troponin I, High Sensitivity 17 (H) 0 - 13 ng/L    XR chest 1 view    Result Date: 3/4/2024  STUDY: Chest Radiograph;  3/4/2024 at 8:25 AM. INDICATION: Shortness of breath, recent pneumonia with worsening hypoxia. COMPARISON: CTA chest 2/26/2024; XR chest 2/26/2024, 10/27/2023; CT chest 11/21/2023. ACCESSION NUMBER(S): [id number] ORDERING CLINICIAN: ASHLEY POND TECHNIQUE:  Frontal chest was obtained at 08:25 hours. FINDINGS: CARDIOMEDIASTINAL SILHOUETTE: Heart is enlarged with increased pulmonary vascularity.  LUNGS: Elevation of left hemidiaphragm.  Small pleural effusions with bibasilar areas of atelectasis and/or consolidation.  Upper lung zones are clear  ABDOMEN: No remarkable upper abdominal findings.  BONES: No acute osseous changes.    Increased pulmonary vascularity. Small pleural effusions with bibasilar areas of atelectasis and/or consolidation. Signed by Akshat Nj MD            Assessment/Plan   Principal Problem:    Shortness of breath  Active Problems:    Personal history of diseases of the blood and blood-forming organs and certain disorders involving the immune mechanism    Personal history of other diseases of the circulatory system    Personal history of other diseases of the digestive system    Arthritis    Asthma    Chronic kidney disease    CVA (cerebral vascular accident) (CMS/HCC)    Diverticulitis    Hyperlipidemia    Hypothyroid    Shortness of breath  May be related to pneumonia or the atrial fibrillation  Patient currently denies shortness of breath  Continue IV antibiotics, consult Infectious Disease  Wean off BiPAP to nasal cannula oxygen  Once off BiPAP may feed patient  DuoNebs as needed    SVT/a flutter  Patient started on Cardizem drip in emergency department, continue Cardizem drip  Cardiology is consulted  Check TSH  Her cardiologist is Dr. Castro.    History of PE  Continue Coumadin  Daily INR    Chronic kidney disease  BNP is 506  Baseline creatinine is 1.4 per chart review  Renal dosing of medications    DVT prophylaxis  Continue patient's home Coumadin    CODE STATUS: Patient is full code       I spent 60 minutes in the professional and overall care of this patient.      Leanne Stein, APRN-CNP

## 2024-03-04 NOTE — CONSULTS
Infectious Disease Consult    PATIENT NAME: Lillian Mendoza    MRN: [id number]  SERVICE DATE:  3/4/2024   SERVICE TIME:  1:44 PM    SIGNATURE: Guerda Pitts MD    PRIMARY CARE PHYSICIAN: Sage Alba DO  REASON FOR CONSULT: Pneumonia  REQUESTING PHYSICIAN: Dr.Bhaiji MARCIAL  83-year-old female with past medical history as listed below who I was asked to evaluate for pneumonia.  The patient had recent admission at [facility] for acute hypoxic respiratory failure, influenza A, completed a course of Tamiflu.  CT chest did show left lower lobe atelectasis.  The patient completed course of antibiotics with ceftriaxone and discharged to the rehab.  Now brought to the emergency room with shortness of breath and hypoxia.  Chest x-ray showed pulmonary congestion.  No leukocytosis.  No fever.  COVID and influenza PCR both negative.  Currently on 2 L of oxygen.  Started on vancomycin, azithromycin, and Zosyn.  BNP came back elevated.  ID team was consulted for antibiotics management.    PAST MEDICAL HISTORY:   Past Medical History:   Diagnosis Date    Arthritis     Asthma     Chronic kidney disease     CVA (cerebral vascular accident) (CMS/HCC)     Diverticulitis     Hiatal hernia     Hyperlipidemia     Hypertension     Hypothyroid     Personal history of diseases of the blood and blood-forming organs and certain disorders involving the immune mechanism 11/23/2020    History of anemia    Personal history of other diseases of the circulatory system 11/23/2020    History of hypertension    Personal history of other diseases of the digestive system 11/23/2020    History of hiatal hernia    Personal history of other diseases of the musculoskeletal system and connective tissue     History of arthritis    Personal history of other diseases of the nervous system and sense organs     History of cataract    Personal history of other diseases of the respiratory system 11/23/2020    History of asthma    Personal history of  other diseases of the respiratory system 11/23/2020    History of bronchitis    Personal history of other diseases of the respiratory system 11/23/2020    History of lung disease    Personal history of other diseases of urinary system 11/23/2020    History of kidney problems    Personal history of other endocrine, nutritional and metabolic disease 11/23/2020    History of thyroid disorder    Personal history of other specified conditions 11/23/2020    H/O shortness of breath    Pulmonary embolus (CMS/HCC)      PAST SURGICAL HISTORY:   Past Surgical History:   Procedure Laterality Date    CARDIAC CATHETERIZATION      CATARACT EXTRACTION      HYSTERECTOMY      INVASIVE VASCULAR PROCEDURE N/A 11/15/2023    Procedure: Pulmonary Angiography - Bilateral;  Surgeon: Maldonado Alvarado MD;  Location: [facility] Card[facility];  Service: Cardiovascular;  Laterality: N/A;    JOINT REPLACEMENT      OTHER SURGICAL HISTORY  11/23/2020    Knee replacement    OTHER SURGICAL HISTORY  11/23/2020    Lumpectomy    OTHER SURGICAL HISTORY  11/23/2020    Cataract surgery    OTHER SURGICAL HISTORY  11/23/2020    Cholecystectomy    OTHER SURGICAL HISTORY  11/23/2020    Hernia repair    OTHER SURGICAL HISTORY  11/23/2020    Hysterectomy     FAMILY HISTORY:   Family History   Problem Relation Name Age of Onset    Cancer Mother      Diabetes Paternal Grandmother      Diabetes Paternal Grandfather       SOCIAL HISTORY:   Social History     Tobacco Use    Smoking status: Never     Passive exposure: Never   Substance Use Topics    Alcohol use: Not Currently    Drug use: Not Currently     CURRENT ALLERGIES:   Allergies as of 03/04/2024 - Reviewed 03/04/2024   Allergen Reaction Noted    Morphine Rash and GI Upset 10/23/2008     MEDICATIONS:    Current Facility-Administered Medications:     acetaminophen (Tylenol) tablet 650 mg, 650 mg, oral, q4h PRN **OR** acetaminophen (Tylenol) oral liquid 650 mg, 650 mg, nasogastric tube, q4h PRN **OR** acetaminophen (Tylenol)  suppository 650 mg, 650 mg, rectal, q4h PRN, ZOEY Gilmore-CNP    acetaminophen (Tylenol) tablet 650 mg, 650 mg, oral, Once, Polo Navarro DO    [Held by provider] ALPRAZolam (Xanax) tablet 0.5 mg, 0.5 mg, oral, TID PRN, ZOEY Gilmore-CNP    aspirin chewable tablet 81 mg, 81 mg, oral, Daily, ZOEY Gilmore-CNP    atorvastatin (Lipitor) tablet 40 mg, 40 mg, oral, Nightly, ZOEY Gilmore-CNP    azithromycin (Zithromax) in dextrose 5 % in water (D5W) 250 mL  mg, 500 mg, intravenous, Once, Polo Navarro DO, Last Rate: 250 mL/hr at 03/04/24 1316, 500 mg at 03/04/24 1316    [START ON 3/5/2024] azithromycin (Zithromax) tablet 500 mg, 500 mg, oral, q24h ROSA, ZOEY Gilmore-CNP    budesonide (Pulmicort) 0.25 mg/2 mL nebulizer solution 0.25 mg, 0.25 mg, nebulization, Daily, ZOEY Gilmore-JANIE    cholecalciferol (Vitamin D-3) tablet 10 mcg, 400 Units, oral, Daily, ZOEY Gilmore-CNP    cyanocobalamin (Vitamin B-12) tablet 1,000 mcg, 1,000 mcg, oral, Daily, ZOEY Gilmore-CNP    dilTIAZem (Cardizem) 125 mg in dextrose 5% 125 mL (1 mg/mL) infusion (premix), 5-15 mg/hr, intravenous, Continuous, Gonsalo Bran DO, Last Rate: 15 mL/hr at 03/04/24 0906, 15 mg/hr at 03/04/24 0906    dilTIAZem (Cardizem) 125 mg in dextrose 5% 125 mL (1 mg/mL) infusion (premix), 5-15 mg/hr, intravenous, Continuous, ZOEY Gilmore-CNP    ergocalciferol (Vitamin D-2) capsule 50 mcg, 50 mcg, oral, Daily, REE Gilmore    fluticasone (Flonase) nasal spray 1 spray, 1 spray, Each Nostril, Daily, REE Gilmore    formoterol (Perforomist) 20 mcg/2 mL nebulizer solution 20 mcg, 20 mcg, nebulization, BID, REE Gilmore    gabapentin (Neurontin) capsule 600 mg, 600 mg, oral, TID, REE Gilmore    guaiFENesin (Mucinex) 12 hr tablet 600 mg, 600 mg, oral, BID PRN, REE Gilmore     hydrocortisone 2.5 % cream 1 Application, 1 Application, Topical, BID, ZOEY Gilmore-CNP    ipratropium-albuteroL (Duo-Neb) 0.5-2.5 mg/3 mL nebulizer solution 3 mL, 3 mL, nebulization, q6h PRN, ZOEY Gilmore-CNP    ipratropium-albuteroL (Duo-Neb) nebulizer solution  - Omnicell Leia Del Toro, , , , Gonsalo Bran DO    levothyroxine (Synthroid, Levoxyl) tablet 150 mcg, 150 mcg, oral, Daily, ZOEY Gilmore-CNP    loratadine (Claritin) tablet 10 mg, 10 mg, oral, Daily, ZOEY Gilmore-JANIE    metoprolol tartrate (Lopressor) tablet 25 mg, 25 mg, oral, Daily, ZOEY Gilmore-CNP    montelukast (Singulair) tablet 10 mg, 10 mg, oral, Daily, ZOEY Gilmore-CNP    nystatin (Mycostatin) 100,000 unit/gram powder 1 Application, 1 Application, Topical, BID, ZOEY Gilmore-CNP    oxygen (O2) therapy, , inhalation, Continuous PRN - O2/gases, Gonsalo Bran DO, Rate Change at 03/04/24 0830    oxygen (O2) therapy, , inhalation, q24h, REE Gilmore    [START ON 3/5/2024] pantoprazole (ProtoNix) EC tablet 40 mg, 40 mg, oral, Daily before breakfast, REE Gilmore    piperacillin-tazobactam-dextrose (Zosyn) IV 3.375 g, 3.375 g, intravenous, q8h, REE Gilmore    polyethylene glycol (Glycolax, Miralax) packet 17 g, 17 g, oral, Daily, ZOEY Gilmore-CNP    sodium chloride (Ocean) 0.65 % nasal spray 1 spray, 1 spray, Each Nostril, PRN, REE Gilmore    [Held by provider] torsemide (Demadex) tablet 5 mg, 5 mg, oral, Daily, REE Gilmore    traMADol (Ultram) tablet 50 mg, 50 mg, oral, Nightly PRN, REE Gilmore    [START ON 3/5/2024] warfarin (Coumadin) tablet 2.5 mg, 2.5 mg, oral, Once per day on Sun Tue Thu Sat, REE Gilmore    warfarin (Coumadin) tablet 5 mg, 5 mg, oral, Once per day on Mon Wed Fri, REE Gilmore    Current Outpatient  Medications:     acetaminophen (Tylenol) 325 mg tablet, Take 3 tablets (975 mg) by mouth every 8 hours., Disp: , Rfl:     albuterol 90 mcg/actuation inhaler, Inhale 2 puffs every 4 hours if needed for wheezing or shortness of breath., Disp: , Rfl:     ALPRAZolam (Xanax) 0.5 mg tablet, Take 1 tablet (0.5 mg) by mouth 3 times a day as needed for anxiety., Disp: 7 tablet, Rfl: 0    amoxicillin-pot clavulanate (Augmentin) 875-125 mg tablet, Take 1 tablet (875 mg) by mouth 2 times a day for 7 days. (Patient taking differently: Take 1 tablet (875 mg) by mouth 2 times a day. Facility list has med from 3-3-24 to 3-10-24), Disp: 14 tablet, Rfl: 0    aspirin 81 mg chewable tablet, Chew 1 tablet (81 mg) once daily. Do not start before March 2, 2024., Disp: , Rfl:     atorvastatin (Lipitor) 40 mg tablet, Take 1 tablet (40 mg) by mouth once daily at bedtime., Disp: , Rfl:     budesonide (Pulmicort) 0.25 mg/2 mL nebulizer solution, Take 2 mL (0.25 mg) by nebulization once daily. Rinse mouth with water after use to reduce aftertaste and incidence of candidiasis. Do not swallow. Do not start before March 2, 2024., Disp: , Rfl:     cholecalciferol (Vitamin D-3) 10 MCG (400 UNIT) tablet, Take 1 tablet (10 mcg) by mouth once daily., Disp: , Rfl:     cyanocobalamin (Vitamin B-12) 1,000 mcg tablet, Take 1 tablet (1,000 mcg) by mouth once daily., Disp: , Rfl:     ergocalciferol, vitamin D2, 50 mcg (2,000 unit) tablet, Take 2,000 Units by mouth once daily., Disp: , Rfl:     fluticasone (Flonase) 50 mcg/actuation nasal spray, Administer 1 spray into each nostril once daily., Disp: , Rfl:     formoterol (Perforomist) 20 mcg/2 mL nebulizer solution, Take 2 mL (20 mcg) by nebulization 2 times a day., Disp: , Rfl:     gabapentin (Neurontin) 600 mg tablet, Take 1 tablet (600 mg) by mouth 3 times a day., Disp: , Rfl:     guaiFENesin (Mucinex) 600 mg 12 hr tablet, Take 1 tablet (600 mg) by mouth 2 times a day as needed for cough. Do not crush,  chew, or split., Disp: , Rfl:     hydrocortisone 2.5 % cream, Apply 1 Application topically 2 times a day., Disp: , Rfl:     ipratropium-albuteroL (Duo-Neb) 0.5-2.5 mg/3 mL nebulizer solution, Inhale 3 mL every 4 hours if needed for wheezing., Disp: , Rfl:     levothyroxine (Synthroid) 150 mcg tablet, Take 1 tablet (150 mcg) by mouth once daily., Disp: , Rfl:     loratadine (Claritin) 10 mg tablet, Take 1 tablet (10 mg) by mouth once daily., Disp: , Rfl:     metoprolol tartrate (Lopressor) 25 mg tablet, Take 1 tablet (25 mg) by mouth once daily. Hold if HR<50 or SBP<110, Disp: , Rfl:     montelukast (Singulair) 10 mg tablet, Take 1 tablet (10 mg) by mouth once daily., Disp: , Rfl:     nystatin (Mycostatin) 100,000 unit/gram powder, Apply 1 Application topically 2 times a day., Disp: , Rfl:     omeprazole (PriLOSEC) 40 mg DR capsule, Take 1 capsule (40 mg) by mouth once daily in the morning., Disp: , Rfl:     oseltamivir (Tamiflu) 30 mg capsule, Take 1 capsule (30 mg) by mouth 2 times a day for 2 days. (Patient taking differently: Take 1 capsule (30 mg) by mouth 2 times a day. Facility has med starting 3/4/24 with no end date), Disp: 4 capsule, Rfl: 0    oxygen (O2) gas therapy, Inhale 1 each once every 24 hours., Disp: , Rfl:     pantoprazole (ProtoNix) 40 mg EC tablet, Take 1 tablet (40 mg) by mouth once daily in the morning. Take before meals. Do not crush, chew, or split. Do not start before March 2, 2024., Disp: , Rfl:     polyethylene glycol (Glycolax, Miralax) 17 gram packet, Take 17 g by mouth once daily. Do not start before November 29, 2023., Disp: , Rfl:     sodium chloride (Ocean) 0.65 % nasal spray, Administer 1 spray into each nostril if needed for congestion., Disp: 30 mL, Rfl: 12    [START ON 3/7/2024] torsemide (Demadex) 10 mg tablet, 5 tab(s) orally once a day Do not start before March 7, 2024., Disp: , Rfl:     traMADol (Ultram) 50 mg tablet, Take 1 tablet (50 mg) by mouth as needed at bedtime for  "severe pain (7 - 10)., Disp: 10 tablet, Rfl: 0    warfarin (Coumadin) 2.5 mg tablet, Take as directed per After Visit Summary. Do not start before March 2, 2024. (Patient taking differently: Take 1 tablet (2.5 mg) by mouth once daily at bedtime. Every Sun/Tues/Thur/Sat), Disp: , Rfl:     warfarin (Coumadin) 5 mg tablet, Take as directed per After Visit Summary. (Patient taking differently: Take 1 tablet (5 mg) by mouth once daily at bedtime. Every Mon/Wed/Fri), Disp: , Rfl:        COMPLETE REVIEW OF SYSTEMS:    Unable to obtain due to patient's illness      PHYSICAL EXAM:  Patient Vitals for the past 24 hrs:   BP Temp Temp src Pulse Resp SpO2 Height Weight   03/04/24 1300 133/83 -- -- 84 19 94 % -- --   03/04/24 1200 121/67 -- -- 85 (!) 22 94 % -- --   03/04/24 1145 125/68 -- -- 84 (!) 21 (!) 91 % -- --   03/04/24 1130 134/81 -- -- 86 (!) 23 (!) 91 % -- --   03/04/24 1115 118/59 -- -- (!) 133 (!) 25 94 % -- --   03/04/24 1100 139/72 -- -- 97 (!) 25 94 % -- --   03/04/24 1045 133/87 -- -- 100 (!) 24 94 % -- --   03/04/24 1030 150/72 -- -- 94 (!) 22 (!) 93 % -- --   03/04/24 1015 136/72 -- -- (!) 115 (!) 22 (!) 92 % -- --   03/04/24 1000 111/65 -- -- (!) 150 (!) 21 (!) 91 % -- --   03/04/24 0945 151/80 -- -- (!) 125 (!) 21 (!) 93 % -- --   03/04/24 0930 147/74 -- -- (!) 135 20 94 % -- --   03/04/24 0915 151/78 -- -- (!) 143 (!) 21 (!) 93 % -- --   03/04/24 0900 143/81 -- -- (!) 140 (!) 21 95 % -- --   03/04/24 0845 172/84 -- -- (!) 117 19 95 % -- --   03/04/24 0840 (!) 167/101 -- -- (!) 140 (!) 23 94 % -- --   03/04/24 0830 (!) 153/109 -- -- (!) 156 (!) 22 96 % -- --   03/04/24 0815 -- -- -- (!) 160 (!) 28 94 % -- --   03/04/24 0800 (!) 170/115 36.5 °C (97.7 °F) Temporal (!) 160 (!) 23 94 % 1.651 m (5' 5\") 118 kg (260 lb 2.3 oz)     Body mass index is 43.29 kg/m².  Gen: NAD  Neck: symmetric, no mass  Cardiovascular: RRR  Respiratory: No distress on 2 L oxygen  GI: Abd soft, nontender, non-distended  Extremities: no " "leg edema  Skin: Warm and dry.  : no da silva     Labs:  Lab Results   Component Value Date    WBC 6.7 03/04/2024    HGB 12.0 03/04/2024    HCT 39.6 03/04/2024    MCV 94 03/04/2024     03/04/2024     Lab Results   Component Value Date    GLUCOSE 107 (H) 03/04/2024    CALCIUM 9.6 03/04/2024     03/04/2024    K 4.3 03/04/2024    CO2 34 (H) 03/04/2024     03/04/2024    BUN 11 03/04/2024    CREATININE 1.13 (H) 03/04/2024   ESR: --No results found for: \"SEDRATE\"No results found for: \"CRP\"  Lab Results   Component Value Date    ALT 10 03/04/2024    AST 14 03/04/2024    ALKPHOS 77 03/04/2024    BILITOT 0.6 03/04/2024       DATA:   Diagnostic tests reviewed for today's visit:    Labs this admission reviewed  Imagings this admission reviewed  Cultures: Reviewed        ASSESSMENT :   -Acute hypoxic respiratory failure likely secondary to volume overload/acute CHF likely triggered by A-fib with RVR  -A-fib with RVR  -Less likely bacterial pneumonia  -Chronic kidney disease  -Recent influenza A infection completed 5 days of oseltamivir  -CVA, diverticulitis, hyperlipidemia, hypothyroidism    PLAN:  -Discontinue antibiotics  -Check procalcitonin  -Check CT chest without contrast  -Consider cardiology evaluation    Will continue to follow     Thank you so much for this consultation     Guerda Pitts MD.   Infectious Disease Attending        "

## 2024-03-04 NOTE — PROGRESS NOTES
Vancomycin Dosing by Pharmacy- Cessation of Therapy    Consult to pharmacy for vancomycin dosing has been discontinued by the prescriber, pharmacy will sign off at this time. (Only received ER dose)    Please call pharmacy if there are further questions or re-enter a consult if vancomycin is resumed.     Ana Lilia Mathew, PharmD

## 2024-03-04 NOTE — CARE PLAN
The patient's goals for the shift include      The clinical goals for the shift include  maintain hemodynamic stability      Problem: Pain  Goal: My pain/discomfort is manageable  Outcome: Progressing     Problem: Safety  Goal: Patient will be injury free during hospitalization  Outcome: Progressing  Goal: I will remain free of falls  Outcome: Progressing     Problem: Daily Care  Goal: Daily care needs are met  Outcome: Progressing     Problem: Psychosocial Needs  Goal: Demonstrates ability to cope with hospitalization/illness  Outcome: Progressing  Goal: Collaborate with me, my family, and caregiver to identify my specific goals  Outcome: Progressing  Flowsheets (Taken 3/4/2024 4517)  Cultural Requests During Hospitalization: None  Spiritual Requests During Hospitalization: None     Problem: Discharge Barriers  Goal: My discharge needs are met  Outcome: Progressing

## 2024-03-04 NOTE — CONSULTS
"CARDIOLOGY/ELECTROPHYSIOLOGY CONSULTATION    PATIENT NAME: Lillian Mendoza  PATIENT MRN: [id number]  SERVICE DATE:  3/4/2024  SERVICE TIME: 3:29 PM    CONSULTANT: Lakhwinder Rodriguez MD  PRIMARY CARE PHYSICIAN: Sage Alba DO  ATTENDING PHYSICIAN: Nathan Gilmore MD      IMPRESSIONS:  1.  Chronic hypertension with probable hypertensive heart disease and diastolic left ventricular dysfunction.  2.  Pulmonary embolic disease, status post pulmonary embolectomy in November 2023.  The patient is on warfarin anticoagulation because of this problem.  3.  Recent influenza A, prompting hospitalization from 2/26/2024 until 3/3/2024 at [facility].  4.  Recent-onset atrial flutter, occurring sometime between November 2023 and February 2024.  The etiology of this rhythm is likely a combination of her pulmonary embolus, respiratory insufficiency, hypertension, age, and obstructive sleep apnea.  She has been on a simple \"rate control strategy\" with beta-blockers, along with warfarin anticoagulation for other indications, as she has a [id number] score of at least 6 (hypertension, female gender, 2 points for stroke, 2 points for age over 75).  Restoration of sinus rhythm is recommended, after the patient's respiratory status has returned to close to baseline.  5.  Other medical problems, including obesity, obstructive sleep apnea, degenerative joint disease, hypothyroidism, chronic kidney disease, and hyperlipidemia.    RECOMMENDATIONS:  1.  The patient may continue warfarin for target INR of 2.0-3.0.  2.  She may continue AV vishal blocking drugs (metoprolol, diltiazem, or a combination of these), targeting a resting ventricular rate of 80 to 100 bpm.  3.  Once her respiratory status is back to baseline or close to her baseline, I recommend an elective direct-current cardioversion.  This could be done at [facility] by Dr. James Ramicone or Dr. Scooby Rice.  With rapid recurrence of classic atrial flutter, the patient would be a " good candidate for cavotricuspid isthmus ablation, which I briefly discussed with her and her granddaughter today.    Thank you for this consultation.      SIGNATURE: Lakhwinder Rodriguez MD   OFFICE: [phone]    ================================================================    REASON FOR CONSULTATION: Atrial flutter with rapid ventricular responses    HISTORY: Lillian Mendoza is an 83 y.o. white female with a history of hypertension and recently-diagnosed atrial flutter who presented on 3/4/2024 because of worsening dyspnea at [facility].  She had been hospitalized at [facility] on a few occasions in the last few months, initially in November 2023 with a pulmonary embolus, requiring pulmonary embolectomy.  She also apparently had a small stroke at that time, of unclear etiology, and was treated with warfarin anticoagulation thereafter for both the pulmonary embolus and stroke, I believe.  She was hospitalized again at [facility] from 2/26/2024 until 3/3/2024 with influenza A and respiratory insufficiency.  During that hospital stay, she was clearly in atrial flutter, sometimes with 2:1 AV conduction (on 2/26/2024) but settling down to 4:1 AV conduction (2/27/2024 and thereafter).  She went to a nursing facility on 3/3/2024 on continuous supplemental oxygen, but became more dyspneic and had oxygen saturation of only 85%, prompting presentation to [facility].  She was again back in atrial flutter with 2:1 AV conduction and a ventricular rate of 159 bpm.  With adenosine, the underlying flutter was clearly uncovered.  She was placed on intravenous diltiazem, and her ventricular response is dropped back to around 80 bpm.  Electrophysiology input was requested.    PAST MEDICAL HISTORY: The patient has a history of chronic hypertension, hyperlipidemia, asthma, stage III chronic kidney disease, hypothyroidism, degenerative joint disease, obesity, and obstructive sleep apnea.  There  is no reported history of coronary artery disease.  The patient had a small stroke in November 2023, as well as a pulmonary embolus that required embolectomy, as noted above.    PAST SURGICAL HISTORY: Past surgeries have included hysterectomy, herniorrhaphy, cataract removals, breast lumpectomy, cholecystectomy, and a knee replacement.    CURRENT MEDICATIONS:     acetaminophen (Tylenol) tablet 650 mg, 650 mg, oral, q4h PRN **OR** acetaminophen (Tylenol) oral liquid 650 mg, 650 mg, nasogastric tube, q4h PRN **OR** acetaminophen (Tylenol) suppository 650 mg, 650 mg, rectal, q4h PRN, Leanne R Stein, APRN-CNP    acetaminophen (Tylenol) tablet 650 mg, 650 mg, oral, Once, Polo Navarro DO    [Held by provider] ALPRAZolam (Xanax) tablet 0.5 mg, 0.5 mg, oral, TID PRN, Leanne Stein APRN-CNP    aspirin chewable tablet 81 mg, 81 mg, oral, Daily, Leanne Stein APRN-CNP    atorvastatin (Lipitor) tablet 40 mg, 40 mg, oral, Nightly, Leanne Stein, APRN-CNP    budesonide (Pulmicort) 0.25 mg/2 mL nebulizer solution 0.25 mg, 0.25 mg, nebulization, Daily, Leannetodd Stein, APRN-CNP    cholecalciferol (Vitamin D-3) tablet 50 mcg, 50 mcg, oral, Daily, Leanne R Emil, APRN-CNP    cyanocobalamin (Vitamin B-12) tablet 1,000 mcg, 1,000 mcg, oral, Daily, Leanne R Emil, APRN-CNP    dilTIAZem (Cardizem) 125 mg in dextrose 5% 125 mL (1 mg/mL) infusion (premix), 5-15 mg/hr, intravenous, Continuous, Gonsalo Bran DO, Last Rate: 15 mL/hr at 03/04/24 0906, 15 mg/hr at 03/04/24 0906    fluticasone (Flonase) nasal spray 1 spray, 1 spray, Each Nostril, Daily, Leanne Stein APRN-CNP    formoterol (Perforomist) 20 mcg/2 mL nebulizer solution 20 mcg, 20 mcg, nebulization, BID, REE Gilmore    gabapentin (Neurontin) capsule 600 mg, 600 mg, oral, TID, REE Gilmore    guaiFENesin (Mucinex) 12 hr tablet 600 mg, 600 mg, oral, BID PRN, REE Gilmore    hydrocortisone  2.5 % cream 1 Application, 1 Application, Topical, BID, ZOEY Gilmore-CNP    ipratropium-albuteroL (Duo-Neb) 0.5-2.5 mg/3 mL nebulizer solution 3 mL, 3 mL, nebulization, q6h PRN, ZOEY Gilmore-CNP    ipratropium-albuteroL (Duo-Neb) nebulizer solution  - Omnicell Overrzulay Del Toro, , , , Gonsalo Bran,     levothyroxine (Synthroid, Levoxyl) tablet 150 mcg, 150 mcg, oral, Daily, ZOEY Gilmore-CNP, 150 mcg at 03/04/24 1457    loratadine (Claritin) tablet 10 mg, 10 mg, oral, Daily, ZOEY Gilmore-CNP    metoprolol succinate XL (Toprol-XL) 24 hr tablet 25 mg, 25 mg, oral, Daily, REE Valentine    montelukast (Singulair) tablet 10 mg, 10 mg, oral, Daily, ZOEY Gilmore-CNP    nystatin (Mycostatin) 100,000 unit/gram powder 1 Application, 1 Application, Topical, BID, ZOEY Gilmore-CNP    oxygen (O2) therapy, , inhalation, q24h, REE Gilmore    [START ON 3/5/2024] pantoprazole (ProtoNix) EC tablet 40 mg, 40 mg, oral, Daily before breakfast, REE Gilmore    polyethylene glycol (Glycolax, Miralax) packet 17 g, 17 g, oral, Daily, ZOEY Gilmore-CNP    sodium chloride (Ocean) 0.65 % nasal spray 1 spray, 1 spray, Each Nostril, PRN, REE Gilmore    [Held by provider] torsemide (Demadex) tablet 5 mg, 5 mg, oral, Daily, ZOEY Gilmore-CNP    traMADol (Ultram) tablet 50 mg, 50 mg, oral, Nightly PRN, REE Gilmore    [START ON 3/5/2024] warfarin (Coumadin) tablet 2.5 mg, 2.5 mg, oral, Once per day on Sun Tue Thu Sat, REE Gilmore    warfarin (Coumadin) tablet 5 mg, 5 mg, oral, Once per day on Mon Wed Fri, REE Gilmore    ALLERGIES AND DRUG INTOLERANCES:   Allergen Reactions    Morphine Rash and GI Upset     FAMILY HISTORY: Not contributory    SOCIAL HISTORY: The patient is  but has several children and grandchildren who live locally, some of whom  "are in healthcare.  She is a non-smoker and does not drink alcohol.    COMPLETE REVIEW OF SYSTEMS:  GENERAL: Negative for weight gain or loss  HEENT: Negative for vision or hearing problems  RESPIRATORY: Positive for chronic dyspnea with minimal exertion over past few months  CARDIAC: Negative for exertional chest discomfort or palpitations  VASCULAR: Negative for claudication or peripheral edema  GI: Negative for nausea, vomiting, hematemesis, melena, or hematochezia  MUSCULOSKELETAL: Positive for chronic lower extremity arthralgias  ENDOCRINE: Negative for heat or cold intolerance, polyuria or polydipsia  HEMATOLOGIC: Negative for bleeding problems  CUTANEOUS: Negative for rashes  NEURO: Negative for seizures; negative for focal neurologic symptoms; negative for near-syncope or syncope    PHYSICAL EXAM:  BP 98/65 (BP Location: Left arm, Patient Position: Lying) Comment (BP Location): forearm Comment (Patient Position): HOB at 30  Pulse 81   Temp 36.2 °C (97.2 °F) (Temporal)   Resp 18   Ht 1.651 m (5' 5\")   Wt 118 kg (260 lb 2.3 oz)   LMP  (LMP Unknown)   SpO2 94%   BMI 43.29 kg/m²   Gen: Pleasant elderly white female in no distress; alert and oriented x 3  HEENT: Unremarkable  Neck: No jugular venous distention noted  Cardiac: Regular rhythm without murmurs, rubs, or gallops  Resp: Clear to auscultation bilaterally, without wheezes or rales  Abd: Non-distended, with no tenderness, no masses, and no organomegaly noted  Ext: Peripheral pulses intact; no peripheral edema  Neuro: Normal gross motor and sensory function; no focal deficits noted  Skin: No lesions noted    EKG's: Initially showed atrial flutter with 2:1 AV conduction and a ventricular rate of 159 bpm with fairly normal QRS complexes; now showing variable AV conduction    LABS:  Lab Results   Component Value Date    GLUCOSE 107 (H) 03/04/2024    CALCIUM 9.6 03/04/2024     03/04/2024    K 4.3 03/04/2024    CO2 34 (H) 03/04/2024     " 03/04/2024    BUN 11 03/04/2024    CREATININE 1.13 (H) 03/04/2024      Lab Results   Component Value Date    WBC 6.7 03/04/2024    HGB 12.0 03/04/2024    HCT 39.6 03/04/2024    MCV 94 03/04/2024     03/04/2024   A BNP was elevated at 506.  The patient's INR was therapeutic at 2.4.

## 2024-03-04 NOTE — ED PROVIDER NOTES
HPI   Chief Complaint   Patient presents with   • Shortness of Breath       Patient is an 83-year-old female with history of asthma, hypertension, hyperlipidemia presents the emergency department today due to concern for shortness of breath.  Of note, patient was recently admitted at [place] for pneumonia and recently discharged to a skilled nurse facility yesterday.  Yesterday, patient reported some increased shortness of breath.  She denies any chest pain, lower extremity swelling or edema.  She does report some increase shortness of breath.  Per EMS, patient was saturating 85% on her 2 L which she was discharged with.  They increased her O2 and did give patient DuoNeb treatment as she did have some wheezing bilaterally.  Patient is on Coumadin      History provided by:  Patient and EMS personnel   used: No                        Sotero Coma Scale Score: 15                     Patient History   Past Medical History:   Diagnosis Date   • Personal history of diseases of the blood and blood-forming organs and certain disorders involving the immune mechanism 11/23/2020    History of anemia   • Personal history of other diseases of the circulatory system 11/23/2020    History of hypertension   • Personal history of other diseases of the digestive system 11/23/2020    History of hiatal hernia   • Personal history of other diseases of the musculoskeletal system and connective tissue     History of arthritis   • Personal history of other diseases of the nervous system and sense organs     History of cataract   • Personal history of other diseases of the respiratory system 11/23/2020    History of asthma   • Personal history of other diseases of the respiratory system 11/23/2020    History of bronchitis   • Personal history of other diseases of the respiratory system 11/23/2020    History of lung disease   • Personal history of other diseases of urinary system 11/23/2020    History of kidney problems   •  Personal history of other endocrine, nutritional and metabolic disease 11/23/2020    History of thyroid disorder   • Personal history of other specified conditions 11/23/2020    H/O shortness of breath     Past Surgical History:   Procedure Laterality Date   • INVASIVE VASCULAR PROCEDURE N/A 11/15/2023    Procedure: Pulmonary Angiography - Bilateral;  Surgeon: Maldonado Alvarado MD;  Location: [facility] Cardiac Cath Lab;  Service: Cardiovascular;  Laterality: N/A;   • OTHER SURGICAL HISTORY  11/23/2020    Knee replacement   • OTHER SURGICAL HISTORY  11/23/2020    Lumpectomy   • OTHER SURGICAL HISTORY  11/23/2020    Cataract surgery   • OTHER SURGICAL HISTORY  11/23/2020    Cholecystectomy   • OTHER SURGICAL HISTORY  11/23/2020    Hernia repair   • OTHER SURGICAL HISTORY  11/23/2020    Hysterectomy     Family History   Problem Relation Name Age of Onset   • Cancer Mother     • Diabetes Paternal Grandmother     • Diabetes Paternal Grandfather       Social History     Tobacco Use   • Smoking status: Former     Types: Cigarettes     Passive exposure: Never   • Smokeless tobacco: Not on file   Vaping Use   • Vaping Use: Not on file   Substance Use Topics   • Alcohol use: Not Currently   • Drug use: Not Currently       Physical Exam   ED Triage Vitals [03/04/24 0800]   Temperature Heart Rate Respirations BP   36.5 °C (97.7 °F) (!) 160 (!) 23 (!) 170/115      Pulse Ox Temp Source Heart Rate Source Patient Position   94 % Temporal Monitor Sitting      BP Location FiO2 (%)     Left arm --       Physical Exam  Vitals and nursing note reviewed.   Constitutional:       General: She is not in acute distress.     Appearance: She is well-developed.   HENT:      Head: Normocephalic and atraumatic.   Eyes:      Conjunctiva/sclera: Conjunctivae normal.   Cardiovascular:      Rate and Rhythm: Regular rhythm. Tachycardia present.      Heart sounds: No murmur heard.  Pulmonary:      Effort: Pulmonary effort is normal.      Breath sounds: Wheezing and  rhonchi present.   Abdominal:      Palpations: Abdomen is soft.      Tenderness: There is no abdominal tenderness.   Musculoskeletal:         General: No swelling.      Cervical back: Neck supple.   Skin:     General: Skin is warm and dry.      Capillary Refill: Capillary refill takes less than 2 seconds.   Neurological:      Mental Status: She is alert.   Psychiatric:         Mood and Affect: Mood normal.         ED Course & MDM   ED Course as of 03/04/24 2255   Mon Mar 04, 2024   0908 Patient is an 83-year-old female with history of hypertension, hyperlipidemia, asthma presents the ER due to concern for shortness of breath.  On arrival, patient was reportedly satting 85% on 2 L via nasal cannula.  She did have wheezing so EMS gave DuoNeb treatment place patient on 4 L via oxygen.  Patient still wheezing on my exam.  She was also tachycardic in the 160s.  EKG read for SVT.  Septic workup and cardiac workup was initiated for patient.  To give patient additional DuoNeb's given her wheezing. [MJ]   0909 1 L of normal saline given given her tachycardia. [MJ]   0909 CBC and Auto Differential(!)  Unremarkable [MJ]   0910 BLOOD GAS VENOUS FULL PANEL(!)  Relatively unremarkable [MJ]   0910 Comprehensive Metabolic Panel(!)  Mildly elevated creatinine of 1.13.  Unremarkable. [MJ]   0910 Coronavirus 2019, PCR: Not Detected [MJ]   0910 Flu B Result: Not Detected [MJ]   0910 Flu A Result: Not Detected [MJ]   0910 Urinalysis with Reflex Culture and Microscopic(!)  Not consistent with UTI [MJ]   0911 BNP elevated at 506.  Troponin mildly elevated at 19.  Suspect some of this may be rate dependent. [MJ]   0911 Given patient's tachycardia as well as EKG reading for SVT, we gave adenosine to assess for underlying rhythm which did show a flutter.  Patient was started on diltiazem bolus and drip.  Patient was also placed on BiPAP given her shortness of breath. [MJ]   0913 INR within therapeutic level. [MJ]   0913 Given patient's  tachycardia, shortness of breath, increased O2 requirement, plan admit patient. [MJ]   0914 XR chest 1 view  IMPRESSION:  Increased pulmonary vascularity. Small pleural effusions with  bibasilar areas of atelectasis and/or consolidation.   [MJ]   0914 Did cover patient for pneumonia given recent admission and x-ray findings.  Patient started on vancomycin, azithromycin, and Zosyn. [MJ]      ED Course User Index  [MJ] Polo Navarro,          Diagnoses as of 03/04/24 5241   Shortness of breath   Elevated brain natriuretic peptide (BNP) level   Pneumonia due to infectious organism, unspecified laterality, unspecified part of lung   Atrial fibrillation with rapid ventricular response (CMS/HCC)       Medical Decision Making  =================Attending note===============    The patient was seen by the resident/fellow.  I have personally performed a substantive portion of the encounter.  I have seen and examined the patient; agree with the workup, evaluation, MDM,   management and diagnosis.  The care plan has been discussed with the resident; I have reviewed the resident's note and agree with the documented findings.      This is a 83 y.o. female who presents to ER with shortness of breath and hypoxia.  Patient was just discharged from [facility] to her nursing facility with flu and pneumonia.  She has had increasing shortness of breath and her O2 saturation was down to 85% on 2 L which she was normally on.  They gave her DuoNebs at the facility and called the ambulance.  She denies any fevers or chills.  She had some nausea without vomiting or diarrhea.    On arrival here her heart rates in the 150s to 160 range.  It is regular.  Not consistent with A-fib.  Patient states that she is on Coumadin, but is for pulmonary embolism.  She does not think she has a history of A-fib.    Heart rate could be elevated due to the DuoNebs and potential sepsis.  This could be due to SVT.  We are pressure bagging and a liter  of normal saline.  There is no history of heart failure.  If this does not improve the heart rate we will give adenosine.    Heart is regular and tachycardic.  Lungs are diminished with the left being more diminished than the right.  Abdomen soft and nontender.    VBG shows no significant acidosis.      EKG: SVT versus sinus tach.  Heart rate 159.  QTc 494.  No ST elevations.    Patient was given 6 mg of adenosine and a rhythm strip was done.  Rhythm strip shows atrial flutter.    Repeat EKG: Atrial flutter with a rate of 124.  QTc 465.  No ST changes.    Patient started on 10 mg Cardizem bolus and 10 mg/h drip.    TSH was low.  Troponin was indeterminate.  COVID and flu are negative.  No definitive UTI.  Bicarb was elevated.  Creatinine mildly elevated.  Repeat troponin was indeterminate without any significant delta.  BNP was 506.  Lactate was normal.  White count was normal.  No anemia.  Chest x-ray showed increased vascularity with small effusions and atelectasis or consolidation.  Patient is started on antibiotics due to recent pneumonia.    Heart rate improved with Cardizem.    Patient is admitted to the hospital for further treatment and evaluation.          ==========================================          Procedure  Procedures     Polo Navarro,   Resident  03/04/24 1325       Gonsalo Bran,   03/04/24 2893

## 2024-03-05 ENCOUNTER — APPOINTMENT (OUTPATIENT)
Dept: CARDIOLOGY | Facility: HOSPITAL | Age: 84
DRG: 291 | End: 2024-03-05
Payer: MEDICARE

## 2024-03-05 ENCOUNTER — APPOINTMENT (OUTPATIENT)
Dept: RADIOLOGY | Facility: HOSPITAL | Age: 84
DRG: 291 | End: 2024-03-05
Payer: MEDICARE

## 2024-03-05 LAB
ANION GAP SERPL CALC-SCNC: 8 MMOL/L (ref 10–20)
BACTERIA UR CULT: NO GROWTH
BNP SERPL-MCNC: 221 PG/ML (ref 0–99)
BUN SERPL-MCNC: 14 MG/DL (ref 6–23)
CALCIUM SERPL-MCNC: 8.7 MG/DL (ref 8.6–10.3)
CHLORIDE SERPL-SCNC: 106 MMOL/L (ref 98–107)
CO2 SERPL-SCNC: 31 MMOL/L (ref 21–32)
CREAT SERPL-MCNC: 1.36 MG/DL (ref 0.5–1.05)
EGFRCR SERPLBLD CKD-EPI 2021: 39 ML/MIN/1.73M*2
ERYTHROCYTE [DISTWIDTH] IN BLOOD BY AUTOMATED COUNT: 16 % (ref 11.5–14.5)
GLUCOSE SERPL-MCNC: 95 MG/DL (ref 74–99)
HCT VFR BLD AUTO: 31.7 % (ref 36–46)
HGB BLD-MCNC: 9.7 G/DL (ref 12–16)
INR PPP: 2.2 (ref 0.9–1.1)
LEFT VENTRICLE INTERNAL DIMENSION DIASTOLE: 5.3 CM (ref 3.5–6)
MCH RBC QN AUTO: 28.8 PG (ref 26–34)
MCHC RBC AUTO-ENTMCNC: 30.6 G/DL (ref 32–36)
MCV RBC AUTO: 94 FL (ref 80–100)
NRBC BLD-RTO: 0 /100 WBCS (ref 0–0)
PLATELET # BLD AUTO: 298 X10*3/UL (ref 150–450)
POTASSIUM SERPL-SCNC: 4.1 MMOL/L (ref 3.5–5.3)
PROTHROMBIN TIME: 25.4 SECONDS (ref 9.8–12.8)
RBC # BLD AUTO: 3.37 X10*6/UL (ref 4–5.2)
SODIUM SERPL-SCNC: 141 MMOL/L (ref 136–145)
WBC # BLD AUTO: 5.7 X10*3/UL (ref 4.4–11.3)

## 2024-03-05 PROCEDURE — 97166 OT EVAL MOD COMPLEX 45 MIN: CPT | Mod: GO | Performed by: OCCUPATIONAL THERAPIST

## 2024-03-05 PROCEDURE — 97535 SELF CARE MNGMENT TRAINING: CPT | Mod: GO | Performed by: OCCUPATIONAL THERAPIST

## 2024-03-05 PROCEDURE — 2500000001 HC RX 250 WO HCPCS SELF ADMINISTERED DRUGS (ALT 637 FOR MEDICARE OP): Performed by: NURSE PRACTITIONER

## 2024-03-05 PROCEDURE — 99223 1ST HOSP IP/OBS HIGH 75: CPT | Performed by: INTERNAL MEDICINE

## 2024-03-05 PROCEDURE — 97161 PT EVAL LOW COMPLEX 20 MIN: CPT | Mod: GP | Performed by: PHYSICAL THERAPIST

## 2024-03-05 PROCEDURE — 83880 ASSAY OF NATRIURETIC PEPTIDE: CPT | Performed by: NURSE PRACTITIONER

## 2024-03-05 PROCEDURE — 85610 PROTHROMBIN TIME: CPT | Performed by: NURSE PRACTITIONER

## 2024-03-05 PROCEDURE — 2500000001 HC RX 250 WO HCPCS SELF ADMINISTERED DRUGS (ALT 637 FOR MEDICARE OP): Performed by: INTERNAL MEDICINE

## 2024-03-05 PROCEDURE — 97530 THERAPEUTIC ACTIVITIES: CPT | Mod: GP | Performed by: PHYSICAL THERAPIST

## 2024-03-05 PROCEDURE — 93010 ELECTROCARDIOGRAM REPORT: CPT | Performed by: INTERNAL MEDICINE

## 2024-03-05 PROCEDURE — 2060000001 HC INTERMEDIATE ICU ROOM DAILY

## 2024-03-05 PROCEDURE — 2500000004 HC RX 250 GENERAL PHARMACY W/ HCPCS (ALT 636 FOR OP/ED): Performed by: NURSE PRACTITIONER

## 2024-03-05 PROCEDURE — 71250 CT THORAX DX C-: CPT

## 2024-03-05 PROCEDURE — 94640 AIRWAY INHALATION TREATMENT: CPT

## 2024-03-05 PROCEDURE — 80048 BASIC METABOLIC PNL TOTAL CA: CPT | Performed by: NURSE PRACTITIONER

## 2024-03-05 PROCEDURE — 85027 COMPLETE CBC AUTOMATED: CPT | Performed by: NURSE PRACTITIONER

## 2024-03-05 PROCEDURE — 2500000002 HC RX 250 W HCPCS SELF ADMINISTERED DRUGS (ALT 637 FOR MEDICARE OP, ALT 636 FOR OP/ED): Performed by: NURSE PRACTITIONER

## 2024-03-05 PROCEDURE — 93005 ELECTROCARDIOGRAM TRACING: CPT

## 2024-03-05 PROCEDURE — 36415 COLL VENOUS BLD VENIPUNCTURE: CPT | Performed by: NURSE PRACTITIONER

## 2024-03-05 PROCEDURE — 71250 CT THORAX DX C-: CPT | Performed by: RADIOLOGY

## 2024-03-05 PROCEDURE — 99232 SBSQ HOSP IP/OBS MODERATE 35: CPT | Performed by: INTERNAL MEDICINE

## 2024-03-05 PROCEDURE — 2500000005 HC RX 250 GENERAL PHARMACY W/O HCPCS: Performed by: NURSE PRACTITIONER

## 2024-03-05 RX ORDER — TORSEMIDE 20 MG/1
10 TABLET ORAL DAILY
Status: DISCONTINUED | OUTPATIENT
Start: 2024-03-05 | End: 2024-03-08 | Stop reason: HOSPADM

## 2024-03-05 RX ORDER — DAPAGLIFLOZIN 10 MG/1
10 TABLET, FILM COATED ORAL DAILY
Status: DISCONTINUED | OUTPATIENT
Start: 2024-03-05 | End: 2024-03-08 | Stop reason: HOSPADM

## 2024-03-05 RX ADMIN — GABAPENTIN 600 MG: 300 CAPSULE ORAL at 08:36

## 2024-03-05 RX ADMIN — ASPIRIN 81 MG CHEWABLE TABLET 81 MG: 81 TABLET CHEWABLE at 08:35

## 2024-03-05 RX ADMIN — GABAPENTIN 600 MG: 300 CAPSULE ORAL at 16:06

## 2024-03-05 RX ADMIN — Medication 4 L/MIN: at 20:51

## 2024-03-05 RX ADMIN — TRAMADOL HYDROCHLORIDE 50 MG: 50 TABLET, COATED ORAL at 22:06

## 2024-03-05 RX ADMIN — ACETAMINOPHEN 650 MG: 325 TABLET ORAL at 01:30

## 2024-03-05 RX ADMIN — MONTELUKAST 10 MG: 10 TABLET, FILM COATED ORAL at 20:23

## 2024-03-05 RX ADMIN — FLUTICASONE PROPIONATE 1 SPRAY: 50 SPRAY, METERED NASAL at 08:36

## 2024-03-05 RX ADMIN — GABAPENTIN 600 MG: 300 CAPSULE ORAL at 20:23

## 2024-03-05 RX ADMIN — DAPAGLIFLOZIN 10 MG: 10 TABLET, FILM COATED ORAL at 10:57

## 2024-03-05 RX ADMIN — POLYETHYLENE GLYCOL 3350 17 G: 17 POWDER, FOR SOLUTION ORAL at 10:57

## 2024-03-05 RX ADMIN — WARFARIN SODIUM 2.5 MG: 2.5 TABLET ORAL at 18:25

## 2024-03-05 RX ADMIN — FORMOTEROL FUMARATE 20 MCG: 20 SOLUTION RESPIRATORY (INHALATION) at 20:49

## 2024-03-05 RX ADMIN — BUDESONIDE 0.25 MG: 0.25 INHALANT RESPIRATORY (INHALATION) at 09:32

## 2024-03-05 RX ADMIN — LEVOTHYROXINE SODIUM 150 MCG: 150 TABLET ORAL at 06:08

## 2024-03-05 RX ADMIN — Medication 50 MCG: at 08:35

## 2024-03-05 RX ADMIN — PANTOPRAZOLE SODIUM 40 MG: 40 TABLET, DELAYED RELEASE ORAL at 06:08

## 2024-03-05 RX ADMIN — HYDROXYZINE HYDROCHLORIDE 10 MG: 10 TABLET ORAL at 22:06

## 2024-03-05 RX ADMIN — HYDROCORTISONE 1 APPLICATION: 25 CREAM TOPICAL at 10:58

## 2024-03-05 RX ADMIN — NYSTATIN 1 APPLICATION: 100000 POWDER TOPICAL at 16:07

## 2024-03-05 RX ADMIN — ATORVASTATIN CALCIUM 40 MG: 40 TABLET, FILM COATED ORAL at 20:23

## 2024-03-05 RX ADMIN — FORMOTEROL FUMARATE 20 MCG: 20 SOLUTION RESPIRATORY (INHALATION) at 09:33

## 2024-03-05 RX ADMIN — TORSEMIDE 10 MG: 20 TABLET ORAL at 10:58

## 2024-03-05 RX ADMIN — CYANOCOBALAMIN TAB 1000 MCG 1000 MCG: 1000 TAB at 08:35

## 2024-03-05 RX ADMIN — METOPROLOL SUCCINATE 25 MG: 25 TABLET, EXTENDED RELEASE ORAL at 08:35

## 2024-03-05 ASSESSMENT — COGNITIVE AND FUNCTIONAL STATUS - GENERAL
WALKING IN HOSPITAL ROOM: A LOT
STANDING UP FROM CHAIR USING ARMS: A LOT
HELP NEEDED FOR BATHING: A LOT
HELP NEEDED FOR BATHING: A LOT
TOILETING: A LOT
MOVING TO AND FROM BED TO CHAIR: A LOT
TURNING FROM BACK TO SIDE WHILE IN FLAT BAD: A LOT
PERSONAL GROOMING: A LOT
MOBILITY SCORE: 14
HELP NEEDED FOR BATHING: A LOT
DAILY ACTIVITIY SCORE: 15
MOVING FROM LYING ON BACK TO SITTING ON SIDE OF FLAT BED WITH BEDRAILS: A LOT
HELP NEEDED FOR BATHING: A LOT
WALKING IN HOSPITAL ROOM: A LOT
WALKING IN HOSPITAL ROOM: A LOT
STANDING UP FROM CHAIR USING ARMS: A LOT
DRESSING REGULAR UPPER BODY CLOTHING: A LOT
TURNING FROM BACK TO SIDE WHILE IN FLAT BAD: A LOT
TOILETING: A LOT
MOBILITY SCORE: 15
TURNING FROM BACK TO SIDE WHILE IN FLAT BAD: A LOT
CLIMB 3 TO 5 STEPS WITH RAILING: TOTAL
DAILY ACTIVITIY SCORE: 13
WALKING IN HOSPITAL ROOM: A LOT
CLIMB 3 TO 5 STEPS WITH RAILING: TOTAL
TOILETING: A LOT
MOBILITY SCORE: 11
STANDING UP FROM CHAIR USING ARMS: A LITTLE
CLIMB 3 TO 5 STEPS WITH RAILING: A LOT
MOVING FROM LYING ON BACK TO SITTING ON SIDE OF FLAT BED WITH BEDRAILS: A LITTLE
DRESSING REGULAR UPPER BODY CLOTHING: A LOT
EATING MEALS: A LITTLE
DRESSING REGULAR LOWER BODY CLOTHING: A LOT
PERSONAL GROOMING: A LITTLE
DRESSING REGULAR LOWER BODY CLOTHING: A LOT
DRESSING REGULAR UPPER BODY CLOTHING: A LOT
CLIMB 3 TO 5 STEPS WITH RAILING: A LOT
MOVING TO AND FROM BED TO CHAIR: A LITTLE
MOVING FROM LYING ON BACK TO SITTING ON SIDE OF FLAT BED WITH BEDRAILS: A LOT
PERSONAL GROOMING: A LOT
DRESSING REGULAR UPPER BODY CLOTHING: A LOT
DRESSING REGULAR LOWER BODY CLOTHING: A LOT
TURNING FROM BACK TO SIDE WHILE IN FLAT BAD: A LOT
PERSONAL GROOMING: A LITTLE
DAILY ACTIVITIY SCORE: 15
DAILY ACTIVITIY SCORE: 13
EATING MEALS: A LITTLE
MOVING TO AND FROM BED TO CHAIR: A LITTLE
TOILETING: A LOT
MOBILITY SCORE: 11
DRESSING REGULAR LOWER BODY CLOTHING: A LOT
MOVING TO AND FROM BED TO CHAIR: A LOT
STANDING UP FROM CHAIR USING ARMS: A LITTLE
MOVING FROM LYING ON BACK TO SITTING ON SIDE OF FLAT BED WITH BEDRAILS: A LOT

## 2024-03-05 ASSESSMENT — PAIN - FUNCTIONAL ASSESSMENT
PAIN_FUNCTIONAL_ASSESSMENT: 0-10

## 2024-03-05 ASSESSMENT — PAIN SCALES - GENERAL
PAINLEVEL_OUTOF10: 0 - NO PAIN
PAINLEVEL_OUTOF10: 7
PAINLEVEL_OUTOF10: 0 - NO PAIN
PAINLEVEL_OUTOF10: 7
PAINLEVEL_OUTOF10: 7
PAINLEVEL_OUTOF10: 0 - NO PAIN

## 2024-03-05 ASSESSMENT — ACTIVITIES OF DAILY LIVING (ADL): HOME_MANAGEMENT_TIME_ENTRY: 8

## 2024-03-05 NOTE — NURSING NOTE
Patient admitted to unit, Cardizem gtt infusing at 15 mg/hr. Cardiac monitor pattern; Aflutter 80's-90's. Patient on 5l nc on arrival, spo2 87-91%. Patient oriented to room, unit, and call light.

## 2024-03-05 NOTE — PROGRESS NOTES
" ELECTROPHYSIOLOGY PROGRESS NOTE    PATIENT NAME: Lillian Mendoza  PATIENT MRN: [id number]  SERVICE DATE:  3/5/2024  SERVICE TIME: 9:32 AM    CONSULTANT: Lakhwinder Rodriguez MD  PRIMARY CARE PHYSICIAN: Sage Alba DO  ATTENDING PHYSICIAN: Nathan Gilmore MD      IMPRESSIONS:  1.  HTN with hypertensive heart disease.  2.  Large PE in 11/2023, on warfarin anticoagulation.  3.  Respiratory insufficiency, secondary to PE and recent influenza A.  4.  Recent persistent atrial flutter, due to PE and influenza, with spontaneous conversion to sinus rhythm early this morning.  She continues on a simple \"rate control strategy\" with Toprol XL 25 mg po daily.  She continues on warfarin anticoagulation.  5.  Other medical problems, including obesity with BLAYNE, DJD, CKD, hypothyroidism, and hyperlipidemia.     RECOMMENDATIONS:  1.  The patient will continue her Toprol XL; if her BP allows, this may be increased from 25 mg daily to 50 mg daily.  2.  She will remain on warfarin anticoagulation with a target INR of 2.0-3.0.  3.  She is free for discharge from an electrophysiology standpoint.  With frequent breakthroughs of classic atrial flutter, she may be considered for cavotricuspid isthmus ablation, which could be done by Dr. James Ramicone or Dr. Scooby Rice in [place].    Please notify me if I may be of further assistance during this hospital stay.        SIGNATURE: Lakhwinder Rodriguez MD   OFFICE: [phone]    ================================================================    SUBJECTIVE: The patient was seen by me in consultation yesterday - see consult for details.  She has a history of hypertension as well as a large pulmonary embolus in November 2023.  More recently, she was hospitalized at [place] from 2/26/2024 to 3/3/2024 because of respiratory insufficiency from influenza A.  During that hospital stay, she was in atrial flutter, sometimes with 2:1 AV conduction.  She was continued on a \"rate control strategy\" with " "beta-blockers, along with warfarin anticoagulation.  She presented on 3/4/2024 with worsening dyspnea and had recurrent atrial flutter with 2:1 AV conduction, slowing with intravenous diltiazem.  In yesterday's consultation, I recommended that the patient's respiratory issues be treated, and then the patient be cardioverted after she was stable from a respiratory standpoint.  Surprisingly, she converted spontaneously to sinus rhythm during the night.  She feels well today and does not wish to go back to a skilled nursing facility but instead to go home with her granddaughters to look after her.     CURRENT CARDIOVASCULAR MEDICATIONS:    aspirin chewable tablet 81 mg, 81 mg, oral, Daily    atorvastatin (Lipitor) tablet 40 mg, 40 mg, oral, Nightly    [Held by provider] dilTIAZem (Cardizem) 125 mg in dextrose 5% 125 mL (1 mg/mL) infusion (premix), 5-15 mg/hr, intravenous, Continuous    metoprolol succinate XL (Toprol-XL) 24 hr tablet 25 mg, 25 mg, oral, Daily    torsemide (Demadex) tablet 10 mg, 10 mg, oral, Daily    warfarin (Coumadin) tablet 2.5 mg, 2.5 mg, oral, Once per day on Sun Tue Thu Sat    warfarin (Coumadin) tablet 5 mg, 5 mg, oral, Once per day on Mon Wed Fri, Leanne Stein    VITALS:  BP 94/58 (BP Location: Left arm, Patient Position: Lying)   Pulse 69   Temp 36.3 °C (97.3 °F) (Temporal)   Resp 20   Ht 1.651 m (5' 5\")   Wt 130 kg (287 lb 0.6 oz)   LMP  (LMP Unknown)   SpO2 96%   BMI 47.77 kg/m²     CV EXAM: See 3/4/2024 consultation    MONITOR: Atrial flutter with predominant 4:1 conduction on evening of 3/4/2024; spontaneous conversion to sinus rhythm 3/5/2024 at 00:46 AM; now in sinus rhythm in 60's with frequent PAC's, sometimes in a pattern of atrial trigeminy    LABS:  Lab Results   Component Value Date    WBC 5.7 03/05/2024    HGB 9.7 (L) 03/05/2024    HCT 31.7 (L) 03/05/2024    MCV 94 03/05/2024     03/05/2024      Lab Results   Component Value Date    GLUCOSE 95 03/05/2024    " CALCIUM 8.7 03/05/2024     03/05/2024    K 4.1 03/05/2024    CO2 31 03/05/2024     03/05/2024    BUN 14 03/05/2024    CREATININE 1.36 (H) 03/05/2024

## 2024-03-05 NOTE — CARE PLAN
Problem: Pain  Goal: My pain/discomfort is manageable  Outcome: Met     Problem: Safety  Goal: I will remain free of falls  Outcome: Met     Problem: Daily Care  Goal: Daily care needs are met  Outcome: Met     Problem: Psychosocial Needs  Goal: Demonstrates ability to cope with hospitalization/illness  Outcome: Met     Problem: Discharge Barriers  Goal: My discharge needs are met  3/5/2024 1855 by Zayra Tyler RN  Outcome: Progressing     Problem: Skin  Goal: Prevent/manage excess moisture  3/5/2024 1855 by Zayra Tyler RN  Outcome: Met    Problem: Respiratory  Goal: Wean oxygen to maintain O2 saturation per order/standard this shift  Outcome: Progressing   The patient's goals for the shift include      Pt remained alert and oriented during shift. Pt hemodynamically stable. Pt maintained on oxygen, weaned to 4 L oxymizer. CT completed during shift. Pt up to chair. Bed/Chair alarm on. Safety maintained.

## 2024-03-05 NOTE — H&P
History Of Present Illness  Lillian Mendoza is a 83 y.o. female presenting with this is a patient has multiple medical problems.  Patient was having some progressive shortness of breath heart was racing.  She was brought in the emergency department she was found to have SVT.  Subsequently started on a Cardizem drip and admitted to the hospital.  She is recent been discharged from another hospital she was being treated for influenza pneumonia.     Past Medical History  Past Medical History:   Diagnosis Date    Arthritis     Asthma     Chronic kidney disease     CVA (cerebral vascular accident) (CMS/HCC)     Diverticulitis     Hiatal hernia     Hyperlipidemia     Hypertension     Hypothyroid     Personal history of diseases of the blood and blood-forming organs and certain disorders involving the immune mechanism 11/23/2020    History of anemia    Personal history of other diseases of the circulatory system 11/23/2020    History of hypertension    Personal history of other diseases of the digestive system 11/23/2020    History of hiatal hernia    Personal history of other diseases of the musculoskeletal system and connective tissue     History of arthritis    Personal history of other diseases of the nervous system and sense organs     History of cataract    Personal history of other diseases of the respiratory system 11/23/2020    History of asthma    Personal history of other diseases of the respiratory system 11/23/2020    History of bronchitis    Personal history of other diseases of the respiratory system 11/23/2020    History of lung disease    Personal history of other diseases of urinary system 11/23/2020    History of kidney problems    Personal history of other endocrine, nutritional and metabolic disease 11/23/2020    History of thyroid disorder    Personal history of other specified conditions 11/23/2020    H/O shortness of breath    Pulmonary embolus (CMS/HCC)        Surgical History  Past Surgical  History:   Procedure Laterality Date    CARDIAC CATHETERIZATION      CATARACT EXTRACTION      HYSTERECTOMY      INVASIVE VASCULAR PROCEDURE N/A 11/15/2023    Procedure: Pulmonary Angiography - Bilateral;  Surgeon: Maldonado Alvarado MD;  Location: [facility];  Service: Cardiovascular;  Laterality: N/A;    JOINT REPLACEMENT      OTHER SURGICAL HISTORY  11/23/2020    Knee replacement    OTHER SURGICAL HISTORY  11/23/2020    Lumpectomy    OTHER SURGICAL HISTORY  11/23/2020    Cataract surgery    OTHER SURGICAL HISTORY  11/23/2020    Cholecystectomy    OTHER SURGICAL HISTORY  11/23/2020    Hernia repair    OTHER SURGICAL HISTORY  11/23/2020    Hysterectomy        Social History  She reports that she has never smoked. She has never been exposed to tobacco smoke. She does not have any smokeless tobacco history on file. She reports that she does not currently use alcohol. She reports that she does not currently use drugs.    Family History  Family History   Problem Relation Name Age of Onset    Cancer Mother      Diabetes Paternal Grandmother      Diabetes Paternal Grandfather          Allergies  Morphine    Review of Systems   Constitutional:  Positive for appetite change. Negative for chills and fever.        Poor p.o. intake since hospitalization at [place].   HENT:  Positive for sore throat. Negative for congestion and drooling.    Respiratory:  Negative for cough and shortness of breath.         Shortness of breath has resolved.   Cardiovascular:  Negative for chest pain and palpitations.        Palpitations have resolved since being admitted through the emergency department.   Gastrointestinal:  Negative for abdominal pain and diarrhea.        Transient nausea this morning which has resolved   Genitourinary:  Negative for dysuria, frequency and urgency.   Musculoskeletal:  Positive for gait problem. Negative for neck stiffness.   Skin:  Negative for color change, pallor and rash.   Neurological:  Positive for weakness  "and headaches. Negative for facial asymmetry.   Physical Exam   Constitutional:       Comments: Currently on BiPAP mask   HENT:      Head: Normocephalic and atraumatic.      Nose: Nose normal.      Mouth/Throat:      Mouth: Mucous membranes are dry.   Eyes:      Extraocular Movements: Extraocular movements intact.      Pupils: Pupils are equal, round, and reactive to light.   Cardiovascular:      Rate and Rhythm: Normal rate. Rhythm irregular.   Pulmonary:      Effort: Pulmonary effort is normal.      Breath sounds: Wheezing present.   Abdominal:      General: Bowel sounds are normal.      Palpations: Abdomen is soft.   Musculoskeletal:         General: Swelling present.      Cervical back: Neck supple.      Comments: Patient moves all extremities, patient with lower extremity edema   Skin:     General: Skin is warm and dry.      Capillary Refill: Capillary refill takes less than 2 seconds.   Neurological:      Mental Status: She is alert and oriented to person, place, and time.   Psychiatric:         Mood and Affect: Mood normal.         Behavior: Behavior normal.      Last Recorded Vitals  Blood pressure 94/58, pulse 69, temperature 36.3 °C (97.3 °F), temperature source Temporal, resp. rate 20, height 1.651 m (5' 5\"), weight 130 kg (287 lb 0.6 oz), SpO2 96 %.    Relevant Results        Scheduled medications  acetaminophen, 650 mg, oral, Once  aspirin, 81 mg, oral, Daily  atorvastatin, 40 mg, oral, Nightly  budesonide, 0.25 mg, nebulization, Daily  cholecalciferol, 50 mcg, oral, Daily  cyanocobalamin, 1,000 mcg, oral, Daily  dapagliflozin propanediol, 10 mg, oral, Daily  fluticasone, 1 spray, Each Nostril, Daily  formoterol, 20 mcg, nebulization, BID  gabapentin, 600 mg, oral, TID  hydrocortisone, 1 Application, Topical, BID  levothyroxine, 150 mcg, oral, Daily  loratadine, 10 mg, oral, Daily  metoprolol succinate XL, 25 mg, oral, Daily  montelukast, 10 mg, oral, Daily  nystatin, 1 Application, Topical, " BID  oxygen, , inhalation, q24h  pantoprazole, 40 mg, oral, Daily before breakfast  polyethylene glycol, 17 g, oral, Daily  torsemide, 10 mg, oral, Daily  warfarin, 2.5 mg, oral, Once per day on Sun Tue Thu Sat  warfarin, 5 mg, oral, Once per day on Mon Wed Fri      Continuous medications  [Held by provider] dilTIAZem, 5-15 mg/hr, Last Rate: Stopped (03/05/24 0504)      PRN medications  PRN medications: acetaminophen **OR** acetaminophen **OR** acetaminophen, [Held by provider] ALPRAZolam, guaiFENesin, hydrOXYzine HCL, ipratropium-albuteroL, oxygen, oxygen, sodium chloride, traMADol       Assessment/Plan   Principal Problem:    Shortness of breath  Active Problems:    Personal history of diseases of the blood and blood-forming organs and certain disorders involving the immune mechanism    Personal history of other diseases of the circulatory system    Personal history of other diseases of the digestive system    Arthritis    Asthma    Chronic kidney disease    CVA (cerebral vascular accident) (CMS/HCC)    Diverticulitis    Hyperlipidemia    Hypothyroid      Cardizem drip  Continue IV antibiotics  DVT prophylaxis  BiPAP  DuoNebs  Continue Cardizem drip   Monitor PT/INR      I spent 66 minutes in the professional and overall care of this patient.      Nathan Gilmore MD

## 2024-03-05 NOTE — PROGRESS NOTES
INPATIENT PROGRESS NOTES    PATIENT NAME: Lillian Mendoza    MRN: [id number]  SERVICE DATE:  3/5/2024   SERVICE TIME:  12:27 PM    SIGNATURE: Guerda Pitts MD      SUBJECTIVE  Afebrile  No events overnight       OBJECTIVE  PHYSICAL EXAM:   Patient Vitals for the past 24 hrs:   BP Temp Temp src Pulse Resp SpO2 Weight   03/05/24 1200 114/79 36.5 °C (97.7 °F) Temporal 72 20 95 % --   03/05/24 1117 -- -- -- -- -- -- 130 kg (287 lb 0.6 oz)   03/05/24 1054 122/79 -- -- 86 20 -- --   03/05/24 1013 -- -- -- -- -- 98 % --   03/05/24 0900 -- -- -- -- -- 97 % --   03/05/24 0800 116/68 36.3 °C (97.3 °F) Temporal 69 20 96 % --   03/05/24 0600 -- -- -- -- -- -- 130 kg (287 lb 0.6 oz)   03/05/24 0400 94/58 36.4 °C (97.5 °F) Temporal 68 23 97 % --   03/05/24 0200 120/57 -- -- 76 16 94 % --   03/05/24 0100 100/58 -- -- 68 16 97 % --   03/05/24 0000 110/73 36.5 °C (97.7 °F) Temporal 72 10 91 % --   03/04/24 2300 118/64 -- -- 82 16 91 % --   03/04/24 2230 123/71 -- -- 76 16 92 % --   03/04/24 2215 140/82 -- -- 76 16 95 % --   03/04/24 2200 134/80 -- -- 78 18 96 % --   03/04/24 2159 -- -- -- -- -- 96 % --   03/04/24 2145 -- -- -- 76 16 97 % --   03/04/24 2130 -- -- -- 76 18 98 % --   03/04/24 2115 -- -- -- 76 18 96 % --   03/04/24 2100 127/77 -- -- 78 25 96 % --   03/04/24 2017 120/73 36.4 °C (97.5 °F) Temporal 78 26 98 % --   03/04/24 2000 -- -- -- 78 20 98 % --   03/04/24 1900 -- -- -- 78 23 95 % --   03/04/24 1815 142/79 -- -- 80 22 95 % --   03/04/24 1800 144/74 -- -- 76 23 95 % --   03/04/24 1745 132/74 -- -- 78 19 96 % --   03/04/24 1730 143/77 -- -- 78 22 94 % --   03/04/24 1723 143/77 -- -- 78 20 94 % --   03/04/24 1715 116/75 -- -- 80 20 94 % --   03/04/24 1708 116/75 -- -- 80 20 93 % --   03/04/24 1700 133/75 -- -- 78 22 94 % --   03/04/24 1653 118/75 -- -- 80 20 93 % --   03/04/24 1645 118/75 -- -- 80 16 95 % --   03/04/24 1638 135/77 -- -- 82 20 95 % --   03/04/24 1630 135/77 -- -- 82 22 94 % --   03/04/24 1623 (!)  "140/92 36.2 °C (97.2 °F) Temporal 78 20 95 % --   03/04/24 1616 127/72 -- -- 79 -- -- --   03/04/24 1611 -- -- -- -- -- 94 % --   03/04/24 1543 127/72 -- -- 80 23 90 % --   03/04/24 1441 98/65 36.2 °C (97.2 °F) Temporal 81 18 94 % --   03/04/24 1422 98/65 -- -- 80 21 95 % --   03/04/24 1335 116/66 36.2 °C (97.2 °F) Temporal 80 20 (!) 89 % --   03/04/24 1300 133/83 -- -- 84 19 94 % --         Gen: NAD  Neck: symmetric, no mass  Respiratory: On 4 L of oxygen  Extremities: no leg edema  Skin: Warm and dry.  Neuro: alert and oriented times 3    Labs:  Lab Results   Component Value Date    WBC 5.7 03/05/2024    HGB 9.7 (L) 03/05/2024    HCT 31.7 (L) 03/05/2024    MCV 94 03/05/2024     03/05/2024     Lab Results   Component Value Date    GLUCOSE 95 03/05/2024    CALCIUM 8.7 03/05/2024     03/05/2024    K 4.1 03/05/2024    CO2 31 03/05/2024     03/05/2024    BUN 14 03/05/2024    CREATININE 1.36 (H) 03/05/2024   ESR: --No results found for: \"SEDRATE\"No results found for: \"CRP\"  Lab Results   Component Value Date    ALT 10 03/04/2024    AST 14 03/04/2024    ALKPHOS 77 03/04/2024    BILITOT 0.6 03/04/2024         DATA:   Diagnostic tests reviewed for today's visit:    Labs this admission reviewed  Imagings this admission reviewed  Cultures: Reviewed        ASSESSMENT :   -Acute hypoxic respiratory failure likely secondary to volume overload/acute CHF likely triggered by A-fib with RVR  -A-fib with RVR  -Less likely bacterial pneumonia  -Chronic kidney disease  -Recent influenza A infection completed 5 days of oseltamivir  -CVA, diverticulitis, hyperlipidemia, hypothyroidism     PLAN:  -Procalcitonin within normal limit  -Follow-up CT chest without contrast  -Cardiology team is following   -Continue to monitor off antibiotics    Guerda Pitts MD.   Infectious Diseases Attending           "

## 2024-03-05 NOTE — PROGRESS NOTES
03/05/24 1529   Discharge Planning   Living Arrangements Children;Family members   Support Systems Family members   Assistance Needed Uses walker to ambulate   Type of Residence Private residence   Number of Stairs to Enter Residence 2   Number of Stairs Within Residence 0   Do you have animals or pets at home? No   Who is requesting discharge planning? Provider   Home or Post Acute Services In home services   Type of Home Care Services Home nursing visits;Home OT;Home PT   Patient expects to be discharged to: Home with HHC   Does the patient need discharge transport arranged? Yes   RoundTrip coordination needed? Yes   Has discharge transport been arranged? No     Met with patient at bedside. Verified address and PCP. Uses [email or web address] in [place] for medications and has no issues in obtaining them. Patient uses a walker to ambulate, has a hospital bed, BSC and everything needed for safety in the home. Patient does not drive, needs assistance with dressing due to arthritis in arm and granddaughter does all household chores. Patient does wear oxygen at night only 2 liters. Already has HHC coming and will send referral to resume on discharge. TCC team to follow patient for all discharge needs.

## 2024-03-05 NOTE — PROGRESS NOTES
Physical Therapy    Physical Therapy Evaluation    Patient Name: Lillian Mendoza  MRN: [id number]  Today's Date: 3/5/2024   Time Calculation  Start Time: 1014  Stop Time: 1047  Time Calculation (min): 33 min    Assessment/Plan   PT Assessment  PT Assessment Results: Decreased strength, Decreased range of motion, Decreased endurance, Impaired balance, Decreased mobility  Rehab Prognosis: Good  Evaluation/Treatment Tolerance: Patient tolerated treatment well  Medical Staff Made Aware: Yes  End of Session Communication: Bedside nurse  Assessment Comment: Pt is a 83 y.o. female admitted for Shortness of breath [R06.02]  Elevated brain natriuretic peptide (BNP) level [R79.89]  Pneumonia due to infectious organism, unspecified laterality, unspecified part of lung [J18.9] on 3/4/2024. Pt below functional level and will benefit from skilled therapy during stay to improve overall functional mobility, strength, ROM, endurance and safety awareness. Upon discharge pt will benefit from low intensity therapy for continued improvement in functional mobility. Therapy will continue to follow and reassess each session.      End of Session Patient Position: Up in chair, Alarm on  IP OR SWING BED PT PLAN  Inpatient or Swing Bed: Inpatient  PT Plan  Treatment/Interventions: Bed mobility, Transfer training, Gait training, Balance training, Strengthening, Endurance training, Therapeutic exercise, Therapeutic activity  PT Plan: Skilled PT  PT Frequency: 3 times per week  PT Discharge Recommendations: Low intensity level of continued care  Equipment Recommended upon Discharge: Wheeled walker, Wheelchair, Bedside commode (gait belt)  PT Recommended Transfer Status: Assist x1  PT - OK to Discharge: Yes To next level of care when cleared by medical team.      Subjective     Current Problem:  Patient Active Problem List   Diagnosis    Arthralgia of shoulder region, right    Epigastric hernia    Umbilical hernia without obstruction and without  gangrene    Facet degeneration of lumbar region    Hearing loss    Hiatal hernia    History of pulmonary embolus (PE)    Lower leg edema    Lymphedema    Otalgia, left    Renal impairment    Acute saddle pulmonary embolism, unspecified whether acute cor pulmonale present (CMS/HCC)    Allergic rhinitis, unspecified    Chronic obstructive pulmonary disease (CMS/HCC)    Chronic respiratory failure with hypoxia (CMS/HCC)    Fall    Fracture of shaft of humerus    Personal history of diseases of the blood and blood-forming organs and certain disorders involving the immune mechanism    Hyperlipidemia, unspecified    Mild persistent asthma, uncomplicated    History of thyroid disorder    Hypertension    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 Gilmore  Past Medical History Relevant to Rehab: flu (1 week ago), obese, CAP, CVA, knee replacement, asthma, hypothyroidism  Co-Treatment: OT  Co-Treatment Reason: discharge planning  Prior to Session Communication: Bedside nurse  Patient Position Received: Bed, 3 rail up, Alarm on (granddaughter present)  Preferred Learning Style: visual, verbal  General Comment: Patient agreeable to therapy.    Home Living:  Home Living  Type of Home: House  Lives With: Other (Comment) (grand daughter)  Home Adaptive Equipment: Walker rolling or standard, Hospital bed  Home Layout: One level  Home Access: Ramped entrance  Home Living Comments: pt. lives with granddaughter who works as an RN, another granddaugther and son assist when she is at work, pt. states having very  close to 24/7 supervision/assist, bsc, hospital bed, tub/shower that is to be converted to stall shower very soon    Prior Level of Function:  Prior Function Per Pt/Caregiver Report  Level of [place]: Needs assistance with ADLs, Needs assistance with homemaking  Receives Help From:  (grand daughter)    Precautions:  Precautions  Medical Precautions: Fall precautions, Oxygen therapy device and L/min    Vital Signs:  Vital Signs  Heart Rate: 77  SpO2: 98 % (on 4 L)  BP: 116/68  Objective     Pain:  Pain Assessment  Pain Assessment: 0-10  Pain Score: 7  Pain Type: Chronic pain, Neuropathic pain  Pain Location:  (bilateral feet and legs from knee to toes)    Cognition:  Cognition  Overall Cognitive Status: Within Functional Limits  Orientation Level: Oriented X4    General Assessments:      Activity Tolerance  Endurance: Tolerates 10 - 20 min exercise with multiple rests  Early Mobility/Exercise Safety Screen: Proceed with mobilization - No exclusion criteria met        Static Sitting Balance  Static Sitting-Comment/Number of Minutes: good  Dynamic Sitting Balance  Dynamic Sitting-Comments: good  Static Standing Balance  Static Standing-Comment/Number of Minutes: good  Dynamic Standing Balance  Dynamic Standing-Comments: good    Functional Assessments:     Bed Mobility  Bed Mobility: Yes  Bed Mobility 1  Bed Mobility 1: Supine to sitting  Level of Assistance 1: Moderate assistance  Bed Mobility Comments 1: granddaughter usually assists with this transfer every morning, confirmed by patient and granddaughter  Transfers  Transfer: Yes  Transfer 1  Technique 1: Sit to stand, Stand to sit  Transfer Device 1: Walker  Transfer Level of Assistance 1: Minimum assistance, Contact guard     Extremity/Trunk Assessments:        RLE   RLE : Within Functional Limits  LLE   LLE : Within Functional Limits    Outcome Measures:  [facility] Basic Mobility  Turning from your back to your side while in a flat bed without using bedrails: A  lot  Moving from lying on your back to sitting on the side of a flat bed without using bedrails: A lot  Moving to and from bed to chair (including a wheelchair): A little  Standing up from a chair using your arms (e.g. wheelchair or bedside chair): A little  To walk in hospital room: A lot  Climbing 3-5 steps with railing: A lot  Basic Mobility - Total Score: 14              ICU Mobility Screen  Early Mobility/Exercise Safety Screen: Proceed with mobilization - No exclusion criteria met  E = Exercise and Early Mobility  Early Mobility/Exercise Safety Screen: Proceed with mobilization - No exclusion criteria met          Goals:  Encounter Problems       Encounter Problems (Active)       PT Problem       Pt will demonstrate Wan for all bed mobility   (Progressing)       Start:  03/05/24    Expected End:  03/19/24            Pt will demonstrate Wan for all transfers with WW  (Progressing)       Start:  03/05/24    Expected End:  03/19/24            Pt will ambulate 10 ft with WW and Wan.  (Progressing)       Start:  03/05/24    Expected End:  03/19/24            Pt will demonstrate BLE WFLs.   (Progressing)       Start:  03/05/24    Expected End:  03/19/24                 Education Documentation  Precautions, taught by Makenna Enriquez, PT at 3/5/2024  4:09 PM.  Learner: Patient  Readiness: Acceptance  Method: Explanation  Response: Verbalizes Understanding, Needs Reinforcement    Body Mechanics, taught by Makenna Enriquez, PT at 3/5/2024  4:09 PM.  Learner: Patient  Readiness: Acceptance  Method: Explanation  Response: Verbalizes Understanding, Needs Reinforcement    Mobility Training, taught by Makenna Enriquez, PT at 3/5/2024  4:09 PM.  Learner: Patient  Readiness: Acceptance  Method: Explanation  Response: Verbalizes Understanding, Needs Reinforcement    Education Comments  No comments found.

## 2024-03-05 NOTE — CARE PLAN
Problem: Skin  Goal: Decreased wound size/increased tissue granulation at next dressing change  Outcome: Progressing     Problem: Skin  Goal: Participates in plan/prevention/treatment measures  Outcome: Progressing     Problem: Skin  Goal: Prevent/manage excess moisture  Outcome: Progressing     Problem: Skin  Goal: Prevent/minimize sheer/friction injuries  Outcome: Progressing  Flowsheets (Taken 3/4/2024 1922)  Prevent/minimize sheer/friction injuries:   Use pull sheet   Increase activity/out of bed for meals     Problem: Skin  Goal: Promote/optimize nutrition  Outcome: Progressing     Problem: Skin  Goal: Promote skin healing  Outcome: Progressing       The clinical goals for the shift include maintain hemodynamic stability

## 2024-03-05 NOTE — PROGRESS NOTES
Occupational Therapy  Evaluation    Patient Name: Lillian Mendoza  MRN: [id number]  Today's Date: 3/5/2024  Time Calculation  Start Time: 1012  Stop Time: 1046  Time Calculation (min): 34 min    Assessment  IP OT Assessment  OT Assessment: Patient demonstrates decreased independence with self care, decreased independence with funcitonal transfers, decreased endurance and decreased balance.  Patient will benefit from skilled OT to address deficits at a low intensity with family to assist as needed.  Prognosis: Good  Barriers to Discharge: None  Evaluation/Treatment Tolerance: Patient tolerated treatment well  Medical Staff Made Aware: Yes  End of Session Communication: Bedside nurse  End of Session Patient Position: Up in chair, Alarm on    Plan:  Treatment Interventions: ADL retraining, Functional transfer training, UE strengthening/ROM, Endurance training, Patient/family training  OT Frequency: 5 times per week  OT Discharge Recommendations: Low intensity level of continued care (assist from family)  Equipment Recommended upon Discharge: Wheeled walker, Wheelchair, Bedside commode  OT Recommended Transfer Status: Minimal assist, Assist of 1  OT - OK to Discharge: Yes (to next level of care when medically cleared by physician)    Subjective     Current Problem:  1. Shortness of breath        2. Elevated brain natriuretic peptide (BNP) level        3. Pneumonia due to infectious organism, unspecified laterality, unspecified part of lung        4. 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 Atrial fibrillation with rapid ventricular response (CMS/HCC)            General:  General  Reason for Referral: shortness of breath, impaired ADLs  Referred By: Dr. Gilmore  Past Medical History Relevant to Rehab: flu (1 week ago), obese, CAP, CVA, knee replacement, asthma, hypothyroidism  Co-Treatment: PT  Co-Treatment Reason: discharge  planning  Prior to Session Communication: Bedside nurse  Patient Position Received: Bed, 3 rail up, Alarm on (granddaughter present)  Preferred Learning Style: visual, verbal  General Comment: Patient agreeable to therapy.    Precautions:  Medical Precautions: Fall precautions, Oxygen therapy device and L/min    Vital Signs:  SpO2: 98 % (6L oximizer)    Pain:  Pain Assessment  Pain Assessment: 0-10  Pain Score:  (pain in LEs (chronic))    Objective     Cognition:  Orientation Level: Oriented X4      Home Living:  Type of Home: House  Lives With: Other (Comment) (grand daughter)  Home Adaptive Equipment: Walker rolling or standard, Hospital bed  Home Layout: One level  Home Access: Ramped entrance  Home Living Comments: pt. lives with granddaughter who works as an RN, another granddaugther and son assist when she is at work, pt. states having very close to 24/7 supervision/assist, bsc, hospital bed, tub/shower that is to be converted to stall shower very soon     Prior Function:  Level of [place]: Needs assistance with ADLs, Needs assistance with homemaking  Receives Help From:  (grand daughter)      ADL:  LE Dressing Assistance: Maximal  Toileting Assistance with Device: Total  Toileting Deficit:  (pure wick in place)    Activity Tolerance:  Endurance: Tolerates 10 - 20 min exercise with multiple rests    Bed Mobility/Transfers:   Bed Mobility  Bed Mobility: Yes (supine to sit with mod A using rail)  Transfers  Transfer: Yes (sit to stand with FWW with min Ax2, transfer to chair with min A x 2 wtih FWW, sit to stand from chair with min A with FWW)         Extremities: RUE   RUE :  (limited with ROM in shoulders) and LUE   LUE:  (limited ROM in shoulders)    Outcome Measures: [facility] Daily Activity  Putting on and taking off regular lower body clothing: A lot  Bathing (including washing, rinsing, drying): A lot  Putting on and taking off regular upper body clothing: A lot  Toileting, which includes using toilet,  bedpan or urinal: A lot  Taking care of personal grooming such as brushing teeth: A little  Eating Meals: None  Daily Activity - Total Score: 15           EDUCATION:  Education  Individual(s) Educated: Patient  Education Provided: Fall precautons, Risk and benefits of OT discussed with patient or other  Plan of Care Discussed and Agreed Upon: yes  Patient Response to Education: Patient/Caregiver Verbalized Understanding of Information    Goals:   Encounter Problems       Encounter Problems (Active)       OT Goals       Patient will complete functional transfers with mod I. (Progressing)       Start:  03/05/24    Expected End:  03/19/24            Patient will complete toileting with min A. (Progressing)       Start:  03/05/24    Expected End:  03/19/24            Patient will complete LE dressing with min A. (Progressing)       Start:  03/05/24    Expected End:  03/19/24

## 2024-03-06 LAB
ANION GAP SERPL CALC-SCNC: 9 MMOL/L (ref 10–20)
BUN SERPL-MCNC: 18 MG/DL (ref 6–23)
CALCIUM SERPL-MCNC: 8.9 MG/DL (ref 8.6–10.3)
CHLORIDE SERPL-SCNC: 105 MMOL/L (ref 98–107)
CO2 SERPL-SCNC: 32 MMOL/L (ref 21–32)
CREAT SERPL-MCNC: 1.51 MG/DL (ref 0.5–1.05)
EGFRCR SERPLBLD CKD-EPI 2021: 35 ML/MIN/1.73M*2
ERYTHROCYTE [DISTWIDTH] IN BLOOD BY AUTOMATED COUNT: 15.9 % (ref 11.5–14.5)
GLUCOSE SERPL-MCNC: 101 MG/DL (ref 74–99)
HCT VFR BLD AUTO: 31.5 % (ref 36–46)
HGB BLD-MCNC: 9.5 G/DL (ref 12–16)
INR PPP: 2.7 (ref 0.9–1.1)
MCH RBC QN AUTO: 28.4 PG (ref 26–34)
MCHC RBC AUTO-ENTMCNC: 30.2 G/DL (ref 32–36)
MCV RBC AUTO: 94 FL (ref 80–100)
NRBC BLD-RTO: 0 /100 WBCS (ref 0–0)
PLATELET # BLD AUTO: 330 X10*3/UL (ref 150–450)
POTASSIUM SERPL-SCNC: 4.2 MMOL/L (ref 3.5–5.3)
PROTHROMBIN TIME: 30.3 SECONDS (ref 9.8–12.8)
RBC # BLD AUTO: 3.35 X10*6/UL (ref 4–5.2)
SODIUM SERPL-SCNC: 142 MMOL/L (ref 136–145)
WBC # BLD AUTO: 6.2 X10*3/UL (ref 4.4–11.3)

## 2024-03-06 PROCEDURE — 99233 SBSQ HOSP IP/OBS HIGH 50: CPT | Performed by: INTERNAL MEDICINE

## 2024-03-06 PROCEDURE — 97110 THERAPEUTIC EXERCISES: CPT | Mod: GP,CQ

## 2024-03-06 PROCEDURE — 2500000001 HC RX 250 WO HCPCS SELF ADMINISTERED DRUGS (ALT 637 FOR MEDICARE OP): Performed by: NURSE PRACTITIONER

## 2024-03-06 PROCEDURE — 2500000004 HC RX 250 GENERAL PHARMACY W/ HCPCS (ALT 636 FOR OP/ED): Performed by: NURSE PRACTITIONER

## 2024-03-06 PROCEDURE — 2060000001 HC INTERMEDIATE ICU ROOM DAILY

## 2024-03-06 PROCEDURE — 97535 SELF CARE MNGMENT TRAINING: CPT | Mod: GO,CO

## 2024-03-06 PROCEDURE — 2500000002 HC RX 250 W HCPCS SELF ADMINISTERED DRUGS (ALT 637 FOR MEDICARE OP, ALT 636 FOR OP/ED): Performed by: NURSE PRACTITIONER

## 2024-03-06 PROCEDURE — 2500000001 HC RX 250 WO HCPCS SELF ADMINISTERED DRUGS (ALT 637 FOR MEDICARE OP): Performed by: INTERNAL MEDICINE

## 2024-03-06 PROCEDURE — 97530 THERAPEUTIC ACTIVITIES: CPT | Mod: GO,CO

## 2024-03-06 PROCEDURE — 94640 AIRWAY INHALATION TREATMENT: CPT

## 2024-03-06 PROCEDURE — 85610 PROTHROMBIN TIME: CPT | Performed by: NURSE PRACTITIONER

## 2024-03-06 PROCEDURE — 85027 COMPLETE CBC AUTOMATED: CPT | Performed by: NURSE PRACTITIONER

## 2024-03-06 PROCEDURE — 80048 BASIC METABOLIC PNL TOTAL CA: CPT | Performed by: NURSE PRACTITIONER

## 2024-03-06 PROCEDURE — 97530 THERAPEUTIC ACTIVITIES: CPT | Mod: GP,CQ

## 2024-03-06 PROCEDURE — 36415 COLL VENOUS BLD VENIPUNCTURE: CPT | Performed by: NURSE PRACTITIONER

## 2024-03-06 RX ADMIN — GABAPENTIN 600 MG: 300 CAPSULE ORAL at 15:40

## 2024-03-06 RX ADMIN — NYSTATIN 1 APPLICATION: 100000 POWDER TOPICAL at 10:40

## 2024-03-06 RX ADMIN — BUDESONIDE 0.25 MG: 0.25 INHALANT RESPIRATORY (INHALATION) at 09:57

## 2024-03-06 RX ADMIN — FLUTICASONE PROPIONATE 1 SPRAY: 50 SPRAY, METERED NASAL at 10:27

## 2024-03-06 RX ADMIN — GABAPENTIN 600 MG: 300 CAPSULE ORAL at 10:21

## 2024-03-06 RX ADMIN — DAPAGLIFLOZIN 10 MG: 10 TABLET, FILM COATED ORAL at 10:22

## 2024-03-06 RX ADMIN — ASPIRIN 81 MG CHEWABLE TABLET 81 MG: 81 TABLET CHEWABLE at 10:16

## 2024-03-06 RX ADMIN — ACETAMINOPHEN 650 MG: 325 TABLET ORAL at 00:23

## 2024-03-06 RX ADMIN — HYDROXYZINE HYDROCHLORIDE 10 MG: 10 TABLET ORAL at 22:54

## 2024-03-06 RX ADMIN — POLYETHYLENE GLYCOL 3350 17 G: 17 POWDER, FOR SOLUTION ORAL at 11:42

## 2024-03-06 RX ADMIN — METOPROLOL SUCCINATE 25 MG: 25 TABLET, EXTENDED RELEASE ORAL at 10:26

## 2024-03-06 RX ADMIN — GUAIFENESIN 600 MG: 600 TABLET ORAL at 10:24

## 2024-03-06 RX ADMIN — HYDROCORTISONE 1 APPLICATION: 25 CREAM TOPICAL at 10:20

## 2024-03-06 RX ADMIN — FORMOTEROL FUMARATE 20 MCG: 20 SOLUTION RESPIRATORY (INHALATION) at 21:46

## 2024-03-06 RX ADMIN — PANTOPRAZOLE SODIUM 40 MG: 40 TABLET, DELAYED RELEASE ORAL at 05:11

## 2024-03-06 RX ADMIN — CYANOCOBALAMIN TAB 1000 MCG 1000 MCG: 1000 TAB at 10:27

## 2024-03-06 RX ADMIN — LEVOTHYROXINE SODIUM 150 MCG: 150 TABLET ORAL at 05:11

## 2024-03-06 RX ADMIN — TORSEMIDE 10 MG: 20 TABLET ORAL at 10:25

## 2024-03-06 RX ADMIN — ATORVASTATIN CALCIUM 40 MG: 40 TABLET, FILM COATED ORAL at 21:00

## 2024-03-06 RX ADMIN — MONTELUKAST 10 MG: 10 TABLET, FILM COATED ORAL at 20:39

## 2024-03-06 RX ADMIN — Medication 50 MCG: at 10:16

## 2024-03-06 RX ADMIN — TRAMADOL HYDROCHLORIDE 50 MG: 50 TABLET, COATED ORAL at 22:53

## 2024-03-06 RX ADMIN — ACETAMINOPHEN 650 MG: 325 TABLET ORAL at 05:11

## 2024-03-06 RX ADMIN — WARFARIN SODIUM 5 MG: 5 TABLET ORAL at 20:39

## 2024-03-06 RX ADMIN — FORMOTEROL FUMARATE 20 MCG: 20 SOLUTION RESPIRATORY (INHALATION) at 09:57

## 2024-03-06 RX ADMIN — GABAPENTIN 600 MG: 300 CAPSULE ORAL at 20:38

## 2024-03-06 ASSESSMENT — PAIN SCALES - GENERAL
PAINLEVEL_OUTOF10: 0 - NO PAIN
PAINLEVEL_OUTOF10: 7
PAINLEVEL_OUTOF10: 7
PAINLEVEL_OUTOF10: 3
PAINLEVEL_OUTOF10: 0 - NO PAIN

## 2024-03-06 ASSESSMENT — ACTIVITIES OF DAILY LIVING (ADL)
HOME_MANAGEMENT_TIME_ENTRY: 15
BATHING_LEVEL_OF_ASSISTANCE: DEPENDENT
BATHING_WHERE_ASSESSED: BED LEVEL

## 2024-03-06 ASSESSMENT — COGNITIVE AND FUNCTIONAL STATUS - GENERAL
PERSONAL GROOMING: A LITTLE
WALKING IN HOSPITAL ROOM: A LOT
MOVING TO AND FROM BED TO CHAIR: A LOT
STANDING UP FROM CHAIR USING ARMS: A LITTLE
PERSONAL GROOMING: A LITTLE
TURNING FROM BACK TO SIDE WHILE IN FLAT BAD: A LOT
TOILETING: TOTAL
MOVING TO AND FROM BED TO CHAIR: A LOT
CLIMB 3 TO 5 STEPS WITH RAILING: TOTAL
CLIMB 3 TO 5 STEPS WITH RAILING: TOTAL
DRESSING REGULAR LOWER BODY CLOTHING: A LOT
HELP NEEDED FOR BATHING: A LOT
MOBILITY SCORE: 12
DAILY ACTIVITIY SCORE: 13
STANDING UP FROM CHAIR USING ARMS: A LITTLE
MOVING FROM LYING ON BACK TO SITTING ON SIDE OF FLAT BED WITH BEDRAILS: A LOT
EATING MEALS: A LITTLE
TOILETING: A LOT
DRESSING REGULAR UPPER BODY CLOTHING: A LOT
WALKING IN HOSPITAL ROOM: A LOT
DAILY ACTIVITIY SCORE: 14
HELP NEEDED FOR BATHING: A LOT
MOVING FROM LYING ON BACK TO SITTING ON SIDE OF FLAT BED WITH BEDRAILS: A LOT
MOBILITY SCORE: 12
TURNING FROM BACK TO SIDE WHILE IN FLAT BAD: A LOT
DRESSING REGULAR UPPER BODY CLOTHING: A LOT
DRESSING REGULAR LOWER BODY CLOTHING: TOTAL

## 2024-03-06 ASSESSMENT — PAIN - FUNCTIONAL ASSESSMENT
PAIN_FUNCTIONAL_ASSESSMENT: 0-10

## 2024-03-06 NOTE — PROGRESS NOTES
MRN: [id number]  SERVICE DATE:  3/6/2024   SERVICE TIME:  9:23 AM  Today's Date: 03/06/24  Admission Date: 3/4/2024  Hospital Day: #2    Subjective        Patient seen today.  Has persistent asthma and submassive PE in 2023.  Patient otherwise continues to have some shortness of breath which is slowly improving.  Patient otherwise does have chronic atelectasis has been seen by pulmonary      Objective      ROS:   General: Denies any change in weight, fever, chills, fatigue.   Head and Neck: Denies any headaches, syncope or history of head injury.   Eyes/Ears: Denies any cataracts, glaucoma, blurred vision, tinnitus.   Nose: Denies any epistaxis or sinus problems   Respiratory: As above   Cardiovascular: No orthopnea, dyspnea, palpitations, congestive heart failure.   Gastrointestinal: Denies any indigestion, nausea, vomiting, diarrhea, constipation.   Neuro: No dizzyness, headache or syncope   ID: No fever or chills.   Endocrine: No weight loss or weight gain.  No thyroid or diabetic complaints     MEDICATIONS:  Current Facility-Administered Medications   Medication Dose Route Frequency Provider Last Rate Last Admin    acetaminophen (Tylenol) tablet 650 mg  650 mg oral q4h PRN Leannetodd Stein APRN-CNP   650 mg at 03/06/24 0511    Or    acetaminophen (Tylenol) oral liquid 650 mg  650 mg nasogastric tube q4h PRN Leanne Stein APRN-CNP        Or    acetaminophen (Tylenol) suppository 650 mg  650 mg rectal q4h PRN Leanneshi Stein APRN-CNP        acetaminophen (Tylenol) tablet 650 mg  650 mg oral Once Polo Navarro DO        [Held by provider] ALPRAZolam (Xanax) tablet 0.5 mg  0.5 mg oral TID PRN Leanneshi Stein APRN-CNP        aspirin chewable tablet 81 mg  81 mg oral Daily LeanneZOEY Colin-CNP   81 mg at 03/05/24 0835    atorvastatin (Lipitor) tablet 40 mg  40 mg oral Nightly Leanneshi Stein APRN-CNP   40 mg at 03/05/24 2023    budesonide (Pulmicort) 0.25 mg/2 mL nebulizer solution 0.25  mg  0.25 mg nebulization Daily Leanne JOCELYN Stein APRN-CNP   0.25 mg at 03/05/24 0932    cholecalciferol (Vitamin D-3) tablet 50 mcg  50 mcg oral Daily Leanne R Stein, APRN-CNP   50 mcg at 03/05/24 0835    cyanocobalamin (Vitamin B-12) tablet 1,000 mcg  1,000 mcg oral Daily Leanne R Emil APRN-CNP   1,000 mcg at 03/05/24 0835    dapagliflozin propanediol (Farxiga) tablet 10 mg  10 mg oral Daily Francisco James MD   10 mg at 03/05/24 1057    fluticasone (Flonase) nasal spray 1 spray  1 spray Each Nostril Daily Leanne R Emil APRN-CNP   1 spray at 03/05/24 0836    formoterol (Perforomist) 20 mcg/2 mL nebulizer solution 20 mcg  20 mcg nebulization BID Leanneshi Stein APRN-CNP   20 mcg at 03/05/24 2049    gabapentin (Neurontin) capsule 600 mg  600 mg oral TID Leanne R Stein, APRN-CNP   600 mg at 03/05/24 2023    guaiFENesin (Mucinex) 12 hr tablet 600 mg  600 mg oral BID PRN Leanne R Emil APRN-CNP        hydrocortisone 2.5 % cream 1 Application  1 Application Topical BID Leanne R Stein, APRN-CNP   1 Application at 03/05/24 1058    hydrOXYzine HCL (Atarax) tablet 10 mg  10 mg oral q6h PRN Leanne JOCELYN Stein APRN-CNP   10 mg at 03/05/24 2206    ipratropium-albuteroL (Duo-Neb) 0.5-2.5 mg/3 mL nebulizer solution 3 mL  3 mL nebulization q6h PRN Leanne R Emil APRN-CNP        levothyroxine (Synthroid, Levoxyl) tablet 150 mcg  150 mcg oral Daily Leanne R Stein, APRN-CNP   150 mcg at 03/06/24 0511    loratadine (Claritin) tablet 10 mg  10 mg oral Daily Leanne R Emil APRN-CNP        metoprolol succinate XL (Toprol-XL) 24 hr tablet 25 mg  25 mg oral Daily Aylin ZOEY Reardon-CNP   25 mg at 03/05/24 0835    montelukast (Singulair) tablet 10 mg  10 mg oral Daily REE Gilmore   10 mg at 03/05/24 2023    nystatin (Mycostatin) 100,000 unit/gram powder 1 Application  1 Application Topical BID REE Gilmore   1 Application at 03/05/24 1607    oxygen (O2) therapy    "inhalation Continuous PRN - O2/gases Gonsalo Bran,    5 L/min at 03/06/24 0800    oxygen (O2) therapy   inhalation q24h Leanneshi Stein, APRN-CNP   8 L/min at 03/04/24 1400    oxygen (O2) therapy   inhalation Continuous PRN - O2/gases Leanneshi Stein, APRN-CNP   4 L/min at 03/05/24 2051    pantoprazole (ProtoNix) EC tablet 40 mg  40 mg oral Daily before breakfast Leannetodd Stein, APRN-CNP   40 mg at 03/06/24 0511    polyethylene glycol (Glycolax, Miralax) packet 17 g  17 g oral Daily Leanne JOCELYN Stein, APRN-CNP   17 g at 03/05/24 1057    sodium chloride (Ocean) 0.65 % nasal spray 1 spray  1 spray Each Nostril PRN Leanneshi Stein, APRN-CNP        sodium chloride 0.9 % bolus 250 mL  250 mL intravenous Once Mayra Cole PA-C        torsemide (Demadex) tablet 10 mg  10 mg oral Daily Francisco James MD   10 mg at 03/05/24 1058    traMADol (Ultram) tablet 50 mg  50 mg oral Nightly PRN Leanneshi Stein, APRN-CNP   50 mg at 03/05/24 2206    warfarin (Coumadin) tablet 2.5 mg  2.5 mg oral Once per day on Sun Tue Thu Sat Leanneshi Stein, APRN-CNP   2.5 mg at 03/05/24 1825    warfarin (Coumadin) tablet 5 mg  5 mg oral Once per day on Mon Wed Fri Leanneshi Stein, APRN-CNP   5 mg at 03/04/24 2022         PHYSICAL EXAM:   /59 (BP Location: Left arm, Patient Position: Lying)   Pulse 66   Temp 35.9 °C (96.6 °F) (Temporal)   Resp 20   Ht 1.651 m (5' 5\")   Wt 131 kg (289 lb 7.4 oz)   LMP  (LMP Unknown)   SpO2 93%   BMI 48.17 kg/m²      CONSTITUTIONAL - well nourished, well developed, looks like stated age, in no acute distress, not ill-appearing   SKIN - normal skin color and pigmentation, normal skin turgor without rash   HEAD - no trauma, normocephalic  EYES - pupils are equal and reactive to light, extraocular muscles are intact, and normal external exam  ENT - TM's intact, no injection, no signs of infection, uvula midline, normal tongue movement and throat normal  NECK - supple without " "rigidity, no neck mass was observed, no thyromegaly    CHEST -basilar rales  CARDIAC - regular rate and regular rhythm, no skipped beats, no murmur  ABDOMEN - no organomegaly, soft, nontender, nondistended, normal bowel sounds   NEUROLOGICAL - normal gait, normal balance, normal motor, no ataxia, DTRs equal and symmetrical; alert, oriented and no focal signs  PSYCHIATRIC - alert, pleasant and cordial, age-appropriate  IMMUNOLOGIC - no cervical lymphadenopathy       Labs:  Lab Results   Component Value Date    WBC 6.2 03/06/2024    HGB 9.5 (L) 03/06/2024    HCT 31.5 (L) 03/06/2024    MCV 94 03/06/2024     03/06/2024     Lab Results   Component Value Date    GLUCOSE 101 (H) 03/06/2024    CALCIUM 8.9 03/06/2024     03/06/2024    K 4.2 03/06/2024    CO2 32 03/06/2024     03/06/2024    BUN 18 03/06/2024    CREATININE 1.51 (H) 03/06/2024   ESR: --No results found for: \"SEDRATE\"No results found for: \"CRP\"  Lab Results   Component Value Date    ALT 10 03/04/2024    AST 14 03/04/2024    ALKPHOS 77 03/04/2024    BILITOT 0.6 03/04/2024                      Problem List Items Addressed This Visit             ICD-10-CM    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) I50.42    Relevant Medications    metoprolol succinate XL (Toprol-XL) 24 hr tablet 25 mg    Other Relevant Orders    Transthoracic Echo (TTE) Limited (Completed)    * (Principal) Shortness of breath - Primary R06.02     Other Visit Diagnoses         Codes    Elevated brain natriuretic peptide (BNP) level     R79.89    Pneumonia due to infectious organism, unspecified laterality, unspecified part of lung     J18.9    Atrial fibrillation with rapid ventricular response (CMS/HCC)     I48.91    Relevant Medications    metoprolol succinate XL (Toprol-XL) 24 hr tablet 25 mg          Check arterial blood gas  Incentive spirometry  Pulmonary toilet  Antibiotics have been discontinued  PT OT eval and treat              Nathan Gilmore MD  This " note was created using [employer]. Any inadvertent grammar, spelling or syntax errors are do to voice recognition software error and are not intentional.

## 2024-03-06 NOTE — CONSULTS
Department of Medicine  Division of Pulmonary, Critical Care, and Sleep Medicine    Reason For Consult  I was asked by Dr. Gilmore to evaluate Ms. Lillian Mendoza for possible pneumonia.    History Of Present Illness  Lillian Mendoza is a 83 y.o. female with past medical history as noted below significant for morbid obesity, moderate persistent asthma, submassive pulmonary embolism in November 2023 status post catheter directed thrombectomy, osteoarthritis and deconditioning who presented to the hospital with acute onset shortness of breath with exertion.  The patient was recently admitted at [facility] with shortness of breath and acute hypoxemia where she was found to have influenza A respiratory infection.  She completed the course of Tamiflu.  Discharged to a rehab facility just 1 day prior to this current admission.  She reports to feel better at the time of discharge though shortly after being at the rehab, she developed shortness of breath with minimal exertion and palpitations which prompted her transfer to the ER.  She was found to have atrial flutter on admission.  Chest x-ray in ER noted possible left lower lobe pneumonia.      By the time of the interview today, the patient reports to feel much better.  She is currently on 4 L/min of supplemental oxygen.  Her heart rate is controlled and she is currently in sinus rhythm.  She denies having acute fevers or chills.  No acute chest pain.  Her cough is intermittent and is only productive of clear sputum.  She had a CT scan of chest done earlier today which is reviewed and compared with previous CT scans.  The patient has large hiatal hernia with chronic atelectasis in the left lower lobe.  I do not see clear consolidation otherwise.       Past Medical History:  Past Medical History:   Diagnosis Date    Arthritis     Asthma     Chronic kidney disease     CVA (cerebral vascular accident) (CMS/HCC)     Diverticulitis     Hiatal hernia      Hyperlipidemia     Hypertension     Hypothyroid     Personal history of diseases of the blood and blood-forming organs and certain disorders involving the immune mechanism 11/23/2020    History of anemia    Personal history of other diseases of the circulatory system 11/23/2020    History of hypertension    Personal history of other diseases of the digestive system 11/23/2020    History of hiatal hernia    Personal history of other diseases of the musculoskeletal system and connective tissue     History of arthritis    Personal history of other diseases of the nervous system and sense organs     History of cataract    Personal history of other diseases of the respiratory system 11/23/2020    History of asthma    Personal history of other diseases of the respiratory system 11/23/2020    History of bronchitis    Personal history of other diseases of the respiratory system 11/23/2020    History of lung disease    Personal history of other diseases of urinary system 11/23/2020    History of kidney problems    Personal history of other endocrine, nutritional and metabolic disease 11/23/2020    History of thyroid disorder    Personal history of other specified conditions 11/23/2020    H/O shortness of breath    Pulmonary embolus (CMS/HCC)        Surgical History:  Past Surgical History:   Procedure Laterality Date    CARDIAC CATHETERIZATION      CATARACT EXTRACTION      HYSTERECTOMY      INVASIVE VASCULAR PROCEDURE N/A 11/15/2023    Procedure: Pulmonary Angiography - Bilateral;  Surgeon: Maldonado Alvarado MD;  Location: [facility] Cardiac [facility] Lab;  Service: Cardiovascular;  Laterality: N/A;    JOINT REPLACEMENT      OTHER SURGICAL HISTORY  11/23/2020    Knee replacement    OTHER SURGICAL HISTORY  11/23/2020    Lumpectomy    OTHER SURGICAL HISTORY  11/23/2020    Cataract surgery    OTHER SURGICAL HISTORY  11/23/2020    Cholecystectomy    OTHER SURGICAL HISTORY  11/23/2020    Hernia repair    OTHER SURGICAL HISTORY  11/23/2020     "Hysterectomy       Social History:   Social History     Tobacco Use    Smoking status: Never     Passive exposure: Never   Substance Use Topics    Alcohol use: Not Currently    Drug use: Not Currently       Family History:  Family History   Problem Relation Name Age of Onset    Cancer Mother      Diabetes Paternal Grandmother      Diabetes Paternal Grandfather            Vital Signs  Visit Vitals  /60   Pulse 74   Temp 36.1 °C (97 °F)   Resp 14   Ht 1.651 m (5' 5\")   Wt 131 kg (289 lb 7.4 oz)   LMP  (LMP Unknown)   SpO2 94%   BMI 48.17 kg/m²   OB Status Postmenopausal   Smoking Status Never   BSA 2.45 m²        Physical Exam  Vitals and nursing note reviewed.   Constitutional:       General: No in acute distress.     Appearance: Normal appearance. Morbidly obese  HENT:      Head: Normocephalic and atraumatic.      Mouth/Throat:      Mouth: Mucous membranes are moist.   Eyes:      Conjunctiva/sclera: Conjunctivae normal.   Cardiovascular:      Rate and Rhythm: Normal rate and regular rhythm.   Pulmonary:      Effort: Pulmonary effort is normal. No respiratory distress.      Breath sounds: Normal breath sounds on anterior chest exam. Diminished over left lower lung fields. No stridor. No wheezing or rhonchi.   Skin:     General: Skin is warm and dry.      Coloration: Skin is not jaundiced.   Neurological:      General: No focal deficit present.      Mental Status: Alert and oriented to person, place, and time. Mental status is at baseline.   Psychiatric:         Mood and Affect: Mood normal.         Behavior: Behavior normal.         Judgment: Judgment normal.       Oxygen Therapy  SpO2: 94 %  Medical Gas Therapy: Supplemental oxygen  O2 Delivery Method: Nasal cannula (5L oximizer)  FiO2 (%):  (8 lpm)      Medications   Scheduled medications  acetaminophen, 650 mg, oral, Once  aspirin, 81 mg, oral, Daily  atorvastatin, 40 mg, oral, Nightly  budesonide, 0.25 mg, nebulization, Daily  cholecalciferol, 50 mcg, oral, " Daily  cyanocobalamin, 1,000 mcg, oral, Daily  dapagliflozin propanediol, 10 mg, oral, Daily  fluticasone, 1 spray, Each Nostril, Daily  formoterol, 20 mcg, nebulization, BID  gabapentin, 600 mg, oral, TID  hydrocortisone, 1 Application, Topical, BID  levothyroxine, 150 mcg, oral, Daily  loratadine, 10 mg, oral, Daily  metoprolol succinate XL, 25 mg, oral, Daily  montelukast, 10 mg, oral, Daily  nystatin, 1 Application, Topical, BID  oxygen, , inhalation, q24h  pantoprazole, 40 mg, oral, Daily before breakfast  polyethylene glycol, 17 g, oral, Daily  torsemide, 10 mg, oral, Daily  warfarin, 2.5 mg, oral, Once per day on Sun Tue Thu Sat  warfarin, 5 mg, oral, Once per day on Mon Wed Fri      Continuous medications     PRN medications  PRN medications: acetaminophen **OR** acetaminophen **OR** acetaminophen, [Held by provider] ALPRAZolam, guaiFENesin, hydrOXYzine HCL, ipratropium-albuteroL, oxygen, oxygen, sodium chloride, traMADol     Allergies  Morphine      Lab Results     Lab Results   Component Value Date    WBC 6.2 03/06/2024    HGB 9.5 (L) 03/06/2024    HCT 31.5 (L) 03/06/2024    MCV 94 03/06/2024     03/06/2024      Lab Results   Component Value Date    GLUCOSE 101 (H) 03/06/2024    CALCIUM 8.9 03/06/2024     03/06/2024    K 4.2 03/06/2024    CO2 32 03/06/2024     03/06/2024    BUN 18 03/06/2024    CREATININE 1.51 (H) 03/06/2024      Lab Results   Component Value Date    ALT 10 03/04/2024    AST 14 03/04/2024    ALKPHOS 77 03/04/2024    BILITOT 0.6 03/04/2024        Chest Radiograph   CT chest wo IV contrast 03/05/2024    Narrative  Interpreted By:  Victor Manuel Posey,  STUDY:  CT CHEST WO IV CONTRAST;  3/5/2024 2:38 pm    INDICATION:  Signs/Symptoms:Acute hypoxia.    COMPARISON:  02/26/2024    ACCESSION NUMBER(S):  [id number]    ORDERING CLINICIAN:  FAHAD DAVIS    TECHNIQUE:  Helical data acquisition of the chest was obtained  without IV  contrast.  Images were reformatted in axial, coronal,  and sagittal  planes.    FINDINGS:  LUNGS and AIRWAYS:  There is some respiratory motion which can obscure findings. Mild  biapical interlobular septal thickening indicating interstitial  edema. Mild right basilar atelectasis, slightly worsened.    There is occlusion of the left lower lobe bronchus with increased  adjacent atelectasis/consolidation.    More centrally the airways are patent. No bronchiectasis. No pleural  effusion or pneumothorax identified.    MEDIASTINUM and GISSELL, LOWER NECK AND AXILLA:  The visualized thyroid gland is grossly unremarkable.    No thoracic lymphadenopathy by CT criteria.    Large hiatal hernia involving nearly the entire stomach, protruding  into the left lung base.    HEART and VESSELS:  Moderate cardiomegaly.    No significant pericardial effusion.    Mild coronary atherosclerosis.    Thoracic aorta is mildly atherosclerotic. Ascending aortic dilatation  up to 4.3 cm. Unchanged dilatation central pulmonary arteries  suggestive of pulmonary hypertension.    UPPER ABDOMEN:  Partially imaged supraumbilical fat containing hernia. Post  cholecystectomy.    CHEST WALL and OSSEOUS STRUCTURES:  Thoracic degenerative changes with dextroscoliosis. Mild anterior  wedging at T6 unchanged.    Impression  1.  Interval occlusion of left lower lobe bronchus causing increased  lobar atelectasis/consolidation. This is at least in part related to  compression by the adjacent enlarged hiatal hernia.  2. Right basilar atelectasis slightly worsened.  3. Mild interstitial edema.      MACRO:  None.    Signed by: Victor Manuel Posey 3/5/2024 2:54 PM  Dictation workstation:   [id number]      CT angio chest for pulmonary embolism 02/26/2024    Narrative  Interpreted By:  Priyanka Gaitan,  STUDY:  CT ANGIO CHEST FOR PULMONARY EMBOLISM;  2/26/2024 10:47 pm    INDICATION:  Signs/Symptoms:History of PE on warfarin with an INR of 1.7 presents  with new oxygen requirement, tachycardia, hypotension, and  elevated  troponin.  Evaluate for acute PE.  Patient is flu a positive..    COMPARISON:  11/21/2023    ACCESSION NUMBER(S):  [id number]    ORDERING CLINICIAN:  ALEJANDRA TOVAR    TECHNIQUE:  Contiguous axial images of the chest were obtained after the  intravenous administration of contrast using angiographic PE  protocol. Coronal and sagittal reformatted images were reconstructed  from the axial data. MIP images were created and reviewed.    FINDINGS:  MEDIASTINUM AND LYMPH NODES: No enlarged intrathoracic or axillary  lymph nodes. Slightly patulous appearance of the esophagus with a  large hiatal hernia with the stomach predominantly contained in the  chest, similar to prior imaging. No pneumomediastinum.    VESSELS: A mildly aneurysmal ascending a thoracic aorta measuring up  to 4.2 by 4.3 cm. Minimal aortic atherosclerosis. Enlarged main  pulmonary artery measuring up to 3.7 cm, suggestive of pulmonary  hypertension. No pulmonary embolism identified to the segmental level.    HEART: Normal in size.  No significant coronary artery  calcifications. No significant pericardial effusion.    LUNG, AIRWAYS, AND PLEURA: The central airways are patent. Bilateral  atelectasis, most pronounced adjacent to the hiatal hernia in the  left lower lobe. No pleural effusion or pneumothorax.    OSSEOUS STRUCTURES/CHEST WALL: The visualized axilla is unremarkable.  Thyroid not well visualized mild degenerative changes present. No  acute osseous abnormality.    UPPER ABDOMEN/OTHER: No acute abnormality.    Impression  No acute pulmonary embolism to the segmental level..    Left lower lobe airspace opacities similar to prior imaging  suggestive of atelectasis with large adjacent hiatal hernia.  Superimposed atypical infection or aspiration however not entirely  excluded.    Mildly aneurysmal ascending thoracic aorta up to 4.3 cm.    3.7 cm enlargement of the main pulmonary artery suggestive of  pulmonary  hypertension.          MACRO:  None.    Signed by: Priyanka Gaitan 2/26/2024 11:03 PM  Dictation workstation:   [id number]      XR chest 1 view 03/04/2024    Narrative  STUDY:  Chest Radiograph;  3/4/2024 at 8:25 AM.  INDICATION:  Shortness of breath, recent pneumonia with worsening hypoxia.  COMPARISON:  CTA chest 2/26/2024; XR chest 2/26/2024, 10/27/2023; CT chest  11/21/2023.  ACCESSION NUMBER(S):  [id number]  ORDERING CLINICIAN:  ASHLEY POND  TECHNIQUE:  Frontal chest was obtained at 08:25 hours.  FINDINGS:  CARDIOMEDIASTINAL SILHOUETTE:  Heart is enlarged with increased pulmonary vascularity.    LUNGS:  Elevation of left hemidiaphragm.  Small pleural effusions with  bibasilar areas of atelectasis and/or consolidation.  Upper lung zones  are clear    ABDOMEN:  No remarkable upper abdominal findings.    BONES:  No acute osseous changes.    Impression  Increased pulmonary vascularity. Small pleural effusions with  bibasilar areas of atelectasis and/or consolidation.  Signed by Akshat jN MD      XR chest 1 view 02/26/2024    Narrative  Interpreted By:  Curtis Rogers,  STUDY:  XR CHEST 1 VIEW;  2/26/2024 9:39 pm    INDICATION:  Signs/Symptoms:SOB.    COMPARISON:  Chest x-ray 11/27/ 1.    ACCESSION NUMBER(S):  [id number]    ORDERING CLINICIAN:  ALEJANDRA TOVAR    FINDINGS:  Multiple overlying are present.    CARDIOMEDIASTINAL SILHOUETTE:  Cardiac silhouette is enlarged but stable. Pulmonary vascular  congestion noted.    LUNGS:  Retrocardiac opacity with blunting of the left costophrenic angle may  relate to combination of effusion and atelectasis. Superimposed  infection not excluded. No pneumothorax.    ABDOMEN:  Large hiatal hernia.    BONES:  Dextroconvex scoliosis. Advanced bilateral shoulder osteoarthrosis.  Partial visualization of fixation plate and screws in the right mid  humerus.    Impression  Cardiomegaly with mild pulmonary vascular congestion.    Large hiatal hernia. Retrocardiac opacity  may relate to combination  of small effusion and atelectasis. Superimposed infection not  excluded. Correlate clinically.    MACRO:  None    Signed by: Curtis Rogers 2/26/2024 9:49 PM  Dictation workstation:   [id number]         Assessment and Plan / Recommendations   Assessment/Plan     Acute respiratory failure with hypoxemia (atelectasis, obesity hypoventilation syndrome)  A flutter with RVR  Large hiatal hernia with chronic atelectasis in the left lower lobe  Recent hospital admission with influenza A infection  Moderate persistent asthma - no signs of acute exacerbation now  Morbid obesity with clear deconditioning  Hx of Pulmonary embolism - on Coumadin    CT scan of chest is reviewed today.  Large hiatal hernia with atelectasis in the left lower lobe noted.  I do not see clear consolidation or signs of acute respiratory infection at this time.  She does not have strong signs and symptoms of acute bacterial infection and her procalcitonin is within normal range.    Recommend:  Daytime ABG  Incentive spirometry use at rest.  Continue on home inhaler regimen for asthma  PAP therapy at night for BLAYNE  Agree to discontinue abx at this time.         Gabriela Mckeon MD    Please excuse any misspellings or unintended errors related to the Dragon speech recognition software used to dictate this note.

## 2024-03-06 NOTE — CARE PLAN
Problem: Safety  Goal: Patient will be injury free during hospitalization  Outcome: Progressing     Problem: Psychosocial Needs  Goal: Collaborate with me, my family, and caregiver to identify my specific goals  Outcome: Progressing     Problem: Discharge Barriers  Goal: My discharge needs are met  Outcome: Progressing     Problem: Skin  Goal: Decreased wound size/increased tissue granulation at next dressing change  Outcome: Progressing  Goal: Participates in plan/prevention/treatment measures  Outcome: Progressing  Goal: Prevent/minimize sheer/friction injuries  Outcome: Progressing  Goal: Promote/optimize nutrition  Outcome: Progressing  Goal: Promote skin healing  Outcome: Progressing   The patient's goals for the shift include      The clinical goals for the shift include Pt will maintain Spo2 >92% throughout shift

## 2024-03-06 NOTE — PROGRESS NOTES
"INPATIENT PROGRESS NOTES    PATIENT NAME: Lillian Mendoza    MRN: [id number]  SERVICE DATE:  3/6/2024   SERVICE TIME:  10:56 AM    SIGNATURE: Guerda Pitts MD      SUBJECTIVE  Afebrile  No events overnight       OBJECTIVE  PHYSICAL EXAM:   Patient Vitals for the past 24 hrs:   BP Temp Temp src Pulse Resp SpO2 Weight   03/06/24 0957 -- -- -- -- -- 95 % --   03/06/24 0800 102/59 35.9 °C (96.6 °F) Temporal 66 20 93 % --   03/06/24 0405 104/60 36.1 °C (97 °F) -- 74 14 94 % 131 kg (289 lb 7.4 oz)   03/05/24 2359 114/73 36.2 °C (97.2 °F) -- 70 14 94 % --   03/05/24 2051 -- -- -- -- -- 93 % --   03/05/24 2015 162/87 36.4 °C (97.5 °F) Temporal 84 21 93 % --   03/05/24 1600 143/69 36.5 °C (97.7 °F) Temporal 77 20 93 % --   03/05/24 1200 114/79 36.5 °C (97.7 °F) Temporal 72 20 95 % --   03/05/24 1117 -- -- -- -- -- -- 130 kg (287 lb 0.6 oz)         Gen: NAD  Neck: symmetric, no mass  Respiratory: On 5 L of oxygen  Extremities: no leg edema  Skin: Warm and dry.  Neuro: alert and oriented times 3    Labs:  Lab Results   Component Value Date    WBC 6.2 03/06/2024    HGB 9.5 (L) 03/06/2024    HCT 31.5 (L) 03/06/2024    MCV 94 03/06/2024     03/06/2024     Lab Results   Component Value Date    GLUCOSE 101 (H) 03/06/2024    CALCIUM 8.9 03/06/2024     03/06/2024    K 4.2 03/06/2024    CO2 32 03/06/2024     03/06/2024    BUN 18 03/06/2024    CREATININE 1.51 (H) 03/06/2024   ESR: --No results found for: \"SEDRATE\"No results found for: \"CRP\"  Lab Results   Component Value Date    ALT 10 03/04/2024    AST 14 03/04/2024    ALKPHOS 77 03/04/2024    BILITOT 0.6 03/04/2024         DATA:   Diagnostic tests reviewed for today's visit:    Labs this admission reviewed  Imagings this admission reviewed  Cultures: Reviewed        ASSESSMENT :   -Acute hypoxic respiratory failure likely secondary to volume overload/acute CHF likely triggered by A-fib with RVR  -A-fib with RVR  -Less likely bacterial pneumonia  -Chronic kidney " disease  -Recent influenza A infection completed 5 days of oseltamivir  -CVA, diverticulitis, hyperlipidemia, hypothyroidism     PLAN:  -Procalcitonin within normal limit  -CT chest showed occlusion of the left lower lobe likely secondary to hiatal hernia, no consolidation to suggest pneumonia.  -Cardiology team is following   -Continue to monitor off antibiotics    Guerda Pitts MD.   Infectious Diseases Attending

## 2024-03-06 NOTE — NURSING NOTE
1100-assumed care of patient at this time, alert and oriented times four, no c/o pain, remains on 3.5L O2, remains on heart monitor, RN encouraged Incentive spirometer, BLE +2 swelling, elevated on pillows, dressing changed to coccyx    1700-patient put back into bed from recliner by 2 assist and walker, purewick in place for accurate I's and O's, remains on 4L O2 oximizer, bed alarm on and active, safety measures in place.

## 2024-03-06 NOTE — NURSING NOTE
Pt remained safe throughout shift. Vitals stable. Sp02 maintained >2%, supplemental O2 increased from 4L- 5L oximizer. Heart rhythm remains NSR.

## 2024-03-06 NOTE — PROGRESS NOTES
Occupational Therapy    OT Treatment    Patient Name: Lillian Mendoza  MRN: [id number]  Today's Date: 3/6/2024  Time Calculation  Start Time: 1209  Stop Time: 1325  Time Calculation (min): 76 min         Assessment:     OT Assessment Results: Decreased ADL status, Decreased endurance, Decreased functional mobility    Plan:  Treatment Interventions: ADL retraining, Functional transfer training  OT Frequency: 5 times per week  OT Discharge Recommendations: Low intensity level of continued care (pt has assist from family)  Equipment Recommended upon Discharge: Wheeled walker, Wheelchair, Bedside commode (gait belt)  Treatment Interventions: ADL retraining, Functional transfer training  Subjective     Outcome Measures:[facility] Daily Activity  Putting on and taking off regular lower body clothing: Total  Bathing (including washing, rinsing, drying): A lot  Putting on and taking off regular upper body clothing: A lot  Toileting, which includes using toilet, bedpan or urinal: Total  Taking care of personal grooming such as brushing teeth: A little  Eating Meals: None  Daily Activity - Total Score: 13         03/06/24 1209   OT Last Visit   OT Received On 03/06/24   General   Reason for Referral impaired mobility   Co-Treatment PT   Co-Treatment Reason pt safety   Prior to Session Communication Bedside nurse   Patient Position Received Bed, 3 rail up;Alarm on   Preferred Learning Style visual;verbal   General Comment Patient agreeable to therapy.   Precautions   Medical Precautions Fall precautions;Oxygen therapy device and L/min   Vital Signs   SpO2 93 %  (4L)   Cognition   Orientation Level Oriented X4   Grooming   Grooming Level of Assistance Setup   Grooming Where Assessed Edge of bed   Grooming Comments washed face   LE Bathing   LE Bathing Level of Assistance Dependent   LE Bathing Where Assessed Bed level   UE Dressing   UE Dressing Level of Assistance Moderate assistance   UE Dressing Where Assessed Bed level   UE  Dressing Comments changed gown   LE Dressing   LE Dressing   (granddaughter assists with ADL's at home)   Toileting   Toileting Level of Assistance Dependent   Where Assessed Bed level   Toileting Comments pt using pure wick, laying in wet bed. pt stood for nursing to coplete marcial care.   Bed Mobility   Bed Mobility   (max A of 2 people to get pt to EOB)   Transfers   Transfer   (STS, min A of 2, bed to chair, min A of 2 people using ww support and cues for correct technique)   Static Sitting Balance   Static Sitting-Level of Assistance Close supervision   Static Sitting-Comment/Number of Minutes seated EOB for over 45 minutes   OT Assessment   OT Assessment Results Decreased ADL status;Decreased endurance;Decreased functional mobility   Education   Individual(s) Educated Patient   Education Provided Fall precautons;Risk and benefits of OT discussed with patient or other   Patient Response to Education Patient/Caregiver Verbalized Understanding of Information   Education Comment pt would benefit from continued reinforcement   Inpatient Plan   Treatment Interventions ADL retraining;Functional transfer training   OT Frequency 5 times per week   OT Discharge Recommendations Low intensity level of continued care  (pt has assist from family)   Equipment Recommended upon Discharge Wheeled walker;Wheelchair;Bedside commode  (gait belt)         Goals:  Encounter Problems       Encounter Problems (Active)       OT Goals       Patient will complete functional transfers with mod I. (Progressing)       Start:  03/05/24    Expected End:  03/19/24            Patient will complete toileting with min A. (Progressing)       Start:  03/05/24    Expected End:  03/19/24            Patient will complete LE dressing with min A. (Progressing)       Start:  03/05/24    Expected End:  03/19/24

## 2024-03-06 NOTE — PROGRESS NOTES
"  Patient: Lillian Mendoza  : 1940  MRN: [id number]    Today's Date: 24  Admission Date: 3/4/2024  Hospital Day: #2    ASSESSMENT/PLAN:  Atrial flutter: Remains in normal sinus rhythm.  Continue warfarin.  No need for antiarrhythmic.  Continue beta-blocker therapy.  Acute on chronic diastolic heart failure: Overall seems to be improved.  Legs appear to be less edematous.  Creatinine however has increased slightly.  For now I would not make any changes.  If creatinine rises further we may need to back off on the torsemide.    OVERNIGHT EVENTS:  None    SUBJECTIVE:  Patient feels as if her breathing has slowly improved though she remains on 4 L nasal cannula Oxymizer.  No other complaints.    OBJECTIVE:  VITALS: /85 (BP Location: Left arm, Patient Position: Lying)   Pulse 70   Temp 36 °C (96.8 °F) (Temporal)   Resp 18   Ht 1.651 m (5' 5\")   Wt 131 kg (289 lb 7.4 oz)   LMP  (LMP Unknown)   SpO2 93%   BMI 48.17 kg/m²       Intake/Output Summary (Last 24 hours) at 3/6/2024 1310  Last data filed at 3/6/2024 1100  Gross per 24 hour   Intake 240 ml   Output 400 ml   Net -160 ml       Physical Exam  Vitals reviewed.   Cardiovascular:      Rate and Rhythm: Normal rate and regular rhythm.      Heart sounds: No murmur heard.  Pulmonary:      Effort: Pulmonary effort is normal. No respiratory distress.      Breath sounds: No wheezing or rales.   Abdominal:      General: Abdomen is flat. There is no distension.      Palpations: Abdomen is soft.   Musculoskeletal:      Right lower le+ Edema present.      Left lower le+ Edema present.   Skin:     General: Skin is warm and dry.   Neurological:      General: No focal deficit present.      Mental Status: She is alert and oriented to person, place, and time.   Psychiatric:         Mood and Affect: Mood normal.          Meds:Scheduled medications  acetaminophen, 650 mg, oral, Once  aspirin, 81 mg, oral, Daily  atorvastatin, 40 mg, oral, " Nightly  budesonide, 0.25 mg, nebulization, Daily  cholecalciferol, 50 mcg, oral, Daily  cyanocobalamin, 1,000 mcg, oral, Daily  dapagliflozin propanediol, 10 mg, oral, Daily  fluticasone, 1 spray, Each Nostril, Daily  formoterol, 20 mcg, nebulization, BID  gabapentin, 600 mg, oral, TID  hydrocortisone, 1 Application, Topical, BID  levothyroxine, 150 mcg, oral, Daily  loratadine, 10 mg, oral, Daily  metoprolol succinate XL, 25 mg, oral, Daily  montelukast, 10 mg, oral, Daily  nystatin, 1 Application, Topical, BID  oxygen, , inhalation, q24h  pantoprazole, 40 mg, oral, Daily before breakfast  polyethylene glycol, 17 g, oral, Daily  sodium chloride, 250 mL, intravenous, Once  torsemide, 10 mg, oral, Daily  warfarin, 2.5 mg, oral, Once per day on Sun Tue Thu Sat  warfarin, 5 mg, oral, Once per day on Mon Wed Fri      Continuous medications     PRN medications  PRN medications: acetaminophen **OR** acetaminophen **OR** acetaminophen, [Held by provider] ALPRAZolam, guaiFENesin, hydrOXYzine HCL, ipratropium-albuteroL, oxygen, oxygen, sodium chloride, traMADol    Infusions:     DIAGNOSTIC DATA:  Results for orders placed or performed during the hospital encounter of 03/04/24 (from the past 24 hour(s))   Basic metabolic panel   Result Value Ref Range    Glucose 101 (H) 74 - 99 mg/dL    Sodium 142 136 - 145 mmol/L    Potassium 4.2 3.5 - 5.3 mmol/L    Chloride 105 98 - 107 mmol/L    Bicarbonate 32 21 - 32 mmol/L    Anion Gap 9 (L) 10 - 20 mmol/L    Urea Nitrogen 18 6 - 23 mg/dL    Creatinine 1.51 (H) 0.50 - 1.05 mg/dL    eGFR 35 (L) >60 mL/min/1.73m*2    Calcium 8.9 8.6 - 10.3 mg/dL   CBC   Result Value Ref Range    WBC 6.2 4.4 - 11.3 x10*3/uL    nRBC 0.0 0.0 - 0.0 /100 WBCs    RBC 3.35 (L) 4.00 - 5.20 x10*6/uL    Hemoglobin 9.5 (L) 12.0 - 16.0 g/dL    Hematocrit 31.5 (L) 36.0 - 46.0 %    MCV 94 80 - 100 fL    MCH 28.4 26.0 - 34.0 pg    MCHC 30.2 (L) 32.0 - 36.0 g/dL    RDW 15.9 (H) 11.5 - 14.5 %    Platelets 330 150 - 450  x10*3/uL   Protime-INR   Result Value Ref Range    Protime 30.3 (H) 9.8 - 12.8 seconds    INR 2.7 (H) 0.9 - 1.1        No results found for this or any previous visit.     Francisco James MD

## 2024-03-07 LAB
ANION GAP SERPL CALC-SCNC: 11 MMOL/L (ref 10–20)
BUN SERPL-MCNC: 19 MG/DL (ref 6–23)
CALCIUM SERPL-MCNC: 9.1 MG/DL (ref 8.6–10.3)
CHLORIDE SERPL-SCNC: 101 MMOL/L (ref 98–107)
CO2 SERPL-SCNC: 35 MMOL/L (ref 21–32)
CREAT SERPL-MCNC: 1.5 MG/DL (ref 0.5–1.05)
EGFRCR SERPLBLD CKD-EPI 2021: 35 ML/MIN/1.73M*2
ERYTHROCYTE [DISTWIDTH] IN BLOOD BY AUTOMATED COUNT: 15.6 % (ref 11.5–14.5)
GLUCOSE SERPL-MCNC: 94 MG/DL (ref 74–99)
HCT VFR BLD AUTO: 33.2 % (ref 36–46)
HGB BLD-MCNC: 10.3 G/DL (ref 12–16)
INR PPP: 2.5 (ref 0.9–1.1)
MCH RBC QN AUTO: 28.7 PG (ref 26–34)
MCHC RBC AUTO-ENTMCNC: 31 G/DL (ref 32–36)
MCV RBC AUTO: 93 FL (ref 80–100)
NRBC BLD-RTO: 0 /100 WBCS (ref 0–0)
PLATELET # BLD AUTO: 325 X10*3/UL (ref 150–450)
POTASSIUM SERPL-SCNC: 4 MMOL/L (ref 3.5–5.3)
PROTHROMBIN TIME: 28.4 SECONDS (ref 9.8–12.8)
RBC # BLD AUTO: 3.59 X10*6/UL (ref 4–5.2)
SODIUM SERPL-SCNC: 143 MMOL/L (ref 136–145)
WBC # BLD AUTO: 6.3 X10*3/UL (ref 4.4–11.3)

## 2024-03-07 PROCEDURE — 85027 COMPLETE CBC AUTOMATED: CPT | Performed by: NURSE PRACTITIONER

## 2024-03-07 PROCEDURE — 2500000002 HC RX 250 W HCPCS SELF ADMINISTERED DRUGS (ALT 637 FOR MEDICARE OP, ALT 636 FOR OP/ED): Performed by: NURSE PRACTITIONER

## 2024-03-07 PROCEDURE — 2500000004 HC RX 250 GENERAL PHARMACY W/ HCPCS (ALT 636 FOR OP/ED): Performed by: NURSE PRACTITIONER

## 2024-03-07 PROCEDURE — 97535 SELF CARE MNGMENT TRAINING: CPT | Mod: GP,CQ

## 2024-03-07 PROCEDURE — 97530 THERAPEUTIC ACTIVITIES: CPT | Mod: GO,CO

## 2024-03-07 PROCEDURE — 94640 AIRWAY INHALATION TREATMENT: CPT

## 2024-03-07 PROCEDURE — 36415 COLL VENOUS BLD VENIPUNCTURE: CPT | Performed by: NURSE PRACTITIONER

## 2024-03-07 PROCEDURE — 99233 SBSQ HOSP IP/OBS HIGH 50: CPT | Performed by: INTERNAL MEDICINE

## 2024-03-07 PROCEDURE — 80048 BASIC METABOLIC PNL TOTAL CA: CPT | Performed by: NURSE PRACTITIONER

## 2024-03-07 PROCEDURE — 2500000005 HC RX 250 GENERAL PHARMACY W/O HCPCS: Performed by: EMERGENCY MEDICINE

## 2024-03-07 PROCEDURE — 2500000001 HC RX 250 WO HCPCS SELF ADMINISTERED DRUGS (ALT 637 FOR MEDICARE OP): Performed by: NURSE PRACTITIONER

## 2024-03-07 PROCEDURE — 2500000001 HC RX 250 WO HCPCS SELF ADMINISTERED DRUGS (ALT 637 FOR MEDICARE OP): Performed by: INTERNAL MEDICINE

## 2024-03-07 PROCEDURE — 85610 PROTHROMBIN TIME: CPT | Performed by: NURSE PRACTITIONER

## 2024-03-07 PROCEDURE — 97535 SELF CARE MNGMENT TRAINING: CPT | Mod: GO,CO

## 2024-03-07 PROCEDURE — 97530 THERAPEUTIC ACTIVITIES: CPT | Mod: GP,CQ

## 2024-03-07 PROCEDURE — 1200000002 HC GENERAL ROOM WITH TELEMETRY DAILY

## 2024-03-07 PROCEDURE — 97110 THERAPEUTIC EXERCISES: CPT | Mod: GP,CQ

## 2024-03-07 RX ORDER — DAPAGLIFLOZIN 10 MG/1
10 TABLET, FILM COATED ORAL DAILY
Qty: 30 TABLET | Refills: 3 | Status: SHIPPED | OUTPATIENT
Start: 2024-03-08 | End: 2024-03-07 | Stop reason: SDUPTHER

## 2024-03-07 RX ORDER — TORSEMIDE 10 MG/1
10 TABLET ORAL DAILY
Qty: 30 TABLET | Refills: 0 | Status: SHIPPED | OUTPATIENT
Start: 2024-03-08 | End: 2024-03-07 | Stop reason: SDUPTHER

## 2024-03-07 RX ORDER — HYDROXYZINE HYDROCHLORIDE 10 MG/1
10 TABLET, FILM COATED ORAL NIGHTLY PRN
Qty: 30 TABLET | Refills: 0 | Status: SHIPPED | OUTPATIENT
Start: 2024-03-07 | End: 2024-03-21

## 2024-03-07 RX ORDER — TORSEMIDE 10 MG/1
10 TABLET ORAL DAILY
Qty: 30 TABLET | Refills: 0 | Status: SHIPPED | OUTPATIENT
Start: 2024-03-08 | End: 2024-04-07

## 2024-03-07 RX ORDER — METOPROLOL SUCCINATE 25 MG/1
25 TABLET, EXTENDED RELEASE ORAL DAILY
Qty: 30 TABLET | Refills: 0 | Status: SHIPPED | OUTPATIENT
Start: 2024-03-08 | End: 2024-04-07

## 2024-03-07 RX ORDER — METOPROLOL SUCCINATE 25 MG/1
25 TABLET, EXTENDED RELEASE ORAL DAILY
Qty: 30 TABLET | Refills: 2 | Status: SHIPPED | OUTPATIENT
Start: 2024-03-08 | End: 2024-03-07 | Stop reason: SDUPTHER

## 2024-03-07 RX ORDER — GUAIFENESIN 600 MG/1
600 TABLET, EXTENDED RELEASE ORAL 2 TIMES DAILY PRN
Start: 2024-03-07

## 2024-03-07 RX ORDER — DAPAGLIFLOZIN 10 MG/1
10 TABLET, FILM COATED ORAL DAILY
Qty: 30 TABLET | Refills: 0 | Status: SHIPPED | OUTPATIENT
Start: 2024-03-08 | End: 2024-03-08 | Stop reason: HOSPADM

## 2024-03-07 RX ADMIN — MONTELUKAST 10 MG: 10 TABLET, FILM COATED ORAL at 22:10

## 2024-03-07 RX ADMIN — NYSTATIN 1 APPLICATION: 100000 POWDER TOPICAL at 22:13

## 2024-03-07 RX ADMIN — FLUTICASONE PROPIONATE 1 SPRAY: 50 SPRAY, METERED NASAL at 08:20

## 2024-03-07 RX ADMIN — NYSTATIN 1 APPLICATION: 100000 POWDER TOPICAL at 09:00

## 2024-03-07 RX ADMIN — ASPIRIN 81 MG CHEWABLE TABLET 81 MG: 81 TABLET CHEWABLE at 08:14

## 2024-03-07 RX ADMIN — ATORVASTATIN CALCIUM 40 MG: 40 TABLET, FILM COATED ORAL at 22:15

## 2024-03-07 RX ADMIN — TRAMADOL HYDROCHLORIDE 50 MG: 50 TABLET, COATED ORAL at 22:18

## 2024-03-07 RX ADMIN — FORMOTEROL FUMARATE 20 MCG: 20 SOLUTION RESPIRATORY (INHALATION) at 20:50

## 2024-03-07 RX ADMIN — CYANOCOBALAMIN TAB 1000 MCG 1000 MCG: 1000 TAB at 08:14

## 2024-03-07 RX ADMIN — HYDROCORTISONE 1 APPLICATION: 25 CREAM TOPICAL at 08:13

## 2024-03-07 RX ADMIN — PANTOPRAZOLE SODIUM 40 MG: 40 TABLET, DELAYED RELEASE ORAL at 05:22

## 2024-03-07 RX ADMIN — GABAPENTIN 600 MG: 300 CAPSULE ORAL at 08:20

## 2024-03-07 RX ADMIN — HYDROXYZINE HYDROCHLORIDE 10 MG: 10 TABLET ORAL at 08:14

## 2024-03-07 RX ADMIN — BUDESONIDE 0.25 MG: 0.25 INHALANT RESPIRATORY (INHALATION) at 09:17

## 2024-03-07 RX ADMIN — TORSEMIDE 10 MG: 20 TABLET ORAL at 08:14

## 2024-03-07 RX ADMIN — LEVOTHYROXINE SODIUM 150 MCG: 150 TABLET ORAL at 05:22

## 2024-03-07 RX ADMIN — Medication 50 MCG: at 08:14

## 2024-03-07 RX ADMIN — HYDROCORTISONE 1 APPLICATION: 25 CREAM TOPICAL at 22:13

## 2024-03-07 RX ADMIN — METOPROLOL SUCCINATE 25 MG: 25 TABLET, EXTENDED RELEASE ORAL at 08:14

## 2024-03-07 RX ADMIN — FORMOTEROL FUMARATE 20 MCG: 20 SOLUTION RESPIRATORY (INHALATION) at 09:17

## 2024-03-07 RX ADMIN — POLYETHYLENE GLYCOL 3350 17 G: 17 POWDER, FOR SOLUTION ORAL at 08:20

## 2024-03-07 RX ADMIN — DAPAGLIFLOZIN 10 MG: 10 TABLET, FILM COATED ORAL at 08:13

## 2024-03-07 RX ADMIN — WARFARIN SODIUM 2.5 MG: 2.5 TABLET ORAL at 17:17

## 2024-03-07 RX ADMIN — GABAPENTIN 600 MG: 300 CAPSULE ORAL at 22:10

## 2024-03-07 RX ADMIN — GABAPENTIN 600 MG: 300 CAPSULE ORAL at 14:38

## 2024-03-07 ASSESSMENT — ACTIVITIES OF DAILY LIVING (ADL)
BATHING_LEVEL_OF_ASSISTANCE: DEPENDENT
HOME_MANAGEMENT_TIME_ENTRY: 45
BATHING_WHERE_ASSESSED: BED LEVEL

## 2024-03-07 ASSESSMENT — PAIN SCALES - GENERAL
PAINLEVEL_OUTOF10: 7
PAINLEVEL_OUTOF10: 0 - NO PAIN
PAINLEVEL_OUTOF10: 5 - MODERATE PAIN

## 2024-03-07 ASSESSMENT — COGNITIVE AND FUNCTIONAL STATUS - GENERAL
TURNING FROM BACK TO SIDE WHILE IN FLAT BAD: A LOT
DAILY ACTIVITIY SCORE: 14
PERSONAL GROOMING: A LITTLE
MOVING FROM LYING ON BACK TO SITTING ON SIDE OF FLAT BED WITH BEDRAILS: A LOT
MOBILITY SCORE: 11
MOVING FROM LYING ON BACK TO SITTING ON SIDE OF FLAT BED WITH BEDRAILS: A LOT
DRESSING REGULAR UPPER BODY CLOTHING: A LOT
DRESSING REGULAR UPPER BODY CLOTHING: A LITTLE
STANDING UP FROM CHAIR USING ARMS: A LITTLE
DRESSING REGULAR LOWER BODY CLOTHING: TOTAL
PERSONAL GROOMING: A LITTLE
DRESSING REGULAR LOWER BODY CLOTHING: A LOT
TURNING FROM BACK TO SIDE WHILE IN FLAT BAD: A LOT
TOILETING: A LITTLE
MOVING TO AND FROM BED TO CHAIR: A LOT
HELP NEEDED FOR BATHING: A LOT
MOBILITY SCORE: 12
TOILETING: TOTAL
EATING MEALS: A LITTLE
TOILETING: A LOT
DRESSING REGULAR UPPER BODY CLOTHING: A LITTLE
CLIMB 3 TO 5 STEPS WITH RAILING: TOTAL
MOVING TO AND FROM BED TO CHAIR: A LOT
TURNING FROM BACK TO SIDE WHILE IN FLAT BAD: A LOT
STANDING UP FROM CHAIR USING ARMS: A LOT
WALKING IN HOSPITAL ROOM: A LOT
DAILY ACTIVITIY SCORE: 13
DRESSING REGULAR LOWER BODY CLOTHING: TOTAL
STANDING UP FROM CHAIR USING ARMS: A LITTLE
HELP NEEDED FOR BATHING: TOTAL
DAILY ACTIVITIY SCORE: 13
WALKING IN HOSPITAL ROOM: A LOT
DRESSING REGULAR UPPER BODY CLOTHING: A LITTLE
CLIMB 3 TO 5 STEPS WITH RAILING: TOTAL
EATING MEALS: A LITTLE
HELP NEEDED FOR BATHING: A LOT
DRESSING REGULAR LOWER BODY CLOTHING: TOTAL
EATING MEALS: A LITTLE
CLIMB 3 TO 5 STEPS WITH RAILING: TOTAL
MOVING TO AND FROM BED TO CHAIR: A LOT
EATING MEALS: A LITTLE
DAILY ACTIVITIY SCORE: 14
MOBILITY SCORE: 12
PERSONAL GROOMING: A LITTLE
PERSONAL GROOMING: A LITTLE
HELP NEEDED FOR BATHING: TOTAL
TOILETING: A LOT
MOVING FROM LYING ON BACK TO SITTING ON SIDE OF FLAT BED WITH BEDRAILS: A LOT
WALKING IN HOSPITAL ROOM: A LOT

## 2024-03-07 ASSESSMENT — PAIN DESCRIPTION - LOCATION: LOCATION: GENERALIZED

## 2024-03-07 ASSESSMENT — PAIN - FUNCTIONAL ASSESSMENT
PAIN_FUNCTIONAL_ASSESSMENT: 0-10
PAIN_FUNCTIONAL_ASSESSMENT: 0-10

## 2024-03-07 NOTE — PROGRESS NOTES
"  Patient: Lillian Mendoza  : 1940  MRN: [id number]    Today's Date: 24  Admission Date: 3/4/2024  Hospital Day: #3    ASSESSMENT/PLAN:  Atrial flutter - remains in NSR. Recommend no medication changes.  Cont warfarin  Acute diastolic HF - seems close to euvolemic. I think some of her lower extremity edema is venous insufficiency and just tissue/obesity related. No changes today  EMILI - stable cr. Cont fraxiga and torsemide at current dose. If cr. Rises further can change torsemide to every other day dosing.  Disp: Cardiology will sign off    OVERNIGHT EVENTS:  None    SUBJECTIVE:  Pt continues to get hilario.r Oxygen requirement cont's to get better.    OBJECTIVE:  VITALS: /89 (BP Location: Left arm, Patient Position: Lying)   Pulse 66   Temp 36.2 °C (97.2 °F) (Temporal)   Resp 18   Ht 1.651 m (5' 5\")   Wt 128 kg (283 lb 1.1 oz)   LMP  (LMP Unknown)   SpO2 94%   BMI 47.11 kg/m²       Intake/Output Summary (Last 24 hours) at 3/7/2024 0955  Last data filed at 3/7/2024 0412  Gross per 24 hour   Intake 480 ml   Output 900 ml   Net -420 ml       Physical Exam  Vitals reviewed.   Cardiovascular:      Rate and Rhythm: Normal rate and regular rhythm.      Heart sounds: No murmur heard.  Pulmonary:      Effort: Pulmonary effort is normal. No respiratory distress.      Breath sounds: Examination of the right-lower field reveals decreased breath sounds. Examination of the left-lower field reveals decreased breath sounds. Decreased breath sounds present. No wheezing or rales.   Abdominal:      General: Abdomen is flat. There is no distension.      Palpations: Abdomen is soft.   Musculoskeletal:      Right lower le+ Edema present.      Left lower le+ Edema present.   Skin:     General: Skin is warm and dry.   Neurological:      General: No focal deficit present.      Mental Status: She is alert and oriented to person, place, and time.   Psychiatric:         Mood and Affect: Mood normal.      "     Meds:Scheduled medications  acetaminophen, 650 mg, oral, Once  aspirin, 81 mg, oral, Daily  atorvastatin, 40 mg, oral, Nightly  budesonide, 0.25 mg, nebulization, Daily  cholecalciferol, 50 mcg, oral, Daily  cyanocobalamin, 1,000 mcg, oral, Daily  dapagliflozin propanediol, 10 mg, oral, Daily  fluticasone, 1 spray, Each Nostril, Daily  formoterol, 20 mcg, nebulization, BID  gabapentin, 600 mg, oral, TID  hydrocortisone, 1 Application, Topical, BID  levothyroxine, 150 mcg, oral, Daily  loratadine, 10 mg, oral, Daily  metoprolol succinate XL, 25 mg, oral, Daily  montelukast, 10 mg, oral, Daily  nystatin, 1 Application, Topical, BID  pantoprazole, 40 mg, oral, Daily before breakfast  polyethylene glycol, 17 g, oral, Daily  sodium chloride, 250 mL, intravenous, Once  torsemide, 10 mg, oral, Daily  warfarin, 2.5 mg, oral, Once per day on Sun Tue Thu Sat  warfarin, 5 mg, oral, Once per day on Mon Wed Fri      Continuous medications     PRN medications  PRN medications: acetaminophen **OR** acetaminophen **OR** acetaminophen, [Held by provider] ALPRAZolam, guaiFENesin, hydrOXYzine HCL, ipratropium-albuteroL, oxygen, oxygen, sodium chloride, traMADol    Infusions:     DIAGNOSTIC DATA:  Results for orders placed or performed during the hospital encounter of 03/04/24 (from the past 24 hour(s))   Basic metabolic panel   Result Value Ref Range    Glucose 94 74 - 99 mg/dL    Sodium 143 136 - 145 mmol/L    Potassium 4.0 3.5 - 5.3 mmol/L    Chloride 101 98 - 107 mmol/L    Bicarbonate 35 (H) 21 - 32 mmol/L    Anion Gap 11 10 - 20 mmol/L    Urea Nitrogen 19 6 - 23 mg/dL    Creatinine 1.50 (H) 0.50 - 1.05 mg/dL    eGFR 35 (L) >60 mL/min/1.73m*2    Calcium 9.1 8.6 - 10.3 mg/dL   CBC   Result Value Ref Range    WBC 6.3 4.4 - 11.3 x10*3/uL    nRBC 0.0 0.0 - 0.0 /100 WBCs    RBC 3.59 (L) 4.00 - 5.20 x10*6/uL    Hemoglobin 10.3 (L) 12.0 - 16.0 g/dL    Hematocrit 33.2 (L) 36.0 - 46.0 %    MCV 93 80 - 100 fL    MCH 28.7 26.0 - 34.0 pg     MCHC 31.0 (L) 32.0 - 36.0 g/dL    RDW 15.6 (H) 11.5 - 14.5 %    Platelets 325 150 - 450 x10*3/uL   Protime-INR   Result Value Ref Range    Protime 28.4 (H) 9.8 - 12.8 seconds    INR 2.5 (H) 0.9 - 1.1        No results found for this or any previous visit.     Francisco James MD

## 2024-03-07 NOTE — NURSING NOTE
Patient:   SIMON GEIGER            MRN: [id number]            FIN: [id number]               Age:   20 years     Sex:  Male     :  1997   Associated Diagnoses:   None   Author:   Tavo Kimble MD      Visit Information      Primary Care Provider (PCP):  [id number] -UNKNOWN,      Referring Provider:  Cresencio Becker MD    NPI# [id number]      Chief Complaint   2018 1:02 PM CDT    here fot TM Perforation with hearing loss per , was tubing on a boat and fell off tube felt something in his ear, then had drainage and hearing loss, was put on some drops, reffered due to hearing loss        History of Present Illness   Asked to see by Dr. Sanchez for evaluation of tympanic membrane trauma. Patient reprots that he was tubing when he fell in the water on his left ear. He felt pressure right away . He started putting ear drops in his ear. It hurt like crazy. It turns out that it was an otc ear drop. He was prescribed an antibiotic drop by Dr. Sanchez. No pain now. Just loss of hearing.       Review of Systems   Constitutional:  Negative.    Ear/Nose/Mouth/Throat:  Negative except as documented in history of present illness.    Respiratory:  Negative.       Health Status   Allergies:    Allergic Reactions (Selected)  No known allergies   Medications:  (Selected)   Prescriptions  Prescribed  ofloxacin 0.3% otic solution: 5 drop(s), Ear-Left, BID, # 10 mL, 0 Refill(s), Type: Maintenance, Pharmacy: [id number], 5 drop(s) Ear-Left bid,x7 day(s)  Documented Medications  Documented  albuterol 90 mcg/inh inhalation aerosol: 2 puff(s), Inhale, q4-6 hrs, PRN: as needed for wheezing, 0 Refill(s), Type: Maintenance   Problem list:    All Problems (Selected)  Absent Kidney, Congenital / ICD-9-.0 / Confirmed  Asthma / SNOMED CT [id number] / Confirmed  Scoliosis / ICD-9-.30 / Confirmed      Histories   Past Medical History:    Resolved  Asthma ([id number]):  Resolved.   Family History:    No family history  Visited with pt and her son. Sepsis home going reviewed. Pt has two granddaughters who are nurses and will provide care post discharge.   items have been selected or recorded.   Procedure history:    Spinal fusion ([id number]) on 7/30/2013 at 15 Years.  Comments:  8/15/2013 5:38 AM - [facility]  T2 to L4  Tonsillectomy (241019234) in 2003 at 6 Years.  Adenoidectomy (153510642).      Physical Examination   Vital Signs   7/20/2018 1:02 PM CDT    Temperature Tympanic      98.5 DegF     Measurements from flowsheet : Measurements   7/20/2018 1:02 PM CDT Height Measured - Standard 63 in    Weight Measured - Standard 135.6 lb    BSA 1.65 m2    Body Mass Index 24.02 kg/m2    Body Mass Index Percentile 68.92      CONSTITUTIONAL  General Appearance:  Normal, well developed, well nourished, no obvious distress  Ability to Communicate:  communicates appropriately.    HEAD AND FACE  Appearance and Symmetry:  Normal, no scalp or facial scarring or suspicious lesions.  Paranasal sinuses tenderness:  Normal, Paranasal sinuses non tender    EARS  Clinical speech reception threshold:  Normal, able to hear normal speech.  Auricle:  Normal, Auricles without scars, lesions, masses.  External auditory canal:  Normal, External auditory canal normal.  Tympanic membrane:  Small central perforation left ear.    NOSE (speculum or scope)  Architecture:  Normal, Grossly normal external nasal architecture with no masses or lesions.  Mucosa:  Normal mucosa, No polyps or masses.  Septum:  Normal, Septum non-obstructing.  Turbinates:  Normal, No turbinate abnormalities    ORAL CAVITY AND OROPHARYNX  Lips:  Normal.  Dental and gingiva:  Normal, No obvious dental or gingival disease.  Mucosa:  Normal, Moist mucous membranes.  Tongue:  Normal, Tongue mobile with no mucosal abnormalities  Hard and soft palate:  Normal, Hard and soft palate without cleft or mucosal lesions.  Oral pharynx:  Normal, Posterior pharynx without lesions or remarkable asymmetry.  Saliva:  Normal, Clear saliva.  Masses:  Normal, No palpable masses or pathologically enlarged lymph nodes.    NECK  Masses/lymph  nodes:  Normal, No worrisome neck masses or lymph nodes.  Salivary glands:  Normal, Parotid and submandibular glands.  Trachea and larynx position:  Normal, Trachea and larynx midline.  Thyroid:  Normal, No thyroid abnormality.  Tenderness:  Normal, No cervical tenderness.  Suppleness:  Normal, Neck supple    NEUROLOGICAL  Speech pattern:  Normal, Proasaic    RESPIRATORY  Symmetry and Respiratory effort:  Normal, Symmetric chest movement and expansion with no increased intercostal retractions or use of accessory muscles.       Impression and Plan   Small central perforation left TM. I would expect this to heal over time with surgical intervention. Advised follow up in 1-2 months. Will get a hearing test to check hearing.

## 2024-03-07 NOTE — NURSING NOTE
1205: Pt. Arrived to [facility] from [facility]. assessment completed. Tele monitor placed on pt. Son at bedside. Vitals obtained    1737: pt. Requesting prescriptions be sent to [facility] in [place], NP notified. Pt. Also requesting prescription for Atarax on discharge as pt. Has been taking it this admission for anxiety NP notified of pt. Requests.      EOS: pt. Stable after arrival to floor. Pt. Had no acute changes throughout this shift. Pt. Had no c/o pain. Pt. Was able to ambulate to the chair this shift with staff assistance x2. Pt. Weaned to 1L O2 this shift per MD ordered and tolerating with SPO2 within ordered parameters. Pt. Had no c/o pain. Family at bedside and updated on pt. Status and plans for DC. Pt. Currently resting in bed at this time. Call light within reach. Bed alarm on. Bed in lowest position.

## 2024-03-07 NOTE — PROGRESS NOTES
Physical Therapy    Physical Therapy Treatment    Patient Name: Lillian Mendoza  MRN: [id number]  Today's Date: 3/6/2024  Time Calculation  Start Time: 1211  Stop Time: 1325  Time Calculation (min): 74 min        03/06/24 1211   PT  Visit   PT Received On 03/06/24   Response to Previous Treatment Patient with no complaints from previous session.   General   Reason for Referral impaired mobility   Referred By Dr. Gilmore   Past Medical History Relevant to Rehab recent flu, obese, CAP, CVA, knee replacement, asthma, hypothyroidism   Co-Treatment OT   Co-Treatment Reason pt. safety with mobility.   Prior to Session Communication Bedside nurse   Patient Position Received Bed, 3 rail up;Alarm on  (granddaughter present)   General Comment Pt. was inbed when i arrived. Pt. was agreeable to PT. no c/o pain. O2 3.5L at start but increased to 4L to keep Spo2 >90%.   Precautions   Medical Precautions Fall precautions;Oxygen therapy device and L/min  (4L)   Vital Signs   SpO2   (SpO2 was 93% on 3.5L at rest. dropped to 90%, O2 incrreased to 4L. Spo2 remained 91-93% for session.)   Cognition   Orientation Level Oriented X4   Bed Mobility 1   Bed Mobility 1 Supine to sitting   Level of Assistance 1 Moderate assistance   Bed Mobility Comments 1 Cues and assist to move hips to the EOB. Mod assist to bring torso off the bed. Mod assist to scoot hips to EOB and get feet on the floor.   Transfers   Transfer Yes   Transfer 1   Technique 1 Sit to stand;Stand to sit   Transfer Device 1 Walker   Transfer Level of Assistance 1 Minimum assistance   Trials/Comments 1 Cue and min assist to move to the Christopher awan to her left. Pt. stood with assist for about a minute while nurse changed sacral dressing   Activity Tolerance   Endurance Tolerates 10 - 20 min exercise with multiple rests   PT Assessment   PT Assessment Results Decreased strength;Decreased range of motion;Decreased endurance;Impaired balance;Decreased mobility   Rehab  Prognosis Good   End of Session Communication Bedside nurse   End of Session Patient Position Up in chair;Alarm on   PT Plan   PT Plan Skilled PT   PT Frequency 3 times per week   PT Discharge Recommendations Low intensity level of continued care   Equipment Recommended upon Discharge Wheeled walker;Wheelchair;Bedside commode  (gait belt)     Assessment/Plan   PT Assessment  PT Assessment Results: Decreased strength, Decreased range of motion, Decreased endurance, Impaired balance, Decreased mobility  Rehab Prognosis: Good  End of Session Communication: Bedside nurse  End of Session Patient Position: Up in chair, Alarm on     PT Plan  Treatment/Interventions: Bed mobility, Transfer training, Gait training, Balance training, Strengthening, Endurance training, Therapeutic exercise, Therapeutic activity  PT Plan: Skilled PT  PT Frequency: 3 times per week  PT Discharge Recommendations: Low intensity level of continued care  Equipment Recommended upon Discharge: Wheeled walker, Wheelchair, Bedside commode (gait belt)  PT Recommended Transfer Status: Assist x1  PT - OK to Discharge: Yes    Outcome Measures:  [facility] Basic Mobility  Turning from your back to your side while in a flat bed without using bedrails: A lot  Moving from lying on your back to sitting on the side of a flat bed without using bedrails: A lot  Moving to and from bed to chair (including a wheelchair): A lot  Standing up from a chair using your arms (e.g. wheelchair or bedside chair): A little  To walk in hospital room: A lot  Climbing 3-5 steps with railing: Total  Basic Mobility - Total Score: 12                             EDUCATION:     Education Documentation  Precautions, taught by Terry Sue PTA at 3/6/2024  6:59 PM.  Learner: Patient  Readiness: Acceptance  Method: Demonstration, Explanation  Response: Verbalizes Understanding, Needs Reinforcement    Body Mechanics, taught by Terry Sue PTA at 3/6/2024  6:59 PM.  Learner:  Patient  Readiness: Acceptance  Method: Demonstration, Explanation  Response: Verbalizes Understanding, Needs Reinforcement    Mobility Training, taught by Terry Sue PTA at 3/6/2024  6:59 PM.  Learner: Patient  Readiness: Acceptance  Method: Demonstration, Explanation  Response: Verbalizes Understanding, Needs Reinforcement    Education Comments  No comments found.        GOALS:  Encounter Problems       Encounter Problems (Active)       PT Problem       Pt will demonstrate Wan for all bed mobility   (Progressing)       Start:  03/05/24    Expected End:  03/19/24            Pt will demonstrate Wan for all transfers with WW  (Progressing)       Start:  03/05/24    Expected End:  03/19/24            Pt will ambulate 10 ft with WW and Wan.  (Progressing)       Start:  03/05/24    Expected End:  03/19/24            Pt will demonstrate BLE WFLs.   (Progressing)       Start:  03/05/24    Expected End:  03/19/24

## 2024-03-07 NOTE — NURSING NOTE
Pt remained safe throughout shift. Vitals stable. Sp02 maintained >92%. O2 weaned to 4L NC. Heart rhythm remains NSR with 1st degree block

## 2024-03-07 NOTE — PROGRESS NOTES
Physical Therapy    Physical Therapy Treatment    Patient Name: Lillian Mendoza  MRN: [id number]  Today's Date: 3/7/2024  Time Calculation  Start Time: 1430  Stop Time: 1530  Time Calculation (min): 60 min  Room 3024      03/07/24 1430   PT  Visit   PT Received On 03/07/24   Response to Previous Treatment Patient with no complaints from previous session.   General   Reason for Referral impaired mobility   Past Medical History Relevant to Rehab recent flu, obese, CAP, CVA, knee replacement, asthma, hypothyroidism   Prior to Session Communication Bedside nurse   Patient Position Received Up in chair;Alarm on  (granddaughter present)   General Comment Pt.in the chiar when 1 arrived. Pt. was agreeable to PT. no c/o pain. 3L to keep SpO2 >90%.   Precautions   Medical Precautions Fall precautions;Oxygen therapy device and L/min  (4L)   Cognition   Orientation Level Oriented X4   Therapeutic Exercise   Therapeutic Exercise Performed Yes   Therapeutic Exercise Activity 1 seated ex's Footrest elevated AP, QS, GS x 15   Therapeutic Exercise Activity 2 Seated ex's Heel/Toe raises,  LAQ, Seated Marches, Hip abd./add x 15   Bed Mobility 1   Bed Mobility 1 Sitting to supine   Level of Assistance 1 Moderate assistance  (2)   Bed Mobility Comments 1 Sit to supine with Mod/Max assist x2. Pt. not able to lift legs onto the bed and lower torso to the bed.   Transfers   Transfer Yes   Transfer 1   Technique 1 Sit to stand;Stand to sit   Transfer Device 1 Walker   Transfer Level of Assistance 1 Minimum assistance   Trials/Comments 1 Sit to stand from the chair with verbal cues and min assist x1. Stood 3 times to tolerance. Perform lateral weight shifts. Transfer chair to bed with the walker and min assist x2.   Activity Tolerance   Endurance Tolerates 10 - 20 min exercise with multiple rests   PT Assessment   PT Assessment Results Decreased strength;Decreased range of motion;Decreased endurance;Impaired balance;Decreased mobility    Rehab Prognosis Good   End of Session Communication Bedside nurse   End of Session Patient Position Up in chair;Alarm on   PT Plan   PT Frequency 3 times per week   PT Discharge Recommendations Low intensity level of continued care   Equipment Recommended upon Discharge Wheeled walker;Wheelchair;Bedside commode  (gait belt)     Assessment/Plan   PT Assessment  PT Assessment Results: Decreased strength, Decreased range of motion, Decreased endurance, Impaired balance, Decreased mobility  Rehab Prognosis: Good  End of Session Communication: Bedside nurse  End of Session Patient Position: Up in chair, Alarm on     PT Plan  Treatment/Interventions: Bed mobility, Transfer training, Gait training, Balance training, Strengthening, Endurance training, Therapeutic exercise, Therapeutic activity  PT Plan: Skilled PT  PT Frequency: 3 times per week  PT Discharge Recommendations: Low intensity level of continued care  Equipment Recommended upon Discharge: Wheeled walker, Wheelchair, Bedside commode (gait belt)  PT Recommended Transfer Status: Assist x1  PT - OK to Discharge: Yes    Outcome Measures:  [facility] Basic Mobility  Turning from your back to your side while in a flat bed without using bedrails: A lot  Moving from lying on your back to sitting on the side of a flat bed without using bedrails: A lot  Moving to and from bed to chair (including a wheelchair): A lot  Standing up from a chair using your arms (e.g. wheelchair or bedside chair): A little  To walk in hospital room: A lot  Climbing 3-5 steps with railing: Total  Basic Mobility - Total Score: 12                             EDUCATION:     Education Documentation  Home Exercise Program, taught by Terry Sue PTA at 3/7/2024  6:46 PM.  Learner: Patient  Readiness: Acceptance  Method: Demonstration, Explanation  Response: Needs Reinforcement, Verbalizes Understanding    Precautions, taught by Terry Sue PTA at 3/7/2024  6:46 PM.  Learner: Patient  Readiness:  Acceptance  Method: Demonstration, Explanation  Response: Needs Reinforcement, Verbalizes Understanding    Body Mechanics, taught by Terry Sue PTA at 3/7/2024  6:46 PM.  Learner: Patient  Readiness: Acceptance  Method: Demonstration, Explanation  Response: Needs Reinforcement, Verbalizes Understanding    Mobility Training, taught by Terry Sue PTA at 3/7/2024  6:46 PM.  Learner: Patient  Readiness: Acceptance  Method: Demonstration, Explanation  Response: Needs Reinforcement, Verbalizes Understanding    Education Comments  No comments found.        GOALS:  Encounter Problems       Encounter Problems (Active)       PT Problem       Pt will demonstrate Wan for all bed mobility   (Progressing)       Start:  03/05/24    Expected End:  03/19/24            Pt will demonstrate Wan for all transfers with WW  (Progressing)       Start:  03/05/24    Expected End:  03/19/24            Pt will ambulate 10 ft with WW and Wan.  (Progressing)       Start:  03/05/24    Expected End:  03/19/24            Pt will demonstrate BLE WFLs.   (Progressing)       Start:  03/05/24    Expected End:  03/19/24               Pain - Adult

## 2024-03-07 NOTE — CARE PLAN
Problem: Safety  Goal: Patient will be injury free during hospitalization  Outcome: Progressing     Problem: Psychosocial Needs  Goal: Collaborate with me, my family, and caregiver to identify my specific goals  Outcome: Progressing     Problem: Discharge Barriers  Goal: My discharge needs are met  Outcome: Progressing     Problem: Skin  Goal: Decreased wound size/increased tissue granulation at next dressing change  Outcome: Progressing  Goal: Participates in plan/prevention/treatment measures  Outcome: Progressing  Goal: Prevent/minimize sheer/friction injuries  Outcome: Progressing  Flowsheets (Taken 3/7/2024 0924)  Prevent/minimize sheer/friction injuries:   Use pull sheet   Turn/reposition every 2 hours/use positioning/transfer devices  Goal: Promote/optimize nutrition  Outcome: Progressing  Goal: Promote skin healing  Outcome: Progressing   The patient's goals for the shift include      The clinical goals for the shift include Pt will maintain Sp02>90% throughout shift

## 2024-03-07 NOTE — PROGRESS NOTES
03/07/24 1340   Discharge Planning   Patient expects to be discharged to: Home, resume [facility]     Patient to d/c home and resume [facility]. FAIZA to place referral. Notified [facility] intake nurse.

## 2024-03-07 NOTE — PROGRESS NOTES
Occupational Therapy    OT Treatment    Patient Name: Lillian Mendoza  MRN: [id number]  Today's Date: 3/7/2024  Time Calculation  Start Time: 1301  Stop Time: 1401  Time Calculation (min): 60 min         Assessment:  OT Assessment: Patient demonstrates decreased independence with self care, decreased independence with funcitonal transfers, decreased endurance and decreased balance.  Patient will benefit from skilled OT to address deficits at a low intensity with family to assist as needed.  Prognosis: Good  Evaluation/Treatment Tolerance: Patient tolerated treatment well  End of Session Communication: Bedside nurse  End of Session Patient Position: Up in chair, Alarm on  OT Assessment Results: Decreased ADL status, Decreased endurance, Decreased functional mobility  Prognosis: Good  Evaluation/Treatment Tolerance: Patient tolerated treatment well    Plan:  Treatment Interventions: ADL retraining, Functional transfer training  OT Frequency: 5 times per week  OT Discharge Recommendations: Low intensity level of continued care  Equipment Recommended upon Discharge: Wheeled walker, Wheelchair, Bedside commode (gait belt)  Treatment Interventions: ADL retraining, Functional transfer training  Subjective     Outcome Measures:[facility] Daily Activity  Putting on and taking off regular lower body clothing: Total  Bathing (including washing, rinsing, drying): A lot  Putting on and taking off regular upper body clothing: A little  Toileting, which includes using toilet, bedpan or urinal: Total  Taking care of personal grooming such as brushing teeth: A little  Eating Meals: A little  Daily Activity - Total Score: 13       03/07/24 1301   OT Last Visit   OT Received On 03/07/24   General   Reason for Referral impaired mobility   Prior to Session Communication Bedside nurse   Patient Position Received Bed, 3 rail up;Alarm on   Preferred Learning Style visual;verbal   General Comment Pt agreeable to therapy   Precautions   Medical  Precautions Fall precautions   Pain Assessment   Pain Score 0 - No pain   Cognition   Orientation Level Oriented X4   Grooming   Grooming Level of Assistance Setup   Grooming Where Assessed Edge of bed   Grooming Comments washed face   UE Bathing   UE Bathing Level of Assistance Setup   UE Bathing Where Assessed Edge of bed   UE Bathing Comments wahsed under arms and breast   LE Bathing   LE Bathing Level of Assistance Dependent   LE Bathing Where Assessed Bed level   UE Dressing   UE Dressing Level of Assistance Minimum assistance   UE Dressing Where Assessed Edge of bed   UE Dressing Comments changed gown   LE Dressing   LE Dressing   ([facility] assists with ADL's at home)   Toileting   Toileting Level of Assistance Dependent   Where Assessed Bed level   Toileting Comments pt has pure wick, layinjg in wet bed. Pt stood for therapist to complete marcial care   Functional Standing Tolerance   Functional Standing Tolerance Comments pt can stand for approximately one minute to complete marcial care   Bed Mobility   Bed Mobility   (max A of 1 person to EOB)   Transfers   Transfer   (min A of 2 people, using ww bed to chair. STS 4 x min A of 2 people)   Static Sitting Balance   Static Sitting-Level of Assistance Close supervision   Static Sitting-Comment/Number of Minutes pt sat EOB for approximatlye 40 minutes   IP OT Assessment   OT Assessment Patient demonstrates decreased independence with self care, decreased independence with funcitonal transfers, decreased endurance and decreased balance.  Patient will benefit from skilled OT to address deficits at a low intensity with family to assist as needed.   Prognosis Good   Evaluation/Treatment Tolerance Patient tolerated treatment well   End of Session Communication Bedside nurse   End of Session Patient Position Up in chair;Alarm on   OT Assessment   OT Assessment Results Decreased ADL status;Decreased endurance;Decreased functional mobility   Education   Individual(s)  Educated Patient   Education Provided Fall precautons;Risk and benefits of OT discussed with patient or other   Patient Response to Education Patient/Caregiver Verbalized Understanding of Information   Education Comment pt would benefit from continued reinforcement   Inpatient Plan   Treatment Interventions ADL retraining;Functional transfer training   OT Frequency 5 times per week   OT Discharge Recommendations Low intensity level of continued care           Goals:  Encounter Problems       Encounter Problems (Active)       OT Goals       Patient will complete functional transfers with mod I. (Progressing)       Start:  03/05/24    Expected End:  03/19/24            Patient will complete toileting with min A. (Progressing)       Start:  03/05/24    Expected End:  03/19/24            Patient will complete LE dressing with min A. (Progressing)       Start:  03/05/24    Expected End:  03/19/24

## 2024-03-08 ENCOUNTER — HOME HEALTH ADMISSION (OUTPATIENT)
Dept: HOME HEALTH SERVICES | Facility: HOME HEALTH | Age: 84
End: 2024-03-08
Payer: MEDICARE

## 2024-03-08 VITALS
DIASTOLIC BLOOD PRESSURE: 103 MMHG | SYSTOLIC BLOOD PRESSURE: 163 MMHG | RESPIRATION RATE: 19 BRPM | BODY MASS INDEX: 47.16 KG/M2 | TEMPERATURE: 96.8 F | WEIGHT: 283.07 LBS | OXYGEN SATURATION: 93 % | HEART RATE: 82 BPM | HEIGHT: 65 IN

## 2024-03-08 LAB
ANION GAP SERPL CALC-SCNC: 18 MMOL/L (ref 10–20)
ATRIAL RATE: 340 BPM
ATRIAL RATE: 73 BPM
BACTERIA BLD CULT: NORMAL
BACTERIA BLD CULT: NORMAL
BUN SERPL-MCNC: 21 MG/DL (ref 6–23)
CALCIUM SERPL-MCNC: 10.2 MG/DL (ref 8.6–10.3)
CHLORIDE SERPL-SCNC: 97 MMOL/L (ref 98–107)
CO2 SERPL-SCNC: 30 MMOL/L (ref 21–32)
CREAT SERPL-MCNC: 1.53 MG/DL (ref 0.5–1.05)
EGFRCR SERPLBLD CKD-EPI 2021: 34 ML/MIN/1.73M*2
ERYTHROCYTE [DISTWIDTH] IN BLOOD BY AUTOMATED COUNT: 15.8 % (ref 11.5–14.5)
GLUCOSE SERPL-MCNC: 108 MG/DL (ref 74–99)
HCT VFR BLD AUTO: 39.8 % (ref 36–46)
HGB BLD-MCNC: 12 G/DL (ref 12–16)
INR PPP: 2 (ref 0.9–1.1)
MCH RBC QN AUTO: 28.2 PG (ref 26–34)
MCHC RBC AUTO-ENTMCNC: 30.2 G/DL (ref 32–36)
MCV RBC AUTO: 94 FL (ref 80–100)
NRBC BLD-RTO: 0 /100 WBCS (ref 0–0)
P AXIS: 78 DEGREES
P OFFSET: 128 MS
P ONSET: 104 MS
PLATELET # BLD AUTO: 404 X10*3/UL (ref 150–450)
POTASSIUM SERPL-SCNC: 3.8 MMOL/L (ref 3.5–5.3)
PR INTERVAL: 216 MS
PROTHROMBIN TIME: 23 SECONDS (ref 9.8–12.8)
Q ONSET: 211 MS
Q ONSET: 212 MS
Q ONSET: 213 MS
QRS COUNT: 12 BEATS
QRS COUNT: 21 BEATS
QRS COUNT: 27 BEATS
QRS DURATION: 80 MS
QRS DURATION: 82 MS
QRS DURATION: 84 MS
QT INTERVAL: 304 MS
QT INTERVAL: 324 MS
QT INTERVAL: 418 MS
QTC CALCULATION(BAZETT): 460 MS
QTC CALCULATION(BAZETT): 465 MS
QTC CALCULATION(BAZETT): 494 MS
QTC FREDERICIA: 412 MS
QTC FREDERICIA: 420 MS
QTC FREDERICIA: 446 MS
R AXIS: -14 DEGREES
R AXIS: -19 DEGREES
R AXIS: -5 DEGREES
RBC # BLD AUTO: 4.25 X10*6/UL (ref 4–5.2)
SODIUM SERPL-SCNC: 141 MMOL/L (ref 136–145)
T AXIS: 20 DEGREES
T AXIS: 45 DEGREES
T AXIS: 54 DEGREES
T OFFSET: 363 MS
T OFFSET: 375 MS
T OFFSET: 421 MS
VENTRICULAR RATE: 124 BPM
VENTRICULAR RATE: 159 BPM
VENTRICULAR RATE: 73 BPM
WBC # BLD AUTO: 6.8 X10*3/UL (ref 4.4–11.3)

## 2024-03-08 PROCEDURE — 2500000001 HC RX 250 WO HCPCS SELF ADMINISTERED DRUGS (ALT 637 FOR MEDICARE OP): Performed by: NURSE PRACTITIONER

## 2024-03-08 PROCEDURE — 36415 COLL VENOUS BLD VENIPUNCTURE: CPT | Performed by: NURSE PRACTITIONER

## 2024-03-08 PROCEDURE — 85027 COMPLETE CBC AUTOMATED: CPT | Performed by: NURSE PRACTITIONER

## 2024-03-08 PROCEDURE — 94640 AIRWAY INHALATION TREATMENT: CPT

## 2024-03-08 PROCEDURE — 2500000001 HC RX 250 WO HCPCS SELF ADMINISTERED DRUGS (ALT 637 FOR MEDICARE OP): Performed by: INTERNAL MEDICINE

## 2024-03-08 PROCEDURE — 2500000002 HC RX 250 W HCPCS SELF ADMINISTERED DRUGS (ALT 637 FOR MEDICARE OP, ALT 636 FOR OP/ED): Performed by: NURSE PRACTITIONER

## 2024-03-08 PROCEDURE — 80048 BASIC METABOLIC PNL TOTAL CA: CPT | Performed by: NURSE PRACTITIONER

## 2024-03-08 PROCEDURE — 85610 PROTHROMBIN TIME: CPT | Performed by: NURSE PRACTITIONER

## 2024-03-08 RX ADMIN — BUDESONIDE 0.25 MG: 0.25 INHALANT RESPIRATORY (INHALATION) at 08:41

## 2024-03-08 RX ADMIN — METOPROLOL SUCCINATE 25 MG: 25 TABLET, EXTENDED RELEASE ORAL at 09:09

## 2024-03-08 RX ADMIN — FLUTICASONE PROPIONATE 1 SPRAY: 50 SPRAY, METERED NASAL at 09:07

## 2024-03-08 RX ADMIN — PANTOPRAZOLE SODIUM 40 MG: 40 TABLET, DELAYED RELEASE ORAL at 07:25

## 2024-03-08 RX ADMIN — HYDROCORTISONE 1 APPLICATION: 25 CREAM TOPICAL at 09:07

## 2024-03-08 RX ADMIN — FORMOTEROL FUMARATE 20 MCG: 20 SOLUTION RESPIRATORY (INHALATION) at 08:41

## 2024-03-08 RX ADMIN — NYSTATIN 1 APPLICATION: 100000 POWDER TOPICAL at 09:07

## 2024-03-08 RX ADMIN — Medication 50 MCG: at 09:08

## 2024-03-08 RX ADMIN — LEVOTHYROXINE SODIUM 150 MCG: 150 TABLET ORAL at 07:25

## 2024-03-08 RX ADMIN — ASPIRIN 81 MG CHEWABLE TABLET 81 MG: 81 TABLET CHEWABLE at 09:08

## 2024-03-08 RX ADMIN — CYANOCOBALAMIN TAB 1000 MCG 1000 MCG: 1000 TAB at 09:08

## 2024-03-08 RX ADMIN — GABAPENTIN 600 MG: 300 CAPSULE ORAL at 09:09

## 2024-03-08 RX ADMIN — HYDROXYZINE HYDROCHLORIDE 10 MG: 10 TABLET ORAL at 09:09

## 2024-03-08 RX ADMIN — DAPAGLIFLOZIN 10 MG: 10 TABLET, FILM COATED ORAL at 09:08

## 2024-03-08 ASSESSMENT — COGNITIVE AND FUNCTIONAL STATUS - GENERAL
MOVING FROM LYING ON BACK TO SITTING ON SIDE OF FLAT BED WITH BEDRAILS: A LOT
MOVING TO AND FROM BED TO CHAIR: A LOT
EATING MEALS: A LITTLE
MOBILITY SCORE: 12
TOILETING: A LITTLE
PERSONAL GROOMING: A LITTLE
HELP NEEDED FOR BATHING: TOTAL
DRESSING REGULAR UPPER BODY CLOTHING: A LITTLE
DAILY ACTIVITIY SCORE: 14
CLIMB 3 TO 5 STEPS WITH RAILING: TOTAL
WALKING IN HOSPITAL ROOM: A LOT
STANDING UP FROM CHAIR USING ARMS: A LITTLE
TURNING FROM BACK TO SIDE WHILE IN FLAT BAD: A LOT
DRESSING REGULAR LOWER BODY CLOTHING: TOTAL

## 2024-03-08 ASSESSMENT — PAIN SCALES - GENERAL: PAINLEVEL_OUTOF10: 0 - NO PAIN

## 2024-03-08 NOTE — NURSING NOTE
Patient alert and oriented with anxiety. Patient asks the same questions over and over and worries about her ADLs and minute to minute routine. Patient is weak and depends on staff for support. Patient gets anxious when alone. Pain medicine given per MAR for generalized pain. No acute changes this shift. Patient currently on the bedside commode after being incontinent of urine in the bed

## 2024-03-08 NOTE — NURSING NOTE
1100- Patient's Farxiga price ~$500. Notified patient and family that this nurse would discuss with care team. Care team changed medication to Jaurdiance with $47 price. Patient and family discharged before this nurse returned to room. Called granddaughter and notified her of medication and price change. Granddaughter states she will call back with any questions.

## 2024-03-08 NOTE — CARE PLAN
The patient's goals for the shift include      The clinical goals for the shift include oxygen to be weaned an sp02 will remain >92%      Problem: Safety  Goal: Patient will be injury free during hospitalization  Outcome: Progressing     Problem: Psychosocial Needs  Goal: Collaborate with me, my family, and caregiver to identify my specific goals  Outcome: Progressing     Problem: Discharge Barriers  Goal: My discharge needs are met  Outcome: Progressing     Problem: Skin  Goal: Decreased wound size/increased tissue granulation at next dressing change  Outcome: Progressing  Goal: Participates in plan/prevention/treatment measures  Outcome: Progressing  Goal: Prevent/minimize sheer/friction injuries  Outcome: Progressing  Goal: Promote/optimize nutrition  Outcome: Progressing  Goal: Promote skin healing  Outcome: Progressing     Problem: Respiratory  Goal: Wean oxygen to maintain O2 saturation per order/standard this shift  Outcome: Progressing     Problem: Pain - Adult  Goal: Verbalizes/displays adequate comfort level or baseline comfort level  Outcome: Progressing     Problem: Safety - Adult  Goal: Free from fall injury  Outcome: Progressing     Problem: Discharge Planning  Goal: Discharge to home or other facility with appropriate resources  Outcome: Progressing     Problem: Chronic Conditions and Co-morbidities  Goal: Patient's chronic conditions and co-morbidity symptoms are monitored and maintained or improved  Outcome: Progressing     Problem: Fall/Injury  Goal: Not fall by end of shift  Outcome: Met  Goal: Be free from injury by end of the shift  Outcome: Met  Goal: Verbalize understanding of personal risk factors for fall in the hospital  Outcome: Progressing  Goal: Verbalize understanding of risk factor reduction measures to prevent injury from fall in the home  Outcome: Progressing  Goal: Use assistive devices by end of the shift  Outcome: Met  Goal: Pace activities to prevent fatigue by end of the  shift  Outcome: Met

## 2024-03-19 NOTE — DISCHARGE SUMMARY
HOSPITAL COURSE AND DETAILS INCLUDING HPI/PHYSICAL EXAM /AND ADMISSION INFO  Lillian Mendoza is a 83 y.o. female presenting with this is a patient has multiple medical problems.  Patient was having some progressive shortness of breath heart was racing.  She was brought in the emergency department she was found to have SVT.  Subsequently started on a Cardizem drip and admitted to the hospital.  She is recent been discharged from another hospital she was being treated for influenza pneumonia.      HENT:      Head: Normocephalic and atraumatic.      Nose: Nose normal.      Mouth/Throat:      Mouth: Mucous membranes are dry.   Eyes:      Extraocular Movements: Extraocular movements intact.      Pupils: Pupils are equal, round, and reactive to light.   Cardiovascular:      Rate and Rhythm: Normal rate. Rhythm irregular.   Pulmonary:      Effort: Pulmonary effort is normal.      Breath sounds: Wheezing present.   Abdominal:      General: Bowel sounds are normal.      Palpations: Abdomen is soft.   Musculoskeletal:         General: Swelling present.      Cervical back: Neck supple.      Comments: Patient moves all extremities, patient with lower extremity edema       Personal history of diseases of the blood and blood-forming organs and certain disorders involving the immune mechanism    Personal history of other diseases of the circulatory system    Personal history of other diseases of the digestive system    Arthritis    Asthma    Chronic kidney disease    CVA (cerebral vascular accident) (CMS/[facility])    Diverticulitis    Hyperlipidemia    Hypothyroid        Cardizem drip  Continue IV antibiotics  DVT prophylaxis  BiPAP  DuoNebs  Continue Cardizem drip  Patient was admitted to the hospital.  She also did have an evaluation from pulmonary.  She was originally sent to the rehab facility then comes in.  She was having issues with oxygenation she was seen by infectious disease as well as pulmonary.  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    Relevant Medications    metoprolol succinate XL (Toprol-XL) 25 mg 24 hr tablet    Other Relevant Orders    Transthoracic Echo (TTE) Limited (Completed)    * (Principal) Shortness of breath - Primary R06.02     Other Visit Diagnoses         Codes    Elevated brain natriuretic peptide (BNP) level     R79.89    Pneumonia due to infectious organism, unspecified laterality, unspecified part of lung     J18.9    Atrial fibrillation with rapid ventricular response (CMS/[facility])     I48.91    Relevant Medications    dilTIAZem (Cardizem) injection 10 mg (Completed)    metoprolol succinate XL (Toprol-XL) 25 mg 24 hr tablet    Acute diastolic congestive heart failure (CMS/[facility])     I50.31    Relevant Medications    dilTIAZem (Cardizem) injection 10 mg (Completed)    torsemide (Demadex) 10 mg tablet    metoprolol succinate XL (Toprol-XL) 25 mg 24 hr tablet    empagliflozin (Jardiance) 10 mg    Other Relevant Orders    Referral to Home Care    Primary insomnia     F51.01    Relevant Medications    hydrOXYzine HCL (Atarax) 10 mg tablet                                      Last Recorded Vitals:  Vitals:    03/07/24 1600 03/07/24 2000 03/08/24 0800 03/08/24 1100   BP: 126/77 152/87 (!) 163/103    BP Location: Left arm Left arm     Patient Position: Lying Lying     Pulse: 71 72 82    Resp: 18 16 19    Temp: 36.3 °C (97.3 °F) 35.8 °C (96.4 °F) 36 °C (96.8 °F)    TempSrc: Temporal Temporal     SpO2: 97% 94% 92% 93%    Weight:       Height:                 DISCHARGE MEDICATIONS       Your medication list        START taking these medications        Instructions Last Dose Given Next Dose Due   empagliflozin 10 mg  Commonly known as: Jardiance      Take 1 tablet (10 mg) by mouth once daily.       hydrOXYzine HCL 10 mg tablet  Commonly known as: Atarax      Take 1 tablet (10 mg) by mouth as needed at bedtime for anxiety for up to 14 days.       metoprolol succinate XL 25 mg 24 hr tablet  Commonly known as: Toprol-XL      Take 1 tablet (25 mg) by mouth once daily. Do not crush or chew. Do not start before March 8, 2024.              CHANGE how you take these medications        Instructions Last Dose Given Next Dose Due   torsemide 10 mg tablet  Commonly known as: Demadex  What changed:   how much to take  how to take this  when to take this  additional instructions      Take 1 tablet (10 mg) by mouth once daily. Do not start before March 8, 2024.       warfarin 5 mg tablet  Commonly known as: Coumadin  What changed:   how much to take  how to take this  when to take this  additional instructions      Take as directed. If you are unsure how to take this medication, talk to your nurse or doctor.  Original instructions: Take as directed per After Visit Summary.       warfarin 2.5 mg tablet  Commonly known as: Coumadin  What changed:   how much to take  how to take this  when to take this  additional instructions      Take as directed. If you are unsure how to take this medication, talk to your nurse or doctor.  Original instructions: Take as directed per After Visit Summary. Do not start before March 2, 2024.              CONTINUE taking these medications        Instructions Last Dose Given Next Dose Due   acetaminophen 325 mg tablet  Commonly known as: Tylenol           albuterol 90 mcg/actuation inhaler           aspirin 81 mg chewable tablet      Chew 1 tablet (81 mg) once daily. Do not start before March 2, 2024.       atorvastatin  40 mg tablet  Commonly known as: Lipitor      Take 1 tablet (40 mg) by mouth once daily at bedtime.       budesonide 0.25 mg/2 mL nebulizer solution  Commonly known as: Pulmicort      Take 2 mL (0.25 mg) by nebulization once daily. Rinse mouth with water after use to reduce aftertaste and incidence of candidiasis. Do not swallow. Do not start before March 2, 2024.       cholecalciferol 10 MCG (400 UNIT) tablet  Commonly known as: Vitamin D-3           cyanocobalamin 1,000 mcg tablet  Commonly known as: Vitamin B-12           ergocalciferol (vitamin D2) 50 mcg (2,000 unit) tablet           fluticasone 50 mcg/actuation nasal spray  Commonly known as: Flonase           formoterol 20 mcg/2 mL nebulizer solution  Commonly known as: Perforomist      Take 2 mL (20 mcg) by nebulization 2 times a day.       gabapentin 600 mg tablet  Commonly known as: Neurontin           guaiFENesin 600 mg 12 hr tablet  Commonly known as: Mucinex      Take 1 tablet (600 mg) by mouth 2 times a day as needed for cough. Do not crush, chew, or split.       ipratropium-albuteroL 0.5-2.5 mg/3 mL nebulizer solution  Commonly known as: Duo-Neb           loratadine 10 mg tablet  Commonly known as: Claritin           montelukast 10 mg tablet  Commonly known as: Singulair           nystatin 100,000 unit/gram powder  Commonly known as: Mycostatin      Apply 1 Application topically 2 times a day.       pantoprazole 40 mg EC tablet  Commonly known as: ProtoNix      Take 1 tablet (40 mg) by mouth once daily in the morning. Take before meals. Do not crush, chew, or split. Do not start before March 2, 2024.       polyethylene glycol 17 gram packet  Commonly known as: Glycolax, Miralax      Take 17 g by mouth once daily. Do not start before November 29, 2023.       sodium chloride 0.65 % nasal spray  Commonly known as: [place]      Administer 1 spray into each nostril if needed for congestion.       Synthroid 150 mcg tablet  Generic drug: levothyroxine            traMADol 50 mg tablet  Commonly known as: Ultram      Take 1 tablet (50 mg) by mouth as needed at bedtime for severe pain (7 - 10).              STOP taking these medications      ALPRAZolam 0.5 mg tablet  Commonly known as: Xanax        amoxicillin-pot clavulanate 875-125 mg tablet  Commonly known as: Augmentin        hydrocortisone 2.5 % cream        metoprolol tartrate 25 mg tablet  Commonly known as: Lopressor        omeprazole 40 mg DR capsule  Commonly known as: PriLOSEC        oseltamivir 30 mg capsule  Commonly known as: Tamiflu        oxygen gas therapy  Commonly known as: O2                  Where to Get Your Medications        These medications were sent to [facility] #[id number] - [facility] [address] [facility]  [address] [place] [id number]      Phone: [phone]   empagliflozin 10 mg  hydrOXYzine HCL 10 mg tablet  metoprolol succinate XL 25 mg 24 hr tablet  torsemide 10 mg tablet       Information about where to get these medications is not yet available    Ask your nurse or doctor about these medications  guaiFENesin 600 mg 12 hr tablet           OUTPATIENT FOLLOW-UP    Future Appointments   Date Time Provider Department Center   3/20/2024  2:45 PM ASHU [id number] [id number] [place]   3/13/2025  3:00 PM Maldonado Alvarado MD [id number] [place]       Patient has been given the appropriate discharge instructions discharge medications and follow-up with appropriate physicians.  For all of the pertinent data  including labs- consults-imaging please see the chart.

## 2024-03-20 ENCOUNTER — APPOINTMENT (OUTPATIENT)
Dept: PHARMACY | Facility: CLINIC | Age: 84
End: 2024-03-20
Payer: MEDICARE

## 2024-03-25 ENCOUNTER — ANTICOAGULATION - WARFARIN VISIT (OUTPATIENT)
Dept: PHARMACY | Facility: CLINIC | Age: 84
End: 2024-03-25
Payer: MEDICARE

## 2024-03-25 DIAGNOSIS — I26.99 PE (PULMONARY THROMBOEMBOLISM) (MULTI): Primary | ICD-10-CM

## 2024-03-25 LAB
POC INR: 3.3
POC PROTHROMBIN TIME: NORMAL

## 2024-03-25 PROCEDURE — 85610 PROTHROMBIN TIME: CPT | Mod: QW | Performed by: PHARMACIST

## 2024-03-25 PROCEDURE — 99212 OFFICE O/P EST SF 10 MIN: CPT | Performed by: PHARMACIST

## 2024-03-25 NOTE — PROGRESS NOTES
Pt was hospitalized again and she was discharged 2 weeks ago - she had flu and pneumonia.  Pt is felling much better now.  Apparently pt was hospitalized earlier in the year for 4 weeks also.    Pt has inr machine at home and tests at home as well as here.  Apparently   Pt denies unusual bleed/bruise.  Pt is here with granddaughter (?) who is a RN.  Pt had a stroke in Nov 2023.  She also has afib.  Pt denies unusual bleed/bruise.  No upcoming procedures.  Verified current dose with pt.    Hold dose today (Mon)  Weekly dose same for now.  Back in 2 weeks.  Note - granddaughter who is an RN instructed pt to hold off on a dose of warfarin at one point when the inr (which was checked at home) was too high.  She also noted that they are here in the clinic to try to get the inr stabilized and find a dose that works.  After that, they will go back to Dr. Alba who will be taking care of the inr with use of their machine at home.

## 2024-04-12 ENCOUNTER — APPOINTMENT (OUTPATIENT)
Dept: PHARMACY | Facility: CLINIC | Age: 84
End: 2024-04-12
Payer: MEDICARE

## 2024-04-19 ENCOUNTER — APPOINTMENT (OUTPATIENT)
Dept: PHARMACY | Facility: CLINIC | Age: 84
End: 2024-04-19
Payer: MEDICARE

## 2024-04-22 ENCOUNTER — ANTICOAGULATION - WARFARIN VISIT (OUTPATIENT)
Dept: PHARMACY | Facility: CLINIC | Age: 84
End: 2024-04-22
Payer: MEDICARE

## 2024-04-22 DIAGNOSIS — J10.1 INFLUENZA A: ICD-10-CM

## 2024-04-22 DIAGNOSIS — J18.9 COMMUNITY ACQUIRED PNEUMONIA OF LEFT LOWER LOBE OF LUNG: ICD-10-CM

## 2024-04-22 DIAGNOSIS — I26.99 PE (PULMONARY THROMBOEMBOLISM) (MULTI): Primary | ICD-10-CM

## 2024-04-22 LAB
POC INR: 3.1
POC PROTHROMBIN TIME: NORMAL

## 2024-04-22 PROCEDURE — 99212 OFFICE O/P EST SF 10 MIN: CPT | Performed by: PHARMACIST

## 2024-04-22 PROCEDURE — 85610 PROTHROMBIN TIME: CPT | Mod: QW | Performed by: PHARMACIST

## 2024-04-22 RX ORDER — WARFARIN SODIUM 5 MG/1
TABLET ORAL
Qty: 65 TABLET | Refills: 1 | Status: SHIPPED | OUTPATIENT
Start: 2024-04-22

## 2024-04-22 NOTE — PROGRESS NOTES
Verified current dose with pt and that she held 1 dose at last visit.  No new meds or med changes since last visit.  Pt denies unusual bleed/bruise.  No upcoming procedures.  Inr = 3.1   Pt says she has checked once a week for the past few weeks and inr is 1.9  Continue same dose and check again in 3 weeks.  Pt and granddaughter will bring in their home machine to check their machine with ours      Pt needs refills - escribed to [facility] (Now called [facility])  Warfarin 5 mg tablet  Take 5 mg on Mon-Wed-Fri and 2.5 mg all other days or UD.  #65 (90 day) 1 rf  Dr. Alba

## 2024-05-07 ENCOUNTER — ANTICOAGULATION - WARFARIN VISIT (OUTPATIENT)
Dept: PHARMACY | Facility: CLINIC | Age: 84
End: 2024-05-07
Payer: MEDICARE

## 2024-05-07 DIAGNOSIS — I48.21 PERMANENT ATRIAL FIBRILLATION (MULTI): Primary | ICD-10-CM

## 2024-05-07 LAB
POC INR: 2.4
POC PROTHROMBIN TIME: NORMAL

## 2024-05-07 PROCEDURE — 99212 OFFICE O/P EST SF 10 MIN: CPT

## 2024-05-07 PROCEDURE — 85610 PROTHROMBIN TIME: CPT | Mod: QW

## 2024-05-07 NOTE — PROGRESS NOTES
Coumadin Clinic Visit Note    Patient brought in home meter to check against clinic meter  INR from home meter - 2.2  Patient verified warfarin dose  No missed doses  Patient hurt toe last night and it did bleed a bit   No changes to medications  Consistent dietary green intake  No anticipated procedures at this time  INR Therapeutic today at 2.4 (clinic meter)  No changes to warfarin dose today  Next appointment 4 weeks    Abida Edmond, Pharm D

## 2024-05-15 ENCOUNTER — APPOINTMENT (OUTPATIENT)
Dept: PHARMACY | Facility: CLINIC | Age: 84
End: 2024-05-15
Payer: MEDICARE

## 2024-06-14 ENCOUNTER — APPOINTMENT (OUTPATIENT)
Dept: PHARMACY | Facility: CLINIC | Age: 84
End: 2024-06-14
Payer: MEDICARE

## 2024-07-18 ENCOUNTER — APPOINTMENT (OUTPATIENT)
Dept: PHARMACY | Facility: CLINIC | Age: 84
End: 2024-07-18
Payer: MEDICARE

## 2024-07-30 ENCOUNTER — ANTICOAGULATION - WARFARIN VISIT (OUTPATIENT)
Dept: PHARMACY | Facility: CLINIC | Age: 84
End: 2024-07-30
Payer: MEDICARE

## 2024-07-30 DIAGNOSIS — I48.21 PERMANENT ATRIAL FIBRILLATION (MULTI): Primary | ICD-10-CM

## 2024-07-30 DIAGNOSIS — Z86.711 HISTORY OF PULMONARY EMBOLUS (PE): ICD-10-CM

## 2024-07-30 LAB
POC INR: 1.9
POC PROTHROMBIN TIME: NORMAL

## 2024-07-30 PROCEDURE — 99212 OFFICE O/P EST SF 10 MIN: CPT

## 2024-07-30 PROCEDURE — 85610 PROTHROMBIN TIME: CPT | Mod: QW

## 2024-07-30 NOTE — PROGRESS NOTES
Coumadin Clinic Visit Note    Patient still monitoring at home, since granddaughter is an RN. Patient was within range, until yesterday 1.7  Patient verified warfarin dose  No missed doses  No unusual bruising or bleeding - Patient had hemorrhoidal bleeding for a few days, but stopped on Saturday.  No changes to medications  Patient takes tylenol daily, but has been taking it consistently since being on warfarin.   Consistent dietary green intake  No anticipated procedures at this time  INR Subtherapeutic today at 1.9    Plan:   Boost warfarin dose today with 5 mg, instead of 2.5 mg  Next appointment 2 weeks      Dorinda Donnelly, PharmD

## 2024-08-14 ENCOUNTER — APPOINTMENT (OUTPATIENT)
Dept: PHARMACY | Facility: CLINIC | Age: 84
End: 2024-08-14
Payer: MEDICARE

## 2024-08-15 ENCOUNTER — APPOINTMENT (OUTPATIENT)
Dept: PHARMACY | Facility: CLINIC | Age: 84
End: 2024-08-15
Payer: MEDICARE

## 2024-08-15 ENCOUNTER — ANTICOAGULATION - WARFARIN VISIT (OUTPATIENT)
Dept: PHARMACY | Facility: CLINIC | Age: 84
End: 2024-08-15
Payer: MEDICARE

## 2024-08-15 DIAGNOSIS — I48.21 PERMANENT ATRIAL FIBRILLATION (MULTI): Primary | ICD-10-CM

## 2024-08-15 LAB
POC INR: 1.6
POC PROTHROMBIN TIME: NORMAL

## 2024-08-15 PROCEDURE — 85610 PROTHROMBIN TIME: CPT | Mod: QW | Performed by: PHARMACIST

## 2024-08-15 PROCEDURE — 99212 OFFICE O/P EST SF 10 MIN: CPT | Performed by: PHARMACIST

## 2024-08-15 NOTE — PROGRESS NOTES
Pt has home monitor and it was 1.6 last night.    Pr says she has not had any greens since she started coming here.  She has had maybe 1 or 2 pickles.  Takes every night at bedtime, no missed doses.    Verified current dose with pt.    No new meds since last visit.  Pt only changed from vit B 12 to B complex vitamin.  No black licorice, no caffeine, no tea, no herbals, no otc's.    Pt says no history of liver problems.  Non smoker.    Pt denies unusual bleed/bruise.  No upcoming procedures.  Inr today = 1.6  Increase dose by 10% starting today (Thurs) and check again in 2 weeks.

## 2024-09-04 ENCOUNTER — APPOINTMENT (OUTPATIENT)
Dept: PHARMACY | Facility: CLINIC | Age: 84
End: 2024-09-04
Payer: MEDICARE

## 2024-09-11 ENCOUNTER — APPOINTMENT (OUTPATIENT)
Dept: PHARMACY | Facility: CLINIC | Age: 84
End: 2024-09-11
Payer: MEDICARE

## 2024-09-12 ENCOUNTER — APPOINTMENT (OUTPATIENT)
Dept: PHARMACY | Facility: CLINIC | Age: 84
End: 2024-09-12
Payer: MEDICARE

## 2024-09-16 ENCOUNTER — APPOINTMENT (OUTPATIENT)
Dept: PHARMACY | Facility: CLINIC | Age: 84
End: 2024-09-16
Payer: MEDICARE

## 2024-09-18 ENCOUNTER — APPOINTMENT (OUTPATIENT)
Dept: PHARMACY | Facility: CLINIC | Age: 84
End: 2024-09-18
Payer: MEDICARE

## 2024-09-26 ENCOUNTER — ANTICOAGULATION - WARFARIN VISIT (OUTPATIENT)
Dept: PHARMACY | Facility: CLINIC | Age: 84
End: 2024-09-26
Payer: MEDICARE

## 2024-09-26 DIAGNOSIS — I48.21 PERMANENT ATRIAL FIBRILLATION (MULTI): Primary | ICD-10-CM

## 2024-09-26 LAB
POC INR: 2.4
POC PROTHROMBIN TIME: NORMAL

## 2024-09-26 PROCEDURE — 85610 PROTHROMBIN TIME: CPT | Mod: QW

## 2024-09-26 PROCEDURE — 99212 OFFICE O/P EST SF 10 MIN: CPT

## 2024-09-26 NOTE — PROGRESS NOTES
Coumadin Clinic Visit Note  Patient verified warfarin dose  No missed doses  No unusual bruising or bleeding  No changes to medications  Consistent dietary green intake  No anticipated procedures at this time  INR Therapeutic today at 2.4  No changes to warfarin dose today  Next appointment 4 weeks

## 2024-09-30 DIAGNOSIS — E03.9 HYPOTHYROIDISM, UNSPECIFIED: Primary | ICD-10-CM

## 2024-10-24 ENCOUNTER — APPOINTMENT (OUTPATIENT)
Dept: PHARMACY | Facility: CLINIC | Age: 84
End: 2024-10-24
Payer: MEDICARE

## 2024-12-27 ENCOUNTER — ANTICOAGULATION - WARFARIN VISIT (OUTPATIENT)
Dept: PHARMACY | Facility: CLINIC | Age: 84
End: 2024-12-27
Payer: MEDICARE

## 2024-12-27 DIAGNOSIS — I48.21 PERMANENT ATRIAL FIBRILLATION (MULTI): Primary | ICD-10-CM

## 2024-12-27 LAB
POC INR: 2
POC PROTHROMBIN TIME: NORMAL

## 2024-12-27 PROCEDURE — 99212 OFFICE O/P EST SF 10 MIN: CPT | Performed by: PHARMACIST

## 2024-12-27 PROCEDURE — 85610 PROTHROMBIN TIME: CPT | Mod: QW | Performed by: PHARMACIST

## 2024-12-27 NOTE — PROGRESS NOTES
Verified current dose with pt.    No new meds or med changes since last visit.  Pt denies unusual bleed/bruise.  No upcoming procedures.  No missed doses  Inr = 2  Pt says she has had 3 readings of 1.8 at home.  Her granddaughter told her she needs to have her dose increased.    Will go ahead and try a small increase   Continue same dose and check again in 3 weeks.

## 2025-01-13 ENCOUNTER — TELEPHONE (OUTPATIENT)
Dept: PHARMACY | Facility: CLINIC | Age: 85
End: 2025-01-13
Payer: MEDICARE

## 2025-01-13 NOTE — TELEPHONE ENCOUNTER
Pt calls.  She cannot get out of house because she is in wheelchair and ramp has too much ice.  She is checking inr at home and it has been 2.  She said she will call when she can come in again

## 2025-01-17 ENCOUNTER — APPOINTMENT (OUTPATIENT)
Dept: PHARMACY | Facility: CLINIC | Age: 85
End: 2025-01-17
Payer: MEDICARE

## 2025-02-07 ENCOUNTER — APPOINTMENT (OUTPATIENT)
Dept: PHARMACY | Facility: CLINIC | Age: 85
End: 2025-02-07
Payer: MEDICARE

## 2025-03-04 ENCOUNTER — TELEPHONE (OUTPATIENT)
Dept: PHARMACY | Facility: CLINIC | Age: 85
End: 2025-03-04
Payer: MEDICARE

## 2025-03-04 NOTE — TELEPHONE ENCOUNTER
Called pt to re-schedule appointment  We have not seen her since December 2024  No answer  Left a message for her to call back to make appt

## 2025-03-13 ENCOUNTER — APPOINTMENT (OUTPATIENT)
Dept: CARDIOLOGY | Facility: CLINIC | Age: 85
End: 2025-03-13
Payer: MEDICARE

## 2025-03-28 ENCOUNTER — ANTICOAGULATION - WARFARIN VISIT (OUTPATIENT)
Dept: PHARMACY | Facility: CLINIC | Age: 85
End: 2025-03-28
Payer: MEDICARE

## 2025-03-28 NOTE — PROGRESS NOTES
Pt has not been in since 12/27/24  We have attempted to call her and make appointments  Pt has not called back  Pt also checks inr at home and granddaughter is RN who helps her monitor her inr

## 2025-03-28 NOTE — LETTER
2025     Sage Alba, DO  [address]  [address]  [facility] [address]    Patient: Lillian Mendoza   YOB: 1940   Date of Visit: 3/28/2025   Patient Discharge Notice    Dr. Alba,          Date: 3/28/25    This notice is to inform you of your discharge from the Anticoagulation Clinic, where you received warfarin management.   The following patient is being discharged from the Anticoagulation Clinic due to: has not been seen since 2024    Patient Name Lillian Mendoza   MRN [id number]    1940   Last Visit Date 2024   Last known Warfarin Regimen 2.5 mg on Mon and Fri.  5 mg all other days   Last known INR 2   Thank you for allowing our team to assist in the management of your anticoagulation needs. Please let us know if your health care needs change in the future.     Sincerely,   Anticoagulation Clinic  [facility]

## 2025-07-02 DIAGNOSIS — I48.91 ATRIAL FIBRILLATION, UNSPECIFIED TYPE (MULTI): ICD-10-CM

## 2025-07-02 DIAGNOSIS — I26.99 PE (PULMONARY THROMBOEMBOLISM) (MULTI): ICD-10-CM

## 2025-07-02 DIAGNOSIS — Z86.711 HISTORY OF PULMONARY EMBOLUS (PE): ICD-10-CM

## 2025-07-02 DIAGNOSIS — I48.21 PERMANENT ATRIAL FIBRILLATION (MULTI): Primary | ICD-10-CM

## (undated) DEVICE — CATHETER, VTE-PPP

## (undated) DEVICE — SENSOR, ALAR, NASAL SPO2, ASSURANCE

## (undated) DEVICE — SYSTEM, FLOWSAVER, BLOOD RETURN

## (undated) DEVICE — Device

## (undated) DEVICE — POSITIONER, RETENTION SYSTEM, PANNUS, 2 PADS 1 STRAP, NS

## (undated) DEVICE — CATHETER, DIAG, EXPO, 5FR 110CM, PIG 145

## (undated) DEVICE — GUIDE WIRE, 260CM, HI-TORQUE, VERSACORE, MODIFIED J

## (undated) DEVICE — GUIDEWIRE, STRAIGHT, AMPLATZ SUPER STIFF ST-1, 0.035 IN X 260 CM

## (undated) DEVICE — SHEATH, PINNACLE, W/.038 GW 10CM, 5FR INTRODUCER, 2.5 CM DIALATOR

## (undated) DEVICE — SHEATH, INTRI24,  24FR

## (undated) DEVICE — DILATOR, VESSEL, COONS, 22FR X 20CM

## (undated) DEVICE — CATHETER, TRIEVER, THROMBECTOMY, 24FR   (PPP)